# Patient Record
Sex: MALE | Race: BLACK OR AFRICAN AMERICAN | NOT HISPANIC OR LATINO | Employment: FULL TIME | URBAN - METROPOLITAN AREA
[De-identification: names, ages, dates, MRNs, and addresses within clinical notes are randomized per-mention and may not be internally consistent; named-entity substitution may affect disease eponyms.]

---

## 2019-04-01 ENCOUNTER — TRANSFERRED RECORDS (OUTPATIENT)
Dept: HEALTH INFORMATION MANAGEMENT | Facility: CLINIC | Age: 44
End: 2019-04-01

## 2019-04-01 ENCOUNTER — HOSPITAL ENCOUNTER (INPATIENT)
Facility: CLINIC | Age: 44
LOS: 15 days | Discharge: HOME | DRG: 885 | End: 2019-04-17
Attending: PSYCHIATRY & NEUROLOGY | Admitting: PSYCHIATRY & NEUROLOGY
Payer: COMMERCIAL

## 2019-04-01 DIAGNOSIS — Z86.718 HISTORY OF DEEP VENOUS THROMBOSIS: ICD-10-CM

## 2019-04-01 DIAGNOSIS — Z21 ASYMPTOMATIC HUMAN IMMUNODEFICIENCY VIRUS (HIV) INFECTION STATUS (H): Primary | ICD-10-CM

## 2019-04-01 DIAGNOSIS — R45.851 SUICIDAL IDEATION: ICD-10-CM

## 2019-04-01 DIAGNOSIS — F14.24 COCAINE DEPENDENCE WITH COCAINE-INDUCED MOOD DISORDER (H): ICD-10-CM

## 2019-04-01 DIAGNOSIS — F33.1 MODERATE EPISODE OF RECURRENT MAJOR DEPRESSIVE DISORDER (H): ICD-10-CM

## 2019-04-01 LAB
AMPHETAMINES UR QL SCN: NEGATIVE
BARBITURATES UR QL: NEGATIVE
BENZODIAZ UR QL: NEGATIVE
CANNABINOIDS UR QL SCN: NEGATIVE
COCAINE UR QL: POSITIVE
ETHANOL UR QL SCN: NEGATIVE
OPIATES UR QL SCN: NEGATIVE

## 2019-04-01 PROCEDURE — 90791 PSYCH DIAGNOSTIC EVALUATION: CPT

## 2019-04-01 PROCEDURE — 99285 EMERGENCY DEPT VISIT HI MDM: CPT | Mod: 25 | Performed by: PSYCHIATRY & NEUROLOGY

## 2019-04-01 PROCEDURE — 99285 EMERGENCY DEPT VISIT HI MDM: CPT | Mod: Z6 | Performed by: PSYCHIATRY & NEUROLOGY

## 2019-04-01 PROCEDURE — 80307 DRUG TEST PRSMV CHEM ANLYZR: CPT | Performed by: PSYCHIATRY & NEUROLOGY

## 2019-04-01 PROCEDURE — 80320 DRUG SCREEN QUANTALCOHOLS: CPT | Performed by: PSYCHIATRY & NEUROLOGY

## 2019-04-01 RX ORDER — VALACYCLOVIR HYDROCHLORIDE 1 G/1
TABLET, FILM COATED ORAL
Status: ON HOLD | COMMUNITY
Start: 2018-08-13 | End: 2022-02-08

## 2019-04-01 RX ORDER — WARFARIN SODIUM 5 MG/1
TABLET ORAL
Status: ON HOLD | COMMUNITY
Start: 2019-02-07 | End: 2019-04-12

## 2019-04-01 ASSESSMENT — ENCOUNTER SYMPTOMS
NEUROLOGICAL NEGATIVE: 1
MUSCULOSKELETAL NEGATIVE: 1
HALLUCINATIONS: 1
DECREASED CONCENTRATION: 1
GASTROINTESTINAL NEGATIVE: 1
ENDOCRINE NEGATIVE: 1
CARDIOVASCULAR NEGATIVE: 1
HEMATOLOGIC/LYMPHATIC NEGATIVE: 1
ACTIVITY CHANGE: 1
NERVOUS/ANXIOUS: 1
EYES NEGATIVE: 1
RESPIRATORY NEGATIVE: 1
SLEEP DISTURBANCE: 1

## 2019-04-01 NOTE — ED NOTES
I have performed an in person assessment of the patient. Based on this assessment the patient no longer requires a one on one attendant at this point in time.    Frandy Gordon MD  5:23 PM  April 1, 2019         Frandy Gordon MD  04/01/19 2608

## 2019-04-02 PROBLEM — F32.A DEPRESSION: Status: ACTIVE | Noted: 2019-04-02

## 2019-04-02 LAB
ALBUMIN SERPL-MCNC: 2.8 G/DL (ref 3.4–5)
ALP SERPL-CCNC: 40 U/L (ref 40–150)
ALT SERPL W P-5'-P-CCNC: 14 U/L (ref 0–70)
ANION GAP SERPL CALCULATED.3IONS-SCNC: 6 MMOL/L (ref 3–14)
AST SERPL W P-5'-P-CCNC: 13 U/L (ref 0–45)
BASOPHILS # BLD AUTO: 0 10E9/L (ref 0–0.2)
BASOPHILS NFR BLD AUTO: 0.6 %
BILIRUB SERPL-MCNC: 0.2 MG/DL (ref 0.2–1.3)
BUN SERPL-MCNC: 14 MG/DL (ref 7–30)
CALCIUM SERPL-MCNC: 7.8 MG/DL (ref 8.5–10.1)
CHLORIDE SERPL-SCNC: 111 MMOL/L (ref 94–109)
CO2 SERPL-SCNC: 26 MMOL/L (ref 20–32)
CREAT SERPL-MCNC: 0.88 MG/DL (ref 0.66–1.25)
DIFFERENTIAL METHOD BLD: NORMAL
EOSINOPHIL # BLD AUTO: 0.2 10E9/L (ref 0–0.7)
EOSINOPHIL NFR BLD AUTO: 3.1 %
ERYTHROCYTE [DISTWIDTH] IN BLOOD BY AUTOMATED COUNT: 13.5 % (ref 10–15)
GFR SERPL CREATININE-BSD FRML MDRD: >90 ML/MIN/{1.73_M2}
GLUCOSE SERPL-MCNC: 98 MG/DL (ref 70–99)
HCT VFR BLD AUTO: 42.2 % (ref 40–53)
HGB BLD-MCNC: 14 G/DL (ref 13.3–17.7)
IMM GRANULOCYTES # BLD: 0 10E9/L (ref 0–0.4)
IMM GRANULOCYTES NFR BLD: 0.2 %
INR PPP: 1 (ref 0.86–1.14)
LYMPHOCYTES # BLD AUTO: 2.8 10E9/L (ref 0.8–5.3)
LYMPHOCYTES NFR BLD AUTO: 54.8 %
MCH RBC QN AUTO: 30.4 PG (ref 26.5–33)
MCHC RBC AUTO-ENTMCNC: 33.2 G/DL (ref 31.5–36.5)
MCV RBC AUTO: 92 FL (ref 78–100)
MONOCYTES # BLD AUTO: 0.4 10E9/L (ref 0–1.3)
MONOCYTES NFR BLD AUTO: 7.1 %
NEUTROPHILS # BLD AUTO: 1.7 10E9/L (ref 1.6–8.3)
NEUTROPHILS NFR BLD AUTO: 34.2 %
NRBC # BLD AUTO: 0 10*3/UL
NRBC BLD AUTO-RTO: 0 /100
PLATELET # BLD AUTO: 177 10E9/L (ref 150–450)
POTASSIUM SERPL-SCNC: 4 MMOL/L (ref 3.4–5.3)
PROT SERPL-MCNC: 6 G/DL (ref 6.8–8.8)
RBC # BLD AUTO: 4.61 10E12/L (ref 4.4–5.9)
SODIUM SERPL-SCNC: 143 MMOL/L (ref 133–144)
T4 FREE SERPL-MCNC: 0.89 NG/DL (ref 0.76–1.46)
TSH SERPL DL<=0.005 MIU/L-ACNC: 4.88 MU/L (ref 0.4–4)
WBC # BLD AUTO: 5.1 10E9/L (ref 4–11)

## 2019-04-02 PROCEDURE — 36415 COLL VENOUS BLD VENIPUNCTURE: CPT | Performed by: PSYCHIATRY & NEUROLOGY

## 2019-04-02 PROCEDURE — 99223 1ST HOSP IP/OBS HIGH 75: CPT | Mod: AI | Performed by: PSYCHIATRY & NEUROLOGY

## 2019-04-02 PROCEDURE — 80053 COMPREHEN METABOLIC PANEL: CPT | Performed by: PSYCHIATRY & NEUROLOGY

## 2019-04-02 PROCEDURE — 25000132 ZZH RX MED GY IP 250 OP 250 PS 637: Performed by: PSYCHIATRY & NEUROLOGY

## 2019-04-02 PROCEDURE — 85025 COMPLETE CBC W/AUTO DIFF WBC: CPT | Performed by: PSYCHIATRY & NEUROLOGY

## 2019-04-02 PROCEDURE — 85610 PROTHROMBIN TIME: CPT | Performed by: PSYCHIATRY & NEUROLOGY

## 2019-04-02 PROCEDURE — 84443 ASSAY THYROID STIM HORMONE: CPT | Performed by: PSYCHIATRY & NEUROLOGY

## 2019-04-02 PROCEDURE — 84439 ASSAY OF FREE THYROXINE: CPT | Performed by: PSYCHIATRY & NEUROLOGY

## 2019-04-02 PROCEDURE — 12400001 ZZH R&B MH UMMC

## 2019-04-02 RX ORDER — ACETAMINOPHEN 325 MG/1
650 TABLET ORAL EVERY 4 HOURS PRN
Status: DISCONTINUED | OUTPATIENT
Start: 2019-04-02 | End: 2019-04-17 | Stop reason: HOSPADM

## 2019-04-02 RX ORDER — TRAZODONE HYDROCHLORIDE 50 MG/1
50 TABLET, FILM COATED ORAL
Status: DISCONTINUED | OUTPATIENT
Start: 2019-04-02 | End: 2019-04-10

## 2019-04-02 RX ORDER — ALUMINA, MAGNESIA, AND SIMETHICONE 2400; 2400; 240 MG/30ML; MG/30ML; MG/30ML
30 SUSPENSION ORAL EVERY 4 HOURS PRN
Status: DISCONTINUED | OUTPATIENT
Start: 2019-04-02 | End: 2019-04-17 | Stop reason: HOSPADM

## 2019-04-02 RX ORDER — OLANZAPINE 10 MG/1
10 TABLET ORAL
Status: DISCONTINUED | OUTPATIENT
Start: 2019-04-02 | End: 2019-04-17 | Stop reason: HOSPADM

## 2019-04-02 RX ORDER — OLANZAPINE 10 MG/2ML
10 INJECTION, POWDER, FOR SOLUTION INTRAMUSCULAR
Status: DISCONTINUED | OUTPATIENT
Start: 2019-04-02 | End: 2019-04-17 | Stop reason: HOSPADM

## 2019-04-02 RX ORDER — BISACODYL 10 MG
10 SUPPOSITORY, RECTAL RECTAL DAILY PRN
Status: DISCONTINUED | OUTPATIENT
Start: 2019-04-02 | End: 2019-04-17 | Stop reason: HOSPADM

## 2019-04-02 RX ORDER — HYDROXYZINE HYDROCHLORIDE 25 MG/1
25 TABLET, FILM COATED ORAL EVERY 4 HOURS PRN
Status: DISCONTINUED | OUTPATIENT
Start: 2019-04-02 | End: 2019-04-17 | Stop reason: HOSPADM

## 2019-04-02 RX ORDER — SERTRALINE HYDROCHLORIDE 25 MG/1
25 TABLET, FILM COATED ORAL DAILY
Status: DISCONTINUED | OUTPATIENT
Start: 2019-04-02 | End: 2019-04-09

## 2019-04-02 RX ORDER — OLANZAPINE 10 MG/1
10 TABLET ORAL ONCE
Status: COMPLETED | OUTPATIENT
Start: 2019-04-02 | End: 2019-04-02

## 2019-04-02 RX ADMIN — ELVITEGRAVIR, COBICISTAT, EMTRICITABINE, AND TENOFOVIR ALAFENAMIDE 1 TABLET: 150; 150; 200; 10 TABLET ORAL at 12:45

## 2019-04-02 RX ADMIN — OLANZAPINE 10 MG: 10 TABLET, FILM COATED ORAL at 02:05

## 2019-04-02 RX ADMIN — SERTRALINE HYDROCHLORIDE 25 MG: 25 TABLET ORAL at 12:45

## 2019-04-02 RX ADMIN — WARFARIN SODIUM 12.5 MG: 2.5 TABLET ORAL at 19:38

## 2019-04-02 ASSESSMENT — MIFFLIN-ST. JEOR
SCORE: 2022.56
SCORE: 2011.67

## 2019-04-02 ASSESSMENT — ACTIVITIES OF DAILY LIVING (ADL)
RETIRED_EATING: 0-->INDEPENDENT
HYGIENE/GROOMING: INDEPENDENT
FALL_HISTORY_WITHIN_LAST_SIX_MONTHS: NO
LAUNDRY: WITH SUPERVISION
SWALLOWING: 0-->SWALLOWS FOODS/LIQUIDS WITHOUT DIFFICULTY
RETIRED_COMMUNICATION: 0-->UNDERSTANDS/COMMUNICATES WITHOUT DIFFICULTY
BATHING: 0-->INDEPENDENT
HYGIENE/GROOMING: INDEPENDENT;PROMPTS
DRESS: STREET CLOTHES;SCRUBS (BEHAVIORAL HEALTH)
HYGIENE/GROOMING: INDEPENDENT
DRESS: INDEPENDENT;SCRUBS (BEHAVIORAL HEALTH)
DRESS: 0-->INDEPENDENT
ORAL_HYGIENE: INDEPENDENT
DRESS: INDEPENDENT;SCRUBS (BEHAVIORAL HEALTH)
ORAL_HYGIENE: INDEPENDENT
TOILETING: 0-->INDEPENDENT
TRANSFERRING: 0-->INDEPENDENT
COGNITION: 0 - NO COGNITION ISSUES REPORTED
LAUNDRY: WITH SUPERVISION
AMBULATION: 0-->INDEPENDENT
ORAL_HYGIENE: INDEPENDENT

## 2019-04-02 NOTE — ED PROVIDER NOTES
"  History     Chief Complaint   Patient presents with     Suicidal     states \"I got laid off for 45 days and I am really feeling suicidal with a plan.\" \"I was just going to cut my wrist, the voice keeps telling me to just go ahead and do it.\"      Hallucinations     voices telling pt to cut himself.      HPI  Edi Sorenson is a 43 year old male who is here due to feeling suicidal and unsafe. Patient admits to history of cocaine dependence. He gets [place] through [facility] as he has HIV. He was seen at [facility] last July 2018 for suicidal thoughts. He was thinking of jumping in front of the light rail train. Patient stayed overnight in [facility] and was released to [facility] in [place]. He reports completing the treatment program and Aftercare. He worked and kept himself busy and managed to stay sober for 6 months. Patient reports losing his job 6 weeks ago. He felt his depression was not being managed and relapsed on cocaine. He reports spending all of his tax refund ($6000) and $4000 more over a 30 day cocaine binge. He last used last night. He now feels suicidal and reports having command hallucinations to kill himself. He would like admission. Patient denies using other drugs.    Please see DEC Crisis Assessment on 4/1/19 in Epic for further details.    PERSONAL MEDICAL HISTORY  Past Medical History:   Diagnosis Date     Asymptomatic human immunodeficiency virus (HIV) infection status (H)      PAST SURGICAL HISTORY  History reviewed. No pertinent surgical history.  FAMILY HISTORY  History reviewed. No pertinent family history.  SOCIAL HISTORY  Social History     Tobacco Use     Smoking status: Never Smoker     Smokeless tobacco: Never Used   Substance Use Topics     Alcohol use: Yes     MEDICATIONS  No current facility-administered medications for this encounter.      Current Outpatient Medications   Medication     GENVOYA [phone] mg tablet     valACYclovir (VALTREX) 1000 mg tablet     warfarin " (COUMADIN) 5 MG tablet     ALLERGIES  No Known Allergies      I have reviewed the Medications, Allergies, Past Medical and Surgical History, and Social History in the Epic system.    Review of Systems   Constitutional: Positive for activity change.   HENT: Negative.    Eyes: Negative.    Respiratory: Negative.    Cardiovascular: Negative.    Gastrointestinal: Negative.    Endocrine: Negative.    Genitourinary: Negative.    Musculoskeletal: Negative.    Skin: Negative.    Neurological: Negative.    Hematological: Negative.    Psychiatric/Behavioral: Positive for decreased concentration, hallucinations, sleep disturbance and suicidal ideas. The patient is nervous/anxious.    All other systems reviewed and are negative.      Physical Exam   BP: 139/79  Pulse: 76  Temp: 96.2  F (35.7  C)  Resp: 18  Weight: 65.8 kg (145 lb)  SpO2: 98 %      Physical Exam   Constitutional: He appears well-developed and well-nourished.   HENT:   Head: Normocephalic.   Eyes: Pupils are equal, round, and reactive to light.   Neck: Normal range of motion.   Cardiovascular: Normal rate.   Pulmonary/Chest: Effort normal.   Abdominal: Soft.   Musculoskeletal: Normal range of motion.   Neurological: He is alert.   Skin: Skin is warm.   Psychiatric: He has a normal mood and affect. His speech is normal and behavior is normal. Judgment normal. He is not agitated, not aggressive, not hyperactive, not actively hallucinating and not combative. Thought content is not paranoid. Cognition and memory are normal. He expresses suicidal ideation. He expresses no homicidal ideation. He expresses suicidal plans.   Nursing note and vitals reviewed.      ED Course        Procedures               Labs Ordered and Resulted from Time of ED Arrival Up to the Time of Departure from the ED   DRUG ABUSE SCREEN 6 CHEM DEP URINE ([facility])            Assessments & Plan (with Medical Decision Making)   Patient with cocaine dependence who feels acutely suicidal and is unsafe.  Patient is referred for admission. There are no available beds here. Intake is looking for other hospital availability. He will likely be spending overnight in the ED.    I have reviewed the nursing notes.    I have reviewed the findings, diagnosis, plan and need for follow up with the patient.       Medication List      There are no discharge medications for this visit.         Final diagnoses:   Cocaine dependence with cocaine-induced mood disorder (H)   Suicidal ideation       4/1/2019   [facility], [place], EMERGENCY DEPARTMENT     Joni Hernandez MD  04/01/19 [id number]

## 2019-04-02 NOTE — PROGRESS NOTES
Case Management note    Update 12:56pm: Writer met with patient and completed Personal Plan of Care and Initial Psychosocial assessment. Patient was anxious to complete assessment quickly as he reported being tired.  He said that he wanted to get stable while here and looked forward to going back to work after he was started on an anti-depressant.  As he was leaving he asked if I thought he could get in to [facility].  I asked if he was considering CD treatment because he didn't mention it before.  He said he didn't really know - that his head was still unclear and that he'd talk to me about it later.  -------------------------------------  Writer attempted to meet with patient to initiate psychosocial assessment.  Patient was sleeping and asked that writer attempt to meet with him this afternoon as he was quite somnolent.  Writer agreed and left.
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
## 2019-04-02 NOTE — CONSULTS
"Brief Medicine Note    Internal Medicine consulted ASAP today for \"new admission with history of blood clots not compliant with anticoagulants\" with request to \"eval and treat as appropriate.\" Attempted to meet with patient in consultation, however, he became agitated due to frequent interruptions/provider visits and declined interview/exam as he wished to sleep. He was agreeable to being seen by our team tomorrow, so will reattempt at that time.    He did agree to give me a brief history of his blood clots. Per his report, he was diagnosed with a DVT in 5/2018 at which time he was started on Coumadin with Lovenox bridging. He has remained on Coumadin since then, but has been non-compliant in recent weeks with INR 1.00 on admission. He reports his Coumadin dosing is typically increased as an OP when his INR is subtherapeutic and he has never been on Lovenox bridging aside from when the clot was initially diagnosed. He currently refuses Lovenox bridging, but is agreeable to resuming Coumadin. Pharmacy consulted for dosing, INR goal 2-3 per review of OSH records.    Discussed patient's refusal to see our team with RN staff. As noted above, will try to see patient again tomorrow if he is agreeable, but please page the on-call provider with any intercurrent medical issues which arise.    Louisa Lee PA-C  Hospitalist Service  [phone]    "
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
## 2019-04-02 NOTE — ED NOTES
"ED to Behavioral Floor Handoff    SITUATION  Edi Sorenson is a 43 year old male who speaks English and lives in a home alone The patient arrived in the ED by private car from home with a complaint of Suicidal (states \"I got laid off for 45 days and I am really feeling suicidal with a plan.\" \"I was just going to cut my wrist, the voice keeps telling me to just go ahead and do it.\" ) and Hallucinations (voices telling pt to cut himself. )  .The patient's current symptoms started/worsened 1 day(s) ago and during this time the symptoms have increased.   In the ED, pt was diagnosed with   Final diagnoses:   Cocaine dependence with cocaine-induced mood disorder (H)   Suicidal ideation        Initial vitals were: BP: 139/79  Pulse: 76  Temp: 96.2  F (35.7  C)  Resp: 18  Weight: 65.8 kg (145 lb)  SpO2: 98 %   --------  Is the patient diabetic? No   If yes, last blood glucose? --     If yes, was this treated in the ED? --  --------  Is the patient inebriated (ETOH) No or Impaired on other substances? No--utox + for cocaine  MSSA done? N/A  Last MSSA score: --    Were withdrawal symptoms treated? N/A  Does the patient have a seizure history? No. If yes, date of most recent seizure--  --------  Is the patient patient experiencing suicidal ideation? reports the following suicide factors: thoughts of jumping in front of light rail; hears command voices telling him to kill self    Homicidal ideation? denies current or recent homicidal ideation or behaviors.    Self-injurious behavior/urges? denies current or recent self injurious behavior or ideation.  ------  Was pt aggressive in the ED No  Was a code called No  Is the pt now cooperative? Yes  -------  Meds given in ED: Medications - No data to display   Family present during ED course? No  Family currently present? No    BACKGROUND  Does the patient have a cognitive impairment or developmental disability? No  Allergies: No Known Allergies.   Social demographics are "   Social History     Socioeconomic History     Marital status: Single     Spouse name: None     Number of children: None     Years of education: None     Highest education level: None   Occupational History     None   Social Needs     Financial resource strain: None     Food insecurity:     Worry: None     Inability: None     Transportation needs:     Medical: None     Non-medical: None   Tobacco Use     Smoking status: Never Smoker     Smokeless tobacco: Never Used   Substance and Sexual Activity     Alcohol use: Yes     Drug use: Yes     Types: Cocaine     Sexual activity: None   Lifestyle     Physical activity:     Days per week: None     Minutes per session: None     Stress: None   Relationships     Social connections:     Talks on phone: None     Gets together: None     Attends Hoahaoism service: None     Active member of club or organization: None     Attends meetings of clubs or organizations: None     Relationship status: None     Intimate partner violence:     Fear of current or ex partner: None     Emotionally abused: None     Physically abused: None     Forced sexual activity: None   Other Topics Concern     None   Social History Narrative     None        ASSESSMENT  Labs results   Labs Ordered and Resulted from Time of ED Arrival Up to the Time of Departure from the ED   DRUG ABUSE SCREEN 6 CHEM DEP URINE ([facility]) - Abnormal; Notable for the following components:       Result Value    Cocaine Qual Urine Positive (*)     All other components within normal limits      Imaging Studies: No results found for this or any previous visit (from the past 24 hour(s)).   Most recent vital signs /79   Pulse 76   Temp 96.2  F (35.7  C) ([place])   Resp 18   Wt 65.8 kg (145 lb)   SpO2 98%    Abnormal labs/tests/findings requiring intervention:---   Pain control: pt had none  Nausea control: pt had none    RECOMMENDATION  Are any infection precautions needed (MRSA, VRE, etc.)? No If yes, what infection? -- has  HIV  ---  Does the patient have mobility issues? independently. If yes, what device does the pt use? ---  ---  Is patient on 72 hour hold or commitment? No If on 72 hour hold, have hold and rights been given to patient? N/A  Are admitting orders written if after 10 p.m. ?N/A  Tasks needing to be completed:---     Bindu Crawford ; Aniyah Zambrano after 23:30  ascom-- [facility] [phone] [place] [facility]   [phone] [facility]

## 2019-04-02 NOTE — PROGRESS NOTES
04/02/19 0134   Patient Belongings   Did you bring any home meds/supplements to the hospital?  Yes   Disposition of meds  Sent to security/pharmacy per site process   Patient Belongings locker;sent to security per site process   Patient Belongings Put in Hospital Secure Location (Security or Locker, etc.) other (see comments)   Belongings Search Yes   Clothing Search Yes   Second Staff E. A-H.     In locker:  Hat  Phone  Pair of socks  Pair of pants  Shirt  Sweater  Pair of shoes  Belt  Set of keys  Wallet  Lighter  Jacket    To security:  Visa - 1967  [place] EBT - [phone]  [employer] gift card  [employer] gift card    A               Admission:  I am responsible for any personal items that are not sent to the safe or pharmacy.  [facility] is not responsible for loss, theft or damage of any property in my possession.    Signature:  _________________________________ Date: _______  Time: _____                                              Staff Signature:  ____________________________ Date: ________  Time: _____      2nd Staff person, if patient is unable/unwilling to sign:    Signature: ________________________________ Date: ________  Time: _____     Discharge:  [facility] has returned all of my personal belongings:    Signature: _________________________________ Date: ________  Time: _____                                          Staff Signature:  ____________________________ Date: ________  Time: _____

## 2019-04-02 NOTE — PHARMACY-ANTICOAGULATION SERVICE
Clinical Pharmacy - Warfarin Dosing Consult     Pharmacy has been consulted to manage this patient s warfarin therapy.  Indication: DVT/ PE Treatment  Therapy Goal: INR 2-3  Provider/Team: Day Lee  Warfarin Prior to Admission: Yes  Warfarin PTA Regimen: 12.5 mg daily  Significant drug interactions: Acetaminophen,sertaline  Recent documented change in oral intake/nutrition: Unknown    INR   Date Value Ref Range Status   04/02/2019 1.00 0.86 - 1.14 Final       Recommend warfarin 12.5 mg today.  Pharmacy will monitor Edi Sorenson daily and order warfarin doses to achieve specified goal.      Please contact pharmacy as soon as possible if the warfarin needs to be held for a procedure or if the warfarin goals change.

## 2019-04-02 NOTE — PLAN OF CARE
BEHAVIORAL TEAM DISCUSSION    Participants: Nader Ortiz MD, Leann Zavala RN, Aniyah SWAIN  Progress: New Admission  Continued Stay Criteria/Rationale: Evaluate and assess  Medical/Physical: Evaluate and assess  Precautions:   Behavioral Orders   Procedures    Code 1 - Restrict to Unit    Elopement precautions    Routine Programming     As clinically indicated    Sexual precautions    Status 15     Every 15 minutes.    Suicide precautions     Patients on Suicide Precautions should have a Combination Diet ordered that includes a Diet selection(s) AND a Behavioral Tray selection for Safe Tray - with utensils, or Safe Tray - NO utensils      Withdrawal precautions     Plan: Start anti depressant, evaluate and assess  Rationale for change in precautions or plan: New admit

## 2019-04-02 NOTE — PROGRESS NOTES
"CLINICAL NUTRITION SERVICES - ASSESSMENT NOTE     Nutrition Prescription    Malnutrition Status:    Non-severe malnutrition in the context of acute on chronic illness (MH)    Recommendations already ordered by Registered Dietitian (RD):  Continue regular diet    Future/Additional Recommendations:  If intake declines, recommend scheduling snack/supplement to help meet nutritional needs.      REASON FOR ASSESSMENT  Edi Sorenson is a/an 43 year old male assessed by the dietitian for Admission Nutrition Risk Screen for reduced oral intake over the last month    NUTRITION HISTORY  Pt presenting with severe depression and anxiety. Noted pt with heavy substance abuse PTA. Per discussion with pt, he has noticed some wt loss PTA, although he feels happy about the weight loss. Pt reports eating 2-3 small meals PTA, most often consuming instant ramen noodles.     CURRENT NUTRITION ORDERS  Diet: Regular  Intake/Tolerance: Pt reports having a good appetite and consuming 100% of his meals since admitted. Met with pt during lunch today and he ate 100% of a PB and J sandwich and skim milk. Pt states he is going to take the opportunity to optimize his nutritional status while admitted.     LABS  Labs reviewed    MEDICATIONS  Medications reviewed    ANTHROPOMETRICS  Height: 182.9 cm (6' 0\")  Most Recent Weight: 106.9 kg (235 lb 9.6 oz)    IBW: 80.9 kg (132%)  BMI: Obesity Grade I BMI 30-34.9  Weight History: Per documented wts, pt has lost 23 lbs (9%) of his body wt in ~ 2 months. This is clinically significant wt loss.    Weights per care everywhere:  117 kg (258 lb) on 2/7/19  117.9 kg (260 lb) on 9/27/18      Dosing Weight: 87 kg    ASSESSED NUTRITION NEEDS  Estimated Energy Needs: 1654-9215 kcals/day (25 - 30 kcals/kg)  Justification: Maintenance  Estimated Protein Needs:  grams protein/day (1 - 1.2 grams of pro/kg)  Justification: Repletion  Estimated Fluid Needs: 6718-4763 mL/day (1 mL/kcal)   Justification: " "Maintenance    PHYSICAL FINDINGS  See malnutrition section below.    MALNUTRITION  % Intake: < 75% for >/= 1 month (non-severe)  % Weight Loss: > 7.5% in 3 months (severe)  Subcutaneous Fat Loss: None observed  Muscle Loss: None observed  Fluid Accumulation/Edema: None noted  Malnutrition Diagnosis: Non-severe malnutrition in the context of acute on chronic illness (MH)    NUTRITION DIAGNOSIS  Predicted inadequate nutrient intake (protein-energy) related to variable appetite as evidenced by reliance on po intakes to meet estimated needs with potential for decline    INTERVENTIONS  Implementation  Nutrition education for nutrition relationship to health/disease - Discussed importance of adequate nutritional intake for overall health, and loss of LBM during times of quick (severe) wt loss. Encouraged pt to take a multivitamin to meet micronutrient needs and pt reports \"he will get that from food.\"     Goals  Patient to consume % of nutritionally adequate meal trays TID, or the equivalent with supplements/snacks.     Monitoring/Evaluation  Progress toward goals will be monitored and evaluated per protocol.    Irma Espinal RD, LD  Pager: [phone]    "

## 2019-04-03 LAB
ANION GAP SERPL CALCULATED.3IONS-SCNC: 7 MMOL/L (ref 3–14)
BUN SERPL-MCNC: 14 MG/DL (ref 7–30)
CALCIUM SERPL-MCNC: 8.2 MG/DL (ref 8.5–10.1)
CHLORIDE SERPL-SCNC: 109 MMOL/L (ref 94–109)
CO2 SERPL-SCNC: 27 MMOL/L (ref 20–32)
CREAT SERPL-MCNC: 0.92 MG/DL (ref 0.66–1.25)
GFR SERPL CREATININE-BSD FRML MDRD: >90 ML/MIN/{1.73_M2}
GLUCOSE SERPL-MCNC: 97 MG/DL (ref 70–99)
INR PPP: 0.99 (ref 0.86–1.14)
POTASSIUM SERPL-SCNC: 3.9 MMOL/L (ref 3.4–5.3)
SODIUM SERPL-SCNC: 143 MMOL/L (ref 133–144)

## 2019-04-03 PROCEDURE — 86359 T CELLS TOTAL COUNT: CPT | Performed by: PHYSICIAN ASSISTANT

## 2019-04-03 PROCEDURE — 87389 HIV-1 AG W/HIV-1&-2 AB AG IA: CPT | Performed by: PHYSICIAN ASSISTANT

## 2019-04-03 PROCEDURE — 99232 SBSQ HOSP IP/OBS MODERATE 35: CPT | Performed by: PSYCHIATRY & NEUROLOGY

## 2019-04-03 PROCEDURE — 25000132 ZZH RX MED GY IP 250 OP 250 PS 637: Performed by: PSYCHIATRY & NEUROLOGY

## 2019-04-03 PROCEDURE — 12400001 ZZH R&B MH UMMC

## 2019-04-03 PROCEDURE — 99222 1ST HOSP IP/OBS MODERATE 55: CPT | Performed by: PHYSICIAN ASSISTANT

## 2019-04-03 PROCEDURE — 36415 COLL VENOUS BLD VENIPUNCTURE: CPT | Performed by: PHYSICIAN ASSISTANT

## 2019-04-03 PROCEDURE — 25000132 ZZH RX MED GY IP 250 OP 250 PS 637: Performed by: PHYSICIAN ASSISTANT

## 2019-04-03 PROCEDURE — 80048 BASIC METABOLIC PNL TOTAL CA: CPT | Performed by: PSYCHIATRY & NEUROLOGY

## 2019-04-03 PROCEDURE — 86360 T CELL ABSOLUTE COUNT/RATIO: CPT | Performed by: PHYSICIAN ASSISTANT

## 2019-04-03 PROCEDURE — 99207 ZZC CONSULT E&M CHANGED TO INITIAL LEVEL: CPT | Performed by: PHYSICIAN ASSISTANT

## 2019-04-03 PROCEDURE — 85610 PROTHROMBIN TIME: CPT | Performed by: PSYCHIATRY & NEUROLOGY

## 2019-04-03 PROCEDURE — 36415 COLL VENOUS BLD VENIPUNCTURE: CPT | Performed by: PSYCHIATRY & NEUROLOGY

## 2019-04-03 RX ORDER — LACTOBACILLUS RHAMNOSUS GG 10B CELL
1 CAPSULE ORAL 2 TIMES DAILY
Status: DISCONTINUED | OUTPATIENT
Start: 2019-04-03 | End: 2019-04-17 | Stop reason: HOSPADM

## 2019-04-03 RX ADMIN — TRAZODONE HYDROCHLORIDE 50 MG: 50 TABLET ORAL at 20:29

## 2019-04-03 RX ADMIN — ELVITEGRAVIR, COBICISTAT, EMTRICITABINE, AND TENOFOVIR ALAFENAMIDE 1 TABLET: 150; 150; 200; 10 TABLET ORAL at 13:04

## 2019-04-03 RX ADMIN — HYDROXYZINE HYDROCHLORIDE 25 MG: 25 TABLET ORAL at 14:13

## 2019-04-03 RX ADMIN — SERTRALINE HYDROCHLORIDE 25 MG: 25 TABLET ORAL at 09:12

## 2019-04-03 RX ADMIN — Medication 1 CAPSULE: at 20:29

## 2019-04-03 RX ADMIN — WARFARIN SODIUM 12.5 MG: 10 TABLET ORAL at 17:25

## 2019-04-03 SDOH — HEALTH STABILITY: MENTAL HEALTH: HOW OFTEN DO YOU HAVE A DRINK CONTAINING ALCOHOL?: NEVER

## 2019-04-03 ASSESSMENT — ACTIVITIES OF DAILY LIVING (ADL)
HYGIENE/GROOMING: INDEPENDENT
ORAL_HYGIENE: INDEPENDENT
DRESS: INDEPENDENT
DRESS: SCRUBS (BEHAVIORAL HEALTH);INDEPENDENT
HYGIENE/GROOMING: INDEPENDENT
LAUNDRY: WITH SUPERVISION
LAUNDRY: WITH SUPERVISION
ORAL_HYGIENE: INDEPENDENT

## 2019-04-03 NOTE — PROGRESS NOTES
"Patient is currently hearing voices \"telling me I ain't shit,\" but denying SI/HI/SIB and stating \"it's better\" than when he was admitted. Low mood currently. Does not have any questions or needs at this time, anxious to get his medication.       04/03/19 1300   Behavioral Health   Hallucinations auditory  (voices telling patient he is worthless)   Thinking intact   Orientation person: oriented;place: oriented;date: oriented;time: oriented   Memory baseline memory   Insight insight appropriate to situation;insight appropriate to events   Judgement intact   Eye Contact at examiner   Affect tense;blunted, flat   Mood depressed   Physical Appearance/Attire attire appropriate to age and situation   Hygiene well groomed   Suicidality other (see comments)  (not currently--just voices telling him he \"ain't shit\")   1. Wish to be Dead No   2. Non-Specific Active Suicidal Thoughts  No   Self Injury other (see comment)  (no statements or behaviors)   Elopement (no statements or behaviors)   Activity withdrawn   Speech clear;coherent   Medication Sensitivity no stated side effects;no observed side effects   Psychomotor / Gait balanced;steady   Activities of Daily Living   Hygiene/Grooming independent   Oral Hygiene independent   Dress independent   Laundry with supervision   Room Organization independent   Julito Flowers on 4/3/2019 at 1:10 PM    "

## 2019-04-03 NOTE — PROGRESS NOTES
"When this writer tried to ask pt some questions in the am; pt responded, \"I just want to sleep\". Later pt was a bit more receptive; he rated his depression an \"8-9\" and his anxiety a \"9\". Pt denies hallucinations, SI, and SIB thoughts.  Pt slept all day, but did get up for meals.       04/02/19 1449   Behavioral Health   Thinking distractable;poor concentration   Orientation time: oriented;date: oriented;place: oriented;person: oriented   Memory baseline memory   Insight poor   Judgement impaired   Eye Contact out of corner of eyes   Affect blunted, flat   Mood depressed;anxious   Physical Appearance/Attire appears stated age;untidy   Hygiene (fair)   Suicidality (denies)   1. Wish to be Dead No   2. Non-Specific Active Suicidal Thoughts  No   Self Injury (denies)   Elopement (WDL) WDL   Activity isolative;withdrawn   Speech coherent;clear   Medication Sensitivity no stated side effects;no observed side effects   Psychomotor / Gait steady;balanced;slow   Coping/Psychosocial   Verbalized Emotional State anxiety;depression   Safety   Suicidality Status 15;Room close to team care station (desk)   Elopement status 15;room away from unit doors   Activities of Daily Living   Hygiene/Grooming independent;prompts   Oral Hygiene independent   Dress independent;scrubs (behavioral health)   Laundry with supervision   Room Organization independent     "

## 2019-04-03 NOTE — PROGRESS NOTES
[facility], [place]   Psychiatric Progress Note  Hospital Day: 1        Interim History:   The patient's care was discussed with the treatment team during the daily team meeting and/or staff's chart notes were reviewed.  Staff report patient has been isolative, coming out mostly for food.    Upon interview, the patient reports he is feeling down. Ongoing suicidal thoughts. More ambivalent about CD treatment at this time.    Psychiatric Symptoms: depression, suicidal thoughts    Medication side effects reported: mild nausea    Other issues reported by patient: none         Medications:       Gberssi-Lcdbc-Qsefqnwu-TenofAF  1 tablet Oral Daily with breakfast     lactobacillus rhamnosus (GG)  1 capsule Oral BID     sertraline  25 mg Oral Daily     warfarin  12.5 mg Oral ONCE at 18:00          Allergies:   No Known Allergies       Labs:     Recent Results (from the past 48 hour(s))   Drug abuse screen 6 urine (tox)    Collection Time: 04/01/19  9:20 PM   Result Value Ref Range    Amphetamine Qual Urine Negative NEG^Negative    Barbiturates Qual Urine Negative NEG^Negative    Benzodiazepine Qual Urine Negative NEG^Negative    Cannabinoids Qual Urine Negative NEG^Negative    Cocaine Qual Urine Positive (A) NEG^Negative    Ethanol Qual Urine Negative NEG^Negative    Opiates Qualitative Urine Negative NEG^Negative   Comprehensive metabolic panel    Collection Time: 04/02/19  8:04 AM   Result Value Ref Range    Sodium 143 133 - 144 mmol/L    Potassium 4.0 3.4 - 5.3 mmol/L    Chloride 111 (H) 94 - 109 mmol/L    Carbon Dioxide 26 20 - 32 mmol/L    Anion Gap 6 3 - 14 mmol/L    Glucose 98 70 - 99 mg/dL    Urea Nitrogen 14 7 - 30 mg/dL    Creatinine 0.88 0.66 - 1.25 mg/dL    GFR Estimate >90 >60 mL/min/[1.73_m2]    GFR Estimate If Black >90 >60 mL/min/[1.73_m2]    Calcium 7.8 (L) 8.5 - 10.1 mg/dL    Bilirubin Total 0.2 0.2 - 1.3 mg/dL    Albumin 2.8 (L) 3.4 - 5.0 g/dL    Protein Total 6.0 (L) 6.8 -  8.8 g/dL    Alkaline Phosphatase 40 40 - 150 U/L    ALT 14 0 - 70 U/L    AST 13 0 - 45 U/L   INR    Collection Time: 04/02/19  8:04 AM   Result Value Ref Range    INR 1.00 0.86 - 1.14   TSH with free T4 reflex and/or T3 as indicated    Collection Time: 04/02/19  8:04 AM   Result Value Ref Range    TSH 4.88 (H) 0.40 - 4.00 mU/L   CBC with platelets differential    Collection Time: 04/02/19  8:04 AM   Result Value Ref Range    WBC 5.1 4.0 - 11.0 10e9/L    RBC Count 4.61 4.4 - 5.9 10e12/L    Hemoglobin 14.0 13.3 - 17.7 g/dL    Hematocrit 42.2 40.0 - 53.0 %    MCV 92 78 - 100 fl    MCH 30.4 26.5 - 33.0 pg    MCHC 33.2 31.5 - 36.5 g/dL    RDW 13.5 10.0 - 15.0 %    Platelet Count 177 150 - 450 10e9/L    Diff Method Automated Method     % Neutrophils 34.2 %    % Lymphocytes 54.8 %    % Monocytes 7.1 %    % Eosinophils 3.1 %    % Basophils 0.6 %    % Immature Granulocytes 0.2 %    Nucleated RBCs 0 0 /100    Absolute Neutrophil 1.7 1.6 - 8.3 10e9/L    Absolute Lymphocytes 2.8 0.8 - 5.3 10e9/L    Absolute Monocytes 0.4 0.0 - 1.3 10e9/L    Absolute Eosinophils 0.2 0.0 - 0.7 10e9/L    Absolute Basophils 0.0 0.0 - 0.2 10e9/L    Abs Immature Granulocytes 0.0 0 - 0.4 10e9/L    Absolute Nucleated RBC 0.0    T4 free    Collection Time: 04/02/19  8:04 AM   Result Value Ref Range    T4 Free 0.89 0.76 - 1.46 ng/dL   Basic metabolic panel    Collection Time: 04/03/19  7:23 AM   Result Value Ref Range    Sodium 143 133 - 144 mmol/L    Potassium 3.9 3.4 - 5.3 mmol/L    Chloride 109 94 - 109 mmol/L    Carbon Dioxide 27 20 - 32 mmol/L    Anion Gap 7 3 - 14 mmol/L    Glucose 97 70 - 99 mg/dL    Urea Nitrogen 14 7 - 30 mg/dL    Creatinine 0.92 0.66 - 1.25 mg/dL    GFR Estimate >90 >60 mL/min/[1.73_m2]    GFR Estimate If Black >90 >60 mL/min/[1.73_m2]    Calcium 8.2 (L) 8.5 - 10.1 mg/dL   INR    Collection Time: 04/03/19  7:23 AM   Result Value Ref Range    INR 0.99 0.86 - 1.14          Psychiatric Examination:     /86   Pulse 80    Temp 96.8  F (36  C) (Tympanic)   Resp 16   Ht 1.829 m (6')   Wt 106.9 kg (235 lb 9.6 oz)   SpO2 98%   BMI 31.95 kg/m    Weight is 235 lbs 9.6 oz  Body mass index is 31.95 kg/m .    Orthostatic Vitals       Most Recent      Sitting Orthostatic /80 04/03 0801    Sitting Orthostatic Pulse (bpm) 68 04/03 0801    Standing Orthostatic /79 04/03 0801    Standing Orthostatic Pulse (bpm) 91 04/03 0801            Appearance: awake, alert, adequately groomed and dressed in hospital scrubs  Attitude:  cooperative  Eye Contact:  good  Mood:  depressed  Affect:  appropriate and in normal range and mood congruent  Speech:  clear, coherent and normal prosody  Language: fluent and intact in English  Psychomotor, Gait, Musculoskeletal:  no evidence of tardive dyskinesia, dystonia, or tics and intact station, gait and muscle tone  Throught Process:  logical, linear and goal oriented  Associations:  no loose associations  Thought Content:  no evidence of psychotic thought and passive suicidal ideation present  Insight:  good  Judgement:  intact  Oriented to:  time, person, and place  Attention Span and Concentration:  intact  Recent and Remote Memory:  intact  Fund of Knowledge:  appropriate    Clinical Global Impressions  First:  Considering your total clinical experience with this particular patient population, how severe are the patient's symptoms at this time?: 7 (04/02/19 1022)  Compared to the patient's condition at the START of treatment, this patient's condition is:: 4 (04/02/19 1022)  Most recent:  Considering your total clinical experience with this particular patient population, how severe are the patient's symptoms at this time?: 7 (04/02/19 1022)  Compared to the patient's condition at the START of treatment, this patient's condition is:: 4 (04/02/19 1022)           Precautions:     Behavioral Orders   Procedures     Code 1 - Restrict to Unit     Elopement precautions     Routine Programming     As  clinically indicated     Sexual precautions     Status 15     Every 15 minutes.     Suicide precautions     Patients on Suicide Precautions should have a Combination Diet ordered that includes a Diet selection(s) AND a Behavioral Tray selection for Safe Tray - with utensils, or Safe Tray - NO utensils       Withdrawal precautions          Diagnoses:      1.  Major depressive disorder, recurrent, severe without psychotic features.   2.  Stimulant use disorder, severe, cocaine type.     3.  HIV positive.   4.  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              Assessment & Plan:       Target psychiatric symptoms and interventions:  1. Depression  -continue with sertraline 25 mg and titrate as tolerated and needed    2. Substance use disorder  -refer for CD treatment    Medical Problems and Treatments:  History of DVT  -management per internal medicine      Behavioral/Psychological/Social:  Encourage unit programming        Disposition Plan   Reason for ongoing admission: poses an imminent risk to self  Discharge location: Chemical dependency treatment facility  Discharge Medications: not ordered  Follow-up Appointments: not scheduled  Legal Status: voluntary  Entered by: Nader Ortiz on 4/3/2019 at 4:52 PM

## 2019-04-03 NOTE — CONSULTS
[facility], [place]  Consult Note - Hospitalist Service       Date of Admission: 4/1/2019  Consult Requested by: Dr. Ortiz  Reason for Consult: Hx of blood clots, non-compliant with anticoagulants.    Assessment & Plan   Edi Sorenson is a 43 year old male with a history of HIV, DVTs (last 6/2018), genital HSV, cocaine abuse, and depression who is admitted to inpatient behavioral health with auditory hallucinations, SI, and HI.    MDD with SI, HI, and Auditory Hallucinations - Not on any PTA medications.   - Management per Psychiatry.    Hx of DVTs - 2 prior DVTs reported by patient, last in 6/2018 with US showing occlusive thrombus from R posterior tibial vein into proximal femoral vein. On Coumadin PTA managed by [facility] anticoagulation clinic with non-compliance for a few weeks PTA. Most recent INR 0.99 on 4/3.  - Coumadin per pharmacy dosing. INR goal 2-3.  - Declined Lovenox bridging     HIV - On Genvoya PTA with non-compliance for a few weeks PTA. Last HIV Quant <48 in 9/2018 and absolute CD4 count 1,122 in 10/2016. Follows with Dr. Puentes of [facility] ID, last visit 9/2018.   - PTA Genvoya resumed by Psychiatry on admission  - Check HIV Quant and CD4 count given non-compliance with antiretrovirals PTA   - Follow up with ID as scheduled in 5/2019    Genital HSV - On Valtrex 1000 mg BID PRN for flares PTA. Currently asymptomatic.   - If concern for outbreak this admission, start Valtrex per PTA dosing    Cocaine Abuse - Using 7-8 grams daily PTA via snorting or smoking. Denies any hx of IVDU.  - Management per Psychiatry.    Intermittent Loose Stools - Since inpatient behavioral health admission. Suspect d/t cocaine withdrawal vs medications. No nausea, vomiting, or abdominal pain. Having formed stools as well. Low suspicion for infectious etiology given afebrile with normal WBC and lack of associated GI symptoms.  - Start daily probiotic  - Notify Medicine if worsening diarrhea,  fever, or new GI symptoms develop    Subclinical Hypothyroidism - TSH 4.88 (H), FT4 0.89 (WNL) on 4/2/19. No hx of thyroid disease. Likely sick euthyroid syndrome in setting of acute psychiatric illness. Asymptomatic.   - Repeat TFTs per PCP in 6-8 weeks.    Medicine will follow up on HIV Quant and CD4 count.    Louisa Lee PA-C  Hospitalist Service  [phone]  _____________________________________________________________________    Chief Complaint   Auditory Hallucinations, Suicidal Ideation, Homicidal Ideation    History is obtained from the patient and electronic health record    History of Present Illness   Edi Sorenson is a 43 year old male with a history of HIV, DVTs (last 6/2018), genital HSV, cocaine abuse, and depression who is admitted to inpatient behavioral health with auditory hallucinations and suicidal/homicidal ideations. Internal Medicine was consulted due to history of DVTs with anticoagulation non-compliance PTA. Mr. Sorenson reports he has had 2 prior DVTs the most recent of which was in 6/2018 at which time he was started on Coumadin. He has remained on Coumadin since then which is [place] [facility] [place], but reports non-compliance for a few weeks PTA. Denies any history of PEs.     He reports using 7-8 grams of cocaine daily PTA; typical modality of use is via smoking or snorting. Denies any IVDU. Currently, he endorses intermittent loose stools since inpatient behavioral health admission and occasional chronic dry cough which is unchanged from baseline. Denies fevers, rhinorrhea, sore throat, chest pain, SOB, n/v/c, abdominal pain, or dysuria.    Review of Systems   The 10 point Review of Systems is negative other than noted in the HPI or here.     Past Medical History    I have reviewed this patient's medical history and updated it with pertinent information if needed.   Past Medical History:   Diagnosis Date     Asymptomatic human immunodeficiency virus (HIV)  infection status (H)      Cocaine abuse (H)      Depression      DVT (deep venous thrombosis) (H)     Last in 6/2018. No hx of PE. On Coumadin.     Genital HSV        Past Surgical History   I have reviewed this patient's surgical history and updated it with pertinent information if needed.  History reviewed. No pertinent surgical history.    Social History   I have reviewed this patient's social history and updated it with pertinent information if needed.  Social History     Tobacco Use     Smoking status: Former Smoker     Smokeless tobacco: Never Used   Substance Use Topics     Alcohol use: No     Frequency: Never     Drug use: Yes     Types: Cocaine       Family History   I have reviewed this patient's family history and updated it with pertinent information if needed.   History reviewed. No pertinent family history.    Medications   Medications Prior to Admission   Medication Sig Dispense Refill Last Dose     GENVOYA [id number] mg tablet Take 1 tablet by mouth   4/1/2019 at Unknown time     valACYclovir (VALTREX) 1000 mg tablet Take 1 tablet twice daily as needed for flares of herpetic lesions.   Past Month at Unknown time     warfarin (COUMADIN) 5 MG tablet Take 2 and 1/2 tablets (12.5 mg) daily.  Repeat INR in one week.   More than a month at Unknown time       Allergies   No Known Allergies    Physical Exam   Vital Signs: Temp: 96.8  F (36  C) Temp src: Tympanic       Resp: 16        Weight: 235 lbs 9.6 oz    General: Awake. Non-toxic appearing. NAD.  HEENT: NC/AT. Anicteric sclera. Mucous membranes moist.  CV: RRR. S1,S2. No appreciable murmurs.  Respiratory: Normal effort on RA. Lungs CTAB without wheezes, rales, or rhonchi.  GI: Soft, non-tender, and non-distended with bowel sounds present.  Extremities: Warm and well perfused. No peripheral edema.   Neuro: Alert. Answers questions appropriately. Moves all extremities.   Skin: No rashes or jaundice on exposed areas of skin.    Data   Most Recent  CBC:  Recent Labs   Lab Test 04/02/19  0804   WBC 5.1   HGB 14.0   MCV 92        Most Recent BMP's:  Recent Labs   Lab Test 04/03/19  0723 04/02/19  0804    143   POTASSIUM 3.9 4.0   CHLORIDE 109 111*   CO2 27 26   BUN 14 14   CR 0.92 0.88   ANIONGAP 7 6   VERNA 8.2* 7.8*   GLC 97 98     Most Recent LFT's:  Recent Labs   Lab Test 04/02/19  0804   AST 13   ALT 14   ALKPHOS 40   BILITOTAL 0.2     Most Recent INR's:  Recent Labs   Lab Test 04/03/19  0723 04/02/19  0804   INR 0.99 1.00

## 2019-04-03 NOTE — PROGRESS NOTES
Case Management Note    Writer met with patient to complete Rule 25 assessment.  The pt reported he had done a Rule 25 assessment immediately preceding his hospitalization here with Beatriz at the [facility] ([phone]//fax [phone]).  Patient signed SYLVAIN and this was sent along with a request for the original Rule 25 were requested by fax.  Spoke with RN Leelee tai at [facility] with patient present and requested original Rule 25.  She said she will convey to [facility] assessors Beatriz and Renard that a request for the assessment be sent to our facility    Referred to [facility] [phone]-they will review.  [facility] to follow up with them on referral    [facility] [phone]/fax [phone] in [facility] event [facility]  Sent SYLVAIN and Rule 25 update.  [facility] to follow up with them on referral    Sent funding documents to [facility] (for [facility]).  [facility] to follow up with them ([phone]/fax [phone])
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
## 2019-04-03 NOTE — PROGRESS NOTES
04/02/19 2223   Behavioral Health   Hallucinations denies / not responding to hallucinations   Thinking poor concentration   Orientation time: oriented;date: oriented;place: oriented   Memory baseline memory   Insight insight appropriate to events   Judgement impaired   Eye Contact at examiner   Affect blunted, flat   Mood mood is calm   Physical Appearance/Attire attire appropriate to age and situation   Hygiene neglected grooming - unclean body, hair, teeth   Suicidality (denied )   1. Wish to be Dead No   2. Non-Specific Active Suicidal Thoughts  No   Self Injury (denied )   Elopement (denied )   Speech (denied )   Psychomotor / Gait steady;balanced   Psycho Education   Type of Intervention 1:1 intervention   Response participates, initiates socially appropriate   Hours 0.5   Activities of Daily Living   Hygiene/Grooming independent   Oral Hygiene independent   Dress street clothes;scrubs (behavioral health)   Laundry with supervision   Room Organization independent   Activity   Activity Assistance Provided independent     Pt was calm and cooperative with blunted mood affect, isolative, withdrawn, and minimally social. Pt stayed in his room for the majority part of the shift, otherwise he comes out for requests, medication and meals. Pt denied all mental health symptoms including SI/SIB. Pt's appetite was good and sleeping and napping well. No behavioral issues. No major concerns.

## 2019-04-04 LAB
CD3 CELLS # BLD: 1735 CELLS/UL (ref 603–2990)
CD3 CELLS NFR BLD: 90 % (ref 49–84)
CD3+CD4+ CELLS # BLD: 827 CELLS/UL (ref 441–2156)
CD3+CD4+ CELLS NFR BLD: 43 % (ref 28–63)
CD3+CD4+ CELLS/CD3+CD8+ CLL BLD: 0.93 % (ref 1.4–2.6)
CD3+CD8+ CELLS # BLD: 878 CELLS/UL (ref 125–1312)
CD3+CD8+ CELLS NFR BLD: 46 % (ref 10–40)
IFC SPECIMEN: ABNORMAL
INR PPP: 1.13 (ref 0.86–1.14)

## 2019-04-04 PROCEDURE — 25000132 ZZH RX MED GY IP 250 OP 250 PS 637: Performed by: PHYSICIAN ASSISTANT

## 2019-04-04 PROCEDURE — 25000132 ZZH RX MED GY IP 250 OP 250 PS 637: Performed by: PSYCHIATRY & NEUROLOGY

## 2019-04-04 PROCEDURE — 85610 PROTHROMBIN TIME: CPT | Performed by: PHYSICIAN ASSISTANT

## 2019-04-04 PROCEDURE — 12400001 ZZH R&B MH UMMC

## 2019-04-04 PROCEDURE — 99233 SBSQ HOSP IP/OBS HIGH 50: CPT | Performed by: PSYCHIATRY & NEUROLOGY

## 2019-04-04 PROCEDURE — H2032 ACTIVITY THERAPY, PER 15 MIN: HCPCS

## 2019-04-04 PROCEDURE — 36415 COLL VENOUS BLD VENIPUNCTURE: CPT | Performed by: PHYSICIAN ASSISTANT

## 2019-04-04 RX ORDER — VALACYCLOVIR HYDROCHLORIDE 1 G/1
1000 TABLET, FILM COATED ORAL 2 TIMES DAILY
Status: DISCONTINUED | OUTPATIENT
Start: 2019-04-04 | End: 2019-04-08

## 2019-04-04 RX ADMIN — MAGNESIUM HYDROXIDE 30 ML: 400 SUSPENSION ORAL at 21:34

## 2019-04-04 RX ADMIN — OLANZAPINE 10 MG: 10 TABLET, FILM COATED ORAL at 00:36

## 2019-04-04 RX ADMIN — Medication 1 CAPSULE: at 20:05

## 2019-04-04 RX ADMIN — OLANZAPINE 10 MG: 10 TABLET, FILM COATED ORAL at 21:34

## 2019-04-04 RX ADMIN — ELVITEGRAVIR, COBICISTAT, EMTRICITABINE, AND TENOFOVIR ALAFENAMIDE 1 TABLET: 150; 150; 200; 10 TABLET ORAL at 12:58

## 2019-04-04 RX ADMIN — Medication 1 CAPSULE: at 08:26

## 2019-04-04 RX ADMIN — VALACYCLOVIR HYDROCHLORIDE 1000 MG: 1 TABLET, FILM COATED ORAL at 21:33

## 2019-04-04 RX ADMIN — WARFARIN SODIUM 15 MG: 10 TABLET ORAL at 18:14

## 2019-04-04 RX ADMIN — SERTRALINE HYDROCHLORIDE 25 MG: 25 TABLET ORAL at 08:26

## 2019-04-04 ASSESSMENT — ACTIVITIES OF DAILY LIVING (ADL)
LAUNDRY: WITH SUPERVISION
DRESS: INDEPENDENT
ORAL_HYGIENE: INDEPENDENT
HYGIENE/GROOMING: INDEPENDENT

## 2019-04-04 ASSESSMENT — MIFFLIN-ST. JEOR: SCORE: 1999.88

## 2019-04-04 NOTE — PROGRESS NOTES
Re: Discharge planning      Writer received call from Patricia with [facility] Rule 25. Patient has [employer] PMAP and is not eligible for [facility] funding.    Tammi Kelley, LPCC, LADC

## 2019-04-04 NOTE — PROGRESS NOTES
04/03/19 2022   Behavioral Health   Hallucinations auditory   Thinking intact   Orientation person: oriented;place: oriented;date: oriented;time: oriented   Memory baseline memory   Insight insight appropriate to situation;insight appropriate to events   Judgement intact   Eye Contact at examiner   Affect blunted, flat   Mood depressed;mood is calm   Physical Appearance/Attire attire appropriate to age and situation;appears younger than stated age   Hygiene well groomed   Suicidality other (see comments)  (not this shift)   1. Wish to be Dead No   2. Non-Specific Active Suicidal Thoughts  No   3. Active Sucidal Ideation with any Methods (Not Plan) Without Intent to Act  No   4. Active Suicidal Ideation with Some Intent to Act, Without Specific Plan  No   5. Active Suicidal Ideation with Specific Plan and Intent  No   Enviromental Risk Factors None   Self Injury other (see comment)  (denies)   Elopement (no statements or behaviors)   Activity isolative;withdrawn   Speech clear;coherent   Medication Sensitivity no stated side effects;no observed side effects   Psychomotor / Gait steady;balanced   Daily Care   Activity up ad shawn   Patient Performed Hygiene other (see comments)  (none observed this shift)   Activities of Daily Living   Hygiene/Grooming independent   Oral Hygiene independent   Dress scrubs (behavioral health);independent   Laundry with supervision   Room Organization independent   Activity   Activity Assistance Provided independent   Hygiene Care Assistance   Oral Care other (see comments)     Pt has been sleeping most of the day. Pt has left room for two snacks as well as supper. Pt spent some time in lounge watching television but has kept to themselves. Affect is blunted. Pt admits to audio hallucinations, but says they are reduced since taking meds. Pt denies anxiety and states that depression is low and motioned to the floor as if the depression was barely off the ground. Pt shows good insight  into their care and is glad to be in the hospital until they feel safe being alone. Patient denies all SI/SIB.

## 2019-04-04 NOTE — PROGRESS NOTES
Re: [employer]    Writer spoke with Ashanti in intake. Patient confirmed address on file. He was instructed to call [employer] and update them on his address as well. Patient given [employer] phone and his ID number.    VANESSA Verdin, SHARRONC

## 2019-04-04 NOTE — PROGRESS NOTES
[facility], [place]   Psychiatric Progress Note  Hospital Day: 2        Interim History:   The patient's care was discussed with the treatment team during the daily team meeting and/or staff's chart notes were reviewed.  Staff reports that patient was requesting valtrex for an outbreak. Does not qualify for [facility] funding due to having active [facility] insurance.    Upon interview, the patient states he is still down. He is committed to sobriety. He becomes tearful when discussing his depression. No medication side effects.    Psychiatric Symptoms: depression, suicidal thoughts, negative self-thoughts    Medication side effects reported: none    Other issues reported by patient: herpes outbreak         Medications:       Rhglkmf-Saxwa-Zupmtemo-TenofAF  1 tablet Oral Daily with breakfast     lactobacillus rhamnosus (GG)  1 capsule Oral BID     sertraline  25 mg Oral Daily     valACYclovir  1,000 mg Oral BID          Allergies:   No Known Allergies       Labs:     Recent Results (from the past 48 hour(s))   Basic metabolic panel    Collection Time: 04/03/19  7:23 AM   Result Value Ref Range    Sodium 143 133 - 144 mmol/L    Potassium 3.9 3.4 - 5.3 mmol/L    Chloride 109 94 - 109 mmol/L    Carbon Dioxide 27 20 - 32 mmol/L    Anion Gap 7 3 - 14 mmol/L    Glucose 97 70 - 99 mg/dL    Urea Nitrogen 14 7 - 30 mg/dL    Creatinine 0.92 0.66 - 1.25 mg/dL    GFR Estimate >90 >60 mL/min/[1.73_m2]    GFR Estimate If Black >90 >60 mL/min/[1.73_m2]    Calcium 8.2 (L) 8.5 - 10.1 mg/dL   INR    Collection Time: 04/03/19  7:23 AM   Result Value Ref Range    INR 0.99 0.86 - 1.14   T cell subset profile    Collection Time: 04/03/19  3:11 PM   Result Value Ref Range    IFC Specimen Blood     CD3 Mature T 90 (H) 49 - 84 %    CD4 Nelson T 43 28 - 63 %    CD8 Suppressor T 46 (H) 10 - 40 %    CD4:CD8 Ratio 0.93 (L) 1.40 - 2.60    Absolute CD3 1,735 603 - 2,990 cells/uL    Absolute CD4 827 441 - 2,156 cells/uL     Absolute CD8 878 125 - 1,312 cells/uL   INR    Collection Time: 04/04/19  8:04 AM   Result Value Ref Range    INR 1.13 0.86 - 1.14          Psychiatric Examination:     BP (!) 127/93   Pulse 71   Temp 97.8  F (36.6  C) (Tympanic)   Resp 16   Ht 1.829 m (6')   Wt 105.7 kg (233 lb)   SpO2 98%   BMI 31.60 kg/m    Weight is 233 lbs 0 oz  Body mass index is 31.6 kg/m .    Orthostatic Vitals       Most Recent      Sitting Orthostatic /92 04/04 0822    Sitting Orthostatic Pulse (bpm) 69 04/04 0822    Standing Orthostatic /90 04/04 0822    Standing Orthostatic Pulse (bpm) 93 04/04 0822            Appearance: awake, alert, adequately groomed and dressed in hospital scrubs  Attitude:  cooperative  Eye Contact:  good  Mood:  depressed  Affect:  mood congruent  Speech:  clear, coherent and normal prosody  Language: fluent and intact in English  Psychomotor, Gait, Musculoskeletal:  no evidence of tardive dyskinesia, dystonia, or tics and intact station, gait and muscle tone  Throught Process:  logical, linear and goal oriented  Associations:  no loose associations  Thought Content:  no evidence of psychotic thought and passive suicidal ideation present  Insight:  good  Judgement:  intact  Oriented to:  time, person, and place  Attention Span and Concentration:  intact  Recent and Remote Memory:  intact  Fund of Knowledge:  appropriate    Clinical Global Impressions  First:  Considering your total clinical experience with this particular patient population, how severe are the patient's symptoms at this time?: 7 (04/02/19 1022)  Compared to the patient's condition at the START of treatment, this patient's condition is:: 4 (04/02/19 1022)  Most recent:  Considering your total clinical experience with this particular patient population, how severe are the patient's symptoms at this time?: 7 (04/02/19 1022)  Compared to the patient's condition at the START of treatment, this patient's condition is:: 4 (04/02/19  1022)           Precautions:     Behavioral Orders   Procedures     Code 1 - Restrict to Unit     Elopement precautions     Routine Programming     As clinically indicated     Sexual precautions     Status 15     Every 15 minutes.     Suicide precautions     Patients on Suicide Precautions should have a Combination Diet ordered that includes a Diet selection(s) AND a Behavioral Tray selection for Safe Tray - with utensils, or Safe Tray - NO utensils       Withdrawal precautions          Diagnoses:      1.  Major depressive disorder, recurrent, severe without psychotic features.   2.  Stimulant use disorder, severe, cocaine type.     3.  HIV positive.   4.  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   5.  Herpes oubreak           Assessment & Plan:       Target psychiatric symptoms and interventions:  1. Depression  -continue with sertraline 25 mg and titrate as tolerated and needed    2. Substance use disorder  -refer for CD treatment    Medical Problems and Treatments:  History of DVT  -management per internal medicine    Herpes outbreak  -start Valtrex 1000 mg BID    Behavioral/Psychological/Social:  Encourage unit programming        Disposition Plan   Reason for ongoing admission: poses an imminent risk to self  Discharge location: Chemical dependency treatment facility  Discharge Medications: not ordered  Follow-up Appointments: not scheduled  Legal Status: voluntary  Entered by: Nader Ortiz on 4/4/2019 at 1:51 PM

## 2019-04-04 NOTE — PROGRESS NOTES
Patient is calm and cooperative. He has been in his room most of the day and says he is just really sleepy. He did come out for meals but was not very social with others. He says that he is just feeling kind of down and wants to sleep. He has denied SI/SIB and rated his anxiety and depression each at 5. He has good concentration and a flat affect. He denies hearing voices. He has no other concerns.

## 2019-04-05 LAB
HIV 1+2 AB+HIV1 P24 AG SERPL QL IA: REACTIVE
INR PPP: 1.31 (ref 0.86–1.14)

## 2019-04-05 PROCEDURE — 25000132 ZZH RX MED GY IP 250 OP 250 PS 637: Performed by: PSYCHIATRY & NEUROLOGY

## 2019-04-05 PROCEDURE — 85610 PROTHROMBIN TIME: CPT | Performed by: PHYSICIAN ASSISTANT

## 2019-04-05 PROCEDURE — 36415 COLL VENOUS BLD VENIPUNCTURE: CPT | Performed by: PHYSICIAN ASSISTANT

## 2019-04-05 PROCEDURE — 12400001 ZZH R&B MH UMMC

## 2019-04-05 PROCEDURE — 25000132 ZZH RX MED GY IP 250 OP 250 PS 637: Performed by: PHYSICIAN ASSISTANT

## 2019-04-05 RX ADMIN — Medication 1 CAPSULE: at 08:22

## 2019-04-05 RX ADMIN — SERTRALINE HYDROCHLORIDE 25 MG: 25 TABLET ORAL at 08:22

## 2019-04-05 RX ADMIN — TRAZODONE HYDROCHLORIDE 50 MG: 50 TABLET ORAL at 21:36

## 2019-04-05 RX ADMIN — VALACYCLOVIR HYDROCHLORIDE 1000 MG: 1 TABLET, FILM COATED ORAL at 21:36

## 2019-04-05 RX ADMIN — VALACYCLOVIR HYDROCHLORIDE 1000 MG: 1 TABLET, FILM COATED ORAL at 08:22

## 2019-04-05 RX ADMIN — Medication 1 CAPSULE: at 21:36

## 2019-04-05 RX ADMIN — ELVITEGRAVIR, COBICISTAT, EMTRICITABINE, AND TENOFOVIR ALAFENAMIDE 1 TABLET: 150; 150; 200; 10 TABLET ORAL at 13:05

## 2019-04-05 RX ADMIN — HYDROXYZINE HYDROCHLORIDE 25 MG: 25 TABLET ORAL at 21:36

## 2019-04-05 RX ADMIN — WARFARIN SODIUM 15 MG: 10 TABLET ORAL at 17:52

## 2019-04-05 ASSESSMENT — ACTIVITIES OF DAILY LIVING (ADL)
DRESS: INDEPENDENT
LAUNDRY: WITH SUPERVISION
ORAL_HYGIENE: INDEPENDENT
LAUNDRY: WITH SUPERVISION
HYGIENE/GROOMING: INDEPENDENT
ORAL_HYGIENE: INDEPENDENT
DRESS: STREET CLOTHES;SCRUBS (BEHAVIORAL HEALTH)
HYGIENE/GROOMING: INDEPENDENT

## 2019-04-05 NOTE — PROGRESS NOTES
Brief Medicine Note    Edi Sorenson is a 43 year old male with a history of HIV, DVTs (last 6/2018), genital HSV, cocaine abuse, and depression who is admitted to station 10 with auditory hallucinations, SI, and HI. Medicine following for HIV.    HIV - On Genvoya with non-compliance for a few weeks PTA. Last HIV Quant <48 in 9/2018. Absolute CD4 827 on 4/3/19. Follows with Dr. Puentes at [facility], last visit 9/2018.   - PTA Genvoya resumed by Psychiatry on admission  - HIV Quant incorrectly ordered on 4/3. Defer repeat lab draw to obtain given normal CD4 count and would be more beneficial to obtain in OP setting after period of compliance with ant-retroviral therapy.  - Follow up with ID as scheduled in 5/2019    Medicine will sign off. Please page the on-call FRIEDA for any intercurrent medical issues which arise.    Louisa Lee PA-C  Hospitalist Service  [phone]

## 2019-04-05 NOTE — PROGRESS NOTES
Pt was much more visible in the milieu this evening, but was still relatively flat upon approach. Pt was then visited by his girlfriend and seemed extremely exuberant after and elated. Pt was admitted after a cocaine binge and had started hearing voices as a result. Pt has been denying voices for a few days, although after this visit was then endorsing them and asked for a PRN zyprexa as the voices he was hearing internally were bothersome to him. It is recommended that staff keep a close eye on their encounters in the future.     Pt is eating well, denying SI, HI, and is only mildly anxious and depressed.

## 2019-04-05 NOTE — PLAN OF CARE
"Illness Management Recovery model:  Taking Action.    Patient identified the following actions they can take when early warning signs are present in order to prevent relapse:    1. \"I will pray.\"  2. \"I will network.\"  3. \"I will persevere.\"     "

## 2019-04-05 NOTE — PLAN OF CARE
"\"I'm about the same.  My medications might be starting to kick in.\"  Indicates depression is the same, rating this at a 9 on a scale with 10 being the worst.  Anxiety remains the same at a 7 on the same scale.  Denies suicidal ideation \"at the moment\", wish to be dead or self injurious thoughts.  States voices continue, but \"they are more mild\".  States focus and concentration continue to be poor, racing and ruminative thoughts continue \"sometimes\".  Sleep has improved, attributing this to \"sleeping medication\". Feels medications in general are helping, denies medication side effects.  When asked about going to CD treatment indicates \"I'm hopeful, I want to go.\"  Has had previous treatment and been able to maintain some sobriety in the past.  [facility] will work with a psychiatric medication provider for medication management.  \"I just want to stay here until I'm better.\"  Indicated this morning would attend groups today. After breakfast returned to bed to rest and has remained there until awakened by this staff at 1130.  "

## 2019-04-06 LAB — INR PPP: 1.86 (ref 0.86–1.14)

## 2019-04-06 PROCEDURE — 85610 PROTHROMBIN TIME: CPT | Performed by: PHYSICIAN ASSISTANT

## 2019-04-06 PROCEDURE — 36415 COLL VENOUS BLD VENIPUNCTURE: CPT | Performed by: PHYSICIAN ASSISTANT

## 2019-04-06 PROCEDURE — 25000132 ZZH RX MED GY IP 250 OP 250 PS 637: Performed by: PSYCHIATRY & NEUROLOGY

## 2019-04-06 PROCEDURE — G0177 OPPS/PHP; TRAIN & EDUC SERV: HCPCS

## 2019-04-06 PROCEDURE — 25000132 ZZH RX MED GY IP 250 OP 250 PS 637: Performed by: PHYSICIAN ASSISTANT

## 2019-04-06 PROCEDURE — 12400001 ZZH R&B MH UMMC

## 2019-04-06 RX ADMIN — WARFARIN SODIUM 12.5 MG: 10 TABLET ORAL at 17:59

## 2019-04-06 RX ADMIN — OLANZAPINE 10 MG: 10 TABLET, FILM COATED ORAL at 21:46

## 2019-04-06 RX ADMIN — ELVITEGRAVIR, COBICISTAT, EMTRICITABINE, AND TENOFOVIR ALAFENAMIDE 1 TABLET: 150; 150; 200; 10 TABLET ORAL at 12:53

## 2019-04-06 RX ADMIN — SERTRALINE HYDROCHLORIDE 25 MG: 25 TABLET ORAL at 08:28

## 2019-04-06 RX ADMIN — VALACYCLOVIR HYDROCHLORIDE 1000 MG: 1 TABLET, FILM COATED ORAL at 08:28

## 2019-04-06 RX ADMIN — HYDROXYZINE HYDROCHLORIDE 25 MG: 25 TABLET ORAL at 08:29

## 2019-04-06 RX ADMIN — VALACYCLOVIR HYDROCHLORIDE 1000 MG: 1 TABLET, FILM COATED ORAL at 21:45

## 2019-04-06 RX ADMIN — Medication 1 CAPSULE: at 08:29

## 2019-04-06 RX ADMIN — Medication 1 CAPSULE: at 21:45

## 2019-04-06 ASSESSMENT — ACTIVITIES OF DAILY LIVING (ADL)
HYGIENE/GROOMING: INDEPENDENT
DRESS: INDEPENDENT
ORAL_HYGIENE: INDEPENDENT
DRESS: SCRUBS (BEHAVIORAL HEALTH)
HYGIENE/GROOMING: INDEPENDENT
ORAL_HYGIENE: INDEPENDENT
LAUNDRY: WITH SUPERVISION
LAUNDRY: WITH SUPERVISION

## 2019-04-06 NOTE — PROGRESS NOTES
[facility] and was social and interactive with both staff and peers. Pt attended and participated in O.T group. Pt denies anxiety, depression, paranoia, hallucination, and SI/SIB/HI. Pt ate meals and had no behavioral issue. Nothing to add.          04/06/19 1501   Behavioral Health   Hallucinations denies / not responding to hallucinations   Thinking poor concentration   Orientation person: oriented;place: oriented;date: oriented;time: oriented   Memory baseline memory   Insight admits / accepts;insight appropriate to situation;insight appropriate to events   Judgement impaired   Eye Contact at examiner   Affect full range affect   Mood mood is calm   Physical Appearance/Attire attire appropriate to age and situation;appears stated age   Hygiene well groomed   Suicidality other (see comments)  (Denies)   1. Wish to be Dead No   2. Non-Specific Active Suicidal Thoughts  No   Self Injury other (see comment)  (Denies)   Elopement (None)   Activity other (see comment)  (Social and interactive)   Speech clear;coherent   Medication Sensitivity no stated side effects;no observed side effects   Psychomotor / Gait balanced;steady   Activities of Daily Living   Hygiene/Grooming independent   Oral Hygiene independent   Dress scrubs (behavioral health)   Laundry with supervision   Room Organization independent

## 2019-04-06 NOTE — PROGRESS NOTES
"   04/05/19 2128   Behavioral Health   Hallucinations auditory   Thinking intact   Orientation time: oriented;date: oriented;place: oriented   Memory baseline memory   Insight insight appropriate to situation   Judgement impaired   Eye Contact at examiner   Affect blunted, flat   Mood mood is calm;depressed   Physical Appearance/Attire attire appropriate to age and situation   Hygiene neglected grooming - unclean body, hair, teeth   Suicidality (denied )   1. Wish to be Dead No   2. Non-Specific Active Suicidal Thoughts  No   Self Injury (denied )   Elopement (denied )   Activity withdrawn;isolative   Speech coherent;clear   Medication Sensitivity no stated side effects   Psychomotor / Gait balanced;steady   Psycho Education   Type of Intervention 1:1 intervention   Response participates, initiates socially appropriate   Hours 0.5   Activities of Daily Living   Hygiene/Grooming independent   Oral Hygiene independent   Dress street clothes;scrubs (behavioral health)   Laundry with supervision   Room Organization independent   Activity   Activity Assistance Provided independent     Pt was calm, cooperative, polite and appreciative with blunted mood affect. Pt was visibile in milieu for the majority part of the shift watching tv and minimally socializing with selective peers. Pt's wife and kids were visited and he was delightful and appreciative about the visit. Pt endorsed 8/10 depression, 7/10 anxiety, and denied SI/SIB ideation. Pt remains experiencing some auditory hallucinations, but gradually \"decreasing\". Pt stated the medication seems to be working. Pt's appetite was good and sleeping and napping well. No behavioral issues. No major concerns from the pt.      "

## 2019-04-06 NOTE — PLAN OF CARE
OT General Care Plan  OT Goal 1  Description  Will develop insightful ideas for coping strategies and identify symptoms of when using them would be beneficial.    4/6/2019 1045 by Lisa Ortega, OT  Note  Attended OT group. He was active in participating in an activity focused on identifying helpful ideas for calming using the 5 senses, and practicing a grounding technique with this focus. He initiated comments, socialized and seemed more focused as the session progressed. He stated feeling more comfortable and relaxed at the end of the group, felt able to re focus thoughts on choice chosen when reminded. He identified multiple ideas of sensory input that is helpful for him and talked at length of the closeness he feels with his Mom and how she has been an important part in his life. OT goals will be set with additional attendance.

## 2019-04-07 LAB — INR PPP: 2.06 (ref 0.86–1.14)

## 2019-04-07 PROCEDURE — 25000132 ZZH RX MED GY IP 250 OP 250 PS 637: Performed by: PHYSICIAN ASSISTANT

## 2019-04-07 PROCEDURE — 25000132 ZZH RX MED GY IP 250 OP 250 PS 637: Performed by: PSYCHIATRY & NEUROLOGY

## 2019-04-07 PROCEDURE — 36415 COLL VENOUS BLD VENIPUNCTURE: CPT | Performed by: PHYSICIAN ASSISTANT

## 2019-04-07 PROCEDURE — 85610 PROTHROMBIN TIME: CPT | Performed by: PHYSICIAN ASSISTANT

## 2019-04-07 PROCEDURE — H2032 ACTIVITY THERAPY, PER 15 MIN: HCPCS

## 2019-04-07 PROCEDURE — 12400001 ZZH R&B MH UMMC

## 2019-04-07 RX ADMIN — HYDROXYZINE HYDROCHLORIDE 25 MG: 25 TABLET ORAL at 20:26

## 2019-04-07 RX ADMIN — WARFARIN SODIUM 12.5 MG: 10 TABLET ORAL at 18:12

## 2019-04-07 RX ADMIN — Medication 1 CAPSULE: at 20:22

## 2019-04-07 RX ADMIN — TRAZODONE HYDROCHLORIDE 50 MG: 50 TABLET ORAL at 20:26

## 2019-04-07 RX ADMIN — SERTRALINE HYDROCHLORIDE 25 MG: 25 TABLET ORAL at 08:32

## 2019-04-07 RX ADMIN — VALACYCLOVIR HYDROCHLORIDE 1000 MG: 1 TABLET, FILM COATED ORAL at 08:32

## 2019-04-07 RX ADMIN — VALACYCLOVIR HYDROCHLORIDE 1000 MG: 1 TABLET, FILM COATED ORAL at 20:22

## 2019-04-07 RX ADMIN — ELVITEGRAVIR, COBICISTAT, EMTRICITABINE, AND TENOFOVIR ALAFENAMIDE 1 TABLET: 150; 150; 200; 10 TABLET ORAL at 12:46

## 2019-04-07 RX ADMIN — MAGNESIUM HYDROXIDE 30 ML: 400 SUSPENSION ORAL at 14:39

## 2019-04-07 RX ADMIN — Medication 1 CAPSULE: at 08:32

## 2019-04-07 ASSESSMENT — ACTIVITIES OF DAILY LIVING (ADL)
HYGIENE/GROOMING: INDEPENDENT
ORAL_HYGIENE: INDEPENDENT
DRESS: INDEPENDENT
LAUNDRY: UNABLE TO COMPLETE

## 2019-04-07 NOTE — PROGRESS NOTES
04/07/19 1350   Behavioral Health   Hallucinations denies / not responding to hallucinations   Thinking intact   Orientation person: oriented;place: oriented;date: oriented;time: oriented   Memory baseline memory   Insight insight appropriate to situation   Judgement intact   Eye Contact at examiner   Affect full range affect   Mood mood is calm   Physical Appearance/Attire attire appropriate to age and situation   Hygiene well groomed   Suicidality   (pt denied)   Self Injury   (none observed)   Elopement   (none observed)   Activity   (visible in the milieu; social with others)   Speech clear;coherent   Medication Sensitivity no stated side effects;no observed side effects   Psychomotor / Gait balanced;steady   Psycho Education   Type of Intervention structured groups   Response participates, initiates socially appropriate   Hours 1   Activities of Daily Living   Hygiene/Grooming independent   Oral Hygiene independent   Dress independent   Laundry unable to complete   Room Organization independent     Pt had a good shift and was very cooperative. He was visible in the milieu, and social with others. Pt denied thoughts of SI and SIB, and feelings of anxiety or depression.

## 2019-04-07 NOTE — PROGRESS NOTES
"Pt had a calm shift.  He spent time out in the milieu watching TV with staff and peers.  Pt ate meals so his appetite was reported as \"OK.\"  He reported attending groups.  Pt reported having depression as a 9 and his anxiety at a 6.  His paranoid thoughts were \"not right now,\" has agitation, irritable, sad, and his concentration is \"I concentrate.\"  Pt reports not thoughts of SI or SIB, no HI or VI, sleep is horrible with \"balls bouncing at all hours of the night, girl screaming and being loud.\"  ADL's are independent.       04/06/19 2134   Behavioral Health   Hallucinations denies / not responding to hallucinations   Thinking intact;distractable   Orientation person: oriented;place: oriented;date: oriented;time: oriented   Memory baseline memory   Insight insight appropriate to situation;insight appropriate to events   Judgement intact   Affect full range affect;other (see comments)  (calm affect at times as well)   Mood mood is calm;anxious   Physical Appearance/Attire attire appropriate to age and situation   Hygiene well groomed   Suicidality other (see comments)  (pt denies)   1. Wish to be Dead No   Wish to be Dead Description pt denies   2. Non-Specific Active Suicidal Thoughts  No   Non-Specific Active Suicidal Thought Description pt denies   Activities of Daily Living   Hygiene/Grooming independent   Oral Hygiene independent   Dress independent   Laundry with supervision   Room Organization independent     "

## 2019-04-08 LAB — INR PPP: 2.2 (ref 0.86–1.14)

## 2019-04-08 PROCEDURE — G0177 OPPS/PHP; TRAIN & EDUC SERV: HCPCS

## 2019-04-08 PROCEDURE — 25000132 ZZH RX MED GY IP 250 OP 250 PS 637: Performed by: PHYSICIAN ASSISTANT

## 2019-04-08 PROCEDURE — 25000132 ZZH RX MED GY IP 250 OP 250 PS 637: Performed by: PSYCHIATRY & NEUROLOGY

## 2019-04-08 PROCEDURE — 85610 PROTHROMBIN TIME: CPT | Performed by: PHYSICIAN ASSISTANT

## 2019-04-08 PROCEDURE — 99232 SBSQ HOSP IP/OBS MODERATE 35: CPT | Performed by: PSYCHIATRY & NEUROLOGY

## 2019-04-08 PROCEDURE — 25000132 ZZH RX MED GY IP 250 OP 250 PS 637

## 2019-04-08 PROCEDURE — 99207 ZZC CDG-MDM COMPONENT: MEETS MODERATE - UP CODED: CPT | Performed by: PSYCHIATRY & NEUROLOGY

## 2019-04-08 PROCEDURE — 12400001 ZZH R&B MH UMMC

## 2019-04-08 PROCEDURE — 36415 COLL VENOUS BLD VENIPUNCTURE: CPT | Performed by: PHYSICIAN ASSISTANT

## 2019-04-08 RX ADMIN — Medication 1 CAPSULE: at 08:51

## 2019-04-08 RX ADMIN — TRAZODONE HYDROCHLORIDE 50 MG: 50 TABLET ORAL at 20:35

## 2019-04-08 RX ADMIN — VALACYCLOVIR HYDROCHLORIDE 1000 MG: 1 TABLET, FILM COATED ORAL at 08:51

## 2019-04-08 RX ADMIN — ELVITEGRAVIR, COBICISTAT, EMTRICITABINE, AND TENOFOVIR ALAFENAMIDE 1 TABLET: 150; 150; 200; 10 TABLET ORAL at 13:04

## 2019-04-08 RX ADMIN — VALACYCLOVIR HYDROCHLORIDE 1000 MG: 1 TABLET, FILM COATED ORAL at 20:32

## 2019-04-08 RX ADMIN — WARFARIN SODIUM 12.5 MG: 10 TABLET ORAL at 18:15

## 2019-04-08 RX ADMIN — SERTRALINE HYDROCHLORIDE 25 MG: 25 TABLET ORAL at 08:51

## 2019-04-08 RX ADMIN — Medication 1 CAPSULE: at 20:32

## 2019-04-08 ASSESSMENT — ACTIVITIES OF DAILY LIVING (ADL)
ORAL_HYGIENE: INDEPENDENT
DRESS: STREET CLOTHES
LAUNDRY: WITH SUPERVISION
HYGIENE/GROOMING: INDEPENDENT
HYGIENE/GROOMING: INDEPENDENT
DRESS: STREET CLOTHES
ORAL_HYGIENE: INDEPENDENT

## 2019-04-08 NOTE — PROGRESS NOTES
Patient is calm and cooperative. He currently denies SI, SIB. He is nervously optimistic about receiving further treatment for addiction. He reports improved mood which attributes to medications.        04/08/19 1353   Behavioral Health   Hallucinations denies / not responding to hallucinations   Thinking intact   Orientation person: oriented;place: oriented;date: oriented;time: oriented   Memory baseline memory   Insight admits / accepts   Judgement intact   Eye Contact at examiner   Affect full range affect   Mood mood is calm   Physical Appearance/Attire attire appropriate to age and situation   Hygiene   (adequate)   1. Wish to be Dead No   2. Non-Specific Active Suicidal Thoughts  No   Self Injury   (denies)   Elopement   (none indicated)   Activity   (active)   Speech clear;coherent   Medication Sensitivity no stated side effects;no observed side effects   Psychomotor / Gait balanced;steady   Activities of Daily Living   Hygiene/Grooming independent   Oral Hygiene independent   Dress street clothes   Room Organization independent

## 2019-04-08 NOTE — PLAN OF CARE
"48 Hour Nursing Assessment    Met with Edi this evening. Stated his goal today was to go to group. He attended art therapy group this afternoon. Pt denies AH, SI/SIB. Denies feeling depressed or anxious. Affect is full range, mood is calm. States he still has cravings to use, plans to work on his 12 step program, \"I am still thinking about going to treatment\" \"I feel good about being in the hospital right now. In the past I have been impatient, wanting to leave the hospital before I am ready. I'm grateful that my work has given me 30-45 days leave, I feel that is what I need\"    Pt continues on suicide, sexual, and withdrawal precautions. Continues on warfarin therapy. Will continue to monitor pt's progress.  "

## 2019-04-08 NOTE — PROGRESS NOTES
Occupational Therapy Initial Assessment      Description: OT staff met with pt to review the role of occupational therapy and explain the value of having them involved in their treatment plan including options to meet current needs/self-identified goals. The below evaluation is a compilation of chart review, functional performance observation, and information obtained from an OT self-assessment.      Pt reported a daily routine to include employment.        04/06/19 1000   General Information   Date Initially Attended OT 04/06/19   Special Considerations see note   Clinical Impression   Affect Appropriate to situation;Fluctuates;Excessive, manic   Orientation Oriented to person, place and time   Appearance and ADLs General cleanliness observed in most areas   Attention to Internal Stimuli No observed signs   Interaction Skills Interacts appropriately with staff;Initiates appropriately with staff;Interacts appropriately with peers;Initiates appropriately with peers   Ability to Communicate Needs Independent   Verbal Content Clear;Appropriate to topic   Ability to Maintain Boundaries Accepts and maintains boundaries with one cue   Participation Participates with minimal encouragement   Concentration Concentrates 30+ minutes   Ability to Concentrate With structure;With refocus   Follows and Comprehends Directions Independently follows 2 step verbal directions   Memory Delayed and immediate recall intact   Organization Independently organizes medium tasks   Decision Making Independent   Planning and Problem Solving Occasionally needs assist/feedback   Ability to Apply and Learn Concepts Comprehends concepts, but needs assist to apply   Frustrations / Stress Tolerance Independently identifies skills   (Sleep or walks [place] the lake)   Level of Insight Some insight  (Stressors/triggers: finances )   Self Esteem Can identify positives;Takes risks with support and encouragement;Accepts positive feedback  (Strengths: good  with people)   Social Supports Has knowledge of support systems  (Friends, familiy, mentors )        Pt identified interest to explore healthy coping strategies via: guided meditation, lacy chi, cooking, journaling, and woodworking in hopes to improve overall wellbeing.     Assessment: Pt would benefit from engagement in OT groups that support healthy recovery, specifically to share thoughts and/or feelings on mental health/substance abuse, routine development, and role fulfillment for symptom management/relapse prevention.      Plan: Initiate occupational therapy goals per plan of care.      Current Goal: Within 1 week, Pt will demonstrate increased openness as evidenced by sharing >2 thoughts and/or feelings on mental health or substance use.

## 2019-04-08 NOTE — PROGRESS NOTES
"   04/07/19 1900   General Information   Art Directive other (see comments)   AT directive was to create two images using lines, shapes and colors. The first image was to symbolize the mind state that led to hospitalization ( anxious, depressed etc) The second image was to symbolize the \"resting mind\" (calmness, peacefulness associated with being mindful. Goals: emotional expression, mindfulness. Pt was a positive participant, focused on task for the full duration of group. Pt verbally processed with group sharing an a current image pt described as \"darkness, racing thoughts.\" For second image pt created an image of water and a nature landscape and [place] for [place]. Mood was calm.    "

## 2019-04-08 NOTE — PLAN OF CARE
"  Problem: OT General Care Plan  Goal: OT Goal 1  Description  Within 1 week, Pt will demonstrate increased openness as evidenced by sharing >2 thoughts and/or feelings on mental health or substance use.      Groups Attended: OT Clinic x2, Mental Health Management     Affect/Hygiene/Presentation: Pleasant mood, bright affect, engaged with occasional assistance to refocus.      Patient Performance/Response:  -Clinic: Pt actively participated in occupational therapy clinic. Pt was able to ask for assistance as needed, and independently engage in a novel, goal-directed task with assistance from writer for task selection, setup, and organization. Pt demonstrated good focus, planning, and problem solving during task completion. Pt appeared comfortable interacting with peers and writer, and demonstrated his sense of humor during group.   -Mental Health Management: Pt attended a structured OT group with a focus on self-awareness and socialization. Pt appeared comfortable sharing positive personal information, and was respectful in listening and responding to peers. Pt identified \"my smile\" as a positive personal strength, and encouraged peers throughout the activity.     Goal Progress: Goal initiated this date.     Plan: Pt will be encouraged to maintain group attendance for continued ongoing assessment and support in completion of occupational therapy treatment goals.     Outcome: Improving     "

## 2019-04-09 LAB — INR PPP: 2.32 (ref 0.86–1.14)

## 2019-04-09 PROCEDURE — 99232 SBSQ HOSP IP/OBS MODERATE 35: CPT | Performed by: PSYCHIATRY & NEUROLOGY

## 2019-04-09 PROCEDURE — 85610 PROTHROMBIN TIME: CPT | Performed by: PHYSICIAN ASSISTANT

## 2019-04-09 PROCEDURE — 12400001 ZZH R&B MH UMMC

## 2019-04-09 PROCEDURE — G0177 OPPS/PHP; TRAIN & EDUC SERV: HCPCS

## 2019-04-09 PROCEDURE — 25000132 ZZH RX MED GY IP 250 OP 250 PS 637: Performed by: PSYCHIATRY & NEUROLOGY

## 2019-04-09 PROCEDURE — 25000132 ZZH RX MED GY IP 250 OP 250 PS 637

## 2019-04-09 PROCEDURE — 25000132 ZZH RX MED GY IP 250 OP 250 PS 637: Performed by: PHYSICIAN ASSISTANT

## 2019-04-09 PROCEDURE — 36415 COLL VENOUS BLD VENIPUNCTURE: CPT | Performed by: PHYSICIAN ASSISTANT

## 2019-04-09 RX ORDER — WARFARIN SODIUM 10 MG/1
10 TABLET ORAL
Status: COMPLETED | OUTPATIENT
Start: 2019-04-09 | End: 2019-04-09

## 2019-04-09 RX ADMIN — ELVITEGRAVIR, COBICISTAT, EMTRICITABINE, AND TENOFOVIR ALAFENAMIDE 1 TABLET: 150; 150; 200; 10 TABLET ORAL at 13:00

## 2019-04-09 RX ADMIN — WARFARIN SODIUM 10 MG: 10 TABLET ORAL at 17:59

## 2019-04-09 RX ADMIN — HYDROXYZINE HYDROCHLORIDE 25 MG: 25 TABLET ORAL at 14:41

## 2019-04-09 RX ADMIN — ALUMINUM HYDROXIDE, MAGNESIUM HYDROXIDE, AND DIMETHICONE 30 ML: 400; 400; 40 SUSPENSION ORAL at 11:16

## 2019-04-09 RX ADMIN — Medication 1 CAPSULE: at 20:26

## 2019-04-09 RX ADMIN — Medication 1 CAPSULE: at 08:28

## 2019-04-09 RX ADMIN — TRAZODONE HYDROCHLORIDE 50 MG: 50 TABLET ORAL at 20:27

## 2019-04-09 RX ADMIN — ACETAMINOPHEN 650 MG: 325 TABLET, FILM COATED ORAL at 19:33

## 2019-04-09 RX ADMIN — SERTRALINE HYDROCHLORIDE 25 MG: 25 TABLET ORAL at 08:28

## 2019-04-09 ASSESSMENT — ACTIVITIES OF DAILY LIVING (ADL)
HYGIENE/GROOMING: INDEPENDENT
LAUNDRY: WITH SUPERVISION
HYGIENE/GROOMING: INDEPENDENT
LAUNDRY: WITH SUPERVISION
ORAL_HYGIENE: INDEPENDENT
ORAL_HYGIENE: INDEPENDENT
DRESS: INDEPENDENT
ORAL_HYGIENE: INDEPENDENT
HYGIENE/GROOMING: INDEPENDENT
DRESS: STREET CLOTHES;INDEPENDENT
DRESS: SCRUBS (BEHAVIORAL HEALTH)

## 2019-04-09 ASSESSMENT — MIFFLIN-ST. JEOR: SCORE: 2049.78

## 2019-04-09 NOTE — PROGRESS NOTES
[facility], [place]   Psychiatric Progress Note  Hospital Day: 6        Interim History:   The patient's care was discussed with the treatment team during the daily team meeting and/or staff's chart notes were reviewed.  Staff reports that the patient had a good weekend. He has been more visible in the lounge. He watched the Final four games and appeared happy and social.    Upon interview, the patient states he is doing better and would be ready for treatment when a bed is available.    Psychiatric Symptoms: some anxiety    Medication side effects reported: none    Other issues reported by patient: none new         Medications:       [id number]-TenofAF  1 tablet Oral Daily with breakfast     lactobacillus rhamnosus (GG)  1 capsule Oral BID     sertraline  25 mg Oral Daily     valACYclovir  1,000 mg Oral BID          Allergies:   No Known Allergies       Labs:     Recent Results (from the past 48 hour(s))   INR    Collection Time: 04/07/19  2:03 PM   Result Value Ref Range    INR 2.06 (H) 0.86 - 1.14   INR    Collection Time: 04/08/19  8:36 AM   Result Value Ref Range    INR 2.20 (H) 0.86 - 1.14          Psychiatric Examination:     /67   Pulse 105   Temp 97.9  F (36.6  C) (Tympanic)   Resp 16   Ht 1.829 m (6')   Wt 105.7 kg (233 lb)   SpO2 98%   BMI 31.60 kg/m    Weight is 233 lbs 0 oz  Body mass index is 31.6 kg/m .    Orthostatic Vitals       Most Recent      Sitting Orthostatic /85 04/08 0700    Sitting Orthostatic Pulse (bpm) 69 04/08 0700    Standing Orthostatic /85 04/08 0700    Standing Orthostatic Pulse (bpm) 104 04/08 0700            Appearance: awake, alert, adequately groomed and casually dressed  Attitude:  cooperative  Eye Contact:  good  Mood:  good  Affect:  mood congruent  Speech:  clear, coherent and normal prosody  Language: fluent and intact in English  Psychomotor, Gait, Musculoskeletal:  no evidence of tardive dyskinesia,  dystonia, or tics and intact station, gait and muscle tone  Throught Process:  logical, linear and goal oriented  Associations:  no loose associations  Thought Content:  no evidence of suicidal ideation or homicidal ideation and no evidence of psychotic thought  Insight:  good  Judgement:  intact  Oriented to:  time, person, and place  Attention Span and Concentration:  intact  Recent and Remote Memory:  intact  Fund of Knowledge:  appropriate    Clinical Global Impressions  First:  Considering your total clinical experience with this particular patient population, how severe are the patient's symptoms at this time?: 7 (04/02/19 1022)  Compared to the patient's condition at the START of treatment, this patient's condition is:: 4 (04/02/19 1022)  Most recent:  Considering your total clinical experience with this particular patient population, how severe are the patient's symptoms at this time?: 7 (04/02/19 1022)  Compared to the patient's condition at the START of treatment, this patient's condition is:: 4 (04/02/19 1022)           Precautions:     Behavioral Orders   Procedures     Code 1 - Restrict to Unit     Elopement precautions     Routine Programming     As clinically indicated     Sexual precautions     Status 15     Every 15 minutes.     Suicide precautions     Patients on Suicide Precautions should have a Combination Diet ordered that includes a Diet selection(s) AND a Behavioral Tray selection for Safe Tray - with utensils, or Safe Tray - NO utensils       Withdrawal precautions          Diagnoses:      1.  Major depressive disorder, recurrent, severe without psychotic features.   2.  Stimulant use disorder, severe, cocaine type.     3.  HIV positive.   4.  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   5.  Herpes oubreak           Assessment & Plan:       Target psychiatric symptoms and interventions:  1. Depression  -continue with sertraline 25 mg and titrate as tolerated and  needed    2. Substance use disorder  -refer for CD treatment    Medical Problems and Treatments:  History of DVT  -management per internal medicine    Herpes outbreak  -stop Valtrex.    Behavioral/Psychological/Social:  Encourage unit programming        Disposition Plan   Reason for ongoing admission: awaiting CD treatment  Discharge location: Chemical dependency treatment facility  Discharge Medications: not ordered  Follow-up Appointments: not scheduled  Legal Status: voluntary  Entered by: Nader Ortiz on 4/5/2019 at 9:18 PM

## 2019-04-09 NOTE — PLAN OF CARE
48 hour nursing assessment:    Patient denies any SI/SIB. He is feeling hopeful for the future. He had a visit with his girlfriend that went well. He has been out in the milieu with a bright affect.   He is participating in groups and developing healthy coping skills. Will continue to assess.  Patient denies auditory or visual  Hallucinations.

## 2019-04-09 NOTE — PLAN OF CARE
"  Problem: OT General Care Plan  Goal: OT Goal 1  Description  Within 1 week, Pt will demonstrate increased openness as evidenced by sharing >2 thoughts and/or feelings on mental health or substance use.      Groups Attended: OT Clinic, Mental Health Management, Mindfulness     Affect/Hygiene/Presentation: Pleasant mood, engaged with occasional assistance to refocus his attention, congruent affect.     Patient Performance/Response:  -Clinic: Pt actively participated in occupational therapy clinic. Pt was able to ask for assistance as needed, and independently engage in a novel, goal-directed task. Pt demonstrated good focus, planning, and problem solving during task completion. Pt appeared comfortable interacting with peers and writer, and shared his thoughts with writer regarding substance use, working to understand his triggers, and how to manage them going forward so that he is able to stay on track.   -Mental Health Management:  Pt attended a mental health management group focused on reviewing accomplishments over the past week and structured goal-setting. Pt was able to independently reflect on their progress over the past week, developed strategies to move closer to discharge readiness, and identified small steps to take this week to achieve self-identified goals. Pt was open to discussing progress and goals with peers and writer. Pt was insightful into his recovery process, and is currently working towards feeling good physically, spiritually, and mentally.   -Mindfulness: Pt actively participated in a mindfulness group focusing on reduction of anxiety, improvement of mood, and increase in concentration. The mindfulness practice was offered via supportive approaches including chair yoga, journaling, and guided imagery/seated meditation to decrease worry, rumination, and other negative feelings. Pt verbalized feeling \"really calm\" at the end of the group, and was able to remain focused for the full duration. "     Plan: Pt will be encouraged to maintain group attendance for continued ongoing assessment and support in completion of occupational therapy treatment goals.     Outcome: Improving

## 2019-04-09 NOTE — PROGRESS NOTES
"[facility], [place]   Psychiatric Progress Note  Hospital Day: 7        Interim History:   The patient's care was discussed with the treatment team during the daily team meeting and/or staff's chart notes were reviewed.  Staff reports that patient has been very social. No issues.    Upon interview, the patient states that he feels down today. Plans to lay in bed, maybe cry some with an extra blanket and pillow. He indicates that his \"girl\" came in to visit and \"she looked real good.\" he looked at female student that was with me today and said, \"know what I mean.\" He then talked about having some jealousy and general difficulty trusting that something wasn't going on with her outside of the hospital. He made another comment to student about considering breaking up with his girl. He was very social and laughing with staff at that time.    Psychiatric Symptoms: sad    Medication side effects reported: none    Other issues reported by patient: none new         Medications:       Ytxclad-Bjces-Octrmxnd-TenofAF  1 tablet Oral Daily with breakfast     lactobacillus rhamnosus (GG)  1 capsule Oral BID     [START ON 4/10/2019] sertraline  50 mg Oral Daily     warfarin  10 mg Oral ONCE at 18:00          Allergies:   No Known Allergies       Labs:     Recent Results (from the past 48 hour(s))   INR    Collection Time: 04/08/19  8:36 AM   Result Value Ref Range    INR 2.20 (H) 0.86 - 1.14   INR    Collection Time: 04/09/19  9:42 AM   Result Value Ref Range    INR 2.32 (H) 0.86 - 1.14          Psychiatric Examination:     /67   Pulse 105   Temp 97.6  F (36.4  C) (Tympanic)   Resp 16   Ht 1.829 m (6')   Wt 110.7 kg (244 lb)   SpO2 98%   BMI 33.09 kg/m    Weight is 244 lbs 0 oz  Body mass index is 33.09 kg/m .    Orthostatic Vitals       Most Recent      Sitting Orthostatic /87 04/09 0802    Sitting Orthostatic Pulse (bpm) 65 04/09 0802    Standing Orthostatic /84 04/09 0802    " Standing Orthostatic Pulse (bpm) 101 04/09 0802            Appearance: awake, alert, adequately groomed and casually dressed  Attitude:  cooperative  Eye Contact:  good  Mood:  good  Affect:  mood congruent  Speech:  clear, coherent and normal prosody  Language: fluent and intact in English  Psychomotor, Gait, Musculoskeletal:  no evidence of tardive dyskinesia, dystonia, or tics and intact station, gait and muscle tone  Throught Process:  logical, linear and goal oriented  Associations:  no loose associations  Thought Content:  no evidence of suicidal ideation or homicidal ideation and no evidence of psychotic thought  Insight:  good  Judgement:  intact  Oriented to:  time, person, and place  Attention Span and Concentration:  intact  Recent and Remote Memory:  intact  Fund of Knowledge:  appropriate    Clinical Global Impressions  First:  Considering your total clinical experience with this particular patient population, how severe are the patient's symptoms at this time?: 7 (04/02/19 1022)  Compared to the patient's condition at the START of treatment, this patient's condition is:: 4 (04/02/19 1022)  Most recent:  Considering your total clinical experience with this particular patient population, how severe are the patient's symptoms at this time?: 7 (04/02/19 1022)  Compared to the patient's condition at the START of treatment, this patient's condition is:: 4 (04/02/19 1022)           Precautions:     Behavioral Orders   Procedures     Code 1 - Restrict to Unit     Elopement precautions     Routine Programming     As clinically indicated     Sexual precautions     Status 15     Every 15 minutes.     Suicide precautions     Patients on Suicide Precautions should have a Combination Diet ordered that includes a Diet selection(s) AND a Behavioral Tray selection for Safe Tray - with utensils, or Safe Tray - NO utensils       Withdrawal precautions          Diagnoses:      1.  Major depressive disorder, recurrent,  severe without psychotic features.   2.  Stimulant use disorder, severe, cocaine type.     3.  HIV positive.   4.  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  Herpes outbreak - treated           Assessment & Plan:       Target psychiatric symptoms and interventions:  1. Depression  -increase sertraline to 50 mg and titrate as tolerated and needed    2. Substance use disorder  -refer for CD treatment    Medical Problems and Treatments:  History of DVT  -management per internal medicine/pharmacy    Behavioral/Psychological/Social:  Encourage unit programming      Disposition Plan   Reason for ongoing admission: awaiting CD treatment  Discharge location: Chemical dependency treatment facility  Discharge Medications: not ordered  Follow-up Appointments: not scheduled  Legal Status: voluntary  Entered by: Nader Ortiz on 4/9/2019 at 3:41 PM

## 2019-04-09 NOTE — PROGRESS NOTES
"Pt expresses, \"I feel sad but I can't determine the reason. I just feel like shit today. Maybe I'll try to lie down during the next shift.\" Pt endorses depression and anxiety but declined to give writer a rating. He denies SI, SIB, pain, racing thoughts, and HI. Pt also expresses, \"my appetite is good but my sleep isn't. I just have this nightmare that makes me anxious. I'm also anxious about going to treatment soon. What's it going to be like?\" He spent the day attending groups, showered, and was generally sociable with pts present in the milieu. Pt is hopeful of his discharge to treatment and remains calm while on the unit.       04/09/19 1443   Behavioral Health   Hallucinations denies / not responding to hallucinations   Thinking intact   Orientation person: oriented;place: oriented;date: oriented;time: oriented   Memory baseline memory   Insight admits / accepts   Judgement impaired   Eye Contact at examiner   Affect full range affect   Mood mood is calm   Physical Appearance/Attire appears stated age   Hygiene well groomed   Suicidality other (see comments)  (denies)   1. Wish to be Dead No   2. Non-Specific Active Suicidal Thoughts  No   Self Injury other (see comment)  (denies)   Elopement   (none stated/observed)   Activity other (see comment)  (visible in milieu, attending groups)   Speech clear;coherent   Psychomotor / Gait steady;balanced   Activities of Daily Living   Hygiene/Grooming independent   Oral Hygiene independent   Dress scrubs (behavioral health)   Laundry with supervision   Room Organization independent     "

## 2019-04-10 LAB — INR PPP: 2.5 (ref 0.86–1.14)

## 2019-04-10 PROCEDURE — 25000132 ZZH RX MED GY IP 250 OP 250 PS 637: Performed by: PSYCHIATRY & NEUROLOGY

## 2019-04-10 PROCEDURE — 25000132 ZZH RX MED GY IP 250 OP 250 PS 637: Performed by: PHYSICIAN ASSISTANT

## 2019-04-10 PROCEDURE — 85610 PROTHROMBIN TIME: CPT | Performed by: PHYSICIAN ASSISTANT

## 2019-04-10 PROCEDURE — 36415 COLL VENOUS BLD VENIPUNCTURE: CPT | Performed by: PHYSICIAN ASSISTANT

## 2019-04-10 PROCEDURE — G0177 OPPS/PHP; TRAIN & EDUC SERV: HCPCS

## 2019-04-10 PROCEDURE — 12400001 ZZH R&B MH UMMC

## 2019-04-10 PROCEDURE — 99232 SBSQ HOSP IP/OBS MODERATE 35: CPT | Performed by: PSYCHIATRY & NEUROLOGY

## 2019-04-10 RX ORDER — MIRTAZAPINE 7.5 MG/1
7.5 TABLET, FILM COATED ORAL
Status: DISCONTINUED | OUTPATIENT
Start: 2019-04-10 | End: 2019-04-17 | Stop reason: HOSPADM

## 2019-04-10 RX ORDER — WARFARIN SODIUM 10 MG/1
10 TABLET ORAL
Status: COMPLETED | OUTPATIENT
Start: 2019-04-10 | End: 2019-04-10

## 2019-04-10 RX ADMIN — WARFARIN SODIUM 10 MG: 10 TABLET ORAL at 17:47

## 2019-04-10 RX ADMIN — SERTRALINE HYDROCHLORIDE 50 MG: 50 TABLET ORAL at 08:16

## 2019-04-10 RX ADMIN — Medication 1 CAPSULE: at 08:16

## 2019-04-10 RX ADMIN — ELVITEGRAVIR, COBICISTAT, EMTRICITABINE, AND TENOFOVIR ALAFENAMIDE 1 TABLET: 150; 150; 200; 10 TABLET ORAL at 13:52

## 2019-04-10 RX ADMIN — MIRTAZAPINE 7.5 MG: 7.5 TABLET, FILM COATED ORAL at 20:48

## 2019-04-10 ASSESSMENT — ACTIVITIES OF DAILY LIVING (ADL)
LAUNDRY: WITH SUPERVISION
HYGIENE/GROOMING: INDEPENDENT
DRESS: INDEPENDENT
HYGIENE/GROOMING: INDEPENDENT
DRESS: INDEPENDENT
ORAL_HYGIENE: INDEPENDENT
ORAL_HYGIENE: INDEPENDENT
LAUNDRY: WITH SUPERVISION

## 2019-04-10 NOTE — PROGRESS NOTES
Patient visible and active in the milieu most of the shift.  Peer interactions spontaneous and respectful.  Responsive to staff MH status inquiries and interventions.  Presented as quiet, calm, alert,  focused on approach, and cooperative with unit protocols.  Continued to endorse significant anxiety and depression, but denied all other MH concerns, issues, and acuities, including SI, SIB urges, and all psychotic thought distortions.  Stated that he was focused on and hopeful of getting an MICD placement subsequent to his stay here.   Galileo Phelps   04/09/2019 04/09/19 2024   Sleep/Rest/Relaxation   Day/Evening Time Hours napping;resting in bed   Behavioral Health   Hallucinations denies / not responding to hallucinations   Thinking intact   Orientation person: oriented;place: oriented;date: oriented;time: oriented   Memory baseline memory   Insight admits / accepts;insight appropriate to situation;insight appropriate to events   Judgement impaired   Eye Contact at examiner   Affect full range affect   Mood depressed;anxious   Physical Appearance/Attire appears stated age;attire appropriate to age and situation;neat   Hygiene well groomed   Suicidality other (see comments)  (denied SI)   1. Wish to be Dead No   2. Non-Specific Active Suicidal Thoughts  No   Self Injury other (see comment)  (denied SIB urges)   Elopement   (no indicators)   Activity other (see comment)  (visible, social, quietly engaged in milieu)   Speech clear;coherent   Psychomotor / Gait balanced;steady   Coping/Psychosocial   Verbalized Emotional State acceptance;anxiety;depression   Psycho Education   Type of Intervention 1:1 intervention   Response participates with encouragement   Hours 0.5   Treatment Detail current MH status   Safety   Suicidality Status 15   Violence Risk   Feels Like Hurting Others no   Activities of Daily Living   Hygiene/Grooming independent   Oral Hygiene independent   Dress street clothes;independent   Groups    Details N/A

## 2019-04-10 NOTE — PROGRESS NOTES
[facility] called needing pt's original rule 25 assessment.  CT called Beatriz at the [facility] where pt had it done and she said she would fax it to station [facility] and [facility].  Awaiting fax.

## 2019-04-10 NOTE — PROGRESS NOTES
CLINICAL NUTRITION SERVICES - REASSESSMENT NOTE     Nutrition Prescription    RECOMMENDATIONS FOR MDs/PROVIDERS TO ORDER:  None today    Malnutrition Status:    Patient does not meet two of the criteria necessary for diagnosing malnutrition     Recommendations already ordered by Registered Dietitian (RD):  None today    Future/Additional Recommendations:  Monitor po intakes and wt trends. Consider need to add/discontinue supplement or adjust flavors pending trends and pt preferences.   Consider need to stop double portions and do small scheduled snack instead.     EVALUATION OF THE PROGRESS TOWARD GOALS   Diet: Regular, allow double portions  Intake: Noted to be eating meals, appetite good. Edi reports that he typically eats eggs, sausage, and nunez at the breakfast meal at has the main entree at the lunch and dinner meals. He likes getting double portions of the main entree and reports typically not eating the sides on his tray.      NEW FINDINGS   - Wt: Using wt 238 lb on 4/2, wt is up 6 lb over admission. Per review of wt history, wt loss of 14 lb (5.4%) over the past ~2 months. Pt asked his current wt and stated that he would not like to exceed 250 lb.  - Plan for discharging to CD treatment in near future    MALNUTRITION  % Intake: Decreased intake does not meet criteria  % Weight Loss: Up to 7.5% in 3 months (non-severe)  Subcutaneous Fat Loss: None observed  Muscle Loss: None observed  Fluid Accumulation/Edema: None noted  Malnutrition Diagnosis: Patient does not meet two of the above criteria necessary for diagnosing malnutrition    Previous Goals   Patient to consume % of nutritionally adequate meal trays TID, or the equivalent with supplements/snacks.  Evaluation: Met    Previous Nutrition Diagnosis  Predicted inadequate nutrient intake (protein-energy) related to variable appetite as evidenced by reliance on po intakes to meet estimated needs with potential for decline  Evaluation:  Resolved    CURRENT NUTRITION DIAGNOSIS  No nutrition diagnosis at this time      INTERVENTIONS  Implementation  Encouraged meals TID. Discussed wt trends - not necessary for pt to gain back the weight he had lost. Pt would like to continue receiving double portions given he usually doesn't eat the sides on his meal tray. He prefers to do this rather than scheduled snacks.    Goals  Patient to consume % of nutritionally adequate meal trays TID, or the equivalent with supplements/snacks.    Monitoring/Evaluation  Progress toward goals will be monitored and evaluated per protocol.    Adina Zafar RD, LD  Unit pgr: [phone]

## 2019-04-10 NOTE — PROGRESS NOTES
"Spoke to nursing representative at pt's INR clinic [phone].  Explained pt's discharge plans.  Pt will need weekly INRs upon discharge. The plan is for him to have the blood draws at [facility]s OP lab while he is in [facility]. Writer faxed over hx of Warfarin dosages while in the hospital per their request.  Fax: [phone].   "

## 2019-04-10 NOTE — PROGRESS NOTES
"Brandy had a calm cooperative shift.  States that today was not a good day as far as his thoughts and feelings go.  Depressed about about issues with his \"girl\".  Denies SI SIB.  No goal for the day.  Had a good visit from his \"girl\"  More anxious today about discharge but trying to feel hopeful .  Eating fine and not sleeping so well.  No other input at this time.     04/09/19 2100   Behavioral Health   Hallucinations denies / not responding to hallucinations   Thinking intact   Orientation date: oriented   Memory baseline memory   Insight insight appropriate to situation   Judgement impaired   Eye Contact at examiner   Affect full range affect   Mood mood is calm   Physical Appearance/Attire attire appropriate to age and situation   Suicidality other (see comments)  (denies)   1. Wish to be Dead No   Fall Risk Interventions   High fall risk medications prescribed? no   Within arms reach in use No   Fall precaution band in place? No (see comments)   Activities of Daily Living   Hygiene/Grooming independent   Oral Hygiene independent   Dress independent   Laundry with supervision     "

## 2019-04-10 NOTE — PROGRESS NOTES
[facility], [place]   Psychiatric Progress Note  Hospital Day: 8        Interim History:   The patient's care was discussed with the treatment team during the daily team meeting and/or staff's chart notes were reviewed.  Staff reports that patient has been very social. No issues.    Upon interview, the patient again reports that he is feeling down. Just minutes before and after our meeting he was laughing and joking with peers.    Psychiatric Symptoms: mild to moderate depression    Medication side effects reported: none    Other issues reported by patient: none new         Medications:       Lfouzdg-Fvmjx-Lnllndbe-TenofAF  1 tablet Oral Daily with breakfast     lactobacillus rhamnosus (GG)  1 capsule Oral BID     sertraline  50 mg Oral Daily     warfarin  10 mg Oral ONCE at 18:00          Allergies:   No Known Allergies       Labs:     Recent Results (from the past 48 hour(s))   INR    Collection Time: 04/09/19  9:42 AM   Result Value Ref Range    INR 2.32 (H) 0.86 - 1.14   INR    Collection Time: 04/10/19  7:59 AM   Result Value Ref Range    INR 2.50 (H) 0.86 - 1.14          Psychiatric Examination:     /70   Pulse 79   Temp 97.2  F (36.2  C) (Tympanic)   Resp 16   Ht 1.829 m (6')   Wt 110.7 kg (244 lb)   SpO2 98%   BMI 33.09 kg/m    Weight is 244 lbs 0 oz  Body mass index is 33.09 kg/m .    Orthostatic Vitals       Most Recent      Sitting Orthostatic /89 04/10 0826    Sitting Orthostatic Pulse (bpm) 70 04/10 1300    Standing Orthostatic /77 04/10 0826    Standing Orthostatic Pulse (bpm) 80 04/10 1300            Appearance: awake, alert, adequately groomed and casually dressed  Attitude:  cooperative  Eye Contact:  good  Mood:  sad   Affect:  mood congruent  Speech:  clear, coherent and normal prosody  Language: fluent and intact in English  Psychomotor, Gait, Musculoskeletal:  no evidence of tardive dyskinesia, dystonia, or tics and intact station, gait and  muscle tone  Throught Process:  logical, linear and goal oriented  Associations:  no loose associations  Thought Content:  no evidence of suicidal ideation or homicidal ideation and no evidence of psychotic thought  Insight:  good  Judgement:  intact  Oriented to:  time, person, and place  Attention Span and Concentration:  intact  Recent and Remote Memory:  intact  Fund of Knowledge:  appropriate    Clinical Global Impressions  First:  Considering your total clinical experience with this particular patient population, how severe are the patient's symptoms at this time?: 7 (04/02/19 1022)  Compared to the patient's condition at the START of treatment, this patient's condition is:: 4 (04/02/19 1022)  Most recent:  Considering your total clinical experience with this particular patient population, how severe are the patient's symptoms at this time?: 7 (04/02/19 1022)  Compared to the patient's condition at the START of treatment, this patient's condition is:: 4 (04/02/19 1022)           Precautions:     Behavioral Orders   Procedures     Code 1 - Restrict to Unit     Elopement precautions     Routine Programming     As clinically indicated     Sexual precautions     Status 15     Every 15 minutes.     Suicide precautions     Patients on Suicide Precautions should have a Combination Diet ordered that includes a Diet selection(s) AND a Behavioral Tray selection for Safe Tray - with utensils, or Safe Tray - NO utensils       Withdrawal precautions          Diagnoses:      1.  Major depressive disorder, recurrent, severe without psychotic features.   2.  Stimulant use disorder, severe, cocaine type.     3.  HIV positive.   4.  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  Herpes outbreak - treated           Assessment & Plan:       Target psychiatric symptoms and interventions:  1. Depression  -sertraline 50 mg and titrate as tolerated and needed    2. Substance use disorder  -[facility] for [facility]. Waiting for bed.    Medical  Problems and Treatments:  History of DVT  -management per internal medicine/pharmacy    Behavioral/Psychological/Social:  Encourage unit programming      Disposition Plan   Reason for ongoing admission: awaiting CD treatment  Discharge location: Chemical dependency treatment facility  Discharge Medications: not ordered  Follow-up Appointments: not scheduled  Legal Status: voluntary  Entered by: Nader Ortiz on 4/10/2019 at 3:15 PM

## 2019-04-10 NOTE — PLAN OF CARE
"\"I'm feeling kind of down today.  I don't have any desire to come out and do anything.\"  States is improved overall.  \"I'm doing a little bit better, I feel healthier and I'm moving around more.\"  Denies suicidal ideation, wish to be dead or thoughts of self harm.  Indicates continues to experience difficulties with focus and concentration.  States has difficulty sleeping, yet staff noted patient to sleep 6.25 hours last night.  Would like to change medication for sleep from Trazodone to Remeron, states that has been helpful in the past. Denies medication side effects and is compliant. Has been approved to enter [facility] CD program when a bed is available.  When asked about this indicated \"I hope it has a mental health part to it.  I think I need that.\"  Was up for breakfast this morning, after this returned to bed to nap.  When in milieu appears relaxed, animated and is joking with peers.  Has not attended groups as of yet today.  "

## 2019-04-11 LAB — INR PPP: 2.39 (ref 0.86–1.14)

## 2019-04-11 PROCEDURE — 12400001 ZZH R&B MH UMMC

## 2019-04-11 PROCEDURE — 25000132 ZZH RX MED GY IP 250 OP 250 PS 637: Performed by: PHYSICIAN ASSISTANT

## 2019-04-11 PROCEDURE — 25000132 ZZH RX MED GY IP 250 OP 250 PS 637: Performed by: PSYCHIATRY & NEUROLOGY

## 2019-04-11 PROCEDURE — G0177 OPPS/PHP; TRAIN & EDUC SERV: HCPCS

## 2019-04-11 PROCEDURE — 36415 COLL VENOUS BLD VENIPUNCTURE: CPT | Performed by: PHYSICIAN ASSISTANT

## 2019-04-11 PROCEDURE — 85610 PROTHROMBIN TIME: CPT | Performed by: PHYSICIAN ASSISTANT

## 2019-04-11 RX ADMIN — MIRTAZAPINE 7.5 MG: 7.5 TABLET, FILM COATED ORAL at 21:41

## 2019-04-11 RX ADMIN — Medication 1 CAPSULE: at 08:28

## 2019-04-11 RX ADMIN — SERTRALINE HYDROCHLORIDE 50 MG: 50 TABLET ORAL at 08:28

## 2019-04-11 RX ADMIN — ELVITEGRAVIR, COBICISTAT, EMTRICITABINE, AND TENOFOVIR ALAFENAMIDE 1 TABLET: 150; 150; 200; 10 TABLET ORAL at 12:38

## 2019-04-11 RX ADMIN — WARFARIN SODIUM 12.5 MG: 10 TABLET ORAL at 19:36

## 2019-04-11 ASSESSMENT — ACTIVITIES OF DAILY LIVING (ADL)
ORAL_HYGIENE: INDEPENDENT
DRESS: STREET CLOTHES;SCRUBS (BEHAVIORAL HEALTH)
HYGIENE/GROOMING: INDEPENDENT
LAUNDRY: WITH SUPERVISION

## 2019-04-11 ASSESSMENT — MIFFLIN-ST. JEOR: SCORE: 2050.23

## 2019-04-11 NOTE — PLAN OF CARE
Problem: OT General Care Plan  Goal: OT Goal 1  Description  Within 1 week, Pt will demonstrate increased openness as evidenced by sharing >2 thoughts and/or feelings on mental health or substance use.      Groups Attended: OT Mental Health Management     Affect/Hygiene/Presentation: Pleasant mood, engaged with occasional assistance to refocus his attention, bright affect.     Patient Performance/Response: Pt actively participated in a mental health management group involving identification of coping skills beginning with every letter of the alphabet facilitated through group discussion. Pt consistently contributed ideas of positive coping skills, and was receptive and respectful of ideas contributed by peers. Pt identified coping skills that currently work for him including affirmations and journaling, and that he would like to try using exercise and knitting.     Plan: Pt will be encouraged to maintain group attendance for continued ongoing assessment and support in completion of occupational therapy treatment goals.     4/11/2019 1627 by Fina Giles, OT  Outcome: Improving

## 2019-04-11 NOTE — PLAN OF CARE
Problem: OT General Care Plan  Goal: OT Goal 1  Description  Within 1 week, Pt will demonstrate increased openness as evidenced by sharing >2 thoughts and/or feelings on mental health or substance use.      Groups Attended: Mindfulness group     Affect/Hygiene/Presentation: Pleasant mood, engaged with occasional assistance to refocus from writer, bright affect.     Patient Performance/Response: Pt actively participated in a mindfulness group focusing on reduction of anxiety, improvement of mood, and increase in concentration. The mindfulness practice was offered via supportive approaches including lacy chi, guided imagery, and a discussion on mindfulness to decrease worry, rumination, and other negative feelings. Pt verbalized feeling good at the end of the group, and was able to remain focused for the full duration.     Plan: Pt will be encouraged to maintain group attendance for continued ongoing assessment and support in completion of occupational therapy treatment goals.     Outcome: Improving

## 2019-04-11 NOTE — PROGRESS NOTES
has been working with Michelle from  to try and have the original CD evaluator send the eval to [employer] for a prior auth.  emailed Beatriz from [facility] ([phone]), the original , as she does not have her VM set up. Hopefully she will fax to .

## 2019-04-11 NOTE — PROGRESS NOTES
"Pt has been very social throughout the shift by interacting with peers and watching movies with them.  His goal was to make it to group and pt reported he did.  Pt reported that he participated in groups today however he \"I wish there was more mental health more then treatment.\"  Staff validated his concerns with him  wanting to get more of the mental health side of treatment.  Pt had a full range, and at times calm, affect throughout the shift, his appetite is fair \"not trying to over do it anymore,\" no sleep, pain was a no \"stayed in my room all during the day shift,\" and the SE's are, pt feels, from his Trazodone PRN at night time and \"it does the opposite effect on me, I can't sleep.\"  Pt had his girlfriend visit today and \"not really\" having paranoid thoughts, rates depression and anxiety both at a 5, concentration \"fair,\" and his racing thoughts \"calming down and not as much anymore.\"     04/10/19 2100   Behavioral Health   Hallucinations denies / not responding to hallucinations   Thinking intact   Orientation person: oriented;place: oriented;date: oriented;time: oriented   Memory baseline memory   Insight admits / accepts  (insights improved)   Judgement intact   Affect full range affect   Mood mood is calm   Physical Appearance/Attire attire appropriate to age and situation   Hygiene well groomed   Suicidality other (see comments)  (pt denies)   1. Wish to be Dead No   Wish to be Dead Description pt denies   2. Non-Specific Active Suicidal Thoughts  No   Non-Specific Active Suicidal Thought Description pt denies   Self Injury other (see comment)  (denies)   Speech coherent;rambling;tangential   Medication Sensitivity no stated side effects;no observed side effects   Psychomotor / Gait balanced;steady   Activities of Daily Living   Hygiene/Grooming independent   Oral Hygiene independent   Dress independent   Laundry with supervision   Room Organization independent     "

## 2019-04-12 LAB — INR PPP: 2.67 (ref 0.86–1.14)

## 2019-04-12 PROCEDURE — 85610 PROTHROMBIN TIME: CPT | Performed by: PSYCHIATRY & NEUROLOGY

## 2019-04-12 PROCEDURE — 99232 SBSQ HOSP IP/OBS MODERATE 35: CPT | Performed by: PSYCHIATRY & NEUROLOGY

## 2019-04-12 PROCEDURE — G0177 OPPS/PHP; TRAIN & EDUC SERV: HCPCS

## 2019-04-12 PROCEDURE — 25000132 ZZH RX MED GY IP 250 OP 250 PS 637: Performed by: PHYSICIAN ASSISTANT

## 2019-04-12 PROCEDURE — 25000132 ZZH RX MED GY IP 250 OP 250 PS 637: Performed by: PSYCHIATRY & NEUROLOGY

## 2019-04-12 PROCEDURE — 36415 COLL VENOUS BLD VENIPUNCTURE: CPT | Performed by: PSYCHIATRY & NEUROLOGY

## 2019-04-12 PROCEDURE — 12400001 ZZH R&B MH UMMC

## 2019-04-12 RX ORDER — WARFARIN SODIUM 10 MG/1
10 TABLET ORAL DAILY
Status: ON HOLD | DISCHARGE
Start: 2019-04-12 | End: 2022-02-08

## 2019-04-12 RX ORDER — MIRTAZAPINE 7.5 MG/1
7.5 TABLET, FILM COATED ORAL
Qty: 30 TABLET | Refills: 0 | Status: SHIPPED | OUTPATIENT
Start: 2019-04-12 | End: 2021-01-11

## 2019-04-12 RX ORDER — WARFARIN SODIUM 10 MG/1
10 TABLET ORAL
Status: COMPLETED | OUTPATIENT
Start: 2019-04-12 | End: 2019-04-12

## 2019-04-12 RX ADMIN — Medication 1 CAPSULE: at 09:14

## 2019-04-12 RX ADMIN — MIRTAZAPINE 7.5 MG: 7.5 TABLET, FILM COATED ORAL at 20:50

## 2019-04-12 RX ADMIN — WARFARIN SODIUM 10 MG: 10 TABLET ORAL at 18:11

## 2019-04-12 RX ADMIN — ELVITEGRAVIR, COBICISTAT, EMTRICITABINE, AND TENOFOVIR ALAFENAMIDE 1 TABLET: 150; 150; 200; 10 TABLET ORAL at 13:01

## 2019-04-12 RX ADMIN — SERTRALINE HYDROCHLORIDE 50 MG: 50 TABLET ORAL at 09:14

## 2019-04-12 ASSESSMENT — ACTIVITIES OF DAILY LIVING (ADL)
LAUNDRY: WITH SUPERVISION
DRESS: INDEPENDENT
HYGIENE/GROOMING: INDEPENDENT
ORAL_HYGIENE: INDEPENDENT

## 2019-04-12 NOTE — PROGRESS NOTES
Patient is calm and cooperative. His goal for the day was to make it to group which he did. He said his depression was 5-6 and anxiety was around 7. He looks to be happy and in good spirit but he is fairly sad when you talk to him more. He Denies SI/SIB. He is good with his ADLs and has been eating well. He started a new med that is making him sleep a lot and he is trying to have more energy and be social. He is doing well overall.

## 2019-04-12 NOTE — PROGRESS NOTES
04/11/19 2152   Behavioral Health   Hallucinations denies / not responding to hallucinations   Thinking poor concentration   Orientation time: oriented;date: oriented;place: oriented   Memory baseline memory   Insight insight appropriate to events   Judgement impaired   Eye Contact at examiner   Affect blunted, flat   Mood mood is calm   Physical Appearance/Attire attire appropriate to age and situation   Hygiene neglected grooming - unclean body, hair, teeth   Suicidality   (denied )   1. Wish to be Dead No   2. Non-Specific Active Suicidal Thoughts  No   Self Injury   (denied )   Elopement   (denied )   Activity withdrawn;isolative   Speech coherent;clear   Medication Sensitivity no observed side effects   Psychomotor / Gait balanced;steady   Psycho Education   Type of Intervention 1:1 intervention   Response participates, initiates socially appropriate   Hours 0.5   Activities of Daily Living   Hygiene/Grooming independent   Oral Hygiene independent   Dress street clothes;scrubs (behavioral health)   Laundry with supervision   Room Organization independent   Activity   Activity Assistance Provided independent     Pt was calm, pleasant, bright and cooperative with blunted mood affect. He was visibile in milieu for the majority part of the shift watching tv and socializing with peers. Pt denied all mental health symptoms including SI/SIB. Pt's appetite was good and sleeping and napping well. No behavioral issues. No major concerns from the pt. Pt is looking forward to discharge to treatment facility near the future.

## 2019-04-12 NOTE — PLAN OF CARE
Problem: OT General Care Plan  Goal: OT Goal 1  Description  Within 1 week, Pt will demonstrate increased openness as evidenced by sharing >2 thoughts and/or feelings on mental health or substance use.      Groups Attended: OT Clinic    Affect/Hygiene/Presentation: Pleasant mood, engaged, bright affect.     Patient Performance/Response: Pt actively participated in occupational therapy clinic. Pt was able to ask for assistance as needed, and independently engage in a novel, goal-directed task without difficulty. Pt demonstrated good focus, planning, and problem solving during task completion. Pt appeared comfortable interacting with peers and writer during clinic.    Plan: Pt will be encouraged to maintain group attendance for continued ongoing assessment and support in completion of occupational therapy treatment goals.     Outcome: Improving

## 2019-04-13 PROCEDURE — 25000132 ZZH RX MED GY IP 250 OP 250 PS 637: Performed by: PSYCHIATRY & NEUROLOGY

## 2019-04-13 PROCEDURE — 12400001 ZZH R&B MH UMMC

## 2019-04-13 RX ORDER — WARFARIN SODIUM 10 MG/1
10 TABLET ORAL
Status: COMPLETED | OUTPATIENT
Start: 2019-04-13 | End: 2019-04-13

## 2019-04-13 RX ADMIN — WARFARIN SODIUM 10 MG: 10 TABLET ORAL at 18:30

## 2019-04-13 RX ADMIN — MIRTAZAPINE 7.5 MG: 7.5 TABLET, FILM COATED ORAL at 22:11

## 2019-04-13 RX ADMIN — ELVITEGRAVIR, COBICISTAT, EMTRICITABINE, AND TENOFOVIR ALAFENAMIDE 1 TABLET: 150; 150; 200; 10 TABLET ORAL at 12:54

## 2019-04-13 RX ADMIN — ALUMINUM HYDROXIDE, MAGNESIUM HYDROXIDE, AND DIMETHICONE 30 ML: 400; 400; 40 SUSPENSION ORAL at 14:39

## 2019-04-13 RX ADMIN — SERTRALINE HYDROCHLORIDE 50 MG: 50 TABLET ORAL at 08:38

## 2019-04-13 ASSESSMENT — ACTIVITIES OF DAILY LIVING (ADL)
LAUNDRY: WITH SUPERVISION
ORAL_HYGIENE: INDEPENDENT
HYGIENE/GROOMING: INDEPENDENT
DRESS: SCRUBS (BEHAVIORAL HEALTH);INDEPENDENT
HYGIENE/GROOMING: INDEPENDENT
DRESS: SCRUBS (BEHAVIORAL HEALTH)
ORAL_HYGIENE: INDEPENDENT

## 2019-04-13 NOTE — PROGRESS NOTES
"Pt spent the day sleeping until lunch. He was present in the milieu after lunch while watching and socializing with other pts. He remains calm while on station 10. He denies SI, SIB, pain, racing thoughts, and HI. He continues to endorse anxiety and depression but declined to rate both for writer. He shares, \"I'm still feeling a little sad but I don't know why and I'm still anxious to go to treatment because I don't know what it's going to be like.\" He expresses no other concerns at this time.       04/13/19 1400   Behavioral Health   Hallucinations denies / not responding to hallucinations   Thinking intact   Orientation person: oriented;place: oriented;time: oriented;date: oriented   Memory baseline memory   Insight insight appropriate to situation;insight appropriate to events   Judgement impaired   Eye Contact at examiner   Affect full range affect   Mood mood is calm   Physical Appearance/Attire appears stated age   Suicidality other (see comments)  (denies)   1. Wish to be Dead No   2. Non-Specific Active Suicidal Thoughts  No   Self Injury other (see comment)  (denies)   Elopement   (none stated/observed)   Activity other (see comment)  (social with others, present in milieu)   Speech clear;coherent   Psychomotor / Gait balanced;steady   Activities of Daily Living   Hygiene/Grooming independent   Oral Hygiene independent   Dress scrubs (behavioral health)   Laundry with supervision   Room Organization independent        04/13/19 1400   Behavioral Health   Hallucinations denies / not responding to hallucinations   Thinking intact   Orientation person: oriented;place: oriented;time: oriented;date: oriented   Memory baseline memory   Insight insight appropriate to situation;insight appropriate to events   Judgement impaired   Eye Contact at examiner   Affect full range affect   Mood mood is calm   Physical Appearance/Attire appears stated age   Suicidality other (see comments)  (denies)   1. Wish to be Dead No "   2. Non-Specific Active Suicidal Thoughts  No   Self Injury other (see comment)  (denies)   Elopement   (none stated/observed)   Activity other (see comment)  (social with others, present in milieu)   Speech clear;coherent   Psychomotor / Gait balanced;steady   Activities of Daily Living   Hygiene/Grooming independent   Oral Hygiene independent   Dress scrubs (behavioral health)   Laundry with supervision   Room Organization independent

## 2019-04-13 NOTE — PLAN OF CARE
"    The patient is noted to be doing better. Patient has been somewhat restless, very engaged on approach, he would give everybody a compliment \" Lj, you're one of kind.\"  Full range affect and brightened upon approach. No real intrusive behavior. No racing thoughts or irritability. Denied hallucinations and paranoia. Denied SI/SIB and HI. No urges to harm self or others. Tolerating medications well, no side effects. Appetite and sleep intact.   Plan: Status 15s; Build trust with pt. Continue to build on strengths. Encourage healthy coping.       "

## 2019-04-14 LAB — INR PPP: 2.3 (ref 0.86–1.14)

## 2019-04-14 PROCEDURE — 85610 PROTHROMBIN TIME: CPT | Performed by: PSYCHIATRY & NEUROLOGY

## 2019-04-14 PROCEDURE — 12400001 ZZH R&B MH UMMC

## 2019-04-14 PROCEDURE — 25000132 ZZH RX MED GY IP 250 OP 250 PS 637: Performed by: PSYCHIATRY & NEUROLOGY

## 2019-04-14 PROCEDURE — 36415 COLL VENOUS BLD VENIPUNCTURE: CPT | Performed by: PSYCHIATRY & NEUROLOGY

## 2019-04-14 RX ADMIN — MIRTAZAPINE 7.5 MG: 7.5 TABLET, FILM COATED ORAL at 21:40

## 2019-04-14 RX ADMIN — MAGNESIUM HYDROXIDE 30 ML: 400 SUSPENSION ORAL at 16:39

## 2019-04-14 RX ADMIN — WARFARIN SODIUM 12.5 MG: 10 TABLET ORAL at 18:27

## 2019-04-14 RX ADMIN — ELVITEGRAVIR, COBICISTAT, EMTRICITABINE, AND TENOFOVIR ALAFENAMIDE 1 TABLET: 150; 150; 200; 10 TABLET ORAL at 12:45

## 2019-04-14 RX ADMIN — SERTRALINE HYDROCHLORIDE 50 MG: 50 TABLET ORAL at 08:46

## 2019-04-14 ASSESSMENT — ACTIVITIES OF DAILY LIVING (ADL)
LAUNDRY: WITH SUPERVISION
ORAL_HYGIENE: INDEPENDENT
DRESS: INDEPENDENT
HYGIENE/GROOMING: INDEPENDENT

## 2019-04-14 ASSESSMENT — MIFFLIN-ST. JEOR: SCORE: 2055.22

## 2019-04-14 NOTE — PROGRESS NOTES
"  Patient visible and active in the milieu throughout the evening.  Peer interactions spontaneous and congenial.  Enjoyed a visit from his spouse.  Cooperative with all unit and personal care protocols.  Responsive to staff MH inquiries and interventions.  General presentation has been bright, alert, calm, and a bit pressured, but pleasant.  Minimized MH issues and concerns and denied all acuity at this time.  Focused on and hopeful of discharge in the near future, stating \"I've got to get on with my life again.  I know what I need to do.\"   Galileo Phelps   04/13/2019 04/13/19 2058   Sleep/Rest/Relaxation   Day/Evening Time Hours up all shift   Behavioral Health   Hallucinations denies / not responding to hallucinations   Thinking intact   Orientation person: oriented;place: oriented   Memory baseline memory   Insight admits / accepts;insight appropriate to situation;insight appropriate to events   Judgement impaired   Eye Contact at examiner   Affect full range affect   Mood mood is calm   Physical Appearance/Attire appears stated age;attire appropriate to age and situation;neat   Suicidality other (see comments)  (denied SI)   1. Wish to be Dead No   2. Non-Specific Active Suicidal Thoughts  No   Self Injury other (see comment)  (denied SI)   Elopement   (no indicators)   Activity other (see comment)  (visible. social, a bit restless)   Speech clear;coherent   Psychomotor / Gait balanced;steady   Psycho Education   Type of Intervention 1:1 intervention   Response participates with encouragement   Hours 0.5   Treatment Detail current MH status   Safety   Suicidality Status 15   Activities of Daily Living   Hygiene/Grooming independent   Oral Hygiene independent   Dress scrubs (behavioral health);independent   Room Organization independent   Groups   Details N/A     "

## 2019-04-15 LAB — INR PPP: 2.54 (ref 0.86–1.14)

## 2019-04-15 PROCEDURE — 25000132 ZZH RX MED GY IP 250 OP 250 PS 637: Performed by: PSYCHIATRY & NEUROLOGY

## 2019-04-15 PROCEDURE — G0177 OPPS/PHP; TRAIN & EDUC SERV: HCPCS

## 2019-04-15 PROCEDURE — 36415 COLL VENOUS BLD VENIPUNCTURE: CPT | Performed by: PSYCHIATRY & NEUROLOGY

## 2019-04-15 PROCEDURE — 99231 SBSQ HOSP IP/OBS SF/LOW 25: CPT | Performed by: PSYCHIATRY & NEUROLOGY

## 2019-04-15 PROCEDURE — 12400001 ZZH R&B MH UMMC

## 2019-04-15 PROCEDURE — 85610 PROTHROMBIN TIME: CPT | Performed by: PSYCHIATRY & NEUROLOGY

## 2019-04-15 RX ORDER — WARFARIN SODIUM 10 MG/1
10 TABLET ORAL
Status: COMPLETED | OUTPATIENT
Start: 2019-04-15 | End: 2019-04-15

## 2019-04-15 RX ADMIN — ELVITEGRAVIR, COBICISTAT, EMTRICITABINE, AND TENOFOVIR ALAFENAMIDE 1 TABLET: 150; 150; 200; 10 TABLET ORAL at 13:06

## 2019-04-15 RX ADMIN — WARFARIN SODIUM 10 MG: 10 TABLET ORAL at 18:20

## 2019-04-15 RX ADMIN — SERTRALINE HYDROCHLORIDE 50 MG: 50 TABLET ORAL at 08:14

## 2019-04-15 RX ADMIN — MIRTAZAPINE 7.5 MG: 7.5 TABLET, FILM COATED ORAL at 21:53

## 2019-04-15 ASSESSMENT — ACTIVITIES OF DAILY LIVING (ADL)
DRESS: STREET CLOTHES;SCRUBS (BEHAVIORAL HEALTH);INDEPENDENT
ORAL_HYGIENE: INDEPENDENT
HYGIENE/GROOMING: INDEPENDENT
LAUNDRY: WITH SUPERVISION

## 2019-04-15 NOTE — PLAN OF CARE
Problem: OT General Care Plan  Goal: OT Goal 1  Description  Pt will practice using >2 coping strategies to manage stress and reduce symptoms to demonstrate increased readiness for discharge.      Groups Attended: OT Leisure    Affect/Hygiene/Presentation: Pleasant mood, engaged, bright affect. Pt was hyperverbal/tangential when responding to question prompts, requiring redirection to remain on task and allow time for peers to share.     Patient Performance/Response: Pt actively participated in a mental health management group revolving around leisure participation. Pt contributed to a discussion regarding various categories of leisure activities, and appeared comfortable sharing his favorite leisure activities with peers. Pt was able to identify one new leisure activity that they are interested in trying. Education was provided on various leisure categories, the purpose of leisure as an occupation in daily/weekly routine and exploration of healthy leisure interests that support recovery.     Goal Progress: GOAL REVIEW: care plan reviewed, plan/goal adjustment made to allow for further progress and additional opportunities for skill acquisition.     Plan: Pt will be encouraged to maintain group attendance for continued ongoing assessment and support in completion of occupational therapy treatment goals.     Outcome: Improving

## 2019-04-15 NOTE — PROGRESS NOTES
[facility], [place]   Psychiatric Progress Note  Hospital Day: 13        Interim History:   The patient's care was discussed with the treatment team during the daily team meeting and/or staff's chart notes were reviewed.  Staff reports that the patient continues to be social. Reports depression.    CTC indicates that there remains delays in getting patient to [facility] because the person that did the CD eval has not faxed the eval to [facility] despite multiple requests from unit CTC and [facility] plus staff. Thus, the approval from [facility] has not occurred.    Upon interview, the patient was tired and in bed. Denies any issues and indicates he is waiting for [facility] bed.    Psychiatric Symptoms: no acute issues    Medication side effects reported: none    Other issues reported by patient: none new         Medications:       Brurfwa-Qhark-Cxoqcrir-TenofAF  1 tablet Oral Daily with breakfast     lactobacillus rhamnosus (GG)  1 capsule Oral BID     sertraline  50 mg Oral Daily          Allergies:   No Known Allergies       Labs:     Recent Results (from the past 48 hour(s))   INR    Collection Time: 04/14/19  1:07 PM   Result Value Ref Range    INR 2.30 (H) 0.86 - 1.14          Psychiatric Examination:     /68   Pulse 75   Temp 97.3  F (36.3  C) (Oral)   Resp 16   Ht 1.829 m (6')   Wt 111.2 kg (245 lb 3.2 oz)   SpO2 98%   BMI 33.26 kg/m    Weight is 245 lbs 3.2 oz  Body mass index is 33.26 kg/m .    Orthostatic Vitals       Most Recent      Sitting Orthostatic /82 04/15 0900    Sitting Orthostatic Pulse (bpm) 70 04/15 0900    Standing Orthostatic /87 04/15 0900    Standing Orthostatic Pulse (bpm) 82 04/15 0900            Appearance: adequately groomed, sleepy and casually dressed  Attitude:  cooperative  Eye Contact:  good  Mood:  euthymic  Affect:  appropriate and in normal range and mood congruent  Speech:  clear, coherent and normal  prosody  Language: fluent and intact in English  Psychomotor, Gait, Musculoskeletal:  no evidence of tardive dyskinesia, dystonia, or tics and intact station, gait and muscle tone  Throught Process:  logical, linear and goal oriented  Associations:  no loose associations  Thought Content:  no evidence of suicidal ideation or homicidal ideation and no evidence of psychotic thought  Insight:  good  Judgement:  intact  Oriented to:  time, person, and place  Attention Span and Concentration:  intact  Recent and Remote Memory:  intact  Fund of Knowledge:  appropriate    Clinical Global Impressions  First:  Considering your total clinical experience with this particular patient population, how severe are the patient's symptoms at this time?: 7 (04/02/19 1022)  Compared to the patient's condition at the START of treatment, this patient's condition is:: 4 (04/02/19 1022)  Most recent:  Considering your total clinical experience with this particular patient population, how severe are the patient's symptoms at this time?: 4 (04/10/19 1250)  Compared to the patient's condition at the START of treatment, this patient's condition is:: 2 (04/10/19 1250)           Precautions:     Behavioral Orders   Procedures     Code 1 - Restrict to Unit     Elopement precautions     Routine Programming     As clinically indicated     Sexual precautions     Status 15     Every 15 minutes.     Suicide precautions     Patients on Suicide Precautions should have a Combination Diet ordered that includes a Diet selection(s) AND a Behavioral Tray selection for Safe Tray - with utensils, or Safe Tray - NO utensils       Withdrawal precautions          Diagnoses:      1.  Major depressive disorder, recurrent, severe without psychotic features.   2.  Stimulant use disorder, severe, cocaine type.     3.  HIV positive.   4.  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  Herpes outbreak - treated           Assessment & Plan:       Target psychiatric symptoms and  interventions:  1. Depression  -sertraline 50 mg and titrate as tolerated and needed    2. Substance use disorder  -approved for [facility]ging [facility]. [facility] has yet to approve    Medical Problems and Treatments:  History of DVT  -management per internal medicine/pharmacy  -outpatient provider will resume prescription while patient is in [facility].    Behavioral/Psychological/Social:  Encourage unit programming    Disposition Plan   Reason for ongoing admission: awaiting CD treatment  Discharge location: [facility] dependency treatment facility  Discharge Medications: ordered.  Follow-up Appointments: scheduled  Legal Status: voluntary  Entered by: Nader Ortiz on 4/12/2019 at 2:33 PM

## 2019-04-15 NOTE — PROGRESS NOTES
04/15/19 1800   General Information   Art Directive other (see comments)   AT directive was to create an image of something pt is grateful for after a short verbal lecture based on gratitude. Pt was a positive participant, focused on task for the full duration of group. Pt lizabeth an image of family and nature symbols. Pts mood was calm.

## 2019-04-15 NOTE — PLAN OF CARE
"  Problem: Suicidal Behavior  Goal: Suicidal Behavior is Absent or Managed  Outcome: Adequate for Discharge        Pt said \" I didn't have a goal today.\" The patient is noted to be at baseline. Patient has been calm and more engaged on approach. Affect full range affect but brightened upon approach.No behavioral outburst/receptive to staff redirection. No overt psychosis, zachary or confusion. No racing thoughts or irritability. Denied hallucinations and paranoia. Denied SI/SIB and HI. No urges to harm self or others. Tolerating medications well, no side effects. Appetite and sleep intact.   Plan: Status 15s; Build trust with pt. Continue to build on strengths. Encourage healthy coping.          "

## 2019-04-15 NOTE — PLAN OF CARE
"Pt has been social with peers and staff. Pt's goal was to go to group, which he was to do. Pt became upset with the phlebotomist who was unable to get a blood draw this am. Pt was given pt relations phone number. Pt was redrawn by another staff.  Refused lactobacillus,stating he does not want to take it.  Pt waiting for bed at lodging plus. Full range affect. Calm mood. Ate breakfast and lunch. Pt told writer several times \"you are the best\"   "

## 2019-04-15 NOTE — PROGRESS NOTES
Pt is waiting for a bed at .  staff Michelle ([id number]) has been trying to get this case moving forward. At this point they are waiting for [facility] to authorize placement. This might happen today.

## 2019-04-16 PROCEDURE — 12400001 ZZH R&B MH UMMC

## 2019-04-16 PROCEDURE — 25000132 ZZH RX MED GY IP 250 OP 250 PS 637: Performed by: PSYCHIATRY & NEUROLOGY

## 2019-04-16 PROCEDURE — 99231 SBSQ HOSP IP/OBS SF/LOW 25: CPT | Performed by: PSYCHIATRY & NEUROLOGY

## 2019-04-16 PROCEDURE — G0177 OPPS/PHP; TRAIN & EDUC SERV: HCPCS

## 2019-04-16 RX ADMIN — ELVITEGRAVIR, COBICISTAT, EMTRICITABINE, AND TENOFOVIR ALAFENAMIDE 1 TABLET: 150; 150; 200; 10 TABLET ORAL at 13:14

## 2019-04-16 RX ADMIN — MIRTAZAPINE 7.5 MG: 7.5 TABLET, FILM COATED ORAL at 21:19

## 2019-04-16 RX ADMIN — WARFARIN SODIUM 12.5 MG: 10 TABLET ORAL at 17:53

## 2019-04-16 RX ADMIN — SERTRALINE HYDROCHLORIDE 50 MG: 50 TABLET ORAL at 08:20

## 2019-04-16 ASSESSMENT — ACTIVITIES OF DAILY LIVING (ADL)
LAUNDRY: WITH SUPERVISION
HYGIENE/GROOMING: INDEPENDENT
ORAL_HYGIENE: INDEPENDENT
DRESS: SCRUBS (BEHAVIORAL HEALTH);INDEPENDENT

## 2019-04-16 ASSESSMENT — MIFFLIN-ST. JEOR: SCORE: 2056.13

## 2019-04-16 NOTE — PLAN OF CARE
Problem: OT General Care Plan  Goal: OT Goal 1  Description  Pt will practice using >2 coping strategies to manage stress and reduce symptoms to demonstrate increased readiness for discharge.      Groups Attended: OT Clinic, Mental Health Management     Affect/Hygiene/Presentation: Pleasant mood, engaged with occasional assistance to refocus, bright affect. No apparent hygiene concerns.     Patient Performance/Response:  -Clinic: Pt actively participated in occupational therapy clinic. Pt was able to ask for assistance as needed, and independently engage in a novel, goal-directed task with minimal encouragement. Pt demonstrated good focus, planning, and problem solving during task completion. Pt appeared comfortable interacting with peers and writer, and socialized throughout group.  -Mental Health Management: Pt actively participated in a mental health management group focusing on increasing openness, communication, self-awareness, and self-compassion within daily roles, habits, and routines for overall occupational performance/function. Pt engaged in a hands-on task involving identification of characteristics, emotions, and thoughts they feel they want to hide from themselves or others, as well as characteristics or activities they may use to diminish these feelings. Pt was insightful during the discussion portion of the activity, and was able to identify how this self-awareness can inform our daily roles, habits, and routines. Pt was also able to verbalize one way they can use this information to increase self-compassion and communication throughout engagement in daily activities.    Plan: Pt will be encouraged to maintain group attendance for continued ongoing assessment and support in completion of occupational therapy treatment goals.     Outcome: Improving

## 2019-04-16 NOTE — PLAN OF CARE
BEHAVIORAL TEAM DISCUSSION    Participants: Nader Ortiz MD, Leann Collins RN, Aniyah SWAIN  Progress: Ready for discharge  Continued Stay Criteria/Rationale: awaiting placement at [facility]  Medical/Physical: Meds ordered-needs PCP appt for coumadin  Precautions:   Behavioral Orders   Procedures    Code 1 - Restrict to Unit    Elopement precautions    Routine Programming     As clinically indicated    Sexual precautions    Status 15     Every 15 minutes.    Suicide precautions     Patients on Suicide Precautions should have a Combination Diet ordered that includes a Diet selection(s) AND a Behavioral Tray selection for Safe Tray - with utensils, or Safe Tray - NO utensils      Withdrawal precautions     Plan:[facility] with intake at 11:00am on Wed 4/17/19 and follow up appt with PCP  Rationale for change in precautions or plan: N/A

## 2019-04-16 NOTE — PROGRESS NOTES
Case management note    Writer spoke with Michelle in Intake at  ([phone]) who reports auth from  is approved.    Writer Spoke with Pa in intake at  ([id number]) who reports bed availability for 11:00am on Wed 4/17/19.    Writer met with pt to determine whether he still wants to go to [facility].  He stated that he did and will be prepared for discharge in order to attend 11:00am intake tomorrow at [facility].    Writer called Pa at  ([id number]) to confirm that patient will accept admission  Team note completed  PCP appointment made  AVS completed

## 2019-04-16 NOTE — PROGRESS NOTES
04/15/19 2151   Behavioral Health   Hallucinations denies / not responding to hallucinations   Thinking intact   Orientation person: oriented;place: oriented;date: oriented;time: oriented   Memory baseline memory   Insight admits / accepts   Judgement intact   Eye Contact at examiner   Affect full range affect   Mood mood is calm;grandiose   Physical Appearance/Attire appears stated age;attire appropriate to age and situation   Hygiene well groomed   Suicidality thoughts only  (fleeting)   1. Wish to be Dead Yes  (fleeting, more hopeful more often)   2. Non-Specific Active Suicidal Thoughts  No   3. Active Sucidal Ideation with any Methods (Not Plan) Without Intent to Act  No   4. Active Suicidal Ideation with Some Intent to Act, Without Specific Plan  No   5. Active Suicidal Ideation with Specific Plan and Intent  No   Duration (Lifetime) 1   Change in Protective Factors? No   Enviromental Risk Factors None   Elopement   (none)   Activity other (see comment)  (social and active)   Speech clear;coherent   Medication Sensitivity no stated side effects;no observed side effects   Psychomotor / Gait steady;balanced   Daily Care   Activity up ad shawn   Activities of Daily Living   Hygiene/Grooming independent   Oral Hygiene independent   Dress street clothes;scrubs (behavioral health);independent   Laundry with supervision   Room Organization independent   Activity   Activity Assistance Provided independent   Hygiene Care Assistance   Hygiene Assistance independent     Pt reported having a good day. Pt is feeling hopeful about their situation. Pt reports the most fleeting of SI and states that the hope for positive change is much stronger. Pt denies SIB/depression/anxiety/hallucinations. Pt reports having support from partner, recovery sponsor, and friends and is hopeful and excited for next step. Pt is happy to be here instead of CD treatment as they feel their mental health has been a bigger contributor to mental  health problems and has yet to be addressed as fully during prior treatments.

## 2019-04-16 NOTE — PROGRESS NOTES
"Pt had a good shift. Was visible and participating in programing and interacting positively with peers. Did endorse audio and visual hallucinations, saying that \"it was weird\" so he was unable to explain further to me. Pt seemed happy and was very sarcastic with writer throughout the shift. Denies SI/SIB, side effects to meds, depression. Appetite and sleep are good. Anxiety is at a 3/10 and states he believes his meds are starting to work. Wants to talk to doctor and/or CTC about when he is discharging to [facility]. No other significant events to note - will continue to monitor.       04/16/19 1013   Behavioral Health   Hallucinations auditory;visual   Thinking poor concentration;distractable   Orientation place: oriented;person: oriented;date: oriented;time: oriented   Memory baseline memory   Insight admits / accepts   Judgement intact   Eye Contact at examiner   Affect full range affect   Mood mood is calm;grandiose   Physical Appearance/Attire appears stated age;attire appropriate to age and situation;neat   Hygiene well groomed   Suicidality other (see comments)  (Denies)   1. Wish to be Dead No   2. Non-Specific Active Suicidal Thoughts  No   Self Injury other (see comment)  (Denies)   Elopement   (None observed)   Activity other (see comment)  (Active)   Speech clear;coherent   Medication Sensitivity no stated side effects;no observed side effects   Psychomotor / Gait balanced;steady   Activities of Daily Living   Hygiene/Grooming independent   Oral Hygiene independent   Dress scrubs (behavioral health);independent   Laundry with supervision   Room Organization independent     "

## 2019-04-17 VITALS
OXYGEN SATURATION: 99 % | HEART RATE: 67 BPM | TEMPERATURE: 97.5 F | RESPIRATION RATE: 16 BRPM | SYSTOLIC BLOOD PRESSURE: 126 MMHG | HEIGHT: 72 IN | WEIGHT: 245.4 LBS | BODY MASS INDEX: 33.24 KG/M2 | DIASTOLIC BLOOD PRESSURE: 80 MMHG

## 2019-04-17 LAB — INR PPP: 2.46 (ref 0.86–1.14)

## 2019-04-17 PROCEDURE — 85610 PROTHROMBIN TIME: CPT | Performed by: PSYCHIATRY & NEUROLOGY

## 2019-04-17 PROCEDURE — 99239 HOSP IP/OBS DSCHRG MGMT >30: CPT | Performed by: PSYCHIATRY & NEUROLOGY

## 2019-04-17 PROCEDURE — 36415 COLL VENOUS BLD VENIPUNCTURE: CPT | Performed by: PSYCHIATRY & NEUROLOGY

## 2019-04-17 PROCEDURE — 25000132 ZZH RX MED GY IP 250 OP 250 PS 637: Performed by: PSYCHIATRY & NEUROLOGY

## 2019-04-17 RX ADMIN — SERTRALINE HYDROCHLORIDE 50 MG: 50 TABLET ORAL at 07:55

## 2019-04-17 ASSESSMENT — ACTIVITIES OF DAILY LIVING (ADL)
HYGIENE/GROOMING: INDEPENDENT
DRESS: INDEPENDENT
ORAL_HYGIENE: INDEPENDENT

## 2019-04-17 NOTE — PLAN OF CARE
"  Pt discharged at approximately 11:42am.    Pt presents as calm, cooperative and pleasant. Pt visible in milieu throughout shift. Pt states his appetite is \"okay\" with no issues reported. Pt explains his sleep hygiene is \"better\" with no issues reported. Pt denies any adverse/side effects to medications and none seen by staff. Pt denies any acute physical ailments and none seen by staff.     Pt rates his anxiety a 4-5/10 and his depression a 0/10. Pt denies SI/SIB/HI. Pt denies AH/VH.     PT was to discharge to [facility]; however, Pt explained that he needed to return to his home and return to work. Pt goes on to state that he was on a 30-45 day leave from work and if he went to [place], he would surpass that timeframe.     Pt able to identify the following support network: \"sponsor, girlfriend and family.\"     Pt able to identify the following coping skills: \"walks and prayer.\"     Pt denies access to guns.     Pt's belongings returned without incident. Belongings sheet signed. AVS report reviewed with Pt and paperwork signed. Medications given to Pt and paperwork signed. Pt escorted off unit by staff and transported to home via cab service.   "

## 2019-04-17 NOTE — DISCHARGE SUMMARY
Psychiatric Discharge Summary    Edi Sorenson MRN# [id number]   Age: 43 year old YOB: 1975     Date of Admission:  4/1/2019  Date of Discharge:  4/17/2019  Admitting Physician:  Nader Ortiz MD  Discharge Physician:  Nader Ortiz MD          Event Leading to Hospitalization:   A 43-year-old man admitted with increasing auditory hallucinations of commands to kill self, also has homicidal thoughts in the setting of a recent cocaine binge.            See Admission note by Nader Ortiz MD on 4/2/19 for additional details.          Diagnoses:     1.  Major depressive disorder, recurrent, severe without psychotic features.   2.  Stimulant use disorder, severe, cocaine type.     3.  HIV positive.   4.  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  Herpes outbreak - treated         Labs:     Recent Results (from the past 336 hour(s))   HIV Antigen Antibody Combo    Collection Time: 04/03/19  3:11 PM   Result Value Ref Range    HIV Antigen Antibody Combo Reactive (A) NR^Nonreactive       T cell subset profile    Collection Time: 04/03/19  3:11 PM   Result Value Ref Range    IFC Specimen Blood     CD3 Mature T 90 (H) 49 - 84 %    CD4 [place] T 43 28 - 63 %    CD8 Suppressor T 46 (H) 10 - 40 %    CD4:CD8 Ratio 0.93 (L) 1.40 - 2.60    Absolute CD3 1,735 603 - 2,990 cells/uL    Absolute CD4 827 441 - 2,156 cells/uL    Absolute CD8 878 125 - 1,312 cells/uL   INR    Collection Time: 04/04/19  8:04 AM   Result Value Ref Range    INR 1.13 0.86 - 1.14   INR    Collection Time: 04/05/19  7:43 AM   Result Value Ref Range    INR 1.31 (H) 0.86 - 1.14   INR    Collection Time: 04/06/19  7:57 AM   Result Value Ref Range    INR 1.86 (H) 0.86 - 1.14   INR    Collection Time: 04/07/19  2:03 PM   Result Value Ref Range    INR 2.06 (H) 0.86 - 1.14   INR    Collection Time: 04/08/19  8:36 AM   Result Value Ref Range    INR 2.20 (H) 0.86 - 1.14   INR    Collection Time: 04/09/19  9:42 AM   Result Value Ref Range    INR  "2.32 (H) 0.86 - 1.14   INR    Collection Time: 04/10/19  7:59 AM   Result Value Ref Range    INR 2.50 (H) 0.86 - 1.14   INR    Collection Time: 04/11/19  7:43 AM   Result Value Ref Range    INR 2.39 (H) 0.86 - 1.14   INR    Collection Time: 04/12/19 11:42 AM   Result Value Ref Range    INR 2.67 (H) 0.86 - 1.14   INR    Collection Time: 04/14/19  1:07 PM   Result Value Ref Range    INR 2.30 (H) 0.86 - 1.14   INR    Collection Time: 04/15/19  1:42 PM   Result Value Ref Range    INR 2.54 (H) 0.86 - 1.14   INR    Collection Time: 04/17/19  7:51 AM   Result Value Ref Range    INR 2.46 (H) 0.86 - 1.14              Consults:   Patient was seen by internal medicine to make recs about anticoagulation         Hospital Course:   Edi Sorenson was [facility] with attending Nader Ortiz MD as a voluntary patient. The patient was placed under status 15 (15 minute checks) to ensure patient safety.     The patient initially \"crashed\" as he was washing out the cocaine. As time went on, he was more visible in the milieu. Case management and [facility] to work on setting up treatment there. On the day he was to go to , he indicated that he no longer wanted to go. Instead he planned to stay with a friend for one night and then his girlfriend would clean the drugs out of his house. When he first learned a bed was available, he started to become ambivalent about going. I suspect that he never really wanted treatment and was simply requesting it to prolong his hospital stay.    The patient would report depression and SI one minute and then joke and laugh with peers. If it wasn't for the plan to go to CD treatment, he would have likely discharged sooner. Given his last second change, I would advise making future CD referrals happen as outpatient care.    Edi Sorenson was released to home. At the time of discharge Edi Sorenson was determined to not be a danger to himself or others.          Discharge " Medications:     Current Discharge Medication List      START taking these medications    Details   mirtazapine (REMERON) 7.5 MG tablet Take 1 tablet (7.5 mg) by mouth nightly as needed (sleep)  Qty: 30 tablet, Refills: 0    Associated Diagnoses: Moderate episode of recurrent major depressive disorder (H)      sertraline (ZOLOFT) 50 MG tablet Take 1 tablet (50 mg) by mouth daily  Qty: 30 tablet, Refills: 0    Associated Diagnoses: Moderate episode of recurrent major depressive disorder (H)         CONTINUE these medications which have CHANGED    Details   GENVOYA [id number] mg tablet Take 1 tablet by mouth daily (with breakfast)  Qty: 30 tablet, Refills: 0    Associated Diagnoses: Asymptomatic human immunodeficiency virus (HIV) infection status (H)      warfarin (COUMADIN) 10 MG tablet Take 1 tablet (10 mg) by mouth daily    Associated Diagnoses: History of deep venous thrombosis         CONTINUE these medications which have NOT CHANGED    Details   valACYclovir (VALTREX) 1000 mg tablet Take 1 tablet twice daily as needed for flares of herpetic lesions.                  Psychiatric Examination:   Appearance:  awake, alert, adequately groomed and casually dressed  Attitude:  cooperative  Eye Contact:  good  Mood:  good  Affect:  appropriate and in normal range and mood congruent  Speech:  clear, coherent  Psychomotor Behavior:  no evidence of tardive dyskinesia, dystonia, or tics  Thought Process:  logical, linear and goal oriented  Associations:  no loose associations  Thought Content:  no evidence of suicidal ideation or homicidal ideation and no evidence of psychotic thought  Insight:  good  Judgment:  intact  Oriented to:  time, person, and place  Attention Span and Concentration:  intact  Recent and Remote Memory:  intact  Language: Able to name objects and Able to repeat phrases  Fund of Knowledge: appropriate  Muscle Strength and Tone: normal  Gait and Station: Normal         Discharge Plan:   Medication  Appointments: Primary care provider, Dr. Puentes on 5/9/19    Patient was referred to [facility] but on the day of discharge, when a bed was available, he declined to go.    Attestation:  The patient has been seen and evaluated by me,  Nader Ortiz MD  On the day of discharge, I saw the patient and performed the above examination, reviewed discharge medications, reviewed follow-up plan, and assessed safety for discharge. I spent greater than 30 minutes on these tasks.

## 2019-04-17 NOTE — DISCHARGE INSTRUCTIONS
Behavioral Discharge Planning and Instructions      Summary:  You were admitted on 4/1/2019  due to Suicidal Ideations and Chemical Use Issues.  You were treated by Dr. Nader Ortiz MD and [facility] on 4/17/[facility]. Patient stated he now does not want to go to treatment but would rather discharge to home.      Principal Diagnosis: Per Nader Ortiz MD  1.  Major depressive disorder, recurrent, severe without psychotic features.   2.  Stimulant use disorder, severe, cocaine type.     3.  HIV positive.   4.  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       Health Care Follow-up Appointments:   Primary Care Physician:  Date and Time: 9:00am on Thursday May 9th  Provider:  Dr Puentes, [facility]   Address:  [address], [address], [place], [place]  Phone:  [phone]  Please arrive 10-15 minutes early with your insurance card and ID    Substance Use Disorder Treatment   Date and Time:  11:00am on Wednesday 4/17/19  Provider:  [facility]  Address:  [address] (next to [address])  [address] [place] [address], 5th Floor, [place] [address]  Phone:  [phone]      Attend all scheduled appointments with your outpatient providers. Call at least 24 hours in advance if you need to reschedule an appointment to ensure continued access to your outpatient providers.   Major Treatments, Procedures and Findings:  You were provided with: a psychiatric assessment, assessed for medical stability, medication evaluation and/or management, group therapy, CD evaluation/assessment and milieu management    Symptoms to Report: feeling more aggressive, increased confusion, losing more sleep, mood getting worse or thoughts of suicide    Early warning signs can include: increased depression or anxiety sleep disturbances increased thoughts or behaviors of suicide or self-harm  increased unusual thinking, such as paranoia or  "hearing voices    Safety and Wellness:  Take all medicines as directed.  Make no changes unless your doctor suggests them.      Follow treatment recommendations.  Refrain from alcohol and non-prescribed drugs.  Ask your support system to help you reduce your access to items that could harm yourself or others. Items could include:  Firearms  Medicines (both prescribed and over-the-counter)  Knives and other sharp objects  Ropes and like materials  Car keys  If there is a concern for safety, call 911. If there is a concern for safety, call 911.    Resources:   Crisis Intervention: 883.543.4635 or 822-884-2167 (TTY: 998.489.7601).  Call anytime for help.  National [place] on Mental Illness (www.[place].eliot.org): 782.135.4839 or 136-310-9087.  [place] Association for Children's Mental Health (www.macmh.org): 535.753.2162.  Alcoholics Anonymous (www.alcoholics-anonymous.org): Check your phone book for your local chapter.  Suicide Awareness Voices of Education (SAVE) (www.save.org): 404-823-DKQE (6082)  National Suicide Prevention Line (www.mentalhealthmn.org): 145-483-FDMI (6446)  Mental Health Consumer/Survivor Network of [place] (www.mhcsn.net): 979.717.7621 or 046-539-0041  Mental Health Association of [place] (www.mentalhealth.org): 328.632.1225 or 713-941-8177  Self- Management and Recovery Training., SMART-- Toll free: 362.801.2199  www.File[employer] Document Management software.org  [employer] Crisis (COPE) Response - Adult 624 686-8496  Text 4 Life: txt \"LIFE\" to 89495 for immediate support and crisis intervention  Crisis text line: Text \"[place]\" to 222491. Free, confidential, 24/7.  Crisis Intervention: 643.164.8612 or 994-166-4518. Call anytime for help.       The treatment team has appreciated the opportunity to work with you.     Edi please take care and make your recovery a daily recovery.   If you have any questions or concerns our unit number is [phone]      "

## 2019-04-17 NOTE — PROGRESS NOTES
[facility], [place]   Psychiatric Progress Note  Hospital Day: 14        Interim History:   The patient's care was discussed with the treatment team during the daily team meeting and/or staff's chart notes were reviewed.  Staff reports that the patient has indicated that he may just want to wait until Friday and go home. Plans to have his friends make his house free from drugs/alcohol.    Upon interview, the patient reports that his mood is good. He states that he has been using up a lot of his 45 day leave waiting for treatment. I indicated that since there is a bed tomorrow, he will be discharged. He agrees to go to [facility]0cket. I advised him to stay for the entire program as it will be helpful in maintaining sobriety.    Psychiatric Symptoms: no acute issues    Medication side effects reported: none    Other issues reported by patient: none new         Medications:       Qjqdlkb-Jgxuc-Ynvpobot-TenofAF  1 tablet Oral Daily with breakfast     lactobacillus rhamnosus (GG)  1 capsule Oral BID     sertraline  50 mg Oral Daily          Allergies:   No Known Allergies       Labs:     Recent Results (from the past 48 hour(s))   INR    Collection Time: 04/15/19  1:42 PM   Result Value Ref Range    INR 2.54 (H) 0.86 - 1.14          Psychiatric Examination:     /80 (BP Location: Left arm)   Pulse 67   Temp 97.6  F (36.4  C) (Tympanic)   Resp 16   Ht 1.829 m (6')   Wt 111.3 kg (245 lb 6.4 oz)   SpO2 99%   BMI 33.28 kg/m    Weight is 245 lbs 6.4 oz  Body mass index is 33.28 kg/m .    Orthostatic Vitals       Most Recent      Sitting Orthostatic /76 04/15 1630    Sitting Orthostatic Pulse (bpm) 69 04/15 1630    Standing Orthostatic /83 04/16 0824    Standing Orthostatic Pulse (bpm) 89 04/16 0824            Appearance: adequately groomed, sleepy and casually dressed  Attitude:  cooperative  Eye Contact:  good  Mood:  euthymic  Affect:  appropriate and in normal range and  mood congruent  Speech:  clear, coherent and normal prosody  Language: fluent and intact in English  Psychomotor, Gait, Musculoskeletal:  no evidence of tardive dyskinesia, dystonia, or tics and intact station, gait and muscle tone  Throught Process:  logical, linear and goal oriented  Associations:  no loose associations  Thought Content:  no evidence of suicidal ideation or homicidal ideation and no evidence of psychotic thought  Insight:  good  Judgement:  intact  Oriented to:  time, person, and place  Attention Span and Concentration:  intact  Recent and Remote Memory:  intact  Fund of Knowledge:  appropriate    Clinical Global Impressions  First:  Considering your total clinical experience with this particular patient population, how severe are the patient's symptoms at this time?: 7 (04/02/19 1022)  Compared to the patient's condition at the START of treatment, this patient's condition is:: 4 (04/02/19 1022)  Most recent:  Considering your total clinical experience with this particular patient population, how severe are the patient's symptoms at this time?: 4 (04/10/19 1250)  Compared to the patient's condition at the START of treatment, this patient's condition is:: 2 (04/10/19 1250)           Precautions:     Behavioral Orders   Procedures     Code 1 - Restrict to Unit     Elopement precautions     Routine Programming     As clinically indicated     Sexual precautions     Status 15     Every 15 minutes.     Suicide precautions     Patients on Suicide Precautions should have a Combination Diet ordered that includes a Diet selection(s) AND a Behavioral Tray selection for Safe Tray - with utensils, or Safe Tray - NO utensils       Withdrawal precautions          Diagnoses:      1.  Major depressive disorder, recurrent, severe without psychotic features.   2.  Stimulant use disorder, severe, cocaine type.     3.  HIV positive.   4.  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  Herpes outbreak - treated            Assessment & Plan:       Target psychiatric symptoms and interventions:  1. Depression  -sertraline 50 mg daily    2. Substance use disorder  -[facility] has been 4/17 at 11 AM.    Medical Problems and Treatments:  History of DVT  -management per internal medicine/pharmacy  -outpatient provider will resume prescription while patient is in [place].    Behavioral/Psychological/Social:  Encourage unit programming    Disposition Plan   Reason for ongoing admission: awaiting CD treatment  Discharge location: Chemical dependency treatment facility  Discharge Medications: ordered.  Follow-up Appointments: scheduled  Legal Status: voluntary  Entered by: Nader Ortiz on 4/16/2019 at 9:50 PM

## 2021-01-10 ENCOUNTER — HOSPITAL ENCOUNTER (OUTPATIENT)
Facility: CLINIC | Age: 46
Setting detail: OBSERVATION
Discharge: HOME | End: 2021-01-11
Attending: FAMILY MEDICINE | Admitting: FAMILY MEDICINE
Payer: COMMERCIAL

## 2021-01-10 DIAGNOSIS — F33.1 MAJOR DEPRESSIVE DISORDER, RECURRENT EPISODE, MODERATE (H): ICD-10-CM

## 2021-01-10 DIAGNOSIS — F14.10 COCAINE ABUSE (H): ICD-10-CM

## 2021-01-10 DIAGNOSIS — Z20.822 CONTACT WITH AND (SUSPECTED) EXPOSURE TO COVID-19: ICD-10-CM

## 2021-01-10 DIAGNOSIS — R45.1 AGITATION: ICD-10-CM

## 2021-01-10 DIAGNOSIS — F14.151: ICD-10-CM

## 2021-01-10 DIAGNOSIS — R05.9 COUGH: ICD-10-CM

## 2021-01-10 PROBLEM — R41.82 ALTERED MENTAL STATUS: Status: ACTIVE | Noted: 2021-01-10

## 2021-01-10 LAB
ALBUMIN SERPL-MCNC: 3.8 G/DL (ref 3.4–5)
ALP SERPL-CCNC: 71 U/L (ref 40–150)
ALT SERPL W P-5'-P-CCNC: 40 U/L (ref 0–70)
ANION GAP SERPL CALCULATED.3IONS-SCNC: 8 MMOL/L (ref 3–14)
APTT PPP: 27 SEC (ref 22–37)
AST SERPL W P-5'-P-CCNC: 75 U/L (ref 0–45)
BASOPHILS # BLD AUTO: 0.1 10E9/L (ref 0–0.2)
BASOPHILS NFR BLD AUTO: 0.4 %
BILIRUB SERPL-MCNC: 0.6 MG/DL (ref 0.2–1.3)
BUN SERPL-MCNC: 18 MG/DL (ref 7–30)
CALCIUM SERPL-MCNC: 8.9 MG/DL (ref 8.5–10.1)
CHLORIDE SERPL-SCNC: 101 MMOL/L (ref 94–109)
CK SERPL-CCNC: 2532 U/L (ref 30–300)
CO2 SERPL-SCNC: 29 MMOL/L (ref 20–32)
CREAT SERPL-MCNC: 1.46 MG/DL (ref 0.66–1.25)
DIFFERENTIAL METHOD BLD: ABNORMAL
EOSINOPHIL # BLD AUTO: 0.2 10E9/L (ref 0–0.7)
EOSINOPHIL NFR BLD AUTO: 1.8 %
ERYTHROCYTE [DISTWIDTH] IN BLOOD BY AUTOMATED COUNT: 12.6 % (ref 10–15)
ETHANOL SERPL-MCNC: <0.01 G/DL
GFR SERPL CREATININE-BSD FRML MDRD: 58 ML/MIN/{1.73_M2}
GLUCOSE SERPL-MCNC: 120 MG/DL (ref 70–99)
HCT VFR BLD AUTO: 38.5 % (ref 40–53)
HGB BLD-MCNC: 13.6 G/DL (ref 13.3–17.7)
IMM GRANULOCYTES # BLD: 0 10E9/L (ref 0–0.4)
IMM GRANULOCYTES NFR BLD: 0.3 %
INR PPP: 1.06 (ref 0.86–1.14)
LYMPHOCYTES # BLD AUTO: 2.9 10E9/L (ref 0.8–5.3)
LYMPHOCYTES NFR BLD AUTO: 25.8 %
MCH RBC QN AUTO: 31.5 PG (ref 26.5–33)
MCHC RBC AUTO-ENTMCNC: 35.3 G/DL (ref 31.5–36.5)
MCV RBC AUTO: 89 FL (ref 78–100)
MONOCYTES # BLD AUTO: 0.9 10E9/L (ref 0–1.3)
MONOCYTES NFR BLD AUTO: 8.1 %
NEUTROPHILS # BLD AUTO: 7.1 10E9/L (ref 1.6–8.3)
NEUTROPHILS NFR BLD AUTO: 63.6 %
NRBC # BLD AUTO: 0 10*3/UL
NRBC BLD AUTO-RTO: 0 /100
PLATELET # BLD AUTO: 220 10E9/L (ref 150–450)
POTASSIUM SERPL-SCNC: 3.2 MMOL/L (ref 3.4–5.3)
PROT SERPL-MCNC: 7.6 G/DL (ref 6.8–8.8)
RBC # BLD AUTO: 4.32 10E12/L (ref 4.4–5.9)
SODIUM SERPL-SCNC: 138 MMOL/L (ref 133–144)
TROPONIN I SERPL-MCNC: <0.015 UG/L (ref 0–0.04)
WBC # BLD AUTO: 11.2 10E9/L (ref 4–11)

## 2021-01-10 PROCEDURE — 96376 TX/PRO/DX INJ SAME DRUG ADON: CPT | Performed by: FAMILY MEDICINE

## 2021-01-10 PROCEDURE — C9803 HOPD COVID-19 SPEC COLLECT: HCPCS | Performed by: FAMILY MEDICINE

## 2021-01-10 PROCEDURE — 84484 ASSAY OF TROPONIN QUANT: CPT | Performed by: FAMILY MEDICINE

## 2021-01-10 PROCEDURE — 96372 THER/PROPH/DIAG INJ SC/IM: CPT | Performed by: FAMILY MEDICINE

## 2021-01-10 PROCEDURE — 96374 THER/PROPH/DIAG INJ IV PUSH: CPT | Performed by: FAMILY MEDICINE

## 2021-01-10 PROCEDURE — 82550 ASSAY OF CK (CPK): CPT | Performed by: FAMILY MEDICINE

## 2021-01-10 PROCEDURE — 99220 PR INITIAL OBSERVATION CARE,LEVEL III: CPT | Mod: 25 | Performed by: FAMILY MEDICINE

## 2021-01-10 PROCEDURE — 85025 COMPLETE CBC W/AUTO DIFF WBC: CPT | Performed by: FAMILY MEDICINE

## 2021-01-10 PROCEDURE — 83605 ASSAY OF LACTIC ACID: CPT | Performed by: FAMILY MEDICINE

## 2021-01-10 PROCEDURE — 258N000003 HC RX IP 258 OP 636: Performed by: FAMILY MEDICINE

## 2021-01-10 PROCEDURE — G0378 HOSPITAL OBSERVATION PER HR: HCPCS

## 2021-01-10 PROCEDURE — 250N000013 HC RX MED GY IP 250 OP 250 PS 637: Performed by: FAMILY MEDICINE

## 2021-01-10 PROCEDURE — 85730 THROMBOPLASTIN TIME PARTIAL: CPT | Performed by: FAMILY MEDICINE

## 2021-01-10 PROCEDURE — 85610 PROTHROMBIN TIME: CPT | Performed by: FAMILY MEDICINE

## 2021-01-10 PROCEDURE — 82077 ASSAY SPEC XCP UR&BREATH IA: CPT | Performed by: FAMILY MEDICINE

## 2021-01-10 PROCEDURE — 96361 HYDRATE IV INFUSION ADD-ON: CPT | Performed by: FAMILY MEDICINE

## 2021-01-10 PROCEDURE — 99285 EMERGENCY DEPT VISIT HI MDM: CPT | Mod: 25 | Performed by: FAMILY MEDICINE

## 2021-01-10 PROCEDURE — 93005 ELECTROCARDIOGRAM TRACING: CPT | Performed by: FAMILY MEDICINE

## 2021-01-10 PROCEDURE — 93010 ELECTROCARDIOGRAM REPORT: CPT | Performed by: FAMILY MEDICINE

## 2021-01-10 PROCEDURE — 80053 COMPREHEN METABOLIC PANEL: CPT | Performed by: FAMILY MEDICINE

## 2021-01-10 PROCEDURE — 250N000011 HC RX IP 250 OP 636: Performed by: FAMILY MEDICINE

## 2021-01-10 RX ORDER — OLANZAPINE 10 MG/1
10 TABLET, ORALLY DISINTEGRATING ORAL ONCE
Status: COMPLETED | OUTPATIENT
Start: 2021-01-10 | End: 2021-01-10

## 2021-01-10 RX ORDER — LORAZEPAM 2 MG/ML
1 INJECTION INTRAMUSCULAR ONCE
Status: COMPLETED | OUTPATIENT
Start: 2021-01-10 | End: 2021-01-10

## 2021-01-10 RX ORDER — LORAZEPAM 2 MG/ML
2 INJECTION INTRAMUSCULAR ONCE
Status: DISCONTINUED | OUTPATIENT
Start: 2021-01-10 | End: 2021-01-10

## 2021-01-10 RX ORDER — SODIUM CHLORIDE 9 MG/ML
INJECTION, SOLUTION INTRAVENOUS CONTINUOUS
Status: DISCONTINUED | OUTPATIENT
Start: 2021-01-10 | End: 2021-01-12 | Stop reason: HOSPADM

## 2021-01-10 RX ORDER — LORAZEPAM 2 MG/ML
2 INJECTION INTRAMUSCULAR ONCE
Status: COMPLETED | OUTPATIENT
Start: 2021-01-10 | End: 2021-01-10

## 2021-01-10 RX ADMIN — LORAZEPAM 1 MG: 2 INJECTION INTRAMUSCULAR; INTRAVENOUS at 22:23

## 2021-01-10 RX ADMIN — SODIUM CHLORIDE 1000 ML: 9 INJECTION, SOLUTION INTRAVENOUS at 22:23

## 2021-01-10 RX ADMIN — SODIUM CHLORIDE: 9 INJECTION, SOLUTION INTRAVENOUS at 23:47

## 2021-01-10 RX ADMIN — OLANZAPINE 10 MG: 10 TABLET, ORALLY DISINTEGRATING ORAL at 22:09

## 2021-01-10 RX ADMIN — LORAZEPAM 2 MG: 2 INJECTION INTRAMUSCULAR; INTRAVENOUS at 21:44

## 2021-01-10 NOTE — ED AVS SNAPSHOT
[facility] Emergency Department  [address]  [place] [address]  Phone: [phone]  Fax: [phone]                                    Edi Sorenson   MRN: [id number]    Department: [facility] Emergency Department   Date of Visit: 1/10/2021           After Visit Summary Signature Page    I have received my discharge instructions, and my questions have been answered. I have discussed any challenges I see with this plan with the nurse or doctor.    ..........................................................................................................................................  Patient/Patient Representative Signature      ..........................................................................................................................................  Patient Representative Print Name and Relationship to Patient    ..................................................               ................................................  Date                                   Time    ..........................................................................................................................................  Reviewed by Signature/Title    ...................................................              ..............................................  Date                                               Time          22EPIC Rev 08/18

## 2021-01-11 ENCOUNTER — APPOINTMENT (OUTPATIENT)
Dept: GENERAL RADIOLOGY | Facility: CLINIC | Age: 46
End: 2021-01-11
Attending: EMERGENCY MEDICINE
Payer: COMMERCIAL

## 2021-01-11 VITALS
OXYGEN SATURATION: 100 % | RESPIRATION RATE: 16 BRPM | SYSTOLIC BLOOD PRESSURE: 132 MMHG | HEART RATE: 79 BPM | DIASTOLIC BLOOD PRESSURE: 70 MMHG

## 2021-01-11 LAB
ALBUMIN UR-MCNC: 10 MG/DL
AMPHETAMINES UR QL SCN: NEGATIVE
ANION GAP SERPL CALCULATED.3IONS-SCNC: 7 MMOL/L (ref 3–14)
APPEARANCE UR: CLEAR
BARBITURATES UR QL: NEGATIVE
BENZODIAZ UR QL: NEGATIVE
BILIRUB UR QL STRIP: NEGATIVE
BUN SERPL-MCNC: 12 MG/DL (ref 7–30)
CALCIUM SERPL-MCNC: 7.8 MG/DL (ref 8.5–10.1)
CANNABINOIDS UR QL SCN: POSITIVE
CHLORIDE SERPL-SCNC: 107 MMOL/L (ref 94–109)
CK SERPL-CCNC: 2055 U/L (ref 30–300)
CK SERPL-CCNC: NORMAL U/L (ref 30–300)
CK SERPL-CCNC: NORMAL U/L (ref 30–300)
CO2 SERPL-SCNC: 26 MMOL/L (ref 20–32)
COCAINE UR QL: POSITIVE
COLOR UR AUTO: ABNORMAL
CREAT SERPL-MCNC: 1.02 MG/DL (ref 0.66–1.25)
ETHANOL UR QL SCN: NEGATIVE
FLUAV RNA RESP QL NAA+PROBE: NEGATIVE
FLUBV RNA RESP QL NAA+PROBE: NEGATIVE
GFR SERPL CREATININE-BSD FRML MDRD: 89 ML/MIN/{1.73_M2}
GLUCOSE SERPL-MCNC: 91 MG/DL (ref 70–99)
GLUCOSE UR STRIP-MCNC: NEGATIVE MG/DL
HGB UR QL STRIP: NEGATIVE
HYALINE CASTS #/AREA URNS LPF: 4 /LPF (ref 0–2)
INTERPRETATION ECG - MUSE: NORMAL
KETONES UR STRIP-MCNC: 10 MG/DL
LABORATORY COMMENT REPORT: NORMAL
LACTATE BLD-SCNC: 1 MMOL/L (ref 0.7–2)
LEUKOCYTE ESTERASE UR QL STRIP: NEGATIVE
MUCOUS THREADS #/AREA URNS LPF: PRESENT /LPF
NITRATE UR QL: NEGATIVE
OPIATES UR QL SCN: NEGATIVE
PH UR STRIP: 5.5 PH (ref 5–7)
POTASSIUM SERPL-SCNC: 5.1 MMOL/L (ref 3.4–5.3)
RBC #/AREA URNS AUTO: 0 /HPF (ref 0–2)
RSV RNA SPEC QL NAA+PROBE: NORMAL
SARS-COV-2 RNA RESP QL NAA+PROBE: NEGATIVE
SODIUM SERPL-SCNC: 140 MMOL/L (ref 133–144)
SOURCE: ABNORMAL
SP GR UR STRIP: 1.01 (ref 1–1.03)
SPECIMEN SOURCE: NORMAL
SQUAMOUS #/AREA URNS AUTO: <1 /HPF (ref 0–1)
TROPONIN I SERPL-MCNC: 0.02 UG/L (ref 0–0.04)
UROBILINOGEN UR STRIP-MCNC: NORMAL MG/DL (ref 0–2)
WBC #/AREA URNS AUTO: 2 /HPF (ref 0–5)

## 2021-01-11 PROCEDURE — 96361 HYDRATE IV INFUSION ADD-ON: CPT | Performed by: FAMILY MEDICINE

## 2021-01-11 PROCEDURE — 80320 DRUG SCREEN QUANTALCOHOLS: CPT | Performed by: FAMILY MEDICINE

## 2021-01-11 PROCEDURE — 84484 ASSAY OF TROPONIN QUANT: CPT | Performed by: EMERGENCY MEDICINE

## 2021-01-11 PROCEDURE — G0378 HOSPITAL OBSERVATION PER HR: HCPCS

## 2021-01-11 PROCEDURE — 81001 URINALYSIS AUTO W/SCOPE: CPT | Mod: XU | Performed by: FAMILY MEDICINE

## 2021-01-11 PROCEDURE — 250N000013 HC RX MED GY IP 250 OP 250 PS 637: Performed by: INTERNAL MEDICINE

## 2021-01-11 PROCEDURE — 80048 BASIC METABOLIC PNL TOTAL CA: CPT | Performed by: EMERGENCY MEDICINE

## 2021-01-11 PROCEDURE — 80307 DRUG TEST PRSMV CHEM ANLYZR: CPT | Performed by: FAMILY MEDICINE

## 2021-01-11 PROCEDURE — 87636 SARSCOV2 & INF A&B AMP PRB: CPT | Performed by: EMERGENCY MEDICINE

## 2021-01-11 PROCEDURE — 250N000013 HC RX MED GY IP 250 OP 250 PS 637: Performed by: EMERGENCY MEDICINE

## 2021-01-11 PROCEDURE — 71045 X-RAY EXAM CHEST 1 VIEW: CPT

## 2021-01-11 PROCEDURE — 90791 PSYCH DIAGNOSTIC EVALUATION: CPT

## 2021-01-11 PROCEDURE — 99217 PR OBSERVATION CARE DISCHARGE: CPT | Performed by: INTERNAL MEDICINE

## 2021-01-11 PROCEDURE — 258N000003 HC RX IP 258 OP 636: Performed by: FAMILY MEDICINE

## 2021-01-11 PROCEDURE — 82550 ASSAY OF CK (CPK): CPT | Mod: 91 | Performed by: EMERGENCY MEDICINE

## 2021-01-11 RX ORDER — MIRTAZAPINE 7.5 MG/1
7.5 TABLET, FILM COATED ORAL
Qty: 10 TABLET | Refills: 0 | Status: ON HOLD | OUTPATIENT
Start: 2021-01-11 | End: 2022-02-14

## 2021-01-11 RX ORDER — MIRTAZAPINE 7.5 MG/1
7.5 TABLET, FILM COATED ORAL AT BEDTIME
Status: DISCONTINUED | OUTPATIENT
Start: 2021-01-11 | End: 2021-01-12 | Stop reason: HOSPADM

## 2021-01-11 RX ADMIN — MIRTAZAPINE 7.5 MG: 7.5 TABLET, FILM COATED ORAL at 22:50

## 2021-01-11 RX ADMIN — SODIUM CHLORIDE: 9 INJECTION, SOLUTION INTRAVENOUS at 03:17

## 2021-01-11 RX ADMIN — SODIUM CHLORIDE 1000 ML: 9 INJECTION, SOLUTION INTRAVENOUS at 01:09

## 2021-01-11 RX ADMIN — ELVITEGRAVIR, COBICISTAT, EMTRICITABINE, AND TENOFOVIR ALAFENAMIDE 1 TABLET: 150; 150; 200; 10 TABLET ORAL at 13:17

## 2021-01-11 NOTE — ED NOTES
I took signout of the patient from Dr. Zimmerman.  Is a 45-year-old male who presents with agitation.  She was also noted to have a CK of 2532 and a creatinine 1.46 of likely due to recent cocaine use.  Patient was given IV hydration and creatinine decreased to 1.02 and CK decreased to 2055.  As these numbers appear to be clearing, it is unlikely the patient is at risk of rhabdomyolysis from his cocaine use.  Patient was initially agitated and was given olanzapine and lorazepam initially.  Patient was more calm and able to sleep after this.  Patient was reevaluated when he was more calm.  He was still somewhat somnolent but able to answer some questions.  He states he has been using cocaine for the past 7 days.  He states he feels thirsty and I encouraged him to continue drinking water and juice that was provided for him.  Deck assessment was attempted however patient was still too drowsy to cooperate at this time.  Patient will need to be evaluated when he is more awake and alert.     Kavin Rojas,   01/11/21 2587

## 2021-01-11 NOTE — ED NOTES
Bed: HW01  Expected date:   Expected time:   Means of arrival:   Comments:  Hen 442: 45 M Narc use

## 2021-01-11 NOTE — ED PROVIDER NOTES
Emergency Department Patient Sign-out       Brief HPI:  This is a 45 year old male signed out to me by Dr. Gordon.  See initial ED Provider note for details of the presentation.       Exam:   Patient Vitals for the past 24 hrs:   BP Pulse Resp SpO2   01/11/21 0345 (!) 142/125 88 27 94 %   01/11/21 0315 (!) 146/103 95 (!) 33 98 %   01/11/21 0235 -- 75 27 90 %   01/11/21 0200 (!) 141/104 91 27 95 %   01/11/21 0120 116/76 92 24 90 %   01/11/21 0050 120/70 93 28 92 %   01/11/21 0032 -- 82 27 93 %   01/11/21 0020 (!) 140/77 93 (!) 31 94 %   01/11/21 0018 -- 99 27 94 %   01/11/21 0005 -- 95 (!) 31 93 %   01/10/21 2345 (!) 144/82 104 30 --   01/10/21 2250 (!) 143/83 104 24 --   01/10/21 2247 (!) 161/92 103 -- --   01/10/21 2214 (!) 143/88 106 -- 97 %           ED RESULTS:   Results for orders placed or performed during the hospital encounter of 01/10/21 (from the past 24 hour(s))   CBC with platelets differential     Status: Abnormal    Collection Time: 01/10/21 10:49 PM   Result Value Ref Range    WBC 11.2 (H) 4.0 - 11.0 10e9/L    RBC Count 4.32 (L) 4.4 - 5.9 10e12/L    Hemoglobin 13.6 13.3 - 17.7 g/dL    Hematocrit 38.5 (L) 40.0 - 53.0 %    MCV 89 78 - 100 fl    MCH 31.5 26.5 - 33.0 pg    MCHC 35.3 31.5 - 36.5 g/dL    RDW 12.6 10.0 - 15.0 %    Platelet Count 220 150 - 450 10e9/L    Diff Method Automated Method     % Neutrophils 63.6 %    % Lymphocytes 25.8 %    % Monocytes 8.1 %    % Eosinophils 1.8 %    % Basophils 0.4 %    % Immature Granulocytes 0.3 %    Nucleated RBCs 0 0 /100    Absolute Neutrophil 7.1 1.6 - 8.3 10e9/L    Absolute Lymphocytes 2.9 0.8 - 5.3 10e9/L    Absolute Monocytes 0.9 0.0 - 1.3 10e9/L    Absolute Eosinophils 0.2 0.0 - 0.7 10e9/L    Absolute Basophils 0.1 0.0 - 0.2 10e9/L    Abs Immature Granulocytes 0.0 0 - 0.4 10e9/L    Absolute Nucleated RBC 0.0    Comprehensive metabolic panel     Status: Abnormal    Collection Time: 01/10/21 10:49 PM   Result Value Ref Range    Sodium 138 133 - 144  mmol/L    Potassium 3.2 (L) 3.4 - 5.3 mmol/L    Chloride 101 94 - 109 mmol/L    Carbon Dioxide 29 20 - 32 mmol/L    Anion Gap 8 3 - 14 mmol/L    Glucose 120 (H) 70 - 99 mg/dL    Urea Nitrogen 18 7 - 30 mg/dL    Creatinine 1.46 (H) 0.66 - 1.25 mg/dL    GFR Estimate 58 (L) >60 mL/min/[1.73_m2]    GFR Estimate If Black 67 >60 mL/min/[1.73_m2]    Calcium 8.9 8.5 - 10.1 mg/dL    Bilirubin Total 0.6 0.2 - 1.3 mg/dL    Albumin 3.8 3.4 - 5.0 g/dL    Protein Total 7.6 6.8 - 8.8 g/dL    Alkaline Phosphatase 71 40 - 150 U/L    ALT 40 0 - 70 U/L    AST 75 (H) 0 - 45 U/L   Troponin I     Status: None    Collection Time: 01/10/21 10:49 PM   Result Value Ref Range    Troponin I ES <0.015 0.000 - 0.045 ug/L   Alcohol ethyl     Status: None    Collection Time: 01/10/21 10:49 PM   Result Value Ref Range    Ethanol g/dL <0.01 <0.01 g/dL   INR     Status: None    Collection Time: 01/10/21 10:49 PM   Result Value Ref Range    INR 1.06 0.86 - 1.14   Partial thromboplastin time     Status: None    Collection Time: 01/10/21 10:49 PM   Result Value Ref Range    PTT 27 22 - 37 sec   CK total     Status: Abnormal    Collection Time: 01/10/21 10:49 PM   Result Value Ref Range    CK Total 2,532 (HH) 30 - 300 U/L   Lactic acid     Status: None    Collection Time: 01/10/21 11:56 PM   Result Value Ref Range    Lactic Acid 1.0 0.7 - 2.0 mmol/L   Drug abuse screen 6 urine (chem dep)     Status: Abnormal    Collection Time: 01/11/21  3:17 AM   Result Value Ref Range    Amphetamine Qual Urine Negative NEG^Negative    Barbiturates Qual Urine Negative NEG^Negative    Benzodiazepine Qual Urine Negative NEG^Negative    Cannabinoids Qual Urine Positive (A) NEG^Negative    Cocaine Qual Urine Positive (A) NEG^Negative    Ethanol Qual Urine Negative NEG^Negative    Opiates Qualitative Urine Negative NEG^Negative   UA reflex to Microscopic and Culture     Status: Abnormal    Collection Time: 01/11/21  3:17 AM    Specimen: Urine clean catch; Unspecified Urine    Result Value Ref Range    Color Urine Light Yellow     Appearance Urine Clear     Glucose Urine Negative NEG^Negative mg/dL    Bilirubin Urine Negative NEG^Negative    Ketones Urine 10 (A) NEG^Negative mg/dL    Specific Gravity Urine 1.008 1.003 - 1.035    Blood Urine Negative NEG^Negative    pH Urine 5.5 5.0 - 7.0 pH    Protein Albumin Urine 10 (A) NEG^Negative mg/dL    Urobilinogen mg/dL Normal 0.0 - 2.0 mg/dL    Nitrite Urine Negative NEG^Negative    Leukocyte Esterase Urine Negative NEG^Negative    Source Unspecified Urine     RBC Urine 0 0 - 2 /HPF    WBC Urine 2 0 - 5 /HPF    Squamous Epithelial /HPF Urine <1 0 - 1 /HPF    Mucous Urine Present (A) NEG^Negative /LPF    Hyaline Casts 4 (H) 0 - 2 /LPF   Symptomatic Influenza A/B & SARS-CoV2 (COVID-19) Virus PCR Multiplex     Status: None    Collection Time: 01/11/21  3:25 AM    Specimen: Nasopharyngeal   Result Value Ref Range    Flu A/B & SARS-COV-2 PCR Source Nasopharyngeal     SARS-CoV-2 PCR Result NEGATIVE     Influenza A PCR Negative NEG^Negative    Influenza B PCR Negative NEG^Negative    Respiratory Syncytial Virus PCR (Note)     Flu A/B & SARS-CoV-2 PCR Comment (Note)    Basic metabolic panel     Status: Abnormal    Collection Time: 01/11/21  4:46 AM   Result Value Ref Range    Sodium 140 133 - 144 mmol/L    Potassium 5.1 3.4 - 5.3 mmol/L    Chloride 107 94 - 109 mmol/L    Carbon Dioxide 26 20 - 32 mmol/L    Anion Gap 7 3 - 14 mmol/L    Glucose 91 70 - 99 mg/dL    Urea Nitrogen 12 7 - 30 mg/dL    Creatinine 1.02 0.66 - 1.25 mg/dL    GFR Estimate 89 >60 mL/min/[1.73_m2]    GFR Estimate If Black >90 >60 mL/min/[1.73_m2]    Calcium 7.8 (L) 8.5 - 10.1 mg/dL   CK total     Status: None    Collection Time: 01/11/21  4:46 AM   Result Value Ref Range    CK Total Unsatisfactory specimen - hemolyzed 30 - 300 U/L   XR Chest Port 1 View     Status: None    Collection Time: 01/11/21  4:49 AM    Narrative    EXAM: CHEST SINGLE VIEW PORTABLE  LOCATION: [facility]  SERVICES  DATE/TIME: 1/11/2021 4:22 AM    INDICATION: Cough.  COMPARISON: None.    FINDINGS: Hypoinflated lungs. No convincing pulmonary opacities. Normal-sized chronic silhouette.      Impression    IMPRESSION: No convincing evidence of active cardiopulmonary disease.        ED MEDICATIONS:   Medications   0.9% sodium chloride BOLUS (0 mLs Intravenous Stopped 1/11/21 0008)     Followed by   sodium chloride 0.9% infusion ( Intravenous New Bag 1/11/21 0317)   LORazepam (ATIVAN) injection 2 mg (2 mg Intramuscular Given 1/10/21 2144)   OLANZapine zydis (zyPREXA) ODT tab 10 mg (10 mg Oral Given 1/10/21 2209)   LORazepam (ATIVAN) injection 1 mg (1 mg Intravenous Given 1/10/21 2223)   0.9% sodium chloride BOLUS (0 mLs Intravenous Stopped 1/11/21 0316)         Impression:  No diagnosis found.    Plan:    Pending studies include continuous close monitoring while patient metabolizes his cocaine abuse, repeat laboratory testing, IV hydration, and behavioral health assessment.    I reviewed repeat laboratory testing which demonstrates improvement of his creatinine from 1.46-1.02.  Chest x-ray was performed due to patient's cough which was unremarkable with no acute cardiopulmonary process, pneumonia, effusion.  I reviewed repeat troponin 0.21. Covid negative. Repeat CK total pending (hemolyzed x 2).  Patient signed out to morning provider pending behavioral health assessment and follow-up on laboratory testing (CK total)      MD Erasmo Gr, Suellen Thacker MD  01/11/21 1411

## 2021-01-11 NOTE — ED PROVIDER NOTES
"ED Observation History and Physical  [facility]  Observation Initiation Date: Josh 10, 2021    Edi Sorenson MRN: [id number]   Age: 45 year old YOB: 1975     History     Chief Complaint   Patient presents with     Hallucinations     visual hallucinations, agitated, seeing people that aren't there     Panic Attack     pt has been awake for ~5 days using crack, says he is scared     Drug Problem     HPI   Edi Sorenson is a 45 year old male with history of asymptomatic HIV, major depressive disorder, cocaine abuse who presents via EMS due to altered mental status and bizarre behavior.  Patient's girlfriend had called 911 after patient started getting anxious, hallucinating things that aren't there and screaming at things. Here patient states he spent $15k on crack cocaine in the past week, hasn't slept in 6 days as he has been on a 6 day cocaine binge. He states he feels as if he has a \"split personality.\" He physically feels ok but his mind is racing. He knows his hallucinations aren't real, just wanted to make sure he doesn't hurt anyone.    History limited due to altered mental status. There is reason to suspect alcohol and/or drug intoxication as the etiology of altered mental status.      Past Medical and Surgical History, Medications, Allergies, and Social History were reviewed in the electronic medical record. Review with the patient was attempted but limited due to altered mental status.      Review of Systems  A complete review of systems was attempted but limited due to altered mental status.    Physical Exam     Physical Exam   There were no vitals taken for this visit.    Vitals signs and nursing note reviewed.     EXAM:  HEENT: Normal.  Oropharynx clear and moist.  Neck: Supple, trachea midline, normal voice, patient does have a large palpable rubbery soft tissue swelling on his posterior neck which is nontender and is most consistent with a " lipoma  Chest:  No respiratory distress, speaks in complete sentences, chest wall nontender, lungs clear in all fields  CV: Tachycardic, regular, no murmur, normal pulse, no jugular venous distention  Abdomen: Nondistended, soft nontender.  No hepatosplenomegaly.  Extremities: No edema or tenderness  Neurologic: He moves all his extremities and has no focal deficits  Psychiatric:   Attention and Perception: He is inattentive. He perceives auditory hallucinations.   Mood and Affect: Mood is elated. Affect is labile.   Speech: Speech is rapid and pressured and tangential.   Behavior: Behavior is hyperactive.   Thought Content: Thought content is delusional. Thought content does not include homicidal or suicidal ideation.   Judgment: Judgment is inappropriate.       ED Course      Procedures             EKG Interpretation:      Interpreted by Frandy Gordon MD  Time reviewed: 2301  Symptoms at time of EKG: altered mental status, suspected cocaine ingestion  Rhythm: sinus tachycardia  Rate: 100-110  Axis: Normal  Ectopy: none  Conduction: normal  ST Segments/ T Waves: Non-specific ST-T wave changes  Q Waves: nonspecific  Comparison to prior: Unchanged from 1/01/2019    Clinical Impression: no acute changes and non-specific EKG                   Critical Care Addendum    My initial assessment, based on my review of prehospital provider report, review of nursing observations, review of vital signs, focused history, physical exam, review of cardiac rhythm monitor and 12 lead ECG analysis, established that Edi Sorenson has severe agitation and altered mental status, which requires immediate intervention, and therefore he is critically ill.     After the initial assessment, the care team initiated multiple lab tests, initiated IV fluid administration and initiated medication therapy with Intravenous Ativan, oral Zyprexa to provide stabilization care. Due to the critical nature of this patient, I reassessed nursing  observations, vital signs, physical exam, review of cardiac rhythm monitor, mental status and respiratory status multiple times prior to his disposition.     Time also spent performing documentation and coordination of care.     Critical care time (excluding teaching time and procedures): 60 minutes.             Labs Ordered and Resulted from Time of ED Arrival Up to the Time of Departure from the ED - No data to display         Assessments & Plan (with Medical Decision Making)   Patient arriving with altered mental status possible substance-induced psychosis, with reason to suspect alcohol or other drug intoxication as etiology. Nursing notes reviewed. Exam without findings suggestive of trauma, non-focal. Blood EtOH 0.0. Glucose Normal.     AMS likely due to intoxication delirium but cannot immediately rule out other dangerous etiologies of AMS. Plan close clinical monitoring of the patient and his mental status for clearing of intoxicating substance. With approriate clearing, the patient would likely be able to be discharged. If not appropriately clearing, plan broadening of work-up, potentially including CT head and serum labs.  Did obtain initial labs with slight leukocytosis, negative troponin, some mild acute kidney injury which is likely due to agitation and dehydration given his recent cocaine binge.  His CPK is 2500 again indicative of his agitation and mild dehydration.    During my care, the patient did require medications for agitation, and did not require restraints/seclusion for patient and/or provider safety.     With period of monitoring, the patient continues to clear appropriately but is not yet clinically sober at this time.  He is sleeping soundly.  Patient signed out to oncoming provider with plan for further monitoring, recheck of basic metabolic profile and CPK after gentle hydration, mental health assessment once he is more clear, possible discharge if appropriately clear with sobriety,  further work-up if indicated.  I see prior visits under similar circumstances both here and at [facility].  He has been admitted before for substance-induced psychosis, however on other occasions was able to clear and be discharged.  His INR is not indicating that he is taking Coumadin.  Is unclear if he is still supposed to take Coumadin as he was previously on this medication for DVT.  Once his mental status is more clear we will need to clarify as to whether we should restart Coumadin, bridge with Lovenox or if he is no longer taking the medication.  Patient is being signed out to the night provider for observation, serial exam, and pending mental health assessment once he is more sober.    Preliminary diagnosis:  Altered mental status, unspecified     --  Frandy Gordon MD   [facility] EMERGENCY DEPARTMENT  1/10/2021      Frandy Gordon MD  01/10/21 8267       Frandy Gordon MD  01/10/21 0122

## 2021-01-11 NOTE — ED NOTES
DEC accessor on Ipad talking to patient and unable to fully access patient due to patient falling asleep. MD Rojas made aware and also spoke to accessor. DEC accessor will try again when patient is more awake.

## 2021-01-12 NOTE — DISCHARGE INSTRUCTIONS
Drug Abuse   Use and abuse of drugs or medicines may lead to addiction or dependence. Illegal drugs include marijuana, amphetamines (speed, crank), cocaine, heroin, MDMA, ecstasy, bath salts, PCP, mescaline, and LSD. Medicines include prescription medicines, sedatives, and sleeping pills. Once addiction or dependence happens, you are at greater risk for any of the following.  Social and personal problems    Craving for the drug and not able to stop using even though you think you want to stop (psychological addiction)    Drug withdrawal symptoms if you stop taking the drug (physical dependence)    Loss of job or your family    Arrest, conviction, and [employer] sentence for possession of an illegal substance or for driving under the influence    Health problems    Strokes, heart attacks, and kidney failure    Accidental injuries to yourself or others while you are under the influence of the drug (in a car or at home)    HIV infection. This is a much greater risk if you use IV drugs.    Skin infections    Other sexually transmitted infections (STIs) such as herpes, chlamydia, and gonorrhea    Severe and fatal infection of the heart valves if you use IV drugs    Hepatitis B or C    Death from overdose    Home care  The following suggestions can help you care for yourself at home:    Admit you have a drug problem. Ask for help from your family and close friends.    Seek professional help. This could be one-on-one therapy or counseling. There are also outpatient, inpatient, and residential drug treatment programs.    Join a self-help group for drug abuse.    Stay away from friends who abuse drugs or tempt you to continue abusing drugs.    Eat a balanced diet and start a regular exercise program.  Follow-up care  Follow up with your healthcare provider, or as advised. Contact one of the resources below for help:    National Transfer on Alcoholism and Drug Dependence, www.ncadd.org, 533.806.5590    Narcotics Anonymous,  www.na.org, 789.153.8612    National Alcohol and Substance Abuse Information Center, www.addictioncarePixable.Orate, 116.429.4958. This center can refer you to a treatment program.  Call 911  Call 911 if any of the following occur:    Seizure    Hard time breathing or slow, irregular breathing    Chest pain    Sudden weakness on one side of your body or sudden trouble speaking    Very drowsy or trouble awakening    Fainting or loss of consciousness    Rapid heart rate    Very slow heart rate  When to seek medical advice  Call your healthcare provider right away if any of these occur:    Agitation, anxiety, or unable to sleep    Unintended weight loss. This means more than 10 to 15 pounds over 3 months.    Fever of 100.4 F (38 C) or higher, or as directed by your healthcare provider    Shortness of breath    Cough with colored sputum    Redness, swelling, or tenderness at an injection site  [employer] last reviewed this educational content on 11/1/2019 2000-2020 [employer]. [address] [place] [address], [place] [address]. All rights reserved. This information is not intended as a substitute for professional medical care. Always follow your healthcare professional's instructions.      Please follow up with [facility] as arranged as soon as possible even if entirely better.

## 2021-01-12 NOTE — ED NOTES
Emergency Department Patient Sign-out       Brief HPI:  This is a 45 year old male signed out to me by Dr. Rojas .  See initial ED Provider note for details of the presentation.          Patient is medically cleared for admission to a Behavioral Health unit.      The patient is not on a hold.      The patient has required medication for agitation.    Awaiting Transfer to Mental Health Facility and Awaiting Behavioral Health Assessment (DEC)        Significant Events prior to my assuming care: agitation required zyprexa.      Exam:   Patient Vitals for the past 24 hrs:   BP Pulse Resp SpO2   01/11/21 2050 132/70 79 -- --   01/11/21 2047 132/70 77 -- --   01/11/21 2023 (!) 143/92 98 16 100 %   01/11/21 1951 127/89 109 -- --   01/11/21 1921 (!) 135/90 87 -- --   01/11/21 1851 126/67 83 18 97 %   01/11/21 1821 (!) 129/120 105 -- 96 %   01/11/21 1751 (!) 126/109 87 -- 95 %   01/11/21 1720 (!) 122/118 79 -- --   01/11/21 1551 (!) 158/101 73 23 99 %   01/11/21 1521 (!) 150/79 78 -- 98 %   01/11/21 1415 (!) 154/82 85 (!) 45 97 %   01/11/21 1215 (!) 110/97 87 (!) 33 92 %   01/11/21 1145 130/74 85 (!) 31 91 %   01/11/21 1115 137/75 92 28 90 %   01/11/21 1045 135/78 104 19 99 %   01/11/21 1015 125/77 102 29 99 %   01/11/21 1000 (!) 155/87 96 (!) 33 100 %   01/11/21 0915 -- 79 26 100 %   01/11/21 0900 -- 90 (!) 31 95 %   01/11/21 0800 -- 112 (!) 31 94 %   01/11/21 0700 -- 85 27 96 %   01/11/21 0644 (!) 154/68 84 25 100 %   01/11/21 0550 (!) 163/97 91 (!) 31 99 %   01/11/21 0345 (!) 142/125 88 27 94 %   01/11/21 0315 (!) 146/103 95 (!) 33 98 %   01/11/21 0235 -- 75 27 90 %   01/11/21 0200 (!) 141/104 91 27 95 %   01/11/21 0120 116/76 92 24 90 %   01/11/21 0050 120/70 93 28 92 %   01/11/21 0032 -- 82 27 93 %   01/11/21 0020 (!) 140/77 93 (!) 31 94 %   01/11/21 0018 -- 99 27 94 %   01/11/21 0005 -- 95 (!) 31 93 %   01/10/21 2345 (!) 144/82 104 30 --           ED RESULTS:   Results for orders placed or performed during the  hospital encounter of 01/10/21 (from the past 24 hour(s))   EKG 12-lead, tracing only     Status: None    Collection Time: 01/10/21 11:01 PM   Result Value Ref Range    Interpretation ECG Click View Image link to view waveform and result    Lactic acid     Status: None    Collection Time: 01/10/21 11:56 PM   Result Value Ref Range    Lactic Acid 1.0 0.7 - 2.0 mmol/L   CK total     Status: None    Collection Time: 01/11/21  2:59 AM   Result Value Ref Range    CK Total Unsatisfactory specimen - hemolyzed 30 - 300 U/L   Drug abuse screen 6 urine (chem dep)     Status: Abnormal    Collection Time: 01/11/21  3:17 AM   Result Value Ref Range    Amphetamine Qual Urine Negative NEG^Negative    Barbiturates Qual Urine Negative NEG^Negative    Benzodiazepine Qual Urine Negative NEG^Negative    Cannabinoids Qual Urine Positive (A) NEG^Negative    Cocaine Qual Urine Positive (A) NEG^Negative    Ethanol Qual Urine Negative NEG^Negative    Opiates Qualitative Urine Negative NEG^Negative   UA reflex to Microscopic and Culture     Status: Abnormal    Collection Time: 01/11/21  3:17 AM    Specimen: Urine clean catch; Unspecified Urine   Result Value Ref Range    Color Urine Light Yellow     Appearance Urine Clear     Glucose Urine Negative NEG^Negative mg/dL    Bilirubin Urine Negative NEG^Negative    Ketones Urine 10 (A) NEG^Negative mg/dL    Specific Gravity Urine 1.008 1.003 - 1.035    Blood Urine Negative NEG^Negative    pH Urine 5.5 5.0 - 7.0 pH    Protein Albumin Urine 10 (A) NEG^Negative mg/dL    Urobilinogen mg/dL Normal 0.0 - 2.0 mg/dL    Nitrite Urine Negative NEG^Negative    Leukocyte Esterase Urine Negative NEG^Negative    Source Unspecified Urine     RBC Urine 0 0 - 2 /HPF    WBC Urine 2 0 - 5 /HPF    Squamous Epithelial /HPF Urine <1 0 - 1 /HPF    Mucous Urine Present (A) NEG^Negative /LPF    Hyaline Casts 4 (H) 0 - 2 /LPF   Symptomatic Influenza A/B & SARS-CoV2 (COVID-19) Virus PCR Multiplex     Status: None     Collection Time: 01/11/21  3:25 AM    Specimen: Nasopharyngeal   Result Value Ref Range    Flu A/B & SARS-COV-2 PCR Source Nasopharyngeal     SARS-CoV-2 PCR Result NEGATIVE     Influenza A PCR Negative NEG^Negative    Influenza B PCR Negative NEG^Negative    Respiratory Syncytial Virus PCR (Note)     Flu A/B & SARS-CoV-2 PCR Comment (Note)    Basic metabolic panel     Status: Abnormal    Collection Time: 01/11/21  4:46 AM   Result Value Ref Range    Sodium 140 133 - 144 mmol/L    Potassium 5.1 3.4 - 5.3 mmol/L    Chloride 107 94 - 109 mmol/L    Carbon Dioxide 26 20 - 32 mmol/L    Anion Gap 7 3 - 14 mmol/L    Glucose 91 70 - 99 mg/dL    Urea Nitrogen 12 7 - 30 mg/dL    Creatinine 1.02 0.66 - 1.25 mg/dL    GFR Estimate 89 >60 mL/min/[1.73_m2]    GFR Estimate If Black >90 >60 mL/min/[1.73_m2]    Calcium 7.8 (L) 8.5 - 10.1 mg/dL   CK total     Status: None    Collection Time: 01/11/21  4:46 AM   Result Value Ref Range    CK Total Unsatisfactory specimen - hemolyzed 30 - 300 U/L   XR Chest Port 1 View     Status: None    Collection Time: 01/11/21  4:49 AM    Narrative    EXAM: CHEST SINGLE VIEW PORTABLE  LOCATION: [facility]  DATE/TIME: 1/11/2021 4:22 AM    INDICATION: Cough.  COMPARISON: None.    FINDINGS: Hypoinflated lungs. No convincing pulmonary opacities. Normal-sized chronic silhouette.      Impression    IMPRESSION: No convincing evidence of active cardiopulmonary disease.    Troponin I     Status: None    Collection Time: 01/11/21  5:29 AM   Result Value Ref Range    Troponin I ES 0.021 0.000 - 0.045 ug/L   CK total     Status: Abnormal    Collection Time: 01/11/21  6:36 AM   Result Value Ref Range    CK Total 2,055 (HH) 30 - 300 U/L       ED MEDICATIONS:   Medications   0.9% sodium chloride BOLUS (0 mLs Intravenous Stopped 1/11/21 0008)     Followed by   sodium chloride 0.9% infusion ( Intravenous Stopped 1/11/21 1208)   GENVOYA [id number] mg tablet (1 tablet Oral Given 1/11/21 1317)    mirtazapine (REMERON) tablet TABS 7.5 mg (7.5 mg Oral Given 1/11/21 2250)   LORazepam (ATIVAN) injection 2 mg (2 mg Intramuscular Given 1/10/21 2144)   OLANZapine zydis (zyPREXA) ODT tab 10 mg (10 mg Oral Given 1/10/21 2209)   LORazepam (ATIVAN) injection 1 mg (1 mg Intravenous Given 1/10/21 2223)   0.9% sodium chloride BOLUS (0 mLs Intravenous Stopped 1/11/21 0316)         Impression:    ICD-10-CM    1. Agitation  R45.1    2. Cocaine abuse (H)  F14.10        Plan:    Pending studies include BEC eval-done, discharge to Crisis bed with 10 days supple of his meds, follow up with Chemical Dependency Program with Rule 25 arrranged.        Gerardo Monique MD, Gerardo Gomez MD  01/11/21 2980

## 2022-02-07 ENCOUNTER — HOSPITAL ENCOUNTER (INPATIENT)
Facility: CLINIC | Age: 47
LOS: 6 days | Discharge: PSYCHIATRIC OR SUBSTANCE USE FACILITY | DRG: 897 | End: 2022-02-14
Attending: EMERGENCY MEDICINE | Admitting: PSYCHIATRY & NEUROLOGY
Payer: COMMERCIAL

## 2022-02-07 DIAGNOSIS — F33.1 MODERATE EPISODE OF RECURRENT MAJOR DEPRESSIVE DISORDER (H): ICD-10-CM

## 2022-02-07 DIAGNOSIS — Z11.52 ENCOUNTER FOR SCREENING LABORATORY TESTING FOR SEVERE ACUTE RESPIRATORY SYNDROME CORONAVIRUS 2 (SARS-COV-2): ICD-10-CM

## 2022-02-07 DIAGNOSIS — K59.00 CONSTIPATION, UNSPECIFIED CONSTIPATION TYPE: ICD-10-CM

## 2022-02-07 DIAGNOSIS — R45.851 SUICIDAL IDEATION: ICD-10-CM

## 2022-02-07 DIAGNOSIS — R10.13 DYSPEPSIA: ICD-10-CM

## 2022-02-07 DIAGNOSIS — B20 HUMAN IMMUNODEFICIENCY VIRUS (HIV) DISEASE (H): ICD-10-CM

## 2022-02-07 DIAGNOSIS — M54.50 ACUTE MIDLINE LOW BACK PAIN WITHOUT SCIATICA: ICD-10-CM

## 2022-02-07 DIAGNOSIS — F32.9 MAJOR DEPRESSIVE DISORDER, SINGLE EPISODE, UNSPECIFIED: ICD-10-CM

## 2022-02-07 DIAGNOSIS — F14.151: ICD-10-CM

## 2022-02-07 DIAGNOSIS — F19.10 POLYSUBSTANCE ABUSE (H): ICD-10-CM

## 2022-02-07 DIAGNOSIS — Z86.718 PERSONAL HISTORY OF VENOUS THROMBOSIS AND EMBOLISM: Primary | ICD-10-CM

## 2022-02-07 PROCEDURE — 99285 EMERGENCY DEPT VISIT HI MDM: CPT | Performed by: EMERGENCY MEDICINE

## 2022-02-07 PROCEDURE — 99285 EMERGENCY DEPT VISIT HI MDM: CPT | Mod: 25

## 2022-02-07 PROCEDURE — 90791 PSYCH DIAGNOSTIC EVALUATION: CPT

## 2022-02-07 PROCEDURE — 250N000013 HC RX MED GY IP 250 OP 250 PS 637: Performed by: EMERGENCY MEDICINE

## 2022-02-07 PROCEDURE — C9803 HOPD COVID-19 SPEC COLLECT: HCPCS

## 2022-02-07 RX ORDER — LORAZEPAM 1 MG/1
2 TABLET ORAL ONCE
Status: COMPLETED | OUTPATIENT
Start: 2022-02-07 | End: 2022-02-07

## 2022-02-07 RX ADMIN — LORAZEPAM 2 MG: 1 TABLET ORAL at 07:59

## 2022-02-07 ASSESSMENT — ENCOUNTER SYMPTOMS
FATIGUE: 0
FEVER: 0
DYSPHORIC MOOD: 1
CHILLS: 0

## 2022-02-07 NOTE — ED TRIAGE NOTES
Patient states he used cocaine, called EMS and had them bring him here for suicidal thoughts with a plan, wanted to jump off a bridge.

## 2022-02-07 NOTE — ED NOTES
2/7/2022  Edi Silvermanwhite 1975     [facility] Crisis Assessment    Patient was assessed: in person  Patient location: [facility] ED    Referral Data and Chief Complaint  Edi is a 46 year old who uses male pronouns. Patient presented to the ED via EMS and was referred to the ED by self. Patient is presenting to the ED for the following concerns: SI with plan to jump off of a bridge, substance induced psychosis with auditory hallucinations.      Informed Consent and Assessment Methods    Patient is his own guardian. Writer met with patient and explained the crisis assessment process, including applicable information disclosures and limits to confidentiality, assessed understanding of the process, and obtained consent to proceed with the assessment. Patient was observed to be able to participate in the assessment as evidenced by engaging with assessment. Assessment methods included conducting a formal interview with patient, review of medical records, collaboration with medical staff, and obtaining relevant collateral information from family and community providers when available.    Narrative Summary of Presenting Problem and Current Functioning  What led to the patient presenting for crisis services, factors that make the crisis life threatening or complex, stressors, how is this disrupting the patient's life, and how current functioning is in comparison to baseline. How is patient presenting during the assessment.     Pt reports that he called 911 to come to the ED because he has been experiencing SI with plan to jump off bridge and auditory hallucinations for the past two weeks. He reports that he had relapsed on crack cocaine and has been using constantly for the past 4 months, reportedly spending $60k on crack cocaine. He reports that he had an onset of his hallucinations two weeks ago after smoking and they have been persistent while he is at his home. He described them as whispers coming from the television  stating 'help me' and he expresses feeling overwhelmed by the persistent voices. He continued to express immense fear with returning to his home due to the voices and due to concerns that he would continue to use crack and kill himself. Pt reports that he had been wanting to come to the hospital to get help with his substance abuse, SI, and to get himself stable on medications . He does have hx of MDD and cocaine abuse with previous CD tx programs including outpatient and inpatient while he lived in [place]. He reports he has not been compliant with any of his psychotropic or medical medications since relapsing on crack cocaine. He has no reported outpatient providers or supports at this time. At time of assessment he reports to have using crack prior to calling 911 and was able to sleep in the ED before being seen. He did present with disorganized thoughts and flight of ideas where he would change subjects rapidly and at times his speech was incoherent, and he was redirectable. He was observed with labile mood and when presented options for treatment he became tearful as he expressed his fear about leaving the hospital and not being able to maintain his safety without stabilization.      History of the Crisis  Duration of the current crisis, coping skills attempted to reduce the crisis, community resources used, and past presentations.    Pt has hx of MDD and crack cocaine abuse which has led to previous ED visits. Pt was last seen at [facility] on 1/11/2021 due to crack abuse, SI, and hallucinations. He was referred for [facility] residence and rule 25. Pt does report that he was able to maintain sobriety, according to care everywhere he relapsed approximately in August 2021. He reports his longest sobriety was 18 months. He reports he has hx of SI with attempts reportedly by cutting his wrists and throat. Pt has hx of inpatient at [facility] in 2014.     Collateral Information  Care everywhere reviewed.    Risk  Assessment    Risk of Harm to Self     ESS-6  1.a. Over the past 2 weeks, have you had thoughts of killing yourself? Yes  1.b. Have you ever attempted to kill yourself and, if yes, when did this last happen? No   2. Recent or current suicide plan? Yes Jump off bridge    3. Recent or current intent to act on ideation? Yes  4. Lifetime psychiatric hospitalization? Yes  5. Pattern of excessive substance use? Yes  6. Current irritability, agitation, or aggression? Yes  Scoring note: BOTH 1a and 1b must be yes for it to score 1 point, if both are not yes it is zero. All others are 1 point per number. If all questions 1a/1b - 6 are no, risk is negligible. If one of 1a/1b is yes, then risk is mild. If either question 2 or 3, but not both, is yes, then risk is automatically moderate regardless of total score. If both 2 and 3 are yes, risk is automatically high regardless of total score.     Score: 4, high risk    The patient has the following risk factors for suicide: substance abuse, depressive symptoms, lack of support, poor decision making, poor impulse control, preoccupied with death/dying, psychosis and restless/agitated    Is the patient experiencing current suicidal ideation: Yes. Plan: to jump off bridge Intent reports strong intent leading him to call 911    Is the patient engaging in preparatory suicide behaviors (formulating how to act on plan, giving away possessions, saying goodbye, displaying dramatic behavior changes, etc)? No    Does the patient have access to firearms or other lethal means? yes, lethal means counseling was provided and the following plan for risk mitigation was discussed: Pt reports hx of gun access, pt expresses concerns with home having paraphernalia and pt stated he would have people go to his home to clean out his drugs and paraphernalia.      The patient has the following protective factors: social support, voluntarily seeking mental health support, future focused thinking, displays  insight, expresses desire to engage in treatment and safe/stable housing    Support system information: Pt reports being involved in the community, currently has no outpatient supports    Does the patient engage in non-suicidal self-injurious behavior (NSSI/SIB)? no    Is the patient vulnerable to sexual exploitation?  No    Is the patient experiencing abuse or neglect? no    Is the patient a vulnerable adult? No      Risk of Harm to Others  The patient has the following risk factors of harm to others: agitation and impaired self-control    Does the patient have thoughts of harming others? No    Is the patient engaging in sexually inappropriate behavior?  no       Current Substance Abuse    Is there recent substance abuse? Pt reports to excessive crack cocaine use for past 4 months, reportedly spending $60k on crack cocaine. Last used this morning.     Was a urine drug screen or blood alcohol level obtained: Yes Ordered    CAGE AID  Have you felt you ought to cut down on your drinking or drug use?  Yes  Have people annoyed you by criticizing your drinking or drug use? Yes  Have you felt bad or guilty about your drinking or drug use? Yes  Have you ever had a drink or used drugs first thing in the morning to steady your nerves or to get rid of a hangover? Yes  Score: 4/4       Current Symptoms/Concerns    Symptoms  Attention, hyperactivity, and impulsivity symptoms present: No    Anxiety symptoms present: Yes: Generalized Symptoms: Agitation, Avoidance and Cognitive anxiety - feelings of doom, racing thoughts, difficulty concentrating       Appetite symptoms present: Yes: Loss of Appetite     Behavioral difficulties present: Yes: Agitation and Impulsivity/Disinhibition     Cognitive impairment symptoms present: No    Depressive symptoms present: Yes Crying or feels like crying, Depressed mood, Feelings of helplessness , Feelings of hopelessness , Feelings of worthlessness , Impaired concentration, Impaired decision  making , Increased irritability/agitation, Low self esteem , Sleep disturbance  and Thoughts of suicide/death      Eating disorder symptoms present: No    Learning disabilities, cognitive challenges, and/or developmental disorder symptoms present: No     Manic/hypomanic symptoms present: No    Personality and interpersonal functioning difficulties present : Yes: Emotional Deregulation and Impaired Impulse Control    Psychosis symptoms present: Yes: Hallucinations: Auditory      Sleep difficulties present: Yes: Other: Not sleeping due to drug use     Substance abuse disorder symptoms present: Yes Substance(s) taken in larger amounts or over a longer period than intended, Persistent desire or unsuccessful efforts to cut down or control use, A great deal of time is spent in activities necessary to obtain substance(s), use substance(s), or recover from their effects, Cravings or strong desire to use, Recurrent substance use resulting in failure to fulfill major role obligations at school, work, or home, Continued substance use despite having persistent or recurrent social or interpersonal problems caused by or exacerbated by the use of substance(s), Substance abuse is continued despite knowledge of having a persistent or recurrent physical or psychological problem that has been caused of exacerbated by substance use, Tolerance (increased amounts to obtain desired effect or diminished effect with the same amount of use) and Withdrawal     Trauma and stressor related symptoms present: No           Mental Status Exam   Affect: Labile   Appearance: Disheveled    Attention Span/Concentration: Attentive?    Eye Contact: Intense   Fund of Knowledge: Appropriate    Language /Speech Content: Non-fluent   Language /Speech Volume: Loud    Language /Speech Rate/Productions: Hyperverbal    Recent Memory: Intact   Remote Memory: Intact   Mood: Angry, Anxious, Depressed, Irritable and Sad    Orientation to Person: Yes    Orientation  to Place: Yes   Orientation to Time of Day: No    Orientation to Date: No    Situation (Do they understand why they are here?): Yes    Psychomotor Behavior: Agitated    Thought Content: Hallucinations and Suicidal   Thought Form: Goal Directed       Mental Health and Substance Abuse History    History  Current and historical diagnoses or mental health concerns: Hx of MDD and substance abuse induced psychosis    Prior MH services (inpatient, programmatic care, outpatient, etc) : Yes hx of inpatient    History of substance abuse: Yes crack cocaine    Prior URSZULA services (inpatient, programmatic care, detox, outpatient, etc) : Yes hx of CD tx    History of commitment: No    Family history of MH/URSZULA: No    Trauma history: No    Medication  Psychotropic medications: No    Current Care Team  Primary Care Provider: [facility]    Psychiatrist: No    Therapist: No    : No    CTSS or ARMHS: No    ACT Team: No    Other: No    Release of Information  Was a release of information signed: No. Reason: pt did not want to sign      Biopsychosocial Information    Socioeconomic Information  Current living situation: Pt lives in own apartment, continued to express that he was not homeless.     Employment/income source: Unable to identify source of income.     Relevant legal issues: None reported    Cultural, Hoahaoism, or spiritual influences on mental health care: Pt is -American male    Is the patient active in the  or a : No      Relevant Medical Concerns   Patient identifies concerns with completing ADLs? No     Patient can ambulate independently? Yes     Other medical concerns? Hx of STI's     History of concussion or TBI? Yes pt reports concerns of head damage citing his drug abuse.         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 Patient response to intervention: pt was engaged and upset at times believing that  did not believe him and was going to 'toss' him back to his living situation where he expressed fear of his hallucinations and inability to control himself.      Disposition  Recommended disposition: Inpatient Mental Health      Reviewed case and recommendations with attending provider. Attending Name: Dr. Zheng      Attending concurs with disposition: Yes      Patient concurs with disposition: Yes      Guardian concurs with disposition: NA     Final disposition: Inpatient mental health .     Inpatient Details (if applicable):  Is patient admitted voluntarily:Yes    Patient aware of potential for transfer if there is not appropriate placement? Yes     Patient is willing to travel outside of the [facility] for placement? Yes      Behavioral Intake Notified? Yes: Date: 2/7/2022 Time: 1545.       Clinical Substantiation of Recommendations   Rationale with supporting factors for disposition and diagnosis.     Pt presents to the ED after calling 911 himself due to ongoing hallucinations with increased SI over the past two weeks due to excessive crack cocaine abuse. Pt reportedly relapsed 4 months ago and has not been able to maintain his functioning as he normally could within the community. He has not felt in control of himself and over the past two weeks having auditory hallucinations of hearing whispers in his apartment. These have been distressing and have increased his SI with plans to jump off a bridge. He was observed to be fearful of his hallucinations and his current SI. Pt expresses strong desire for stabilization and to return to treatment  so he can return to being a member in the community which he has hx of doing so. Pt would benefit from acute level inpatient stabilization and safekeeping and is recommended.       Assessment Details  Patient interview started at: 1505 and completed at: 1535.    Total duration spent on the patient case in minutes: .50 hrs     CPT code(s) utilized: 24409 - Psychotherapy for Crisis - 60 (30-74*) min       Aftercare and Safety Planning  Follow up plans with MH/URSZULA services: No      Aftercare plan placed in the AVS and provided to patient: No. Rationale: inpatient    Austin Khan, MSW, LGSW

## 2022-02-07 NOTE — SAFE
Edi Yas  February 7, 2022  SAFE Note    Critical Safety Issues: Pt presents to the ED after calling 911 himself due to ongoing hallucinations with increased SI over the past two weeks due to excessive crack cocaine abuse. Over the past two weeks pt has been having auditory hallucinations of hearing whispers in his apartment. These have been distressing and have increased his SI with plans to jump off a bridge. Pt has been labile during assessment and would become loud at times, and NOT threatening in any manner.       Current Suicidal Ideation/Self-Injurious Concerns/Methods: Jumping (from building, into traffic, etc.)      Current or Historical Inappropriate Sexual Behavior: No      Current or Historical Aggression/Homicidal Ideation: Agitation/Hyperactivity and Impaired Self-Control      Triggers: Hallucinations    Updated care team: Yes    For additional details see full [facility] assessment.       Austin Khan, MSW, LGSW

## 2022-02-07 NOTE — ED NOTES
Bed: HW01  Expected date:   Expected time:   Means of arrival:   Comments:  H431  46 M   SI/Crack use

## 2022-02-07 NOTE — ED PROVIDER NOTES
[facility] EMERGENCY DEPARTMENT ([facility])    2/07/22          History     Chief Complaint   Patient presents with     Suicidal     SI thoughts     Addiction Problem     HPI  Edi Sorenson is a 46 year old male with a past medical history significant for cocaine abuse, depression, HIV, DVT, and PE who presents to the ED for evaluation of cocaine use and suicidal ideation.  Patient says he last used crack cocaine and then called an ambulance to come to the ER.  Patient says that he has been having suicidal thoughts for the past 2 weeks.  Patient wants to jump off a bridge.  He says feel feeling anxious.  He says that he was previously clean and relapsed about 4 months prior.  Patient says he feels that he needs to be admitted to inpatient mental health for further care.    Per chart review patient [place] [facility] [place] for chest pain and cocaine abuse.  CT PE was negative for any acute PE but showed sequela of previous PE and mild enlargement of the pulmonary artery possibly indicative of pulmonary hypertension.  EKG was nonconcerning for ischemia and delta troponins were insignificant.  Patient was discharged with Eliquis 5 mg and follow-up with primary care provider.  Past Medical History  Past Medical History:   Diagnosis Date     Asymptomatic human immunodeficiency virus (HIV) infection status (H)      Cocaine abuse (H)      Depression      DVT (deep venous thrombosis) (H)     Last in 6/2018. No hx of PE. On Coumadin.     Genital HSV      History reviewed. No pertinent surgical history.  GENVOYA [id number] mg tablet  mirtazapine (REMERON) 7.5 MG tablet  sertraline (ZOLOFT) 50 MG tablet  valACYclovir (VALTREX) 1000 mg tablet  warfarin (COUMADIN) 10 MG tablet      No Known Allergies  Family History  History reviewed. No pertinent family history.  Social History   Social History     Tobacco Use     Smoking status: Former Smoker     Smokeless tobacco: Never Used   Substance Use Topics      Alcohol use: Yes     Drug use: Yes     Types: Cocaine     Comment: Used in the right before calling EMS      Past medical history, past surgical history, medications, allergies, family history, and social history were reviewed with the patient. No additional pertinent items.       Review of Systems   Constitutional: Negative for chills, fatigue and fever.   Psychiatric/Behavioral: Positive for dysphoric mood and suicidal ideas. Negative for behavioral problems and self-injury.     A complete review of systems was performed with pertinent positives and negatives noted in the HPI, and all other systems negative.    Physical Exam   BP: (!) 160/98  Pulse: 97  Temp: 98.3  F (36.8  C)  Resp: 18  SpO2: 97 %  Physical Exam  Constitutional:       Appearance: He is well-developed. He is not ill-appearing or toxic-appearing.      Comments: Jittery; anxious;    HENT:      Head: Normocephalic and atraumatic.   Cardiovascular:      Rate and Rhythm: Normal rate and regular rhythm.      Heart sounds: Normal heart sounds.   Pulmonary:      Effort: Pulmonary effort is normal. No respiratory distress.      Breath sounds: No wheezing.   Abdominal:      General: There is no distension.      Palpations: Abdomen is soft.      Tenderness: There is no abdominal tenderness. There is no rebound.   Musculoskeletal:      Cervical back: Normal range of motion and neck supple.   Skin:     General: Skin is warm.   Neurological:      Mental Status: He is alert and oriented to person, place, and time.   Psychiatric:         Behavior: Behavior normal.         Thought Content: Thought content normal.      Comments: Thoughts of self-harm; no clear plan; no active delusions          ED Course      Procedures       The medical record was reviewed and interpreted.  Previous labs reviewed and interpreted.  Mental Health Risk Assessment      PSS-3    Date and Time Over the past 2 weeks have you felt down, depressed, or hopeless? Over the past 2 weeks have  you had thoughts of killing yourself? Have you ever attempted to kill yourself? When did this last happen? User   02/07/22 0610 yes yes yes patient unable to complete SMR      C-SSRS ([place])    Date and Time Q1 Wished to be Dead (Past Month) Q2 Suicidal Thoughts (Past Month) Q3 Suicidal Thought Method Q4 Suicidal Intent without Specific Plan Q5 Suicide Intent with Specific Plan Q6 Suicide Behavior (Lifetime) Within the Past 3 Months? RETIRED: Level of Risk per Screen Screening Not Complete User   02/07/22 0627 yes yes yes no yes yes -- -- -- SMR              Suicide assessment completed by mental health (D.E.C., LCSW, etc.)       No results found for any visits on 02/07/22.  Medications   LORazepam (ATIVAN) tablet 2 mg (2 mg Oral Given 2/7/22 7741)        Assessments & Plan (with Medical Decision Making)   Hx of substance abuse;  Recent crack-cocaine use.  Pt was given ativan and reevaluated 8 hrs later.   Pt is still saying he is suicidal and wants to jump off a bridge.  Pt was seen by DARY Avila ; still says he feels unsafe.   Will admit to  admit on voluntary basis.   If pt decides he is feeling safe will discharge home.     I have reviewed the nursing notes. I have reviewed the findings, diagnosis, plan and need for follow up with the patient.    New Prescriptions    No medications on file       Final diagnoses:   Polysubstance abuse (H)   Moderate episode of recurrent major depressive disorder (H)       --  Cesilia Zheng  [facility] EMERGENCY DEPARTMENT  2/7/2022     Cesilia Zheng MD  02/07/22 0697

## 2022-02-08 ENCOUNTER — TELEPHONE (OUTPATIENT)
Dept: BEHAVIORAL HEALTH | Facility: CLINIC | Age: 47
End: 2022-02-08

## 2022-02-08 LAB
AMPHETAMINES UR QL SCN: ABNORMAL
BARBITURATES UR QL: ABNORMAL
BENZODIAZ UR QL: ABNORMAL
CANNABINOIDS UR QL SCN: ABNORMAL
COCAINE UR QL: ABNORMAL
OPIATES UR QL SCN: ABNORMAL
SARS-COV-2 RNA RESP QL NAA+PROBE: NEGATIVE

## 2022-02-08 PROCEDURE — 80307 DRUG TEST PRSMV CHEM ANLYZR: CPT | Performed by: EMERGENCY MEDICINE

## 2022-02-08 PROCEDURE — 250N000013 HC RX MED GY IP 250 OP 250 PS 637: Performed by: STUDENT IN AN ORGANIZED HEALTH CARE EDUCATION/TRAINING PROGRAM

## 2022-02-08 PROCEDURE — 124N000002 HC R&B MH UMMC

## 2022-02-08 PROCEDURE — 87635 SARS-COV-2 COVID-19 AMP PRB: CPT | Performed by: EMERGENCY MEDICINE

## 2022-02-08 PROCEDURE — 99222 1ST HOSP IP/OBS MODERATE 55: CPT | Mod: AI | Performed by: PSYCHIATRY & NEUROLOGY

## 2022-02-08 PROCEDURE — 250N000013 HC RX MED GY IP 250 OP 250 PS 637

## 2022-02-08 RX ORDER — POLYETHYLENE GLYCOL 3350 17 G
2 POWDER IN PACKET (EA) ORAL
Status: DISCONTINUED | OUTPATIENT
Start: 2022-02-08 | End: 2022-02-14 | Stop reason: HOSPADM

## 2022-02-08 RX ORDER — POLYETHYLENE GLYCOL 3350 17 G/17G
17 POWDER, FOR SOLUTION ORAL DAILY
Status: DISCONTINUED | OUTPATIENT
Start: 2022-02-09 | End: 2022-02-14 | Stop reason: HOSPADM

## 2022-02-08 RX ORDER — LANOLIN ALCOHOL/MO/W.PET/CERES
3 CREAM (GRAM) TOPICAL
Status: DISCONTINUED | OUTPATIENT
Start: 2022-02-08 | End: 2022-02-14 | Stop reason: HOSPADM

## 2022-02-08 RX ORDER — OLANZAPINE 10 MG/2ML
10 INJECTION, POWDER, FOR SOLUTION INTRAMUSCULAR 3 TIMES DAILY PRN
Status: DISCONTINUED | OUTPATIENT
Start: 2022-02-08 | End: 2022-02-14 | Stop reason: HOSPADM

## 2022-02-08 RX ORDER — MAGNESIUM HYDROXIDE/ALUMINUM HYDROXICE/SIMETHICONE 120; 1200; 1200 MG/30ML; MG/30ML; MG/30ML
30 SUSPENSION ORAL EVERY 4 HOURS PRN
Status: DISCONTINUED | OUTPATIENT
Start: 2022-02-08 | End: 2022-02-10 | Stop reason: CLARIF

## 2022-02-08 RX ORDER — HYDROXYZINE HYDROCHLORIDE 25 MG/1
25 TABLET, FILM COATED ORAL EVERY 4 HOURS PRN
Status: DISCONTINUED | OUTPATIENT
Start: 2022-02-08 | End: 2022-02-14 | Stop reason: HOSPADM

## 2022-02-08 RX ORDER — MIRTAZAPINE 7.5 MG/1
7.5 TABLET, FILM COATED ORAL
Status: DISCONTINUED | OUTPATIENT
Start: 2022-02-08 | End: 2022-02-14 | Stop reason: HOSPADM

## 2022-02-08 RX ORDER — POLYETHYLENE GLYCOL 3350 17 G/17G
17 POWDER, FOR SOLUTION ORAL DAILY PRN
Status: DISCONTINUED | OUTPATIENT
Start: 2022-02-08 | End: 2022-02-08

## 2022-02-08 RX ORDER — DOCUSATE SODIUM 100 MG/1
100 CAPSULE, LIQUID FILLED ORAL 2 TIMES DAILY
Status: DISCONTINUED | OUTPATIENT
Start: 2022-02-08 | End: 2022-02-09

## 2022-02-08 RX ORDER — ACETAMINOPHEN 325 MG/1
650 TABLET ORAL EVERY 4 HOURS PRN
Status: DISCONTINUED | OUTPATIENT
Start: 2022-02-08 | End: 2022-02-14 | Stop reason: HOSPADM

## 2022-02-08 RX ORDER — RISPERIDONE 1 MG/1
1 TABLET ORAL 3 TIMES DAILY PRN
Status: DISCONTINUED | OUTPATIENT
Start: 2022-02-08 | End: 2022-02-14 | Stop reason: HOSPADM

## 2022-02-08 RX ORDER — OLANZAPINE 10 MG/1
10 TABLET ORAL 3 TIMES DAILY PRN
Status: DISCONTINUED | OUTPATIENT
Start: 2022-02-08 | End: 2022-02-14 | Stop reason: HOSPADM

## 2022-02-08 RX ADMIN — POLYETHYLENE GLYCOL 3350 17 G: 17 POWDER, FOR SOLUTION ORAL at 09:16

## 2022-02-08 RX ADMIN — APIXABAN 5 MG: 2.5 TABLET, FILM COATED ORAL at 20:10

## 2022-02-08 RX ADMIN — MIRTAZAPINE 7.5 MG: 7.5 TABLET, FILM COATED ORAL at 21:47

## 2022-02-08 RX ADMIN — DOCUSATE SODIUM 100 MG: 100 CAPSULE, LIQUID FILLED ORAL at 12:53

## 2022-02-08 RX ADMIN — MELATONIN TAB 3 MG 3 MG: 3 TAB at 20:20

## 2022-02-08 RX ADMIN — DOCUSATE SODIUM 100 MG: 100 CAPSULE, LIQUID FILLED ORAL at 20:10

## 2022-02-08 RX ADMIN — BICTEGRAVIR SODIUM, EMTRICITABINE, AND TENOFOVIR ALAFENAMIDE FUMARATE 1 TABLET: 50; 200; 25 TABLET ORAL at 10:30

## 2022-02-08 RX ADMIN — SERTRALINE HYDROCHLORIDE 50 MG: 50 TABLET ORAL at 08:42

## 2022-02-08 ASSESSMENT — ACTIVITIES OF DAILY LIVING (ADL)
DIFFICULTY_EATING/SWALLOWING: NO
FALL_HISTORY_WITHIN_LAST_SIX_MONTHS: NO
HYGIENE/GROOMING: INDEPENDENT
DOING_ERRANDS_INDEPENDENTLY_DIFFICULTY: NO
DRESS: SCRUBS (BEHAVIORAL HEALTH)
CONCENTRATING,_REMEMBERING_OR_MAKING_DECISIONS_DIFFICULTY: NO
WALKING_OR_CLIMBING_STAIRS_DIFFICULTY: NO
DRESSING/BATHING_DIFFICULTY: NO
HEARING_DIFFICULTY_OR_DEAF: NO
ORAL_HYGIENE: PROMPTS
DIFFICULTY_COMMUNICATING: NO
TOILETING_ISSUES: NO
WEAR_GLASSES_OR_BLIND: NO
LAUNDRY: WITH SUPERVISION

## 2022-02-08 ASSESSMENT — MIFFLIN-ST. JEOR
SCORE: 1861.96
SCORE: 1864.22

## 2022-02-08 NOTE — PLAN OF CARE
BEHAVIORAL TEAM DISCUSSION    Participants: Dr. George, Psychiatry Residents, Med Students, Marla FRAZIER, Patricia Rothman [facility]  Progress: Initial assessment   Anticipated length of stay: No anticipated discharge date at this time   Continued Stay Criteria/Rationale: Hallucinations and SI. Needs clinical stabilization and medication management  Medical/Physical: See MD's daily note  Precautions:   Behavioral Orders   Procedures    Code 1 - Restrict to Unit    Discontinue 1:1 attendant for suicide risk     Order Specific Question:   I have performed an in person assessment of the patient     Answer:   Based on this assessment the patient no longer requires a one on one attendant at this point in time.    Routine Programming     As clinically indicated    Self Injury Precaution    Status 15     Every 15 minutes.    Suicide precautions     Patients on Suicide Precautions should have a Combination Diet ordered that includes a Diet selection(s) AND a Behavioral Tray selection for Safe Tray - with utensils, or Safe Tray - NO utensils      Withdrawal precautions     Plan: Patient will have ongoing psychiatric assessment. Medications will be reviewed and adjusted per MD as indicated. Outpatient providers will be contacted for care coordination. CTC will continue to assess needs and  ensure appropriate follow up care is in place.   Rationale for change in precautions or plan: None

## 2022-02-08 NOTE — PLAN OF CARE
Problem: Adult Inpatient Plan of Care  Goal: Optimal Comfort and Wellbeing  2/8/2022 0528 by Lebron Moon RN  Outcome: Improving     Problem: Behavioral Health Plan of Care  Goal: Plan of Care Review  2/8/2022 0528 by Lebron Moon RN  Outcome: Improving  Flowsheets (Taken 2/8/2022 0354)  Plan of Care Reviewed With: patient  Patient Agreement with Plan of Care: agrees  2/8/2022 0319 by Lebron Moon RN  Outcome: Improving     Problem: Adult Inpatient Plan of Care  Goal: Plan of Care Review  Recent Flowsheet Documentation  Taken 2/8/2022 0354 by Lebron Moon RN  Plan of Care Reviewed With: patient    Patient had full range affect. Appeared disheveled. Patient came to the unit from ED and is voluntary. Patient stated that he came to hospital because of suicidal ideation to harm self by jumping off the bridge and have not taken his medication in weeks now. Patient could not state how long and reason for not taking his medication. He stated the thought of harming self have been lingering in his mind for about 2 weeks. He verbalized taking 1/2 ounce of cocaine and smoking three cigarette daily. Last intake of both cocaine and cigarette was yesterday before coming to hospital. He verbalized he came to hospital for treatment and denied pain/anxiety/depression/SI/HI/SIB/AH/VH  and covid 19. Patient complained of being hungry was given lunch box which he ate with good appetite. Patient was oriented to unit. Will continue to monitor.
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
## 2022-02-08 NOTE — PROGRESS NOTES
Patient appeared to have slept for 2 hours. Patient is a new admit. No complaint by patient and no psychiatric issues noted. Safety checks done. Will continue to monitor.

## 2022-02-08 NOTE — PLAN OF CARE
Problem: Adult Inpatient Plan of Care  Goal: Optimal Comfort and Wellbeing  Outcome: Improving  Goal: Readiness for Transition of Care  Intervention: Mutually Develop Transition Plan  Recent Flowsheet Documentation  Taken 2/8/2022 0300 by Lebron Moon RN  Equipment Currently Used at Home: none     Problem: Adult Inpatient Plan of Care  Goal: Readiness for Transition of Care  Intervention: Mutually Develop Transition Plan  Recent Flowsheet Documentation  Taken 2/8/2022 0300 by Lebron Moon RN  Equipment Currently Used at Home: none     Problem: Behavioral Health Plan of Care  Goal: Plan of Care Review  Outcome: Improving  Goal: Adheres to Safety Considerations for Self and Others  Outcome: Improving
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
## 2022-02-08 NOTE — PROVIDER NOTIFICATION
02/08/22 1109   Patient Belongings   Did you bring any home meds/supplements to the hospital?  Yes   Disposition of meds  Sent to security/pharmacy per site process   Patient Belongings locker;sent to security per site process   Patient Belongings Put in Hospital Secure Location (Security or Locker, etc.) clothing;medication(s);plastic bag;shoes   Belongings Search Yes   Clothing Search Yes   Second Staff MARIBEL Reece     Rubber shoes with laces  Blue hat  White shirt  Blue sweat shirt  Black jacket  White  T shirt  White long sleeved shirt  Socks  Belt  Jeans  Lighter  Carmex    Security envelope #[id number]  $40.00                                #[id number]  Medications:    Eliquis, Viktarvy, Mirtazapine,Potassium  Chloride, Sertraline    A               Admission:  I am responsible for any personal items that are not sent to the safe or pharmacy.  [facility] is not responsible for loss, theft or damage of any property in my possession.    Signature:  _________________________________ Date: _______  Time: _____                                              Staff Signature:  ____________________________ Date: ________  Time: _____      2nd Staff person, if patient is unable/unwilling to sign:    Signature: ________________________________ Date: ________  Time: _____     Discharge:  [facility] has returned all of my personal belongings:    Signature: _________________________________ Date: ________  Time: _____                                          Staff Signature:  ____________________________ Date: ________  Time: _____

## 2022-02-08 NOTE — PLAN OF CARE
Problem: Behavioral Health Plan of Care  Goal: Patient-Specific Goal (Individualization)  Flowsheets (Taken 2/8/2022 1129)  Patient Vulnerabilities:   substance abuse/addiction   limited support system   history of unsuccessful treatment   poor impulse control   lacks insight into illness   adverse childhood experience(s)   traumatic event  Patient Personal Strengths:   resilient   stable living environment  Note:   Personal Plan of Care    Patient goals:  Feel stable  Get referrals for outpatient care    Plan for admission:  1. stabilization of mood disorder symptoms.   2. Absence of SI/Safe with self  3. Medication management per Mds  4. Coordination of Care with outpatient providers/family  5. Psychiatric follow up care in place    For CD:    Monitor for symptoms of withdrawal  Complete CD assessment/plan for sobriety

## 2022-02-08 NOTE — TELEPHONE ENCOUNTER
R: 20 / Doris  02:08 - On call resident accepts for 20NB. Placed pt in queue. 02:12 - Notified unit. RN completing another admission and will call ED after Covid test is collected/resulted. 02:13 - Notified ED and requested Covid be collected prior to RN-RN report is given.

## 2022-02-08 NOTE — PLAN OF CARE
"  Problem: Behavioral Health Plan of Care  Goal: Patient-Specific Goal (Individualization)  Outcome: No Change     Problem: Behavioral Health Plan of Care  Goal: Adheres to Safety Considerations for Self and Others  Outcome: No Change     Problem: Behavioral Health Plan of Care  Goal: Absence of New-Onset Illness or Injury  Outcome: No Change     Pt presented with a full range affect. Pleasant and cooperative with staff, medication compliant. Appetite is good. Pt slept for 2 hours last night. Upon check in, Pt mood is \"solemn\".  Pt said his Anxiety is minimal. Depression is at 4/10. When asked about SI, pt stated \"not here\" , rosy for safety while in the unit. Pt stated that he hears the voices at his house, and they say \"help me\",\" psst \". Goal for today is to \"talk to God a little bit more\" and his overall goal in his stay here is \"stability\" - to get stable on medications  in 2 weeks. \"    "

## 2022-02-09 LAB
ALBUMIN SERPL-MCNC: 3 G/DL (ref 3.4–5)
ALP SERPL-CCNC: 44 U/L (ref 40–150)
ALT SERPL W P-5'-P-CCNC: 15 U/L (ref 0–70)
ANION GAP SERPL CALCULATED.3IONS-SCNC: 5 MMOL/L (ref 3–14)
AST SERPL W P-5'-P-CCNC: 7 U/L (ref 0–45)
BASOPHILS # BLD AUTO: 0 10E3/UL (ref 0–0.2)
BASOPHILS NFR BLD AUTO: 1 %
BILIRUB SERPL-MCNC: 0.2 MG/DL (ref 0.2–1.3)
BUN SERPL-MCNC: 11 MG/DL (ref 7–30)
CALCIUM SERPL-MCNC: 8.4 MG/DL (ref 8.5–10.1)
CHLORIDE BLD-SCNC: 113 MMOL/L (ref 94–109)
CHOLEST SERPL-MCNC: 171 MG/DL
CO2 SERPL-SCNC: 27 MMOL/L (ref 20–32)
CREAT SERPL-MCNC: 0.89 MG/DL (ref 0.66–1.25)
EOSINOPHIL # BLD AUTO: 0.2 10E3/UL (ref 0–0.7)
EOSINOPHIL NFR BLD AUTO: 3 %
ERYTHROCYTE [DISTWIDTH] IN BLOOD BY AUTOMATED COUNT: 13.3 % (ref 10–15)
FOLATE SERPL-MCNC: 11.8 NG/ML
GFR SERPL CREATININE-BSD FRML MDRD: >90 ML/MIN/1.73M2
GLUCOSE BLD-MCNC: 97 MG/DL (ref 70–99)
HCT VFR BLD AUTO: 45.6 % (ref 40–53)
HDLC SERPL-MCNC: 50 MG/DL
HGB BLD-MCNC: 15.2 G/DL (ref 13.3–17.7)
IMM GRANULOCYTES # BLD: 0 10E3/UL
IMM GRANULOCYTES NFR BLD: 0 %
INR PPP: 0.97 (ref 0.86–1.14)
LDLC SERPL CALC-MCNC: 102 MG/DL
LYMPHOCYTES # BLD AUTO: 3.4 10E3/UL (ref 0.8–5.3)
LYMPHOCYTES NFR BLD AUTO: 68 %
MCH RBC QN AUTO: 29 PG (ref 26.5–33)
MCHC RBC AUTO-ENTMCNC: 33.3 G/DL (ref 31.5–36.5)
MCV RBC AUTO: 87 FL (ref 78–100)
MONOCYTES # BLD AUTO: 0.4 10E3/UL (ref 0–1.3)
MONOCYTES NFR BLD AUTO: 7 %
NEUTROPHILS # BLD AUTO: 1.1 10E3/UL (ref 1.6–8.3)
NEUTROPHILS NFR BLD AUTO: 21 %
NONHDLC SERPL-MCNC: 121 MG/DL
NRBC # BLD AUTO: 0 10E3/UL
NRBC BLD AUTO-RTO: 0 /100
PLATELET # BLD AUTO: 240 10E3/UL (ref 150–450)
POTASSIUM BLD-SCNC: 3.9 MMOL/L (ref 3.4–5.3)
PROT SERPL-MCNC: 6.8 G/DL (ref 6.8–8.8)
RBC # BLD AUTO: 5.24 10E6/UL (ref 4.4–5.9)
SODIUM SERPL-SCNC: 145 MMOL/L (ref 133–144)
TRIGL SERPL-MCNC: 95 MG/DL
TSH SERPL DL<=0.005 MIU/L-ACNC: 1.9 MU/L (ref 0.4–4)
VIT B12 SERPL-MCNC: 320 PG/ML (ref 193–986)
WBC # BLD AUTO: 5 10E3/UL (ref 4–11)

## 2022-02-09 PROCEDURE — 80053 COMPREHEN METABOLIC PANEL: CPT | Performed by: STUDENT IN AN ORGANIZED HEALTH CARE EDUCATION/TRAINING PROGRAM

## 2022-02-09 PROCEDURE — 82746 ASSAY OF FOLIC ACID SERUM: CPT | Performed by: STUDENT IN AN ORGANIZED HEALTH CARE EDUCATION/TRAINING PROGRAM

## 2022-02-09 PROCEDURE — 80061 LIPID PANEL: CPT | Performed by: STUDENT IN AN ORGANIZED HEALTH CARE EDUCATION/TRAINING PROGRAM

## 2022-02-09 PROCEDURE — 124N000002 HC R&B MH UMMC

## 2022-02-09 PROCEDURE — 82607 VITAMIN B-12: CPT | Performed by: STUDENT IN AN ORGANIZED HEALTH CARE EDUCATION/TRAINING PROGRAM

## 2022-02-09 PROCEDURE — 250N000013 HC RX MED GY IP 250 OP 250 PS 637: Performed by: STUDENT IN AN ORGANIZED HEALTH CARE EDUCATION/TRAINING PROGRAM

## 2022-02-09 PROCEDURE — H0001 ALCOHOL AND/OR DRUG ASSESS: HCPCS

## 2022-02-09 PROCEDURE — 36415 COLL VENOUS BLD VENIPUNCTURE: CPT | Performed by: STUDENT IN AN ORGANIZED HEALTH CARE EDUCATION/TRAINING PROGRAM

## 2022-02-09 PROCEDURE — 85025 COMPLETE CBC W/AUTO DIFF WBC: CPT | Performed by: STUDENT IN AN ORGANIZED HEALTH CARE EDUCATION/TRAINING PROGRAM

## 2022-02-09 PROCEDURE — 85610 PROTHROMBIN TIME: CPT | Performed by: STUDENT IN AN ORGANIZED HEALTH CARE EDUCATION/TRAINING PROGRAM

## 2022-02-09 PROCEDURE — 84443 ASSAY THYROID STIM HORMONE: CPT | Performed by: STUDENT IN AN ORGANIZED HEALTH CARE EDUCATION/TRAINING PROGRAM

## 2022-02-09 PROCEDURE — 250N000013 HC RX MED GY IP 250 OP 250 PS 637

## 2022-02-09 PROCEDURE — 99232 SBSQ HOSP IP/OBS MODERATE 35: CPT | Mod: GC | Performed by: PSYCHIATRY & NEUROLOGY

## 2022-02-09 RX ORDER — DOCUSATE SODIUM 100 MG/1
200 CAPSULE, LIQUID FILLED ORAL 2 TIMES DAILY
Status: DISCONTINUED | OUTPATIENT
Start: 2022-02-09 | End: 2022-02-14 | Stop reason: HOSPADM

## 2022-02-09 RX ADMIN — MELATONIN TAB 3 MG 3 MG: 3 TAB at 23:42

## 2022-02-09 RX ADMIN — SERTRALINE HYDROCHLORIDE 50 MG: 50 TABLET ORAL at 08:40

## 2022-02-09 RX ADMIN — HYDROXYZINE HYDROCHLORIDE 25 MG: 25 TABLET, FILM COATED ORAL at 00:41

## 2022-02-09 RX ADMIN — POLYETHYLENE GLYCOL 3350 17 G: 17 POWDER, FOR SOLUTION ORAL at 08:40

## 2022-02-09 RX ADMIN — MIRTAZAPINE 7.5 MG: 7.5 TABLET, FILM COATED ORAL at 19:46

## 2022-02-09 RX ADMIN — APIXABAN 5 MG: 2.5 TABLET, FILM COATED ORAL at 08:40

## 2022-02-09 RX ADMIN — DOCUSATE SODIUM 100 MG: 100 CAPSULE, LIQUID FILLED ORAL at 08:40

## 2022-02-09 RX ADMIN — BICTEGRAVIR SODIUM, EMTRICITABINE, AND TENOFOVIR ALAFENAMIDE FUMARATE 1 TABLET: 50; 200; 25 TABLET ORAL at 08:49

## 2022-02-09 RX ADMIN — DOCUSATE SODIUM 200 MG: 100 CAPSULE, LIQUID FILLED ORAL at 19:46

## 2022-02-09 RX ADMIN — APIXABAN 5 MG: 2.5 TABLET, FILM COATED ORAL at 19:46

## 2022-02-09 NOTE — PLAN OF CARE
Assessment/Intervention/Current Symptoms and Care Coordination: Met with team. Reviewed patient's chart and progress notes. Patient declined to complete CD assessment yesterday. Staff attempted to connect patient with the CD  but this became irritable and stated that he needed to sleep. CD  will attempt to meet with patient again when time allows on 2/10 or 2/11.           Discharge Plan or Goal: [facility]. Will be referred to outpatient mental health providers.          Barriers to Discharge: Needs clinical stabilization and medication management.          Referral Status:         Legal Status: Voluntary

## 2022-02-09 NOTE — PROGRESS NOTES
"CLINICAL NUTRITION SERVICES - ASSESSMENT NOTE     Nutrition Prescription    RECOMMENDATIONS FOR MDs/PROVIDERS TO ORDER:  None at this time     Malnutrition Status:    Moderate malnutrition in the context of social/environmental factor    Recommendations already ordered by Registered Dietitian (RD):  None at this time    Future/Additional Recommendations:  Monitor PO intake  Monitor pt. weight       REASON FOR ASSESSMENT  Edi Sorenson is a/an 46 year old male assessed by the dietitian for Admission Nutrition Risk Screen for report of wt loss of 2-13 lbs at admission.    PMH:  Cocaine abuse, depression, HIV, DVT, and PE who presents to the ED for evaluation of cocaine use and suicidal ideation.     NUTRITION HISTORY  Per [facility] Crisis Assessment on 2/7/2022, patient has loss of appetite, however on 2/8/2022 patient complained of being hungry and was given lunch box which he ate with good appetite - per nurse note   Per patient: he has not been eating well over last 5-6 months due to substance abuse ( he said that \" I only ate cocaine\" ). He reported he lost 67 lb over this time frame. He feels his appetite is back, he ordered food, but declined food supplement.    CURRENT NUTRITION ORDERS  Diet: Regular  Intake/Tolerance:  No data on flowsheet- Pt. Is new admit.    LABS  Labs reviewed  Na+: 145 (H), other lytes WNL  LDL: 102 slightly high.    MEDICATIONS  Medications reviewed  Biktarvy  Miralax    ANTHROPOMETRICS  Height: 182.9 cm (6' 0\")  Most Recent Weight: 93.4 kg (205 lb 14.4 oz)    IBW: 89.9 kg  BMI: 27.9 kg/m2 Overweight BMI 25-29.9  Weight History: ~ 24% wt. loss in 1 year  Wt Readings from Last 10 Encounters:   02/08/22 93.4 kg (205 lb 14.4 oz)   04/16/19 111.3 kg (245 lb 6.4 oz)     115.7 kg (255 lb) 01/28/2021 - Care Everywhere       Dosing Weight: 93.4 kg (most recent wt.)    ASSESSED NUTRITION NEEDS  Estimated Energy Needs: 2335 - 2802 kcals/day (25 - 30 kcals/kg)  Justification: Maintenance  Estimated " Protein Needs: 93.4 - 112 grams protein/day (1 - 1.2 grams of pro/kg)  Justification: Maintenance  Estimated Fluid Needs: (1 mL/kcal)   Justification: Maintenance    PHYSICAL FINDINGS  See malnutrition section below.      MALNUTRITION  % Intake: < 75% for >/= 3 months (moderate)  % Weight Loss: > 20% in 1 year (severe)  Subcutaneous Fat Loss: Unable to assess  Muscle Loss: Unable to assess  Fluid Accumulation/Edema: None noted  Malnutrition Diagnosis: Moderate malnutrition in the context of social/environmental factor    NUTRITION DIAGNOSIS  Inadequate oral intake related to poor appetite/AMS secondary to substance abuse as evidenced by 24% weight loss since 1/28/2021 and minimal food intake over past 5 months per pt report.    INTERVENTIONS  Implementation  Nutrition Education: Energy & Protein intake. Explained option to order double portions. Pt prefers this instead of using supplements.   Collaboration with other providers     Goals  Patient to consume % of nutritionally adequate meal trays TID, or the equivalent with supplements/snacks.     Monitoring/Evaluation  Progress toward goals will be monitored and evaluated per protocol.    Dc Albarado  Dietetic Intern    Renetta Fontana, MPH, RDN, LD, CNSC  Pager: [phone]
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
## 2022-02-09 NOTE — PROGRESS NOTES
2/9/2022    Called unit and attempted to meet with pt for CD consult. Staff reports pt is currently sleeping. [facility] team will attempt to contact pt later today if schedule permits otherwise it will be Thursday or Friday.    [facility] Nelson [facility]  Phone: [phone]  Email: [email or web address]

## 2022-02-09 NOTE — PLAN OF CARE
Problem: Sleep Disturbance  Goal: Adequate Sleep/Rest  Outcome: Improving     Patient appears to have slept 6.25 hours. Patient was up once for snacks. Patient requested for Melatonin, but there was no  repeat  dose, requested for hydroxyzine and that was given,  Safety checks and precautions in place every 15 minutes. No other known concerns at this time. Will monitor as needed

## 2022-02-09 NOTE — PLAN OF CARE
Problem: Behavioral Health Plan of Care  Goal: Adheres to Safety Considerations for Self and Others  Outcome: Improving     Problem: Behavioral Health Plan of Care  Goal: Optimized Coping Skills in Response to Life Stressors  Outcome: Improving     Patient sleep beginning of the shift. Patient check every 15 minutes for safety. Patient on the hallway walking in and out of his room. Patient compliant with his medication no observed symptoms. Patient denies MH symptoms. Melatonin given for sleep, Remeron will give when available. Patient denies pain. Denies chest pain, SOB, dizziness, lightheadedness or N/V. VSS Blood pressure (!) 133/97, pulse 65, temperature 97.4  F (36.3  C), temperature source Temporal, resp. rate 18, height 1.829 m (6'), weight 93.4 kg (205 lb 14.4 oz), SpO2 96 %.  Will continue with current plan of care.
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
## 2022-02-09 NOTE — CONSULTS
02/09/22 Chem dep consult completed.      Met with patient via telephone visit, and patient was agreeable to interview.  Patient is interested in IOP treatment with lodging.  He requested referrals to both [facility] and [facility].  This assess can submit referral to [facility] and I will contact unit  to assist in referral to [facility] as we will need a release of information to release assessment for review.  Chem dep will sign off as  can assist with care coordination for placement.    Zoila Del Toro [facility]  [phone]

## 2022-02-09 NOTE — PLAN OF CARE
"  Problem: Behavioral Health Plan of Care  Goal: Patient-Specific Goal (Individualization)  Outcome: No Change     Problem: Behavioral Health Plan of Care  Goal: Adheres to Safety Considerations for Self and Others  Outcome: No Change     Problem: Behavioral Health Plan of Care  Goal: Absence of New-Onset Illness or Injury  Outcome: No Change       Pt spent most of the shift sleeping in his room. Comes out for meals, appetite is good.  A little irritable at the beginning of the shift, but later warmed up then was pleasant and cooperative with staff. Pt denies SI/SIB or hallucinations. Endorsed Anxiety at 4 and depression at 6. Pt mood is a little \"depressed\" today and was not happy that he missed the CD evaluation.  Pt has no goals for today.   "

## 2022-02-10 ENCOUNTER — APPOINTMENT (OUTPATIENT)
Dept: GENERAL RADIOLOGY | Facility: CLINIC | Age: 47
DRG: 897 | End: 2022-02-10
Attending: PHYSICIAN ASSISTANT
Payer: COMMERCIAL

## 2022-02-10 PROCEDURE — 99222 1ST HOSP IP/OBS MODERATE 55: CPT | Performed by: PHYSICIAN ASSISTANT

## 2022-02-10 PROCEDURE — 99232 SBSQ HOSP IP/OBS MODERATE 35: CPT | Mod: GC | Performed by: PSYCHIATRY & NEUROLOGY

## 2022-02-10 PROCEDURE — 124N000002 HC R&B MH UMMC

## 2022-02-10 PROCEDURE — 72080 X-RAY EXAM THORACOLMB 2/> VW: CPT | Mod: 26 | Performed by: STUDENT IN AN ORGANIZED HEALTH CARE EDUCATION/TRAINING PROGRAM

## 2022-02-10 PROCEDURE — 250N000013 HC RX MED GY IP 250 OP 250 PS 637: Performed by: STUDENT IN AN ORGANIZED HEALTH CARE EDUCATION/TRAINING PROGRAM

## 2022-02-10 PROCEDURE — 99207 PR CONSULT E&M CHANGED TO INITIAL LEVEL: CPT | Performed by: PHYSICIAN ASSISTANT

## 2022-02-10 PROCEDURE — 250N000013 HC RX MED GY IP 250 OP 250 PS 637

## 2022-02-10 PROCEDURE — 72080 X-RAY EXAM THORACOLMB 2/> VW: CPT

## 2022-02-10 RX ORDER — FAMOTIDINE 20 MG/1
20 TABLET, FILM COATED ORAL 2 TIMES DAILY PRN
Status: DISCONTINUED | OUTPATIENT
Start: 2022-02-10 | End: 2022-02-14 | Stop reason: HOSPADM

## 2022-02-10 RX ORDER — IBUPROFEN 600 MG/1
600 TABLET, FILM COATED ORAL EVERY 6 HOURS PRN
Status: DISCONTINUED | OUTPATIENT
Start: 2022-02-10 | End: 2022-02-14 | Stop reason: HOSPADM

## 2022-02-10 RX ADMIN — APIXABAN 5 MG: 2.5 TABLET, FILM COATED ORAL at 19:18

## 2022-02-10 RX ADMIN — APIXABAN 5 MG: 2.5 TABLET, FILM COATED ORAL at 08:43

## 2022-02-10 RX ADMIN — OLANZAPINE 10 MG: 10 TABLET, FILM COATED ORAL at 19:23

## 2022-02-10 RX ADMIN — SERTRALINE HYDROCHLORIDE 50 MG: 50 TABLET ORAL at 08:43

## 2022-02-10 RX ADMIN — HYDROXYZINE HYDROCHLORIDE 25 MG: 25 TABLET, FILM COATED ORAL at 13:00

## 2022-02-10 RX ADMIN — BICTEGRAVIR SODIUM, EMTRICITABINE, AND TENOFOVIR ALAFENAMIDE FUMARATE 1 TABLET: 50; 200; 25 TABLET ORAL at 08:43

## 2022-02-10 RX ADMIN — NICOTINE POLACRILEX 2 MG: 2 LOZENGE ORAL at 13:00

## 2022-02-10 RX ADMIN — DOCUSATE SODIUM 200 MG: 100 CAPSULE, LIQUID FILLED ORAL at 08:43

## 2022-02-10 RX ADMIN — DOCUSATE SODIUM 200 MG: 100 CAPSULE, LIQUID FILLED ORAL at 19:18

## 2022-02-10 RX ADMIN — MIRTAZAPINE 7.5 MG: 7.5 TABLET, FILM COATED ORAL at 19:18

## 2022-02-10 ASSESSMENT — MIFFLIN-ST. JEOR: SCORE: 1866.04
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
## 2022-02-10 NOTE — PLAN OF CARE
Problem: Behavioral Health Plan of Care  Goal: Plan of Care Review  Outcome: Improving  Flowsheets  Taken 2/9/2022 2001  Plan of Care Reviewed With: patient  Patient Agreement with Plan of Care: agrees  Taken 2/9/2022 0539  Plan of Care Reviewed With: patient  Patient Agreement with Plan of Care: agrees   Patient alert and oriented. Patient was in his room sleeping beginning of the shift. Patient came out of his room for dinner-appetite good. Patient complained of feeling oozy when standing. Encouraged fluids. Patient VS Blood pressure 133/89, pulse 85, temperature 98.3  F (36.8  C), resp. rate 18, height 1.829 m (6'), weight 93.4 kg (205 lb 14.4 oz), SpO2 98 %. Patient denies chest pain, SOB, dizziness, or N/V. Safety rounds q 15 min done. Patient able to verbalized needs. Patient requested Remeron-given. Patient on Apixaban- no signs and symptoms of active bleeding noted. Will continue with current plan of care.

## 2022-02-10 NOTE — TELEPHONE ENCOUNTER
LP SCREEN TELEPHONE NOTE   Edi Sorenson paperwork was reviewed by JANA Malik and the patient was deemed ELIGIBLE for the LP program.     Inpatient or Community Referral: Inpatient referral     Medical Screening (As Needed): The patient's medical and COVID-19 screen with the LP RN is pending at this time and NEEDS to be completed prior to placing this patient on the LP waiting list.     Regular use of benzodiazapine's (other than detox): The patient does NOT use benzodiazepines.     Insurance: The [facility] Department is responsible for obtaining ALL authorizations for treatment services at the EOP, DOP and LP levels of care.      This will be a: 3A DIRECT TRANSFER (The LP Admissions Counselor will complete the LP UPDATE NOTE, VAA, ISP, SYLVAIN's, DAANES & Update SBAR)     IV use or Pregnant: This patient is an IV drug user and will have PRIORITY status.     Business office: [phone]     List: This patient CAN NOT be placed on the PRIORITY LP Waiting List until after being medically approved for the LP program by the LP RN.       Group: [facility]     Additional Info as needed: NA     The best current contact telephone number for the patient is: [address] @ [phone], or @ [phone] if discharged home       Thanks,  JANA Malik   2/10/2022

## 2022-02-10 NOTE — PLAN OF CARE
Pt was up and out of his room a few different times tonight asking for snacks. He slept for a total of 6.25 hours. He received PRN Melatonin (3mg) at 2342. No other concerns noted.     Problem: Sleep Disturbance  Goal: Adequate Sleep/Rest  Outcome: Improving

## 2022-02-10 NOTE — PLAN OF CARE
Assessment/Intervention/Current Symptoms and Care Coordination: Met with team. Reviewed patient's chart and progress notes. Patient completed CD assessment on 2/9/2022. Writer called [facility] . Writer left  requesting a call back to follow up on referral.      Discharge Plan or Goal: Placement at [facility]. Will be referred to outpatient mental health providers.            Barriers to Discharge: Needs clinical stabilization and medication management.            Referral Status:           Legal Status: Voluntary

## 2022-02-10 NOTE — PLAN OF CARE
Problem: Adult Inpatient Plan of Care  Goal: Plan of Care Review  Recent Flowsheet Documentation  Taken 2/10/2022 1600 by Aniyah Gonzales, RN  Plan of Care Reviewed With: patient     Problem: Adult Inpatient Plan of Care  Goal: Optimal Comfort and Wellbeing  Outcome: Improving  Intervention: Provide Person-Centered Care  Recent Flowsheet Documentation  Taken 2/10/2022 1600 by Aniyah Gonzales, RN  Trust Relationship/Rapport:    care explained    choices provided    emotional support provided    empathic listening provided    questions answered    questions encouraged    reassurance provided    thoughts/feelings acknowledged   Patient mostly Isolative and withdrawn to room this shift, flat and blunted affect on approach, Patient endorsed minimal anxiety and depression, but denies all other  psych symptoms, patient is rosy for safety. Visible in the milieu intermittently, selectively engaged with peers, patient is rosy for safety, safety checks in place every 15 minutes, . PRN Zyprexa given for anxiety, had Remeron with HS medications. No other known concerns at this time. Will offer support as needed for the rest of the shift.

## 2022-02-10 NOTE — TELEPHONE ENCOUNTER
Date: 2/10/2022    To: CHIQUITA RN    Please call this patient at [address]: [phone] or hhvg: [phone] to complete a medical screening to clarify his medications and medical condition and to complete a COVID-19 screening.      The patient has a history of:  Back pain, HBP, HIV, DVT/PE  IV user    INSIDE: This patient had a substance abuse assessment with JANA Moura, so no paperwork will be sent up to the 6th floor because all of the clinical documentation is in the patient's electronic medical record in [facility].      Thanks,  JANA Neves
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
## 2022-02-11 PROCEDURE — 250N000013 HC RX MED GY IP 250 OP 250 PS 637: Performed by: STUDENT IN AN ORGANIZED HEALTH CARE EDUCATION/TRAINING PROGRAM

## 2022-02-11 PROCEDURE — 124N000002 HC R&B MH UMMC

## 2022-02-11 PROCEDURE — 99231 SBSQ HOSP IP/OBS SF/LOW 25: CPT | Mod: GC | Performed by: PSYCHIATRY & NEUROLOGY

## 2022-02-11 PROCEDURE — 250N000013 HC RX MED GY IP 250 OP 250 PS 637

## 2022-02-11 PROCEDURE — 250N000011 HC RX IP 250 OP 636: Performed by: STUDENT IN AN ORGANIZED HEALTH CARE EDUCATION/TRAINING PROGRAM

## 2022-02-11 RX ORDER — PANTOPRAZOLE SODIUM 20 MG/1
40 TABLET, DELAYED RELEASE ORAL 2 TIMES DAILY
Status: DISCONTINUED | OUTPATIENT
Start: 2022-02-11 | End: 2022-02-14 | Stop reason: HOSPADM

## 2022-02-11 RX ORDER — ONDANSETRON 2 MG/ML
4 INJECTION INTRAMUSCULAR; INTRAVENOUS ONCE
Status: COMPLETED | OUTPATIENT
Start: 2022-02-11 | End: 2022-02-11

## 2022-02-11 RX ORDER — FAMOTIDINE 10 MG
10 TABLET ORAL 2 TIMES DAILY
Status: DISCONTINUED | OUTPATIENT
Start: 2022-02-11 | End: 2022-02-11 | Stop reason: CLARIF

## 2022-02-11 RX ADMIN — APIXABAN 5 MG: 2.5 TABLET, FILM COATED ORAL at 09:29

## 2022-02-11 RX ADMIN — APIXABAN 5 MG: 2.5 TABLET, FILM COATED ORAL at 19:00

## 2022-02-11 RX ADMIN — DOCUSATE SODIUM 200 MG: 100 CAPSULE, LIQUID FILLED ORAL at 19:01

## 2022-02-11 RX ADMIN — DOCUSATE SODIUM 200 MG: 100 CAPSULE, LIQUID FILLED ORAL at 09:28

## 2022-02-11 RX ADMIN — SERTRALINE HYDROCHLORIDE 50 MG: 50 TABLET ORAL at 09:29

## 2022-02-11 RX ADMIN — ONDANSETRON 4 MG: 2 INJECTION INTRAMUSCULAR; INTRAVENOUS at 11:08

## 2022-02-11 RX ADMIN — POLYETHYLENE GLYCOL 3350 17 G: 17 POWDER, FOR SOLUTION ORAL at 09:29

## 2022-02-11 RX ADMIN — MIRTAZAPINE 7.5 MG: 7.5 TABLET, FILM COATED ORAL at 19:00

## 2022-02-11 RX ADMIN — PANTOPRAZOLE SODIUM 40 MG: 20 TABLET, DELAYED RELEASE ORAL at 19:01

## 2022-02-11 RX ADMIN — FAMOTIDINE 20 MG: 20 TABLET, FILM COATED ORAL at 10:38

## 2022-02-11 RX ADMIN — HYDROXYZINE HYDROCHLORIDE 25 MG: 25 TABLET, FILM COATED ORAL at 20:03

## 2022-02-11 RX ADMIN — BICTEGRAVIR SODIUM, EMTRICITABINE, AND TENOFOVIR ALAFENAMIDE FUMARATE 1 TABLET: 50; 200; 25 TABLET ORAL at 09:28

## 2022-02-11 RX ADMIN — MELATONIN TAB 3 MG 3 MG: 3 TAB at 19:03

## 2022-02-11 ASSESSMENT — ACTIVITIES OF DAILY LIVING (ADL)
ORAL_HYGIENE: INDEPENDENT
DRESS: SCRUBS (BEHAVIORAL HEALTH);INDEPENDENT
HYGIENE/GROOMING: INDEPENDENT
HYGIENE/GROOMING: HANDWASHING;INDEPENDENT
LAUNDRY: WITH SUPERVISION
LAUNDRY: WITH SUPERVISION
ORAL_HYGIENE: INDEPENDENT
DRESS: INDEPENDENT

## 2022-02-11 NOTE — PROGRESS NOTES
Left ms for Chanda, nurse at [facility], to call back. She had called this morning for a report re pt, and requested a call back. She was busy at the time, and will call back when she is available.
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
## 2022-02-11 NOTE — PLAN OF CARE
Problem: Sleep Disturbance  Goal: Adequate Sleep/Rest  Outcome: Improving     Patient appeared to have slept 6.5 hours, no PRN given or requested this shift. Safety checks in place every 15 minutes. No other known concerns at this time. Will monitor and offer support as needed

## 2022-02-11 NOTE — PLAN OF CARE
"Pt has been irritable and dismissive, this am. He said he woud \"just take my meds, so you can get out of here.\" He declines 1:1, and is staying in his rm, in bed. Pt came out and requested something for heartburn. Pepsid was given at about 1041. Shortly after, pt was heard yelling in his rm. He had a lg emesis, appeared to be undigested food. His BP was 162/104 HR 91 R 24. Asked providers to check pt; Dr George was here and spoke w pt. Also, notified  that pt's BP was somewhat elevated earlier this morning. Zofran IM was given, and will give po med for GI distress, when IM takes effect. Pt is resting quietly, and continues to be dismissive. Will recheck BP in a bit; cont to monitor. CM had said that she had just told pt he may be going to [facility].    1150) Pt declined Protonix ordered. He is asking what time lunch will be here. He continues to be irritable, but did thank this nurse, after BP/P rechecked. BP now 143/81 HR 91 R 16. No acute distress noted or stated.  "

## 2022-02-11 NOTE — PLAN OF CARE
Assessment/Intervention/Current Symptoms and Care Coordination: Met with team. Reviewed patient's chart and progress notes. Patient is currently under review for [facility]. Patient had an upset stomach this morning. Medications were given to address nausea and heartburn. Writer scheduled psychiatry appointment with [facility]        Discharge Plan or Goal: Placement at [facility]. Will be referred to outpatient mental health providers.            Barriers to Discharge: Needs clinical stabilization and medication management.            Referral Status: Was [facility] for [facility]           Legal Status: Voluntary

## 2022-02-12 PROCEDURE — 250N000013 HC RX MED GY IP 250 OP 250 PS 637

## 2022-02-12 PROCEDURE — 250N000013 HC RX MED GY IP 250 OP 250 PS 637: Performed by: STUDENT IN AN ORGANIZED HEALTH CARE EDUCATION/TRAINING PROGRAM

## 2022-02-12 PROCEDURE — 124N000002 HC R&B MH UMMC

## 2022-02-12 RX ADMIN — DOCUSATE SODIUM 200 MG: 100 CAPSULE, LIQUID FILLED ORAL at 08:39

## 2022-02-12 RX ADMIN — APIXABAN 5 MG: 2.5 TABLET, FILM COATED ORAL at 19:22

## 2022-02-12 RX ADMIN — MIRTAZAPINE 7.5 MG: 7.5 TABLET, FILM COATED ORAL at 19:22

## 2022-02-12 RX ADMIN — PANTOPRAZOLE SODIUM 40 MG: 20 TABLET, DELAYED RELEASE ORAL at 19:22

## 2022-02-12 RX ADMIN — SERTRALINE HYDROCHLORIDE 50 MG: 50 TABLET ORAL at 08:39

## 2022-02-12 RX ADMIN — RISPERIDONE 1 MG: 1 TABLET ORAL at 13:21

## 2022-02-12 RX ADMIN — OLANZAPINE 10 MG: 10 TABLET, FILM COATED ORAL at 14:32

## 2022-02-12 RX ADMIN — APIXABAN 5 MG: 2.5 TABLET, FILM COATED ORAL at 08:39

## 2022-02-12 RX ADMIN — PANTOPRAZOLE SODIUM 40 MG: 20 TABLET, DELAYED RELEASE ORAL at 08:39

## 2022-02-12 RX ADMIN — DOCUSATE SODIUM 200 MG: 100 CAPSULE, LIQUID FILLED ORAL at 19:22

## 2022-02-12 RX ADMIN — POLYETHYLENE GLYCOL 3350 17 G: 17 POWDER, FOR SOLUTION ORAL at 08:39

## 2022-02-12 RX ADMIN — BICTEGRAVIR SODIUM, EMTRICITABINE, AND TENOFOVIR ALAFENAMIDE FUMARATE 1 TABLET: 50; 200; 25 TABLET ORAL at 08:40

## 2022-02-12 ASSESSMENT — ACTIVITIES OF DAILY LIVING (ADL)
LAUNDRY: WITH SUPERVISION
HYGIENE/GROOMING: INDEPENDENT
LAUNDRY: WITH SUPERVISION
DRESS: SCRUBS (BEHAVIORAL HEALTH);INDEPENDENT
HYGIENE/GROOMING: INDEPENDENT
ORAL_HYGIENE: INDEPENDENT
DRESS: SCRUBS (BEHAVIORAL HEALTH);INDEPENDENT
ORAL_HYGIENE: INDEPENDENT

## 2022-02-12 NOTE — PLAN OF CARE
"Earlier in the shift pt took a nap. Pt had a labile mood. When pt woke up but still laying down this writer went in to check his vital signs. Pt was very irritable and he just put his arm out and said \"just take it and leave me alone\". Pt came out in the lounge later and was talking to staff and was in good mood and was very hyper verbal and restless. Pt got upset again when he asked for his phone to get numbers and was told there was no phone listed on his belonging list. The phone was later found in his locker and the issues was resolved and pt apologized for being rude. Pt said that he felt like staff were thinking he did not know what he was talking about when he said he had a phone. Pt said his mood today was not good because it was his daughter's birthday and he could not e there for because he is in the hospital. Pt denies SIB/AH/VH. He endorsed passive SI thoughts and said when he leaves the hospital \"I don't know what I might do\". Pt contracts for safety while in the hospital. Pt took all his HS medications including prn Melatonin and Remeron. Will monitor.    Problem: Behavioral Health Plan of Care  Goal: Optimized Coping Skills in Response to Life Stressors  Outcome: No Change     Problem: Adult Inpatient Plan of Care  Goal: Readiness for Transition of Care  Outcome: Improving     Problem: Behavioral Health Plan of Care  Goal: Adheres to Safety Considerations for Self and Others  Outcome: Improving     Problem: Suicidal Behavior  Goal: Suicidal Behavior is Absent or Managed  Outcome: Improving     "

## 2022-02-12 NOTE — PLAN OF CARE
Problem: Sleep Disturbance  Goal: Adequate Sleep/Rest  Outcome: Improving       Pt appeared to have slept for 7 hours. No PRN medication was given. Staff to continue to offer support to patients.

## 2022-02-12 NOTE — PROGRESS NOTES
Brief Medicine Note    Chart reviewed. XR negative for acute fractures.     Continue conservative management of back pain.     Recommend outpatient follow up with primary care to evaluate if symptoms persist >6 weeks.    Austin Manley PA-C  Internal Medicine [facility] Hospitalist  [facility]  Pager [phone]

## 2022-02-12 NOTE — PLAN OF CARE
Pt denies SI.  States waiting for CD tx.  Pt reports hearing voices.  Pt state he hears hissing form the TV.  Pt states he not going to watch TV for the rest of his life.  Pt req.  PRN Risperdal.  Later pt stated the Risperdal not working requested Zyprexa.  During this time pt sitting in [facility] asked staff to turn on a movie channel.  Pt does not appear to be responding to internal stimuli.   Problem: Adult Inpatient Plan of Care  Goal: Plan of Care Review  Outcome: No Change  Goal: Patient-Specific Goal (Individualized)  Outcome: No Change  Goal: Absence of Hospital-Acquired Illness or Injury  Outcome: No Change  Goal: Optimal Comfort and Wellbeing  Outcome: No Change  Goal: Readiness for Transition of Care  Outcome: No Change     Problem: Behavioral Health Plan of Care  Goal: Plan of Care Review  Outcome: No Change  Goal: Patient-Specific Goal (Individualization)  Outcome: No Change  Note:     Goal: Adheres to Safety Considerations for Self and Others  Outcome: No Change  Goal: Absence of New-Onset Illness or Injury  Outcome: No Change  Goal: Optimized Coping Skills in Response to Life Stressors  Outcome: No Change  Goal: Develops/Participates in Therapeutic [place] to Support Successful Transition  Outcome: No Change

## 2022-02-13 PROCEDURE — 124N000002 HC R&B MH UMMC

## 2022-02-13 PROCEDURE — 250N000013 HC RX MED GY IP 250 OP 250 PS 637: Performed by: STUDENT IN AN ORGANIZED HEALTH CARE EDUCATION/TRAINING PROGRAM

## 2022-02-13 PROCEDURE — 250N000013 HC RX MED GY IP 250 OP 250 PS 637

## 2022-02-13 RX ADMIN — ACETAMINOPHEN 650 MG: 325 TABLET, FILM COATED ORAL at 20:53

## 2022-02-13 RX ADMIN — APIXABAN 5 MG: 2.5 TABLET, FILM COATED ORAL at 09:27

## 2022-02-13 RX ADMIN — POLYETHYLENE GLYCOL 3350 17 G: 17 POWDER, FOR SOLUTION ORAL at 09:27

## 2022-02-13 RX ADMIN — APIXABAN 5 MG: 2.5 TABLET, FILM COATED ORAL at 20:53

## 2022-02-13 RX ADMIN — MIRTAZAPINE 7.5 MG: 7.5 TABLET, FILM COATED ORAL at 22:13

## 2022-02-13 RX ADMIN — MELATONIN TAB 3 MG 3 MG: 3 TAB at 21:01

## 2022-02-13 RX ADMIN — SERTRALINE HYDROCHLORIDE 50 MG: 50 TABLET ORAL at 09:27

## 2022-02-13 RX ADMIN — PANTOPRAZOLE SODIUM 40 MG: 20 TABLET, DELAYED RELEASE ORAL at 09:27

## 2022-02-13 RX ADMIN — BICTEGRAVIR SODIUM, EMTRICITABINE, AND TENOFOVIR ALAFENAMIDE FUMARATE 1 TABLET: 50; 200; 25 TABLET ORAL at 09:28

## 2022-02-13 RX ADMIN — MELATONIN TAB 3 MG 3 MG: 3 TAB at 01:06

## 2022-02-13 RX ADMIN — DOCUSATE SODIUM 200 MG: 100 CAPSULE, LIQUID FILLED ORAL at 20:53

## 2022-02-13 RX ADMIN — PANTOPRAZOLE SODIUM 40 MG: 20 TABLET, DELAYED RELEASE ORAL at 20:53

## 2022-02-13 RX ADMIN — DOCUSATE SODIUM 200 MG: 100 CAPSULE, LIQUID FILLED ORAL at 09:27

## 2022-02-13 ASSESSMENT — ACTIVITIES OF DAILY LIVING (ADL)
DRESS: SCRUBS (BEHAVIORAL HEALTH);INDEPENDENT
DRESS: INDEPENDENT
ORAL_HYGIENE: INDEPENDENT
HYGIENE/GROOMING: INDEPENDENT
HYGIENE/GROOMING: INDEPENDENT
ORAL_HYGIENE: INDEPENDENT
LAUNDRY: WITH SUPERVISION
LAUNDRY: WITH SUPERVISION

## 2022-02-13 ASSESSMENT — MIFFLIN-ST. JEOR: SCORE: 1901.42

## 2022-02-13 NOTE — PLAN OF CARE
"Pt was in and out his room and kept to himself. Pt declined to have 1:1 interview with writer, saying he \"is going to sleep\". He was irritable during the encounter. Pt appeared responding to internal stimuli.       "

## 2022-02-13 NOTE — PLAN OF CARE
Pt states has SI thoughts reports no plan. Pt states hear voices.  Asked pt what they say.  Pt states he does not know what there saying.  Pt does not appear responding to internal stimuli.  Pt affect been very bright.  Pt laughing and joking with staff.  Pt ate all meals.  Pt currently singing in his room.  Problem: Adult Inpatient Plan of Care  Goal: Plan of Care Review  Outcome: No Change  Goal: Patient-Specific Goal (Individualized)  Outcome: No Change  Goal: Absence of Hospital-Acquired Illness or Injury  Outcome: No Change  Goal: Optimal Comfort and Wellbeing  Outcome: No Change  Goal: Readiness for Transition of Care  Outcome: No Change     Problem: Behavioral Health Plan of Care  Goal: Plan of Care Review  Outcome: No Change  Goal: Patient-Specific Goal (Individualization)  Outcome: No Change  Goal: Adheres to Safety Considerations for Self and Others  Outcome: No Change  Goal: Absence of New-Onset Illness or Injury  Outcome: No Change  Goal: Optimized Coping Skills in Response to Life Stressors  Outcome: No Change  Goal: Develops/Participates in Therapeutic [place] to Support Successful Transition  Outcome: No Change

## 2022-02-14 ENCOUNTER — HOSPITAL ENCOUNTER (OUTPATIENT)
Dept: BEHAVIORAL HEALTH | Facility: CLINIC | Age: 47
End: 2022-02-14
Attending: FAMILY MEDICINE
Payer: COMMERCIAL

## 2022-02-14 VITALS
SYSTOLIC BLOOD PRESSURE: 137 MMHG | BODY MASS INDEX: 29.07 KG/M2 | HEART RATE: 77 BPM | DIASTOLIC BLOOD PRESSURE: 90 MMHG | HEIGHT: 72 IN | RESPIRATION RATE: 16 BRPM | TEMPERATURE: 96.9 F | WEIGHT: 214.6 LBS | OXYGEN SATURATION: 99 %

## 2022-02-14 VITALS — TEMPERATURE: 98.3 F | WEIGHT: 216 LBS | HEIGHT: 72 IN | BODY MASS INDEX: 29.26 KG/M2 | OXYGEN SATURATION: 99 %

## 2022-02-14 PROBLEM — K59.00 CONSTIPATION: Status: ACTIVE | Noted: 2022-02-14

## 2022-02-14 PROBLEM — M54.50 ACUTE LOW BACK PAIN: Status: ACTIVE | Noted: 2022-02-14

## 2022-02-14 PROCEDURE — 250N000013 HC RX MED GY IP 250 OP 250 PS 637

## 2022-02-14 PROCEDURE — 1002N00001 HC LODGING PLUS FACILITY CHARGE ADULT

## 2022-02-14 PROCEDURE — 99238 HOSP IP/OBS DSCHRG MGMT 30/<: CPT | Mod: GC | Performed by: PSYCHIATRY & NEUROLOGY

## 2022-02-14 PROCEDURE — 250N000013 HC RX MED GY IP 250 OP 250 PS 637: Performed by: STUDENT IN AN ORGANIZED HEALTH CARE EDUCATION/TRAINING PROGRAM

## 2022-02-14 RX ORDER — PANTOPRAZOLE SODIUM 40 MG/1
40 TABLET, DELAYED RELEASE ORAL 2 TIMES DAILY
Qty: 30 TABLET | Refills: 0 | Status: ON HOLD | OUTPATIENT
Start: 2022-02-14 | End: 2022-02-25

## 2022-02-14 RX ORDER — AMOXICILLIN 250 MG
2 CAPSULE ORAL DAILY PRN
Status: ON HOLD | COMMUNITY
End: 2022-02-25

## 2022-02-14 RX ORDER — LANOLIN ALCOHOL/MO/W.PET/CERES
3 CREAM (GRAM) TOPICAL
Qty: 30 TABLET | Refills: 0 | Status: SHIPPED | OUTPATIENT
Start: 2022-02-14 | End: 2023-08-29

## 2022-02-14 RX ORDER — MAGNESIUM HYDROXIDE/ALUMINUM HYDROXICE/SIMETHICONE 120; 1200; 1200 MG/30ML; MG/30ML; MG/30ML
30 SUSPENSION ORAL EVERY 6 HOURS PRN
COMMUNITY
End: 2023-08-29

## 2022-02-14 RX ORDER — RISPERIDONE 1 MG/1
1 TABLET ORAL 3 TIMES DAILY PRN
Qty: 60 TABLET | Refills: 0 | Status: ON HOLD | OUTPATIENT
Start: 2022-02-14 | End: 2022-02-25

## 2022-02-14 RX ORDER — POLYETHYLENE GLYCOL 3350 17 G/17G
17 POWDER, FOR SOLUTION ORAL DAILY
Qty: 510 G | Refills: 0 | Status: SHIPPED | OUTPATIENT
Start: 2022-02-14 | End: 2023-08-29

## 2022-02-14 RX ORDER — MIRTAZAPINE 7.5 MG/1
7.5 TABLET, FILM COATED ORAL
Qty: 30 TABLET | Refills: 0 | Status: ON HOLD | OUTPATIENT
Start: 2022-02-14 | End: 2022-02-25

## 2022-02-14 RX ORDER — LORATADINE 10 MG/1
10 TABLET ORAL DAILY PRN
COMMUNITY
End: 2023-08-29

## 2022-02-14 RX ORDER — ACETAMINOPHEN 325 MG/1
650 TABLET ORAL EVERY 4 HOURS PRN
Qty: 14 TABLET | Refills: 0 | Status: ON HOLD | OUTPATIENT
Start: 2022-02-14 | End: 2022-02-25

## 2022-02-14 RX ORDER — IBUPROFEN 600 MG/1
600 TABLET, FILM COATED ORAL EVERY 6 HOURS PRN
Qty: 14 TABLET | Refills: 0 | Status: SHIPPED | OUTPATIENT
Start: 2022-02-14 | End: 2023-08-29

## 2022-02-14 RX ADMIN — PANTOPRAZOLE SODIUM 40 MG: 20 TABLET, DELAYED RELEASE ORAL at 09:10

## 2022-02-14 RX ADMIN — DOCUSATE SODIUM 200 MG: 100 CAPSULE, LIQUID FILLED ORAL at 09:10

## 2022-02-14 RX ADMIN — BICTEGRAVIR SODIUM, EMTRICITABINE, AND TENOFOVIR ALAFENAMIDE FUMARATE 1 TABLET: 50; 200; 25 TABLET ORAL at 09:11

## 2022-02-14 RX ADMIN — APIXABAN 5 MG: 2.5 TABLET, FILM COATED ORAL at 09:10

## 2022-02-14 RX ADMIN — SERTRALINE HYDROCHLORIDE 50 MG: 50 TABLET ORAL at 09:10

## 2022-02-14 RX ADMIN — ACETAMINOPHEN 650 MG: 325 TABLET, FILM COATED ORAL at 09:17

## 2022-02-14 ASSESSMENT — ANXIETY QUESTIONNAIRES
3. WORRYING TOO MUCH ABOUT DIFFERENT THINGS: SEVERAL DAYS
4. TROUBLE RELAXING: NEARLY EVERY DAY
7. FEELING AFRAID AS IF SOMETHING AWFUL MIGHT HAPPEN: NOT AT ALL
5. BEING SO RESTLESS THAT IT IS HARD TO SIT STILL: NOT AT ALL
6. BECOMING EASILY ANNOYED OR IRRITABLE: MORE THAN HALF THE DAYS
IF YOU CHECKED OFF ANY PROBLEMS ON THIS QUESTIONNAIRE, HOW DIFFICULT HAVE THESE PROBLEMS MADE IT FOR YOU TO DO YOUR WORK, TAKE CARE OF THINGS AT HOME, OR GET ALONG WITH OTHER PEOPLE: NOT DIFFICULT AT ALL
GAD7 TOTAL SCORE: 10
2. NOT BEING ABLE TO STOP OR CONTROL WORRYING: SEVERAL DAYS
1. FEELING NERVOUS, ANXIOUS, OR ON EDGE: NEARLY EVERY DAY

## 2022-02-14 ASSESSMENT — MIFFLIN-ST. JEOR: SCORE: 1907.77

## 2022-02-14 ASSESSMENT — PATIENT HEALTH QUESTIONNAIRE - PHQ9: SUM OF ALL RESPONSES TO PHQ QUESTIONS 1-9: 11
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
## 2022-02-14 NOTE — PLAN OF CARE
Problem: Sleep Disturbance  Goal: Adequate Sleep/Rest  Outcome: Improving          Pt appeared to have slept for 6.75 hours. No PRN medication was given. Staff to continue to offer support to patients.

## 2022-02-14 NOTE — PLAN OF CARE
Assessment/Intervention/Current Symptoms and Care Coordination: Met with team. Reviewed patient's chart and progress notes. Writer called  at [facility]. Writer provided update on patient's progress over the weekend. Writer will be informed today if patient will be approved for admissions.        Discharge Plan or Goal: Placement at [facility]. Will be referred to outpatient mental health providers.            Barriers to Discharge: Needs clinical stabilization and medication management.            Referral Status: [facility]           Legal Status: Voluntary

## 2022-02-14 NOTE — PROGRESS NOTES
Name: Edi Sorenson  Date: 2/14/2022  Medical Record: [id number]    Envelope Number: [id number]    List of Contents (List each item separately in new row):   Cell Phone    Admission:  I am responsible for any personal items that are not sent to the safe or pharmacy.  [facility] is not responsible for loss, theft or damage of any property in my possession.      Patient Signature:  ___________________________________________       Date/Time:__________________________    Staff Signature: __________________________________       Date/Time:_________________________      Discharge:  [facility] has returned all of my personal belongings:    Patient Signature: ________________________________________     Date/Time: ____________________________________    Staff Signature: ______________________________________     Date/Time:_____________________________________

## 2022-02-14 NOTE — DISCHARGE INSTRUCTIONS
Behavioral Discharge Planning and Instructions    Summary: You were admitted on 2/7/2022  due to SI.  You were treated by Dr. George and discharged on 2/14/2022 from [facility]    Main Diagnosis: MDD, cocaine use disorder.        Health Care Follow-up:   Ninfa Horan  Appointment Date/Time: 3/8/2022 at 3:00 PM. This will be an in person appointment    Follow up: 4/8/2022 at 12:30 PM.     Psychiatrist/Primary Care Giver: Yuri Pham      Address: [address] [place] [address], [address], [place] [address]     Phone Number: [phone]       Other Providers:  [facility]   Infectious Disease [facility]    [address]    [address]    [place] [address]    [phone]          Attend all scheduled appointments with your outpatient providers. Call at least 24 hours in advance if you need to reschedule an appointment to ensure continued access to your outpatient providers.     Major Treatments, Procedures and Findings:  You were provided with: a psychiatric assessment, assessed for medical stability, medication evaluation and/or management, group therapy, CD evaluation/assessment and milieu management    Symptoms to Report: feeling more aggressive, increased confusion, losing more sleep, mood getting worse or thoughts of suicide    Early warning signs can include: increased depression or anxiety sleep disturbances increased thoughts or behaviors of suicide or self-harm  increased unusual thinking, such as paranoia or hearing voices    Safety and Wellness:  Take all medicines as directed.  Make no changes unless your doctor suggests them. Follow treatment recommendations.  Refrain from alcohol and non-prescribed drugs.  Ask your support system to help you reduce your access to items that could harm yourself or others. If there is a concern for safety, call 911.    Resources:   Crisis Intervention: 410.383.7529 or 092-306-1520 (TTY: 627.413.2983).  Call anytime for  "help.  National [place] [employer] Mental Illness (www.[place].eliot.org): 524.646.8941 or 930-918-4399.  Alcoholics Anonymous (www.alcoholics-anonymous.org): Check your phone book for your local chapter.  Suicide Awareness Voices of Education (SAVE) (www.save.org): 074-593-IKME (2237)  National Suicide Prevention Line (www.mentalhealthmn.org): 594-530-IUXR (4583)  Mental Health Consumer/Survivor Network of [place] (www.mhcsn.net): 176.791.7509 or 238-861-1309  Mental Health Association of [place] (www.mentalhealth.org): 890.909.2190 or 345-578-4927  Self- Management and Recovery Training., SMART-- Toll free: 784.865.3616  www.[employer]  [facility] Crisis (COPE) Response - Adult 369 491-7315  Text 4 Life: txt \"LIFE\" to 41558 for immediate support and crisis intervention  Crisis text line: Text \"[place]\" to 014586. Free, confidential, 24/7.    General Medication Instructions:   See your medication sheet(s) for instructions.   Take all medicines as directed.  Make no changes unless your doctor suggests them.   Go to all your doctor visits.  Be sure to have all your required lab tests. This way, your medicines can be refilled on time.  Do not use any drugs not prescribed by your doctor.  Avoid alcohol.    Advance Directives:   Scanned document on file with [employer]? No scanned doc  Is document scanned? Pt states no documents  Honoring Choices Your Rights Handout: Informed and given  Was more information offered? Pt declined    The Treatment team has appreciated the opportunity to work with you. If you have any questions or concerns about your recent admission, you can contact the unit which can receive your call 24 hours a day, 7 days a week. They will be able to get in touch with a Provider if needed. The unit number is [phone].      "
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
## 2022-02-14 NOTE — PLAN OF CARE
"Pt was visible in the milieu and social with peers and staff. Pt was in good mood this shift and happy watching the super ball with others. Presented with full range affect. Pt reports that suicidal thoughts are intermittent and has not plans. Pt endorsed auditory hallucinations that he describes as a \"hiss sound from the tv\". Pt says he will stop watching tv for the rest of his life it that won't stop. Denies depression or anxiety. Ate 100% and took his medications including prn Melatonin and Remeron for sleep.     Problem: Adult Inpatient Plan of Care  Goal: Readiness for Transition of Care  Outcome: Improving     Problem: Behavioral Health Plan of Care  Goal: Adheres to Safety Considerations for Self and Others  Outcome: Improving     Problem: Suicidal Behavior  Goal: Suicidal Behavior is Absent or Managed  Outcome: Improving     "
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
## 2022-02-14 NOTE — PROGRESS NOTES
Initial Services Plan    Before your first treatment group, please do the following    Immediate health & safety concerns: Look for sober housing and a supportive social network.  Look for a support network (such as AA, NA, DBT group, a Sabianism group, etc.)    Suggestions for client during the time between intake & completion of treatment plan:  Tour your treatment center (unit or outpatient clinic).  Introduce yourself to the treatment group.  Spend time getting to know your peers.  Complete the problem list for your treatment plan.  Review your patient or client handbook.    Client issues to be addressed in the first treatment sessions:  Identify motivations(s) for coming to treatment, i.e. legal, family, job, self  Identify concerns about coming into treatment, i.e. fear of failing again, sharing a room in treatment  Identify concerns about going to group, i.e. fear of talking in group  Identify outside concerns that may interfere with treatment, i.e. bills not getting paid, homesick for children    Jay Granda, [facility]  2/14/2022

## 2022-02-14 NOTE — PLAN OF CARE
Pt has been visible in the milieu, he is social with staff and peers and presents with a full range affect. Pt denies mental health symptoms upon assessment. He is med compliant with no stated or observed effects, he declined his AM Miralax. Pt will discharge to [facility], he is looking forward to this.    Problem: Adult Inpatient Plan of Care  Goal: Plan of Care Review  Outcome: Adequate for Discharge
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
## 2022-02-14 NOTE — PROGRESS NOTES
Progress Note    This patient had a Comprehensive Substance Abuse assessment on 2/9/22 completed by JANA Moura.  This patient was seen for a face to face update of the Comprehensive Substance Abuse assessment on 2/14/2022 by JANA Neves.  INSIDE: The patient's Comprehensive Substance Abuse assessment completed on 2/9/22 is in the patient's electronic medical record in Epic in the Chart Review section under the Notes/Trans Tab.    Alcohol/Drug use since the last CD evaluation (include date of last use):     No additional substances use since the last CD evaluation     Please note any other clinical changes since the last CD evaluation (such as medication changes, additional legal charges, detoxification admissions, overdoses, etc.)     No significant changes since the last CD evaluation       ASAM Dimensions Original scores Current Scores   I.) Intoxication and Withdrawal: 0 0   II.) Biomedical:  1 1   III.) Emotional and Behavioral:  2 2   IV.) Readiness to Change:  0 0   V.) Relapse Potential: 4 4   VI.) Recovery Environmental: 4 4     Please list clinical justifications for the above ASAM score changes since the original comprehensive assessment:     None of the ASAM scores on the six dimensions had changed since the Comprehensive Substance Abuse assessment was completed on 2/9/22.       Current ARTHUR: Current UA:     No ARTHUR as the patient was a direct transfer from [facility] at [facility] in [place], [place].     No UA screen as the patient was a direct transfer from [facility] at [facility] in [place], [place].        PHQ-9, SOFIA-7   PHQ-9 on 2/14/2022 SOFIA-7 on 2/14/2022   The patient's PHQ-9 score was 11 out of 27, indicating moderate depression.   The patient's SOFIA-7 score was 10 out of 21, indicating moderate anxiety.       [place]-Suicide Severity Rating Scale Reassessment   Have you ever wished you were dead or that you could go to sleep and not wake up?  Past Month:   Yes     Have you actually had any thoughts of killing yourself?  Past Month:  Yes     Have you been thinking about how you might do this?     Past Month:  No   Lifetime:  Yes, Describe: jump off bridge   Have you had these thoughts and had some intention of acting on them?     Past Month:  No   Lifetime:  Yes, Describe: see above   Have you started to work out the details of how to kill yourself?   Past Month:  No   Lifetime:  Yes, Describe: see above   Do you intend to carry out this plan?   No     When you have the thoughts how long do they last?  More than 8 hours/persistent or continuous- days     Are there things - anyone or anything (i.e. family, Yazidi, pain of death) that stopped you from wanting to die or acting on thoughts of suicide?  Protective factors definitely stopped you from attempting suicide       2008  [employer]  Used with permission by Chanel Davidson, PhD.       Guide to C-SSRS Risk Ratings   NO IDEATION:  with no active thoughts IDEATION: with a wish to die. IDEATION: with active thoughts. Risk Ratings   If Yes No No 0 - Very Low Risk   If NA Yes No 1 - Low Risk   If NA Yes Yes 2 - Low/moderate risk   IDEATION: associated thoughts of methods without intent or plan INTENT: Intent to follow through on suicide PLAN: Plan to follow through on suicide Risk Ratings cont...   If Yes No No 3 - Moderate Risk   If Yes Yes No 4 - High Risk   If Yes Yes Yes 5 - High Risk   The patient's ADDITIONAL RISK FACTORS and lack of PROTECTIVE FACTORS may increase their overall suicide risk ratings.     Additional Risk Factors:    Someone close to the patient (family member/friend) completed a suicide     Significant history of physical illness or chronic medical problems     Significant history of untreated or poorly treated chronic pain issues     A recent death of someone close to the patient and/or unresolved grief and loss issues     A recent loss that was significant to  "the patient, i.e. loss of job, loss of home, divorce, break-up, etc.     Significant history of trauma and/or abuse issues     History of impulsive or aggressive behaviors   Protective Factors:    Having people in his/her life that would prevent the patient from considering a suicide attempt (i.e. young children, spouse, parents, etc.)     A positive relationship with his/her clinical medical and/or mental health providers     Having easy access to supportive family members     Having a good community support network     Having cultural, Adventism or spiritual beliefs that discourage suicide     Having restricted access to highly lethal means of suicide     Risk Status   1. - Low Risk: Evaluation Counselors:  Document in Epic / SBAR to counselor \"Low Risk\".      Treatment Counselors:  Reassess upon admission as applicable, assess weekly in progress notes under Dimension 3 and summarize in Discharge / Treatment summary under Dimension 3.     Additional information to support suicide risk rating: There was no additional information to provide at this time.       "

## 2022-02-14 NOTE — TELEPHONE ENCOUNTER
----- Message from Mansi Duggan sent at 2/14/2022 11:18 AM CST -----  Regarding: [facility] NEEDED  Pt will transfer [facility] to [facility] today 2/14/22. Thanks

## 2022-02-14 NOTE — PROGRESS NOTES
Name: Edi Sorenson  Date: 2/14/2022  Medical Record: [id number]    Envelope Number: [id number]  List of Contents (List each item separately in new row):   Biktarvy -25 mg/ Mirtazapine 7.5 mg/Eliquis 5 mg/ Potassium chloride 20 mg  Setraline 50 mg  Admission:  I am responsible for any personal items that are not sent to the safe or pharmacy.  [facility] is not responsible for loss, theft or damage of any property in my possession.    Patient Signature:  ___________________________________________       Date/Time:__________________________    Staff Signature: __________________________________       Date/Time:__________________________    2nd Staff person, if patient is unable/unwilling to sign:      __________________________________________________________       Date/Time: __________________________    Discharge:  [facility] has returned all of my personal belongings:    Patient Signature: ________________________________________     Date/Time: ____________________________________    Staff Signature: ______________________________________     Date/Time:_____________________________________

## 2022-02-14 NOTE — DISCHARGE SUMMARY
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   Recent Labs   Lab 02/09/22  0816   *   POTASSIUM 3.9   CHLORIDE 113*   CO2 27   ANIONGAP 5   BUN 11   CR 0.89   VERNA 8.4*   PROTTOTAL 6.8   ALBUMIN 3.0*   BILITOTAL 0.2   ALKPHOS 44   AST 7   ALT 15     Recent Labs   Lab 02/09/22  0816   WBC 5.0   RBC 5.24   HGB 15.2   HCT 45.6   MCV 87   MCH 29.0   MCHC 33.3   RDW 13.3        Recent Labs   Lab 02/09/22  0816   INR 0.97     No lab results found in last 7 days.   Recent Labs   Lab 02/09/22  0816   TSH 1.90     No lab results found in last 7 days.  Recent Labs   Lab 02/09/22  0816   GLC 97           Urine Drugs of Abuse Screen     Status: Abnormal    Narrative    The following orders were created for panel order Urine Drugs of Abuse Screen.  Procedure                               Abnormality         Status                     ---------                               -----------         ------                     Drug abuse screen 1 urin...[[id number]]  Abnormal            Final result                 Please view results for these  tests on the individual orders.   CBC with platelets differential     Status: Abnormal    Narrative    The following orders were created for panel order CBC with platelets differential.  Procedure                               Abnormality         Status                     ---------                               -----------         ------                     CBC with platelets and d...[[id number]]  Abnormal            Final result                 Please view results for these tests on the individual orders.

## 2022-02-15 ENCOUNTER — HOSPITAL ENCOUNTER (OUTPATIENT)
Dept: BEHAVIORAL HEALTH | Facility: CLINIC | Age: 47
End: 2022-02-15
Attending: FAMILY MEDICINE
Payer: COMMERCIAL

## 2022-02-15 VITALS — TEMPERATURE: 97.3 F | OXYGEN SATURATION: 98 %

## 2022-02-15 PROBLEM — F19.20 CHEMICAL DEPENDENCY (H): Status: ACTIVE | Noted: 2022-02-15

## 2022-02-15 PROCEDURE — H2035 A/D TX PROGRAM, PER HOUR: HCPCS | Mod: HQ

## 2022-02-15 PROCEDURE — 1002N00001 HC LODGING PLUS FACILITY CHARGE ADULT

## 2022-02-15 ASSESSMENT — ANXIETY QUESTIONNAIRES: GAD7 TOTAL SCORE: 10

## 2022-02-15 NOTE — GROUP NOTE
Group Therapy Documentation    PATIENT'S NAME: Edi Sorenson  MRN:   [id number]  :   1975  ACCT. NUMBER: [id number]  DATE OF SERVICE: 2/15/22  START TIME:  3:00 PM  END TIME:  4:00 PM  FACILITATOR(S): Lonnie Sifuentes LADC; Ant Turk; Solange Roberts  TOPIC: BEH Group Therapy  Number of patients attending the group:  23  Group Length:  1 Hours    Group Therapy Type: Recovery strategies    Summary of Group / Topics Discussed:    Recovery Principles      Group Attendance:  Attended group session    Patient's response to the group topic/interactions:  cooperative with task    Patient appeared to be Attentive.        Client specific details:  Edi attended PM Skills group. Patient took part in a group exercise/discussion on DBT applications in recovery.

## 2022-02-15 NOTE — GROUP NOTE
Group Therapy Documentation    PATIENT'S NAME: Edi Sorenson  MRN:   [id number]  :   1975  ACCT. NUMBER: [id number]  DATE OF SERVICE: 2/15/22  START TIME: 12:30 PM  END TIME:  2:30 PM  FACILITATOR(S): Alannah Zambrano LADC  TOPIC: BEH Group Therapy  Number of patients attending the group: 7  Group Length:  2 Hours    Group Therapy Type: Emotion processing    Summary of Group / Topics Discussed:    Sober coping skills, Disease of addiction, and Emotions/expression      Group Attendance:  Attended group session    Patient's response to the group topic/interactions:  cooperative with task    Patient appeared to be Actively participating, Attentive and Engaged.        Client specific details: Edi was an active participant in afternoon group therapy session.

## 2022-02-15 NOTE — GROUP NOTE
Group Therapy Documentation    PATIENT'S NAME: Edi Sorenson  MRN:   [id number]  :   1975  ACCT. NUMBER: [id number]  DATE OF SERVICE: 2/15/22  START TIME:  9:00 AM  END TIME: 11:00 AM  FACILITATOR(S): Kiana Theodore; Lucina Thornton LADC  TOPIC: BEH Group Therapy  Number of patients attending the group:  7  Group Length:  2 Hours    Group Therapy Type: Recovery strategies and Emotion processing    Summary of Group / Topics Discussed:    Recovery Principles, Sober coping skills, Relationship/socialization, Disease of addiction, and Emotions/expression      Group Attendance:  Attended group session    Patient's response to the group topic/interactions:  cooperative with task    Patient appeared to be Actively participating, Attentive and Engaged.        Client specific details:   Patient participated in morning check in and a mindfulness exercise. The patient then participated in group process time. The group concluded with a self exploration exercise to identify positive traits and how they will help in recovery.

## 2022-02-16 ENCOUNTER — HOSPITAL ENCOUNTER (OUTPATIENT)
Dept: BEHAVIORAL HEALTH | Facility: CLINIC | Age: 47
End: 2022-02-16
Attending: FAMILY MEDICINE
Payer: COMMERCIAL

## 2022-02-16 VITALS
DIASTOLIC BLOOD PRESSURE: 90 MMHG | SYSTOLIC BLOOD PRESSURE: 131 MMHG | OXYGEN SATURATION: 99 % | HEART RATE: 80 BPM | TEMPERATURE: 98 F

## 2022-02-16 PROCEDURE — H2035 A/D TX PROGRAM, PER HOUR: HCPCS | Mod: HQ

## 2022-02-16 PROCEDURE — 1002N00001 HC LODGING PLUS FACILITY CHARGE ADULT

## 2022-02-16 NOTE — GROUP NOTE
Group Therapy Documentation    PATIENT'S NAME: Edi Sorenson  MRN:   [id number]  :   1975  ACCT. NUMBER: [id number]  DATE OF SERVICE: 22  START TIME: 12:30 PM  END TIME:  2:30 PM  FACILITATOR(S): Kiana Theodore; Daily Preston  TOPIC: BEH Group Therapy  Number of patients attending the group: 7  Group Length:  2 Hours    Group Therapy Type: Recovery strategies    Summary of Group / Topics Discussed:    Spiritual Care      Group Attendance:  Attended group session    Patient's response to the group topic/interactions:  cooperative with task    Patient appeared to be Actively participating, Attentive and Engaged.        Client specific details:  Patient participated in Spirituality group with Marie Preston. The primary topic was Love.

## 2022-02-16 NOTE — GROUP NOTE
Group Therapy Documentation    PATIENT'S NAME: Edi Sorenson  MRN:   [id number]  :   1975  ACCT. NUMBER: [id number]  DATE OF SERVICE: 22  START TIME:  9:45 AM  END TIME: 11:30 AM  FACILITATOR(S): Alannah Zambrano LADC; Kiana Theodore  TOPIC: BEH Group Therapy  Number of patients attending the group: 7  Group Length:  2 Hours    Group Therapy Type: Emotion processing    Summary of Group / Topics Discussed:    Sober coping skills and Disease of addiction      Group Attendance:  Attended group session    Patient's response to the group topic/interactions:  cooperative with task    Patient appeared to be Actively participating, Attentive and Engaged.        Client specific details: Edi was an active participant in morning group therapy session.

## 2022-02-16 NOTE — GROUP NOTE
Psychoeducation Group Documentation    PATIENT'S NAME: Edi Sorenson  MRN:   [id number]  :   1975  ACCT. NUMBER: [id number]  DATE OF SERVICE: 22  START TIME:  8:30 AM  END TIME:  9:30 AM  FACILITATOR(S): Azam Branch LADC; Cathleen Mansfield  TOPIC: BEH Pyschoeducation  Number of patients attending the group:  7  Group Length:  1 Hours    Skills Group Therapy Type: Recovery skills and Emotion regulation skills    Summary of Group / Topics Discussed:    Relationship/social skills and Balanced lifestyle skills          Group Attendance:  Attended group session    Patient's response to the group topic/interactions:  cooperative with task and expressed understanding of topic    Patient appeared to be Actively participating and Attentive.         Client specific details:  Edi gave appropriate feedback..
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
## 2022-02-17 ENCOUNTER — HOSPITAL ENCOUNTER (OUTPATIENT)
Dept: BEHAVIORAL HEALTH | Facility: CLINIC | Age: 47
End: 2022-02-17
Attending: FAMILY MEDICINE
Payer: COMMERCIAL

## 2022-02-17 VITALS — OXYGEN SATURATION: 99 % | TEMPERATURE: 97.6 F

## 2022-02-17 PROCEDURE — 1002N00001 HC LODGING PLUS FACILITY CHARGE ADULT

## 2022-02-17 PROCEDURE — H2035 A/D TX PROGRAM, PER HOUR: HCPCS | Mod: HQ

## 2022-02-17 NOTE — GROUP NOTE
Group Therapy Documentation    PATIENT'S NAME: Edi Sorenson  MRN:   [id number]  :   1975  ACCT. NUMBER: [id number]  DATE OF SERVICE: 22  START TIME: 12:30 PM  END TIME:  2:30 PM  FACILITATOR(S): Lucina Thornton LADC; Ant Turk LADC  TOPIC: BEH Group Therapy  Number of patients attending the group:  7  Group Length:  2 Hours    Group Therapy Type: Recovery strategies    Summary of Group / Topics Discussed:    Mindfulness/Relaxation      Group Attendance:  Attended group session    Patient's response to the group topic/interactions:  cooperative with task    Patient appeared to be Engaged.        Client specific details:  Edi participated in an art therapy project on self-exploration.

## 2022-02-17 NOTE — GROUP NOTE
Group Therapy Documentation    PATIENT'S NAME: Edi Sorenson  MRN:   [id number]  :   1975  ACCT. NUMBER: [id number]  DATE OF SERVICE: 22  START TIME:  3:00 PM  END TIME:  4:00 PM  FACILITATOR(S): Solange Roberts  TOPIC: BEH Group Therapy  Number of patients attending the group:  26    Group Length:  1 Hours    Group Therapy Type: Emotion processing    Summary of Group / Topics Discussed:    Cognitive behavioral therapy skills      Group Attendance:  Attended group session    Patient's response to the group topic/interactions:  cooperative with task    Patient appeared to be Actively participating, Attentive and Engaged.        Client specific details:  Patient was attentive and participative during lecture.

## 2022-02-17 NOTE — GROUP NOTE
Group Therapy Documentation    PATIENT'S NAME: Edi Sorenson  MRN:   [id number]  :   1975  ACCT. NUMBER: [id number]  DATE OF SERVICE: 22  START TIME:  9:00 AM  END TIME: 11:00 AM  FACILITATOR(S): Alannah Zambrano LADC; Kiana Theodore  TOPIC: BEH Group Therapy  Number of patients attending the group: 6  Group Length:  2 Hours    Group Therapy Type: Emotion processing    Summary of Group / Topics Discussed:    Disease of addiction, Emotions/expression, and Self-care activities      Group Attendance:  Attended group session    Patient's response to the group topic/interactions:  cooperative with task    Patient appeared to be Actively participating, Attentive and Engaged.        Client specific details: Edi was an active participant in morning group therapy session. He provided supportive feedback to his peer who presented a treatment plan assignment. He also participated in a discussion on morning routines and maintaining a positive attitude.

## 2022-02-18 ENCOUNTER — HOSPITAL ENCOUNTER (OUTPATIENT)
Dept: BEHAVIORAL HEALTH | Facility: CLINIC | Age: 47
End: 2022-02-18
Attending: FAMILY MEDICINE
Payer: COMMERCIAL

## 2022-02-18 VITALS — TEMPERATURE: 97.9 F | OXYGEN SATURATION: 97 %

## 2022-02-18 PROCEDURE — H2035 A/D TX PROGRAM, PER HOUR: HCPCS | Mod: HQ

## 2022-02-18 PROCEDURE — 1002N00001 HC LODGING PLUS FACILITY CHARGE ADULT

## 2022-02-18 NOTE — GROUP NOTE
Group Therapy Documentation    PATIENT'S NAME: Edi Sorenson  MRN:   [id number]  :   1975  ACCT. NUMBER: [id number]  DATE OF SERVICE: 22  START TIME: 12:30 PM  END TIME:  2:30 PM  FACILITATOR(S): Alannah Zambrano LADC; Solange Roberts  TOPIC: BEH Group Therapy  Number of patients attending the group: 5  Group Length:  2 Hours    Group Therapy Type: Emotion processing    Summary of Group / Topics Discussed:    Sober coping skills and Leisure explorations/use of leisure time      Group Attendance:  Attended group session    Patient's response to the group topic/interactions:  cooperative with task    Patient appeared to be Attentive and Engaged.        Client specific details: Edi participated in a sober leisure activity with his peers.

## 2022-02-18 NOTE — GROUP NOTE
"Group Therapy Documentation    PATIENT'S NAME: Edi Sorenson  MRN:   [id number]  :   1975  ACCT. NUMBER: [id number]  DATE OF SERVICE: 22  START TIME:  9:00 AM  END TIME: 11:00 AM  FACILITATOR(S): Luicna Thornton LADC  TOPIC: BEH Group Therapy  Number of patients attending the group:  6  Group Length:  2 Hours    Group Therapy Type: Recovery strategies    Summary of Group / Topics Discussed:    Self-care activities and process time      Group Attendance:  Attended group session    Patient's response to the group topic/interactions:  cooperative with task    Patient appeared to be Actively participating.        Client specific details:  Edi shared insight gained from making his \"inside/outside\" bag and letter to his childhood self.      "

## 2022-02-19 ENCOUNTER — HOSPITAL ENCOUNTER (OUTPATIENT)
Dept: BEHAVIORAL HEALTH | Facility: CLINIC | Age: 47
End: 2022-02-19
Attending: FAMILY MEDICINE
Payer: COMMERCIAL

## 2022-02-19 VITALS — TEMPERATURE: 98 F | OXYGEN SATURATION: 100 %

## 2022-02-19 PROCEDURE — H2035 A/D TX PROGRAM, PER HOUR: HCPCS | Mod: HQ

## 2022-02-19 NOTE — GROUP NOTE
Group Therapy Documentation    PATIENT'S NAME: Edi Sorenson  MRN:   [id number]  :   1975  ACCT. NUMBER: [id number]  DATE OF SERVICE: 22  START TIME:  9:00 AM  END TIME: 11:00 AM  FACILITATOR(S): Ant Turk LADC; Ryan Childs LADC  TOPIC: BEH Group Therapy  Number of patients attending the group:  6  Group Length:  2 Hours    Group Therapy Type: Recovery strategies    Summary of Group / Topics Discussed:    Recovery Principles, Relationship/socialization, Balanced lifestyle, and Disease of addiction      Group Attendance:  Attended group session    Patient's response to the group topic/interactions:  cooperative with task    Patient appeared to be Attentive.        Client specific details:  Edi listened to the speaker and then participated in the morning lecture.

## 2022-02-19 NOTE — GROUP NOTE
Group Therapy Documentation    PATIENT'S NAME: Edi Sorenson  MRN:   [id number]  :   1975  ACCT. NUMBER: [id number]  DATE OF SERVICE: 22  START TIME: 12:30 PM  END TIME:  2:30 PM  FACILITATOR(S): Ant Turk LADC; Lázaro Argueta LADC  TOPIC: BEH Group Therapy  Number of patients attending the group:  6  Group Length:  2 Hours    Group Therapy Type: Recovery strategies    Summary of Group / Topics Discussed:    Recovery Principles and Relapse prevention      Group Attendance:  Attended group session    Patient's response to the group topic/interactions:  cooperative with task    Patient appeared to be Attentive.        Client specific details:  Edi participated in the afternoon lecture on relapse prevention.
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
## 2022-02-21 ENCOUNTER — TELEPHONE (OUTPATIENT)
Dept: BEHAVIORAL HEALTH | Facility: CLINIC | Age: 47
End: 2022-02-21

## 2022-02-21 ENCOUNTER — HOSPITAL ENCOUNTER (INPATIENT)
Facility: CLINIC | Age: 47
LOS: 4 days | Discharge: NURSING, REHAB OR LONG-TERM CARE | DRG: 897 | End: 2022-02-25
Attending: EMERGENCY MEDICINE | Admitting: PSYCHIATRY & NEUROLOGY
Payer: COMMERCIAL

## 2022-02-21 DIAGNOSIS — F14.151: ICD-10-CM

## 2022-02-21 DIAGNOSIS — R10.13 DYSPEPSIA: ICD-10-CM

## 2022-02-21 DIAGNOSIS — Z11.52 ENCOUNTER FOR SCREENING LABORATORY TESTING FOR SEVERE ACUTE RESPIRATORY SYNDROME CORONAVIRUS 2 (SARS-COV-2): ICD-10-CM

## 2022-02-21 DIAGNOSIS — M54.50 ACUTE MIDLINE LOW BACK PAIN WITHOUT SCIATICA: ICD-10-CM

## 2022-02-21 DIAGNOSIS — F30.9 MANIA (H): ICD-10-CM

## 2022-02-21 DIAGNOSIS — B20 HUMAN IMMUNODEFICIENCY VIRUS (HIV) DISEASE (H): ICD-10-CM

## 2022-02-21 DIAGNOSIS — F41.9 ANXIETY: Chronic | ICD-10-CM

## 2022-02-21 DIAGNOSIS — Z86.718 PERSONAL HISTORY OF VENOUS THROMBOSIS AND EMBOLISM: ICD-10-CM

## 2022-02-21 DIAGNOSIS — F33.1 MODERATE EPISODE OF RECURRENT MAJOR DEPRESSIVE DISORDER (H): ICD-10-CM

## 2022-02-21 DIAGNOSIS — R45.851 SUICIDAL IDEATION: ICD-10-CM

## 2022-02-21 DIAGNOSIS — K59.00 CONSTIPATION, UNSPECIFIED CONSTIPATION TYPE: Primary | ICD-10-CM

## 2022-02-21 PROCEDURE — 250N000013 HC RX MED GY IP 250 OP 250 PS 637: Performed by: EMERGENCY MEDICINE

## 2022-02-21 PROCEDURE — 80307 DRUG TEST PRSMV CHEM ANLYZR: CPT | Performed by: EMERGENCY MEDICINE

## 2022-02-21 PROCEDURE — 124N000002 HC R&B MH UMMC

## 2022-02-21 PROCEDURE — C9803 HOPD COVID-19 SPEC COLLECT: HCPCS

## 2022-02-21 PROCEDURE — 250N000013 HC RX MED GY IP 250 OP 250 PS 637: Performed by: STUDENT IN AN ORGANIZED HEALTH CARE EDUCATION/TRAINING PROGRAM

## 2022-02-21 PROCEDURE — 99285 EMERGENCY DEPT VISIT HI MDM: CPT | Performed by: EMERGENCY MEDICINE

## 2022-02-21 PROCEDURE — 99285 EMERGENCY DEPT VISIT HI MDM: CPT | Mod: 25

## 2022-02-21 PROCEDURE — U0005 INFEC AGEN DETEC AMPLI PROBE: HCPCS | Performed by: EMERGENCY MEDICINE

## 2022-02-21 PROCEDURE — 90791 PSYCH DIAGNOSTIC EVALUATION: CPT

## 2022-02-21 RX ORDER — ACETAMINOPHEN 325 MG/1
650 TABLET ORAL EVERY 4 HOURS PRN
Status: DISCONTINUED | OUTPATIENT
Start: 2022-02-21 | End: 2022-02-25 | Stop reason: HOSPADM

## 2022-02-21 RX ORDER — OLANZAPINE 10 MG/2ML
10 INJECTION, POWDER, FOR SOLUTION INTRAMUSCULAR 3 TIMES DAILY PRN
Status: DISCONTINUED | OUTPATIENT
Start: 2022-02-21 | End: 2022-02-24

## 2022-02-21 RX ORDER — POLYETHYLENE GLYCOL 3350 17 G/17G
17 POWDER, FOR SOLUTION ORAL DAILY PRN
Status: DISCONTINUED | OUTPATIENT
Start: 2022-02-21 | End: 2022-02-25 | Stop reason: HOSPADM

## 2022-02-21 RX ORDER — LANOLIN ALCOHOL/MO/W.PET/CERES
3 CREAM (GRAM) TOPICAL
Status: DISCONTINUED | OUTPATIENT
Start: 2022-02-21 | End: 2022-02-25 | Stop reason: HOSPADM

## 2022-02-21 RX ORDER — RISPERIDONE 1 MG/1
1 TABLET ORAL 3 TIMES DAILY PRN
Status: DISCONTINUED | OUTPATIENT
Start: 2022-02-21 | End: 2022-02-25 | Stop reason: HOSPADM

## 2022-02-21 RX ORDER — AMOXICILLIN 250 MG
2 CAPSULE ORAL DAILY PRN
Status: DISCONTINUED | OUTPATIENT
Start: 2022-02-21 | End: 2022-02-25 | Stop reason: HOSPADM

## 2022-02-21 RX ORDER — PANTOPRAZOLE SODIUM 40 MG/1
40 TABLET, DELAYED RELEASE ORAL 2 TIMES DAILY
Status: DISCONTINUED | OUTPATIENT
Start: 2022-02-21 | End: 2022-02-25 | Stop reason: HOSPADM

## 2022-02-21 RX ORDER — OLANZAPINE 10 MG/1
10 TABLET ORAL 3 TIMES DAILY PRN
Status: DISCONTINUED | OUTPATIENT
Start: 2022-02-21 | End: 2022-02-24

## 2022-02-21 RX ORDER — GABAPENTIN 100 MG/1
100 CAPSULE ORAL EVERY 6 HOURS PRN
Status: DISCONTINUED | OUTPATIENT
Start: 2022-02-21 | End: 2022-02-25 | Stop reason: HOSPADM

## 2022-02-21 RX ORDER — POLYETHYLENE GLYCOL 3350 17 G
2 POWDER IN PACKET (EA) ORAL
Status: DISCONTINUED | OUTPATIENT
Start: 2022-02-21 | End: 2022-02-25 | Stop reason: HOSPADM

## 2022-02-21 RX ORDER — MIRTAZAPINE 7.5 MG/1
7.5 TABLET, FILM COATED ORAL
Status: DISCONTINUED | OUTPATIENT
Start: 2022-02-21 | End: 2022-02-25 | Stop reason: HOSPADM

## 2022-02-21 RX ADMIN — DOCUSATE SODIUM AND SENNOSIDES 2 TABLET: 8.6; 5 TABLET ORAL at 07:51

## 2022-02-21 RX ADMIN — PANTOPRAZOLE SODIUM 40 MG: 40 TABLET, DELAYED RELEASE ORAL at 07:47

## 2022-02-21 RX ADMIN — MELATONIN TAB 3 MG 3 MG: 3 TAB at 20:58

## 2022-02-21 RX ADMIN — BICTEGRAVIR SODIUM, EMTRICITABINE, AND TENOFOVIR ALAFENAMIDE FUMARATE 1 TABLET: 50; 200; 25 TABLET ORAL at 07:48

## 2022-02-21 RX ADMIN — SERTRALINE HYDROCHLORIDE 50 MG: 50 TABLET ORAL at 07:47

## 2022-02-21 RX ADMIN — APIXABAN 5 MG: 2.5 TABLET, FILM COATED ORAL at 20:55

## 2022-02-21 RX ADMIN — GABAPENTIN 100 MG: 100 CAPSULE ORAL at 20:57

## 2022-02-21 RX ADMIN — PANTOPRAZOLE SODIUM 40 MG: 40 TABLET, DELAYED RELEASE ORAL at 20:55

## 2022-02-21 RX ADMIN — APIXABAN 5 MG: 2.5 TABLET, FILM COATED ORAL at 07:47

## 2022-02-21 ASSESSMENT — ENCOUNTER SYMPTOMS
HEADACHES: 0
SHORTNESS OF BREATH: 0
FEVER: 0
VOMITING: 0
NAUSEA: 0
DIFFICULTY URINATING: 0
NERVOUS/ANXIOUS: 1
SLEEP DISTURBANCE: 0
BACK PAIN: 0
CHILLS: 0
ABDOMINAL PAIN: 0
DYSPHORIC MOOD: 1
NECK PAIN: 0
SORE THROAT: 0

## 2022-02-21 ASSESSMENT — ACTIVITIES OF DAILY LIVING (ADL)
DRESS: INDEPENDENT
LAUNDRY: WITH SUPERVISION
HYGIENE/GROOMING: INDEPENDENT
ORAL_HYGIENE: INDEPENDENT

## 2022-02-21 NOTE — CONSULTS
2/21/2022  Edi Sorenson 1975     [facility] Crisis Assessment    Patient was assessed: remote  Patient location: [facility] Emergency Room    Referral Data and Chief Complaint  Mr. Sorenson is a 46 year old who uses he/him pronouns. Patient presented to the ED via EMS and was referred to the ED by self. Patient is presenting to the ED for the following concerns: suicidal ideation.      Informed Consent and Assessment Methods    Patient is his own guardian. Writer met with patient and explained the crisis assessment process, including applicable information disclosures and limits to confidentiality, assessed understanding of the process, and obtained consent to proceed with the assessment. Patient was observed to be able to participate in the assessment as evidenced by verbal understanding. Assessment methods included conducting a formal interview with patient, review of medical records, collaboration with medical staff, and obtaining relevant collateral information from family and community providers when available.    Narrative Summary of Presenting Problem and Current Functioning  What led to the patient presenting for crisis services, factors that make the crisis life threatening or complex, stressors, how is this disrupting the patient's life, and how current functioning is in comparison to baseline. How is patient presenting during the assessment.     Mr. Sorenson is a 46 year old male that presents to the Emergency Room via EMS after he called. Unknown if he is an accurate historian as he has some variance of his recollections before coming to the Emergency Room.  He stated to Writer that the last time he used cocaine was yesterday and reported to RN that he used prior to calling EMS.  He reports that he was recently discharged from [facility]. He feels that upon reflection he was not ready for discharge and that he is currently awaiting care for [facility]. He reports that after  "he \"gave in\" to use of cocaine that he felt as though he was hopeless, helpless, defeated and angry with himself that he would be better off dead. He reports that he has had thoughts of jumping off the bridge and reports \"This is the end of the road for me.\"  Reports several times during the assessment that \"If I leave here I will jump off that bridge. I will go right from her and jump.\" Indicates he has tried so hard to make his life better and better the community and wants to work with kids to give them positive role models and \"I can't do that high\". He reports he left [place] as \"I was just seen as a dealer. I ruined families and now God is showing me what I did to other families.\"      History of the Crisis  Duration of the current crisis, coping skills attempted to reduce the crisis, community resources used, and past presentations.    Mr. Sorenson reports that he recently discharged [facility] within the past 10 days. He reports that he does not have a safe living environment as his dealers have access which is how he obtained cocaine recently. States this \"all started with a cup of coffee and before you know it I was jonesing for crack.\"  Indicates he would rather die than keep using. Reports several times that \"If I leave here I will jump off a bridge\". 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 Notes that he was selling drugs and doing other things and left there to start a new life and meet \"the real Edi\". Notes when he got in [facility] things were going well and \"I was even on the news for cleaning up the graffiti in [facility] trying to make things better\". 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 He repeats his ideas several times and in a rapid, hyperverbal, disorganized and tangential way.        Per previous notes from [facility] on 2/4/22:  Pt has hx of MDD and crack cocaine abuse which has " "led to previous ED visits. Pt was last seen at [facility] on 1/11/2021 due to crack abuse, SI, and hallucinations. He was referred for [facility] residence and rule 25. Pt does report that he was able to maintain sobriety, according to care everywhere he relapsed approximately in August 2021. He reports his longest sobriety was 18 months. He reports he has hx of SI with attempts reportedly by cutting his wrists and throat. Pt has [place] [facility] [place]    Collateral Information  No collateral obtained. Mr. Sorenson came in under voluntary status and does not wish to give emergency contact information to obtain collateral.      Risk Assessment    Risk of Harm to Self     ESS-6  1.a. Over the past 2 weeks, have you had thoughts of killing yourself? Yes  1.b. Have you ever attempted to kill yourself and, if yes, when did this last happen? Yes jumping from a bridge   2. Recent or current suicide plan? Yes jumping from a bridge   3. Recent or current intent to act on ideation? Yes  4. Lifetime psychiatric hospitalization? Yes  5. Pattern of excessive substance use? Yes  6. Current irritability, agitation, or aggression? No  Scoring note: BOTH 1a and 1b must be yes for it to score 1 point, if both are not yes it is zero. All others are 1 point per number. If all questions 1a/1b - 6 are no, risk is negligible. If one of 1a/1b is yes, then risk is mild. If either question 2 or 3, but not both, is yes, then risk is automatically moderate regardless of total score. If both 2 and 3 are yes, risk is automatically high regardless of total score.     Score: 4, high risk    The patient has the following risk factors for suicide: substance abuse, lack of support, poor decision making, poor impulse control, prior suicide attempt and psychosis    Is the patient experiencing current suicidal ideation: Yes. Plan: jumping from a bridge Intent yes \"If i leave here I will go there\"    Is the patient engaging in preparatory suicide " "behaviors (formulating how to act on plan, giving away possessions, saying goodbye, displaying dramatic behavior changes, etc)? No    Does the patient have access to firearms or other lethal means? yes, lethal means counseling was provided and the following plan for risk mitigation was discussed: reports he does not believe he has any but has easy access to them .     The patient has the following protective factors: social support, voluntarily seeking mental health support, displays resiliency , janice system, future focused thinking and expresses desire to engage in treatment    Support system information: Reports limited support system.  Indicates that he does not have outpatient providers and that his social supports are also using drugs. He feels he needs \"one year to get my head on straight\".     Patient strengths: Janice, Charismatic, Friendly, Outgoing, Motivated, Caring, Kind     Does the patient engage in non-suicidal self-injurious behavior (NSSI/SIB)? no    Is the patient vulnerable to sexual exploitation?  No    Is the patient experiencing abuse or neglect? no    Is the patient a vulnerable adult? No      Risk of Harm to Others  The patient has the following risk factors of harm to others: no risk factors identified    Does the patient have thoughts of harming others? No    Is the patient engaging in sexually inappropriate behavior?  no       Current Substance Abuse    Is there recent substance abuse? Substance type(s): cocaine Frequency: havent since 2/7/22 until yesterday/today  Quantity: \"not sure\" Method: smoke Duration: unknown Last use: yesterday/today    Was a urine drug screen or blood alcohol level obtained: No-ordered by Dr. Bob after speaking about disposition recommendation.     CAGE AID  Have you felt you ought to cut down on your drinking or drug use?  Yes  Have people annoyed you by criticizing your drinking or drug use? Yes  Have you felt bad or guilty about your drinking or drug use? " Yes  Have you ever had a drink or used drugs first thing in the morning to steady your nerves or to get rid of a hangover? Yes  Score: 4/4       Current Symptoms/Concerns    Symptoms  Attention, hyperactivity, and impulsivity symptoms present: Yes: Disorganized/Forgetful, Hyperactive and Restless    Anxiety symptoms present: Yes: Generalized Symptoms: Agitation, Cognitive anxiety - feelings of doom, racing thoughts, difficulty concentrating , Excessive worry and Physiological anxiety - sweating, flushing, shaking, shortness of breath, or racing heart      Appetite symptoms present: No     Behavioral difficulties present: No     Cognitive impairment symptoms present: No    Depressive symptoms present: No    Eating disorder symptoms present: No    Learning disabilities, cognitive challenges, and/or developmental disorder symptoms present: No     Manic/hypomanic symptoms present: Yes Hyper verbal, Flight of ideas and Distractibility     Personality and interpersonal functioning difficulties present : No    Psychosis symptoms present: No      Sleep difficulties present: No    Substance abuse disorder symptoms present: Yes Persistent desire or unsuccessful efforts to cut down or control use, A great deal of time is spent in activities necessary to obtain substance(s), use substance(s), or recover from their effects, Cravings or strong desire to use, Recurrent substance use resulting in failure to fulfill major role obligations at school, work, or home, Continued substance use despite having persistent or recurrent social or interpersonal problems caused by or exacerbated by the use of substance(s), Important social, occupational, or recreational activities are given up or reduced because of substance use and Substance abuse is continued despite knowledge of having a persistent or recurrent physical or psychological problem that has been caused of exacerbated by substance use     Trauma and stressor related symptoms  present: No           Mental Status Exam   Affect: Dramatic   Appearance: Disheveled    Attention Span/Concentration: Attentive?    Eye Contact: Intense   Fund of Knowledge: Other: Difficult to assess due to anxiety/manic symptoms possibly substance induced    Language /Speech Content: Fluent   Language /Speech Volume: Normal    Language /Speech Rate/Productions: Hyperverbal    Recent Memory: Variable   Remote Memory: Intact   Mood: Anxious    Orientation to Person: Yes    Orientation to Place: Yes   Orientation to Time of Day: Yes    Orientation to Date: Yes    Situation (Do they understand why they are here?): Yes    Psychomotor Behavior: Hyperactive    Thought Content: Suicidal   Thought Form: Flight of Ideas, Obsessive/Perseverative and Tangential       Mental Health and Substance Abuse History    History  Current and historical diagnoses or mental health concerns: Hx of MDD and substance abuse induced psychosis     Prior MH services (inpatient, programmatic care, outpatient, etc) : Yes hx of inpatient     History of substance abuse: Yes crack cocaine     Prior URSZULA services (inpatient, programmatic care, detox, outpatient, etc) : Yes hx of CD tx     History of commitment: No     Family history of MH/URSZULA: No     Trauma history: No     Medication  Psychotropic medications: No     Current Care Team  Primary Care Provider: [facility]     Psychiatrist: No     Therapist: No     : No     CTSS or ARMHS: No     ACT Team: No     Other: No     Release of Information  Was a release of information signed: No. Reason: pt did not want to sign        Biopsychosocial Information     Socioeconomic Information  Current living situation: Pt lives in own apartment, continued to express that he was not homeless.      Employment/income source: Unable to identify source of income.      Relevant legal issues: None reported     Cultural, Jainism, or spiritual influences on mental health care: Pt is  -American male     Is the patient active in the  or a : No        Relevant Medical Concerns   Patient identifies concerns with completing ADLs? No      Patient can ambulate independently? Yes      Other medical concerns? Hx of STI's      History of concussion or TBI? Yes pt reports concerns of head damage citing his drug abuse.     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 Patient response to intervention: participative and engaged.    Disposition  Recommended disposition: Inpatient Mental Health      Reviewed case and recommendations with attending provider. Attending Name: Dr. Bob      Attending concurs with disposition: Yes      Patient concurs with disposition: Yes      Guardian concurs with disposition: NA     Final disposition: Inpatient mental health .     Inpatient Details (if applicable):  Is patient admitted voluntarily:Yes    Patient aware of potential for transfer if there is not appropriate placement? Yes     Patient is willing to travel outside of the [facility] for placement? Yes      Behavioral Intake Notified? Yes: Date: 2/21/22 Time: 5:36am.       Clinical Substantiation of Recommendations   Rationale with supporting factors for disposition and diagnosis.     Mr. Sorenson reports that he is going to jump off a bridge if he were to leave the hospital. He is hyperverbal and anxious and notes that he used cocaine yesterday/today.  He has flight of ideas, tangential or disorganized during his presentation. He is moving constantly and having a difficult time sitting still.  Has a sammy bear with him that her  "refers to him as a \"security bear\". He will hold him and look at him saying \"he is smiling because he knows we are going to get help.\"       Assessment Details  Patient interview started at: 0458 and completed at: 0526.    Total duration spent on the patient case in minutes: .50 hrs     CPT code(s) utilized: 05032 - Psychotherapy for Crisis - 60 (30-74*) min     SHANKAR LAMAS    "

## 2022-02-21 NOTE — ED NOTES
Pt appears anxious; keeps taking off his mask, pacing from time in the ko and having freq sm request.

## 2022-02-21 NOTE — CONSULTS
2/21/2022    CD consult acknowledged and closing.    Pt is currently in the ED, waiting on further placement. A consult can be reordered when pt is admitted to a medical (or mental health unit), is A&Ox4, has a medical or mental health plan in place and wants treatment or resources for URSZULA services. We are unable to preform CD consults in the ED units.  If pt is discharging instead of admitting, they can call the Mental Health and Addiction Services Line: [phone] and make an outpatient appt.     Upon chart review, pt is currently waiting for mental health bed. Pt was in mental health unit 02/07-02/14/22 and had a URSZULA eval on 02/09/2022 with JANA Morales. Pt was discharged to [facility] from 02/14/22 to 02/19/22 where he argued with another client and then decided to leave.   Pt would just need an update when he is stabilized on the mental health unit if he wants further CD treatment.     [email or web address]  Direct phone: [phone]

## 2022-02-21 NOTE — ADDENDUM NOTE
Encounter addended by: Alannah Zambrano LADC on: 2/21/2022 9:32 AM   Actions taken: Clinical Note Signed

## 2022-02-21 NOTE — ED PROVIDER NOTES
ED Provider Note  [facility]      History     Chief Complaint   Patient presents with     Suicidal     HPI  Edi Sorenson is a 46 year old male with history of HIV, cocaine abuse, history of DVT/PE on apixaban, presents to the ED with suicidal thoughts.  States he has a plan to jump off a bridge.  Was recently admitted to hospital for substance-induced psychosis, suicidality, amphetamine abuse.  Was just discharged yesterday.  Patient states that he cannot help himself and used crack cocaine yesterday.  This causes him severe depression and anxiety.  States he has a plan to jump off a bridge.  Says he is desperate for rehabilitation and continued mental health assistance.  He feels that if he leaves the ED he will certainly commit suicide.  Denies any additional medical complaints.  States he used crack cocaine few hours prior to arrival.    Past Medical History  Past Medical History:   Diagnosis Date     Asymptomatic human immunodeficiency virus (HIV) infection status (H)      Cocaine abuse (H)      Depression      DVT (deep venous thrombosis) (H)     Last in 6/2018. No hx of PE. On Coumadin.     Genital HSV      History reviewed. No pertinent surgical history.  acetaminophen (TYLENOL) 325 MG tablet  alum & mag hydroxide-simethicone (MAALOX) 200-200-20 MG/5ML SUSP suspension  apixaban ANTICOAGULANT (ELIQUIS) 5 MG tablet  benzocaine-menthol (CEPACOL) 15-3.6 MG lozenge  bictegravir-emtricitabine-tenofovir (BIKTARVY) -25 MG per tablet  guaiFENesin (ROBITUSSIN) 20 mg/mL SOLN solution  ibuprofen (ADVIL/MOTRIN) 600 MG tablet  loratadine (CLARITIN) 10 MG tablet  melatonin 3 MG tablet  mirtazapine (REMERON) 7.5 MG tablet  pantoprazole (PROTONIX) 40 MG EC tablet  polyethylene glycol (MIRALAX) 17 GM/Dose powder  risperiDONE (RISPERDAL) 1 MG tablet  senna-docusate (SENOKOT-S/PERICOLACE) 8.6-50 MG tablet  sertraline (ZOLOFT) 50 MG tablet      No Known Allergies  Family History  History  reviewed. No pertinent family history.  Social History   Social History     Tobacco Use     Smoking status: Former Smoker     Smokeless tobacco: Never Used   Substance Use Topics     Alcohol use: Yes     Drug use: Yes     Types: Cocaine     Comment: Used in the right before calling EMS      Past medical history, past surgical history, medications, allergies, family history, and social history were reviewed with the patient. No additional pertinent items.       Review of Systems   Constitutional: Negative for chills and fever.   HENT: Negative for sore throat.    Eyes: Negative for visual disturbance.   Respiratory: Negative for shortness of breath.    Cardiovascular: Negative for chest pain.   Gastrointestinal: Negative for abdominal pain, nausea and vomiting.   Genitourinary: Negative for difficulty urinating.   Musculoskeletal: Negative for back pain and neck pain.   Neurological: Negative for headaches.   Psychiatric/Behavioral: Positive for dysphoric mood and suicidal ideas. Negative for self-injury and sleep disturbance. The patient is nervous/anxious.      A complete review of systems was performed with pertinent positives and negatives noted in the HPI, and all other systems negative.    Physical Exam   BP: (!) 160/107  Pulse: 79  Temp: 98.3  F (36.8  C)  Resp: 16  Height: 182.9 cm (6')  Weight: 98 kg (216 lb)  SpO2: 99 %  Physical Exam  Vitals and nursing note reviewed.   Constitutional:       General: He is not in acute distress.     Appearance: Normal appearance. He is not ill-appearing or toxic-appearing.   HENT:      Head: Normocephalic and atraumatic.      Nose: Nose normal.      Mouth/Throat:      Mouth: Mucous membranes are moist.   Eyes:      Pupils: Pupils are equal, round, and reactive to light.   Cardiovascular:      Rate and Rhythm: Normal rate.      Pulses: Normal pulses.      Heart sounds: Normal heart sounds.   Pulmonary:      Effort: Pulmonary effort is normal. No respiratory distress.       Breath sounds: Normal breath sounds.   Abdominal:      General: Abdomen is flat. There is no distension.   Musculoskeletal:         General: No swelling or deformity. Normal range of motion.      Cervical back: Normal range of motion. No rigidity.   Skin:     General: Skin is warm.      Capillary Refill: Capillary refill takes less than 2 seconds.   Neurological:      Mental Status: He is alert and oriented to person, place, and time.   Psychiatric:         Attention and Perception: Attention normal.         Mood and Affect: Mood is anxious.         Speech: Speech is rapid and pressured.         Behavior: Behavior is cooperative.         Thought Content: Thought content is paranoid. Thought content includes suicidal ideation. Thought content includes suicidal plan.         Cognition and Memory: Cognition normal.         Judgment: Judgment is impulsive.         ED Course      Procedures       The medical record was reviewed and interpreted.  Mental Health Risk Assessment      PSS-3    Date and Time Over the past 2 weeks have you felt down, depressed, or hopeless? Over the past 2 weeks have you had thoughts of killing yourself? Have you ever attempted to kill yourself? When did this last happen? User   02/21/22 0454 yes yes yes -- [facility]      C-SSRS (Gable)    Date and Time Q1 Wished to be Dead (Past Month) Q2 Suicidal Thoughts (Past Month) Q3 Suicidal Thought Method Q4 Suicidal Intent without Specific Plan Q5 Suicide Intent with Specific Plan Q6 Suicide Behavior (Lifetime) Within the Past 3 Months? RETIRED: Level of Risk per Screen Screening Not Complete User   02/21/22 1630 yes yes yes yes yes yes -- -- -- [facility]              Suicide assessment completed by mental health (D.E.C., LCSW, etc.)       No results found for any visits on 02/21/22.  Medications   apixaban ANTICOAGULANT (ELIQUIS) tablet 5 mg (has no administration in time range)   bictegravir-emtricitabine-tenofovir (BIKTARVY) -25 MG per tablet 1 tablet  (has no administration in time range)   mirtazapine (REMERON) tablet TABS 7.5 mg (has no administration in time range)   pantoprazole (PROTONIX) EC tablet 40 mg (has no administration in time range)   risperiDONE (risperDAL) tablet 1 mg (has no administration in time range)   senna-docusate (SENOKOT-S/PERICOLACE) 8.6-50 MG per tablet 2 tablet (has no administration in time range)   sertraline (ZOLOFT) tablet 50 mg (has no administration in time range)        Assessments & Plan (with Medical Decision Making)   Patient presents to the ED with suicidal thoughts with plan to jump off a bridge.  Recent admission for similar symptoms.  Continues to use crack cocaine.  On arrival, patient has normal vital signs.  His physical exam is without signs of trauma or clinical toxidrome.  He has rapid/pressured speech and is anxious.  He is persistently suicidal plan to jump off a bridge.  He was evaluated by the mental health team.  Despite his recent admission/discharge, they are recommending readmission to the hospital.  He is unable to contract for safety.  Will restart home medications and monitor in the ED while awaiting inpatient psych admission.    I have reviewed the nursing notes. I have reviewed the findings, diagnosis, plan and need for follow up with the patient.    New Prescriptions    No medications on file       Final diagnoses:   Suicidal ideation   Kimberli (H)       --  Luke Bob DO  [facility] EMERGENCY DEPARTMENT  2/21/2022     Luke Bob DO  02/21/22 0645
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
## 2022-02-21 NOTE — ED NOTES
Bed: HW02  Expected date: 2/21/22  Expected time: 4:19 AM  Means of arrival: Ambulance  Comments:  [address]  46 M  SI

## 2022-02-21 NOTE — TELEPHONE ENCOUNTER
S: 47 y/o male presented to the [facility] ED with SI.    B: Hx depression, DVT, cocaine abuse, substance-induced psychosis.  SI with a plan to jump off of a bridge.  Pt reported to the ED that he became suicidal after he was removed from [facility].  Pt reported he used cocaine just PTA then later told the  he last used cocaine yesterday.  No HI or hallucinations reported.   reported pt appears to be manic, tangential and disorganized.  No reported hx of aggression.  Pt was admitted to [facility] from 2/7-2/14 due to a similar presentation.      A: Voluntary  No acute medical concerns reported.  COVID has not been ordered.  Drug screen has not been ordered.    R: Placed on wait list pending bed availability.
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
## 2022-02-21 NOTE — CONSULTS
Edi Sorenson  February 21, 2022  SAFE Note    Critical Safety Issues: None noted at this time      Current Suicidal Ideation/Self-Injurious Concerns/Methods: None - N/A      Current or Historical Inappropriate Sexual Behavior: No      Current or Historical Aggression/Homicidal Ideation: None - N/A      Triggers: Use of substances, coffee reminds him of use of chemicals and he tries to avoid drinking coffee.     Updated care team: Yes: spoke to provider     For additional details see full LMHP assessment.       MARY FRAZIER LGSW       This patient was last seen 2 1/2 years ago. He is scheduled for a follow up with me next week.     I can provide him with one month of refills; however, he will need to attend the appointment for any further medication refills.

## 2022-02-21 NOTE — ED TRIAGE NOTES
Pt states SI after being removed from [facility] Program.  Pt states using cocaine before calling EMS.

## 2022-02-21 NOTE — ED NOTES
Pt given breakfast and has been on the phone talking to friends about how he was kicked out of his housing and needs to get a place to stay.

## 2022-02-22 LAB
CREAT SERPL-MCNC: 0.9 MG/DL (ref 0.66–1.25)
GFR SERPL CREATININE-BSD FRML MDRD: >90 ML/MIN/1.73M2

## 2022-02-22 PROCEDURE — 82565 ASSAY OF CREATININE: CPT | Performed by: STUDENT IN AN ORGANIZED HEALTH CARE EDUCATION/TRAINING PROGRAM

## 2022-02-22 PROCEDURE — 124N000002 HC R&B MH UMMC

## 2022-02-22 PROCEDURE — 99223 1ST HOSP IP/OBS HIGH 75: CPT | Mod: AI | Performed by: PSYCHIATRY & NEUROLOGY

## 2022-02-22 PROCEDURE — H2032 ACTIVITY THERAPY, PER 15 MIN: HCPCS

## 2022-02-22 PROCEDURE — 250N000013 HC RX MED GY IP 250 OP 250 PS 637: Performed by: STUDENT IN AN ORGANIZED HEALTH CARE EDUCATION/TRAINING PROGRAM

## 2022-02-22 PROCEDURE — 36415 COLL VENOUS BLD VENIPUNCTURE: CPT | Performed by: STUDENT IN AN ORGANIZED HEALTH CARE EDUCATION/TRAINING PROGRAM

## 2022-02-22 RX ADMIN — PANTOPRAZOLE SODIUM 40 MG: 40 TABLET, DELAYED RELEASE ORAL at 21:02

## 2022-02-22 RX ADMIN — APIXABAN 5 MG: 2.5 TABLET, FILM COATED ORAL at 09:33

## 2022-02-22 RX ADMIN — APIXABAN 5 MG: 2.5 TABLET, FILM COATED ORAL at 21:02

## 2022-02-22 RX ADMIN — BICTEGRAVIR SODIUM, EMTRICITABINE, AND TENOFOVIR ALAFENAMIDE FUMARATE 1 TABLET: 50; 200; 25 TABLET ORAL at 10:03

## 2022-02-22 RX ADMIN — SERTRALINE HYDROCHLORIDE 50 MG: 50 TABLET ORAL at 09:34

## 2022-02-22 RX ADMIN — MELATONIN TAB 3 MG 3 MG: 3 TAB at 22:46

## 2022-02-22 RX ADMIN — GABAPENTIN 100 MG: 100 CAPSULE ORAL at 13:03

## 2022-02-22 RX ADMIN — PANTOPRAZOLE SODIUM 40 MG: 40 TABLET, DELAYED RELEASE ORAL at 09:34

## 2022-02-22 RX ADMIN — GABAPENTIN 100 MG: 100 CAPSULE ORAL at 22:46

## 2022-02-22 ASSESSMENT — ACTIVITIES OF DAILY LIVING (ADL)
ORAL_HYGIENE: INDEPENDENT
HYGIENE/GROOMING: INDEPENDENT
HYGIENE/GROOMING: INDEPENDENT
LAUNDRY: WITH SUPERVISION
ORAL_HYGIENE: INDEPENDENT
DRESS: SCRUBS (BEHAVIORAL HEALTH);INDEPENDENT
DRESS: INDEPENDENT
LAUNDRY: WITH SUPERVISION

## 2022-02-22 NOTE — PLAN OF CARE
02/22/22 1321   Individualization/Patient Specific Goals   Patient Personal Strengths realistic evaluation of current/future capabilities;positive attitude;expressive of emotions;expressive of needs;motivated for recovery;motivated for treatment   Patient Vulnerabilities lacks insight into illness;poor impulse control;history of unsuccessful treatment;limited support system;adverse childhood experience(s);substance abuse/addiction   Anxieties, Fears or Concerns Current SI, Recent relapse   Team Discussion   Progress Initial assessment   Anticipated length of stay 5-7 days   Continued Stay Criteria/Rationale SI, substace use. Needs clinical stabilization and medication management   Medical/Physical See H&P note   Plan Will be referred to substance use Tx. Will follow up with outpaitent providers.   Rationale for change in precautions or plan onging assessment

## 2022-02-22 NOTE — PLAN OF CARE
Problem: Sleep Disturbance  Goal: Adequate Sleep/Rest  Outcome: Ongoing, Progressing      Patient appeared to be sleeping, awake around 0230, requested snacks and went back to sleep, repositioned frequently and used the bathroom, no other concerns noted no prn given during shift.

## 2022-02-22 NOTE — TELEPHONE ENCOUNTER
R: Patient in [facility] waiting bed placement.    6:23pm: Paged Resident to present for 20/Doris  6:35pm: Resident accepts for 20/Doris  6:41pm: Intake called unit to go over patient placement; charge nurse unavailable, left message to call back. Sent text page with patient placement to ER charge nurse.

## 2022-02-22 NOTE — PLAN OF CARE
"Pt denied SI. Pt was intrusive, demonstrated poor boundaries, made sexual references when speaking with peers. Pt. Made a joke about \"sucking on hard candy\" when speaking with female peers. Pt. Seemed to be interacting more with vulnerable female patients. Pt. was hyper verbal and active in the milieu, but did not participate in any groups today.     Problem: Plan of Care - These are the overarching goals to be used throughout the patient stay.    Goal: Plan of Care Review/Shift Note  Description: The Plan of Care Review/Shift note should be completed every shift.  The Outcome Evaluation is a brief statement about your assessment that the patient is improving, declining, or no change.  This information will be displayed automatically on your shift note.  Outcome: Ongoing, Not Progressing  Goal: Patient-Specific Goal (Individualized)  Description: You can add care plan individualizations to a care plan. Examples of Individualization might be:  \"Parent requests to be called daily at 9am for status\", \"I have a hard time hearing out of my right ear\", or \"Do not touch me to wake me up as it startles me\".  Outcome: Ongoing, Not Progressing  Goal: Absence of Hospital-Acquired Illness or Injury  Outcome: Ongoing, Not Progressing  Goal: Optimal Comfort and Wellbeing  Outcome: Ongoing, Not Progressing  Goal: Readiness for Transition of Care  Outcome: Ongoing, Not Progressing     Problem: Behavioral Health Plan of Care  Goal: Optimal Comfort and Wellbeing  Outcome: Ongoing, Not Progressing  Goal: Plan of Care Review  Outcome: Ongoing, Not Progressing  Goal: Patient-Specific Goal (Individualization)  Outcome: Ongoing, Not Progressing  Goal: Adheres to Safety Considerations for Self and Others  Outcome: Ongoing, Not Progressing  Goal: Absence of New-Onset Illness or Injury  Outcome: Ongoing, Not Progressing  Goal: Optimized Coping Skills in Response to Life Stressors  Outcome: Ongoing, Not Progressing  Goal: " Develops/Participates in Therapeutic [place] to Support Successful Transition  Outcome: Ongoing, Not Progressing  Goal: Team Discussion  Outcome: Ongoing, Not Progressing   Goal Outcome Evaluation:    Plan of Care Reviewed With: patient

## 2022-02-22 NOTE — CARE PLAN
02/22/22 1321   Team Discussion   Progress Initial assessment   Anticipated length of stay 5-7 days   Continued Stay Criteria/Rationale SI, substace use. Needs clinical stabilization and medication management   Medical/Physical See H&P note   Plan Will be referred to substance use Tx. Will follow up with outpaitent providers.   Rationale for change in precautions or plan onging assessment
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
## 2022-02-22 NOTE — PLAN OF CARE
A               Admission:  I am responsible for any personal items that are not sent to the safe or pharmacy.  [facility] is not responsible for loss, theft or damage of any property in my possession.    Signature:  _________________________________ Date: _______  Time: _____                                              Staff Signature:  ____________________________ Date: ________  Time: _____      2nd Staff person, if patient is unable/unwilling to sign:    Signature: ________________________________ Date: ________  Time: _____     Discharge:  [facility] has returned all of my personal belongings:    Signature: _________________________________ Date: ________  Time: _____                                          Staff Signature:  ____________________________ Date: ________  Time: _____       In locker:  Sweater x2  Pants x2  Belt  Shirt x2  Shampoo  Conditioner  Deodorant  Chapstick  Body wash  Shoes  Socks  Hat  Alvarado bear  Phone  Miralax    Security:  Drivers license  $1.70  Medications
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
## 2022-02-22 NOTE — PLAN OF CARE
"Pt is a 46 year old male admitted to [facility] from [facility]. Pt was recently admitted to this unit. He was discharged to [facility], got into an argument with another patient and was asked to leave, and then relapsed on cocaine the same day. Pt then stated \"he would rather be dead than continue to be a drug addict\" and that he had a plan to jump off a bridge.    Pt contracts for safety on the unit, endorses passive SI but does not currently have intent. He presents with a full range affect and is social with staff/peers. Pt is hyperverbal and tangential, tells writer about various topics from his past and spoke about hx of drug use. He attended the group that was offered this evening. No other concerns at this time.    Problem: Behavior Regulation Impairment (Excessive Substance Use)  Goal: Improved Behavioral Control (Excessive Substance Use)  Outcome: Ongoing, Not Progressing  Problem: Behavioral Health Plan of Care  Goal: Optimal Comfort and Wellbeing  Outcome: Ongoing, Not Progressing                           "

## 2022-02-23 PROCEDURE — 250N000013 HC RX MED GY IP 250 OP 250 PS 637: Performed by: STUDENT IN AN ORGANIZED HEALTH CARE EDUCATION/TRAINING PROGRAM

## 2022-02-23 PROCEDURE — 124N000002 HC R&B MH UMMC

## 2022-02-23 PROCEDURE — 99231 SBSQ HOSP IP/OBS SF/LOW 25: CPT | Mod: GC | Performed by: PSYCHIATRY & NEUROLOGY

## 2022-02-23 RX ADMIN — APIXABAN 5 MG: 2.5 TABLET, FILM COATED ORAL at 21:24

## 2022-02-23 RX ADMIN — MIRTAZAPINE 7.5 MG: 7.5 TABLET, FILM COATED ORAL at 01:24

## 2022-02-23 RX ADMIN — GABAPENTIN 100 MG: 100 CAPSULE ORAL at 13:20

## 2022-02-23 RX ADMIN — NICOTINE POLACRILEX 2 MG: 2 LOZENGE ORAL at 13:26

## 2022-02-23 RX ADMIN — APIXABAN 5 MG: 2.5 TABLET, FILM COATED ORAL at 08:39

## 2022-02-23 RX ADMIN — MELATONIN TAB 3 MG 3 MG: 3 TAB at 21:24

## 2022-02-23 RX ADMIN — SERTRALINE HYDROCHLORIDE 50 MG: 50 TABLET ORAL at 08:39

## 2022-02-23 RX ADMIN — ACETAMINOPHEN 650 MG: 325 TABLET, FILM COATED ORAL at 12:10

## 2022-02-23 RX ADMIN — PANTOPRAZOLE SODIUM 40 MG: 40 TABLET, DELAYED RELEASE ORAL at 21:25

## 2022-02-23 RX ADMIN — MIRTAZAPINE 7.5 MG: 7.5 TABLET, FILM COATED ORAL at 21:24

## 2022-02-23 RX ADMIN — DOCUSATE SODIUM AND SENNOSIDES 2 TABLET: 8.6; 5 TABLET ORAL at 01:26

## 2022-02-23 RX ADMIN — BICTEGRAVIR SODIUM, EMTRICITABINE, AND TENOFOVIR ALAFENAMIDE FUMARATE 1 TABLET: 50; 200; 25 TABLET ORAL at 08:39

## 2022-02-23 RX ADMIN — OLANZAPINE 10 MG: 10 TABLET, FILM COATED ORAL at 13:24

## 2022-02-23 RX ADMIN — PANTOPRAZOLE SODIUM 40 MG: 40 TABLET, DELAYED RELEASE ORAL at 08:39

## 2022-02-23 ASSESSMENT — ACTIVITIES OF DAILY LIVING (ADL)
DRESS: SCRUBS (BEHAVIORAL HEALTH)
LAUNDRY: WITH SUPERVISION
HYGIENE/GROOMING: INDEPENDENT
DRESS: SCRUBS (BEHAVIORAL HEALTH);INDEPENDENT
ORAL_HYGIENE: INDEPENDENT
HYGIENE/GROOMING: INDEPENDENT
ORAL_HYGIENE: INDEPENDENT

## 2022-02-23 NOTE — PLAN OF CARE
02/23/22 1600   Group Therapy Session   Group Attendance attended group session   Time Session Began 1115   Time Session Ended 1205   Total Time patient participated (minutes) 50   Group Type psychotherapeutic   Group Topic Covered balanced lifestyle     Edi  attended a Life Skills group this a.m. that involved sharing reflections according to question prompts. Pleasant and thoughtful. Expressed a strong desire to live a sober life. Expressed support for peers in the group.

## 2022-02-23 NOTE — PLAN OF CARE
"Pt social with others, ate all meals, Pt requested PRN Tylenol for back pain pt also received egg crate mattress for his bed.  Pt requested and rec'd PRN Gabapentin and Seroquel for anxiety.  Pt currently in bed sleeping.  Pt was less verbally loud this shift.    Problem: Plan of Care - These are the overarching goals to be used throughout the patient stay.    Goal: Plan of Care Review/Shift Note  Description: The Plan of Care Review/Shift note should be completed every shift.  The Outcome Evaluation is a brief statement about your assessment that the patient is improving, declining, or no change.  This information will be displayed automatically on your shift note.  Outcome: Ongoing, Not Progressing  Goal: Patient-Specific Goal (Individualized)  Description: You can add care plan individualizations to a care plan. Examples of Individualization might be:  \"Parent requests to be called daily at 9am for status\", \"I have a hard time hearing out of my right ear\", or \"Do not touch me to wake me up as it startles me\".  Outcome: Ongoing, Not Progressing  Goal: Absence of Hospital-Acquired Illness or Injury  Outcome: Ongoing, Not Progressing  Goal: Optimal Comfort and Wellbeing  Outcome: Ongoing, Not Progressing  Goal: Readiness for Transition of Care  Outcome: Ongoing, Not Progressing     Problem: Behavioral Health Plan of Care  Goal: Optimal Comfort and Wellbeing  Outcome: Ongoing, Not Progressing  Goal: Plan of Care Review  Outcome: Ongoing, Not Progressing  Goal: Patient-Specific Goal (Individualization)  Outcome: Ongoing, Not Progressing  Goal: Adheres to Safety Considerations for Self and Others  Outcome: Ongoing, Not Progressing  Goal: Absence of New-Onset Illness or Injury  Outcome: Ongoing, Not Progressing  Goal: Optimized Coping Skills in Response to Life Stressors  Outcome: Ongoing, Not Progressing  Goal: Develops/Participates in Therapeutic [place] to Support Successful Transition  Outcome: Ongoing, Not " Progressing  Goal: Team Discussion  Outcome: Ongoing, Not Progressing   Goal Outcome Evaluation:    Plan of Care Reviewed With: patient

## 2022-02-23 NOTE — PROGRESS NOTES
"    ----------------------------------------------------------------------------------------------------------  [facility], [place]   Psychiatric Progress Note  Hospital Day #2    Identifier: Edi Sorenson is a 46 year old male with previous psychiatric diagnoses of cocaine use disorder and MDD who presents with recent cocaine use and hypomania in the context of cocaine left in his apartment due to poor stewardship. Assessment is that the current presentation is consistent with cocaine use disorder. Of note, was recently hospitalized 2/8-2/14 for cocaine use and SI w/ plan.     Interim History:   The patient's care was discussed with the treatment team and chart notes were reviewed.    Vitals: 140/91, other VS WNL  Sleep: 4.5 hours (02/23/22 0600)  Scheduled medications: Took all scheduled medications as prescribed  PRN medications: gabapentin, melatonin, senna, mirtazapine     Staff Report:   Per chart review, pt was animated and talkative with staff/other pts. Expresses motivation for getting better. Endorses SI if discharged outside of hospital on his own. In the [place], made inappropriate comment on \"sucking on hard candy\" when speaking with female peers. Admits that \"there is a little kid in me\" with poor boundaries and impulse control with women and drugs. Attended groups.     Subjective:     Patient Interview:  Edi Sorenson was interviewed in the conference room.    Edi says he is doing well this morning. He says the tylenol is working well for his back pain. He was interested in learning about [facility] for CD treatment. He expressed much gratitude in the treatment team.     He did ask for a mattress topper.    The risks, benefits, alternatives and side effects of any medication changes have been discussed and are understood by the patient and other caregivers.    Review of systems:   Patient has no bothersome physical symptoms. Back pain significantly improved " "from previous visit.  Patient denies acute concerns    Objective:   BP (!) 140/91 (BP Location: Left arm, Patient Position: Sitting, Cuff Size: Adult Regular)   Pulse 76   Temp 97  F (36.1  C)   Resp 16   Ht 1.829 m (6')   Wt 99.4 kg (219 lb 1.6 oz)   SpO2 99%   BMI 29.72 kg/m    Weight is 219 lbs 1.6 oz  Body mass index is 29.72 kg/m .    Mental Status Exam:  Oriented to:  Grossly Oriented  General:  Awake and Alert  Appearance:  appears stated age  Behavior/Attitude:  cooperative and engaged  Eye Contact:  appropriate  Psychomotor: normal and no evidence of tics, dystonia, or tardive dyskinesia no catatonia present  Speech:  loud volume/tone, talkative and spontaneous  Language: Fluent in English with appropriate syntax and vocabulary.  Mood:  \"you guys are amazing\"  Affect:  appropriate and broad/full  Thought Process:  linear, coherent and goal directed  Thought Content:  suicidal ideation (passive), denies HI; No apparent delusions  Associations:  intact  Insight:  good   Judgment:  fair  Impulse control: poor  Attention Span:  grossly intact  Concentration:  grossly intact  Recent and Remote Memory:  grossly intact  Fund of Knowledge:  average  Muscle Strength and Tone: normal  Gait and Station: Normal        Allergies:   No Known Allergies     Labs:     No results found for this or any previous visit (from the past 24 hour(s)).       Assessment      Primary psychiatric diagnosis:   # Cocaine use disorder, severe    Secondary psychiatric diagnoses of concern this admission:   # Major depressive disorder, per hx     Diagnostic Impression:   Edi Sorenson is a 46 year old single male previously diagnosed with substance use disorder who presented voluntarily with SI in the context of recent discharge from CD treatment, and cocaine use. Substances are a contributing factor to their presentation.  Biological contributions to mental health presentation include childhood trauma exposure, recent and " historical cocaine use, adherence to scheduled medications. Psychosocial factors contributing to current presentation include interpersonal stresses from time in CD treatment, and poor stewardship of his apartment by thoise he trusted to manage it. The patient's presentation is consistent with severe cocaine use disorder.    Psychiatric Hospital course:   Edi Sorenson was admitted to [facility] as a voluntary patient. PTA Eliquis, Biktarvy, Sertraline, & Pantoprazole were continued. No new medications started at the time of admission. Pt just waiting for new CD Tx during this hospital stay.    Medical course:  Medically cleared prior to admission.     Medical diagnoses:  # HIV  - bictegravir-emtricitabine-tenofovir (BIKTARVY) -25 MG, PO daily      # h/o DVT and PE  - apixaban ANTICOAGULANT (ELIQUIS) tablet 5 mg PO, BID    # GERD  - pantoprazole (PROTONIX) EC tablet 40 mg, PO, BID    Admission Labs: Cr wnl    Data:   - UDS (positive for cocaine)  - Cr 0.90    Plan     Today's changes:  - Work with  to find new CD housing.    Psychotropic Medications:  Scheduled:  - Sertraline 50 mg daily for depression    PRN:  -Mirtazapine 7.5 mg daily PRN  -Risperidone 1 mg TID PRN for AH (PTA, from previous admission)  -Acetaminophen 650 mg Q6H PRN for pain and fever  -Melatonin 3mg at bedtime  -Miralax prn for constipation  -NRT  -Olanzapine 10 mg PO/IM prn Q2H severe agitation/psychosis  -Gabapentin 100 mg Q6H PRN for anxiety  -Senna for constipation    Additional Plans:  - Patient will be treated in therapeutic milieu with appropriate individual and group therapies as described  - Referral to CD treatment    Medical diagnoses to be addressed this admission:    None    Consults: None    Legal Status: Voluntary     Safety Assessment:   Behavioral Orders   Procedures     Code 1 - Restrict to Unit     Discontinue 1:1 attendant for suicide risk     Order Specific Question:   I have performed an in person  assessment of the patient     Answer:   Based on this assessment the patient no longer requires a one on one attendant at this point in time.     Order Specific Question:   Rationale     Answer:   Patient States able to remain safe in hospital     Routine Programming     As clinically indicated     Status 15     Every 15 minutes.     Suicide precautions     Patients on Suicide Precautions should have a Combination Diet ordered that includes a Diet selection(s) AND a Behavioral Tray selection for Safe Tray - with utensils, or Safe Tray - NO utensils       Withdrawal precautions       SIO: No    Disposition: Pending stabilization, medication optimization, & development of a safe discharge plan.    Attestation:   I was present with the medical student Adria Ferguson MS3 who participated in the service and in the documentation of the note.  I have verified the history and personally performed the physical exam and medical decision making. I agree with the assessment and plan of care as documented in the note.    This patient was seen and discussed with my attending physician.    Aniya Kee MD, PhD  PGY-2 Psychiatry Resident Physician    Attestation:  This patient has been seen and evaluated by me, Stephan George MD.  I have discussed this patient with the house staff team including the resident and medical student and I agree with the findings and plan in this note.    I have reviewed today's vital signs, medications, labs and imaging. Stephan George MD , PhD.

## 2022-02-23 NOTE — PLAN OF CARE
Music Therapy Group note    Clinical Hours in session: 0.75    Number of patients in group: 7    Scope of service: behavioral     Intervention: Music Bingo     Goal of group: containment, improvement of mood and focus, mental organization    Patient response/reaction to treatment intervention(s): Engaged in 80s Rock and Current Hits Music Bingo.  Showed focus, concentration, cooperation and improvement of mood.  Social with peers.  Pleasant.  Upbeat.  Energetic. Expressed appreciation for group verbally to MT after session.     Karlie Teresa, MT-BC  Board-Certified Music Therapist
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
## 2022-02-23 NOTE — PLAN OF CARE
Problem: Sleep Disturbance  Goal: Adequate Sleep/Rest  Outcome: Ongoing, Progressing      Patient appears sleeping intermittently awake for bathroom use and repositioning, came out requesting snacks and prn Mirtazapine 7.5 mg, patient went back to sleep without concerns no other activities noted the rest of the shift will continue to monitor condition.

## 2022-02-23 NOTE — PROGRESS NOTES
Pt was a  in dance/movement therapy (D/MT) using imagery to support movement what connected him as an integral part of the world.  He initiated and generated these themes and was able to embodied them completely.  He physically saluted each peer in the session to show respect as well as opened his arms wide to demonstrate his connection to the whole wide world.  Peers responded well to his positive attitude and he joined with others demonstrating respectful boundaries and words as well.       02/23/22 1015   Group Therapy Session   Group Attendance attended group session   Group Type psychotherapeutic   Group Topic Covered emotions/expression;relationship;structured socialization

## 2022-02-23 NOTE — PLAN OF CARE
Assessment/Intervention/Current Symptoms and Care Coordination: Met with team. Reviewed patient's chart and progress notes. Patient wants to attend a different residential treatment  for substance use disorders. CD consult was placed. Patient needs to complete CD assessment update and discuss placement with CD .         Discharge Plan or Goal: Will attend residential tx for Substance use. Will follow up with psychiatry services at [facility] and [facility]          Barriers to Discharge: SI and ongoing substance use. Needs clinical stabilization and medication management.               Referral Status: None today         Legal Status:  Voluntary

## 2022-02-24 PROCEDURE — 99232 SBSQ HOSP IP/OBS MODERATE 35: CPT | Mod: GC | Performed by: PSYCHIATRY & NEUROLOGY

## 2022-02-24 PROCEDURE — 250N000013 HC RX MED GY IP 250 OP 250 PS 637: Performed by: STUDENT IN AN ORGANIZED HEALTH CARE EDUCATION/TRAINING PROGRAM

## 2022-02-24 PROCEDURE — 124N000002 HC R&B MH UMMC

## 2022-02-24 PROCEDURE — H0001 ALCOHOL AND/OR DRUG ASSESS: HCPCS

## 2022-02-24 RX ORDER — OLANZAPINE 5 MG/1
5 TABLET ORAL 3 TIMES DAILY PRN
Status: DISCONTINUED | OUTPATIENT
Start: 2022-02-24 | End: 2022-02-25 | Stop reason: HOSPADM

## 2022-02-24 RX ORDER — OLANZAPINE 10 MG/1
10 TABLET ORAL 3 TIMES DAILY PRN
Status: DISCONTINUED | OUTPATIENT
Start: 2022-02-24 | End: 2022-02-24

## 2022-02-24 RX ORDER — OLANZAPINE 10 MG/2ML
10 INJECTION, POWDER, FOR SOLUTION INTRAMUSCULAR 3 TIMES DAILY PRN
Status: DISCONTINUED | OUTPATIENT
Start: 2022-02-24 | End: 2022-02-25 | Stop reason: HOSPADM

## 2022-02-24 RX ORDER — OLANZAPINE 10 MG/2ML
10 INJECTION, POWDER, FOR SOLUTION INTRAMUSCULAR 3 TIMES DAILY PRN
Status: DISCONTINUED | OUTPATIENT
Start: 2022-02-24 | End: 2022-02-24

## 2022-02-24 RX ADMIN — GABAPENTIN 100 MG: 100 CAPSULE ORAL at 13:07

## 2022-02-24 RX ADMIN — DOCUSATE SODIUM AND SENNOSIDES 2 TABLET: 8.6; 5 TABLET ORAL at 19:43

## 2022-02-24 RX ADMIN — PANTOPRAZOLE SODIUM 40 MG: 40 TABLET, DELAYED RELEASE ORAL at 08:25

## 2022-02-24 RX ADMIN — MIRTAZAPINE 7.5 MG: 7.5 TABLET, FILM COATED ORAL at 19:43

## 2022-02-24 RX ADMIN — MELATONIN TAB 3 MG 3 MG: 3 TAB at 23:07

## 2022-02-24 RX ADMIN — BICTEGRAVIR SODIUM, EMTRICITABINE, AND TENOFOVIR ALAFENAMIDE FUMARATE 1 TABLET: 50; 200; 25 TABLET ORAL at 08:25

## 2022-02-24 RX ADMIN — PANTOPRAZOLE SODIUM 40 MG: 40 TABLET, DELAYED RELEASE ORAL at 19:43

## 2022-02-24 RX ADMIN — SERTRALINE HYDROCHLORIDE 50 MG: 50 TABLET ORAL at 08:25

## 2022-02-24 RX ADMIN — GABAPENTIN 100 MG: 100 CAPSULE ORAL at 19:43

## 2022-02-24 RX ADMIN — APIXABAN 5 MG: 2.5 TABLET, FILM COATED ORAL at 08:25

## 2022-02-24 RX ADMIN — APIXABAN 5 MG: 2.5 TABLET, FILM COATED ORAL at 19:43

## 2022-02-24 RX ADMIN — RISPERIDONE 1 MG: 1 TABLET ORAL at 23:07

## 2022-02-24 ASSESSMENT — ACTIVITIES OF DAILY LIVING (ADL)
HYGIENE/GROOMING: INDEPENDENT
HYGIENE/GROOMING: INDEPENDENT
LAUNDRY: WITH SUPERVISION
ORAL_HYGIENE: INDEPENDENT
ORAL_HYGIENE: INDEPENDENT
LAUNDRY: WITH SUPERVISION
DRESS: SCRUBS (BEHAVIORAL HEALTH);INDEPENDENT
DRESS: SCRUBS (BEHAVIORAL HEALTH);INDEPENDENT
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
## 2022-02-24 NOTE — PROVIDER NOTIFICATION
02/24/22 0600   Sleep/Rest   Night Time # Hours 6.25 hours     Pt was awake for a snack and back to sleep. Slept well for 6.25 hours.

## 2022-02-24 NOTE — PROGRESS NOTES
"    ----------------------------------------------------------------------------------------------------------  [facility], [place]   Psychiatric Progress Note  Hospital Day #3    Identifier: Edi Sorenson is a 46 year old male with previous psychiatric diagnoses of cocaine use disorder and MDD who presents with recent cocaine use and hypomania in the context of cocaine left in his apartment due to poor stewardship. Assessment is that the current presentation is consistent with cocaine use disorder. Of note, was recently hospitalized 2/8-2/14 for cocaine use and SI w/ plan.     Interim History:   The patient's care was discussed with the treatment team and chart notes were reviewed.    Vitals: Hypertensive (134/92), other VS WNL  Sleep: 6.25 hours (02/24/22 0600)  Scheduled medications: Took all scheduled medications as prescribed  PRN medications: gabapentin, melatonin, senna, mirtazapine, tylenol, nicotine, Zyprexa    Staff Report:   Per chart review, pt feeling more isolative and napping most of the day. Less loud during shift than before. Denies SI/SIB. Attended groups and expressed a strong desire to live a sober life.      Subjective:     Patient Interview:  Edi Sorenson was interviewed in the conference room.    Edi spoke with the CD assessment team this morning. He said that they spoke about a couple of different options including [facility]. He wants to do more research before committing to any of them; this is his goal for today.    He said that yesterday he was \"feeling a little down\" after groups because it brought up feelings that he \"let other people and himself down.\" He said that he is dedicated to working on himself so this won't happen again.    He was very thankful again to the team and the members of the unit. He is excited to \"live for a purpose\" again.    The risks, benefits, alternatives and side effects of any medication changes have been discussed " "and are understood by the patient and other caregivers.    Review of systems:   Patient has no bothersome physical symptoms. Back pain significantly improved from previous visit.  Patient denies acute concerns    Objective:   /71 (Patient Position: Sitting, Cuff Size: Adult Regular)   Pulse 68   Temp 97.9  F (36.6  C) (Temporal)   Resp 16   Ht 1.829 m (6')   Wt 99.4 kg (219 lb 1.6 oz)   SpO2 99%   BMI 29.72 kg/m    Weight is 219 lbs 1.6 oz  Body mass index is 29.72 kg/m .    Mental Status Exam:  Oriented to:  Grossly Oriented  General:  Awake and Alert  Appearance:  appears stated age  Behavior/Attitude:  cooperative and engaged  Eye Contact:  appropriate  Psychomotor: normal and no evidence of tics, dystonia, or tardive dyskinesia no catatonia present  Speech:  loud volume/tone, talkative and spontaneous  Language: Fluent in English with appropriate syntax and vocabulary.  Mood:  \"Doin good today\"  Affect:  appropriate and broad/full  Thought Process:  linear, coherent and goal directed  Thought Content:  suicidal ideation (passive), denies HI; No apparent delusions  Associations:  intact  Insight:  good   Judgment:  fair  Impulse control: poor  Attention Span:  grossly intact  Concentration:  grossly intact  Recent and Remote Memory:  grossly intact  Fund of Knowledge:  average  Muscle Strength and Tone: normal  Gait and Station: Normal        Allergies:   No Known Allergies     Labs:     No results found for this or any previous visit (from the past 24 hour(s)).       Assessment      Primary psychiatric diagnosis:   # Cocaine use disorder, severe    Secondary psychiatric diagnoses of concern this admission:   # Major depressive disorder, per hx     Diagnostic Impression:   Edi Sorenson is a 46 year old single male previously diagnosed with substance use disorder who presented voluntarily with SI in the context of recent discharge from CD treatment, and cocaine use. Substances are a contributing " factor to their presentation.  Biological contributions to mental health presentation include childhood trauma exposure, recent and historical cocaine use, adherence to scheduled medications. Psychosocial factors contributing to current presentation include interpersonal stresses from time in CD treatment, and poor stewardship of his apartment by thoise he trusted to manage it. The patient's presentation is consistent with severe cocaine use disorder.    Psychiatric Hospital course:   Edi Sorenson was admitted to [facility] as a voluntary patient. PTA Eliquis, Biktarvy, Sertraline, & Pantoprazole were continued. No new medications started at the time of admission. Pt just waiting for new CD Tx during this hospital stay.    Medical course:  Medically cleared prior to admission.     Medical diagnoses:  # HIV  - bictegravir-emtricitabine-tenofovir (BIKTARVY) -25 MG, PO daily      # h/o DVT and PE  - apixaban ANTICOAGULANT (ELIQUIS) tablet 5 mg PO, BID    # GERD  - pantoprazole (PROTONIX) EC tablet 40 mg, PO, BID    Admission Labs: Cr wnl    Data:   - UDS (positive for cocaine)  - Cr 0.90    Plan     Today's changes:  - Work with  to find new CD housing.    Psychotropic Medications:  Scheduled:  - Sertraline 50 mg daily for depression    PRN:  -Mirtazapine 7.5 mg daily PRN  -Risperidone 1 mg TID PRN for AH (PTA, from previous admission)  -Acetaminophen 650 mg Q6H PRN for pain and fever  -Melatonin 3mg at bedtime  -Miralax prn for constipation  -NRT  -Olanzapine 10 mg PO/IM prn Q2H severe agitation/psychosis  -Gabapentin 100 mg Q6H PRN for anxiety  -Senna for constipation    Additional Plans:  - Patient will be treated in therapeutic milieu with appropriate individual and group therapies as described  - Referral to CD treatment    Medical diagnoses to be addressed this admission:    None    Consults: CD consult pending    Legal Status: Voluntary     Safety Assessment:   Behavioral Orders    Procedures     Code 1 - Restrict to Unit     Discontinue 1:1 attendant for suicide risk     Order Specific Question:   I have performed an in person assessment of the patient     Answer:   Based on this assessment the patient no longer requires a one on one attendant at this point in time.     Order Specific Question:   Rationale     Answer:   Patient States able to remain safe in hospital     Routine Programming     As clinically indicated     Sexual precautions     Status 15     Every 15 minutes.     Suicide precautions     Patients on Suicide Precautions should have a Combination Diet ordered that includes a Diet selection(s) AND a Behavioral Tray selection for Safe Tray - with utensils, or Safe Tray - NO utensils       Withdrawal precautions       SIO: No    Disposition: Pending stabilization, medication optimization, & development of a safe discharge plan.    Attending Attestation:    I was present with the medical student who participated in the service and in the documentation of the note. I have verified the history and personally performed the exam and medical decision making. I agree with the assessment and plan of care as documented in the note. Stephan George M.D., Ph.D.

## 2022-02-24 NOTE — PLAN OF CARE
Assessment/Intervention/Current Symptoms and Care Coordination: Met with team. Reviewed patient's chart and progress notes. Patient completed CD assessment update. Originally patient was interested in residential tx but when completing CD update he reported  interest in IOP at this time. However, he did not opposed to send referrals to [facility].     -Patient signed ROIs for: [facility], [facility], [facility], and [facility].     - Patient was given information on [facility] program. Writer notified attending provider of patient's interest on treatment options.   - Writer recommended to attending provider discharging patient to a crisis bed while he awaits to start treatment at an IOP treatment facility.     - Writer received a request for H&P, MAR, and progress notes from [facility]. Writer faxed requested documents to Fina Fischer at [facility] Phone  [phone]   Fax: [phone]           Discharge Plan or Goal: Unclear if patient will attend treatment at a residential level or [facility] for Substance use since he is still exploring his options. Will follow up with outpatient mental health provider at [facility]. Upcoming appointment is 3/8/2022.           Barriers to Discharge: SI and ongoing substance use. Needs clinical stabilization and medication management.                  Referral Status: None today           Legal Status:  Voluntary

## 2022-02-24 NOTE — PLAN OF CARE
"Pt denies SI.  Pt has appeared very clam all shift.  Pt social with staff and patients.  Pt siting in lounge with feet on table and arm on back of chair.  When group time was announced pt came to med window requested PRN Gabapentin and Zyprexa.  Per MD Zyprexa for severe agitation.  Staff told pt just 30 seconds ago you were sitting in chair appearing very relaxed.  Pt got irritated and said that he is very agitated.    Problem: Plan of Care - These are the overarching goals to be used throughout the patient stay.    Goal: Plan of Care Review/Shift Note  Description: The Plan of Care Review/Shift note should be completed every shift.  The Outcome Evaluation is a brief statement about your assessment that the patient is improving, declining, or no change.  This information will be displayed automatically on your shift note.  Outcome: Ongoing, Not Progressing  Goal: Patient-Specific Goal (Individualized)  Description: You can add care plan individualizations to a care plan. Examples of Individualization might be:  \"Parent requests to be called daily at 9am for status\", \"I have a hard time hearing out of my right ear\", or \"Do not touch me to wake me up as it startles me\".  Outcome: Ongoing, Not Progressing  Goal: Absence of Hospital-Acquired Illness or Injury  Outcome: Ongoing, Not Progressing  Goal: Optimal Comfort and Wellbeing  Outcome: Ongoing, Not Progressing  Goal: Readiness for Transition of Care  Outcome: Ongoing, Not Progressing     Problem: Behavioral Health Plan of Care  Goal: Optimal Comfort and Wellbeing  Outcome: Ongoing, Not Progressing  Goal: Plan of Care Review  Outcome: Ongoing, Not Progressing  Goal: Patient-Specific Goal (Individualization)  Outcome: Ongoing, Not Progressing  Goal: Adheres to Safety Considerations for Self and Others  Outcome: Ongoing, Not Progressing  Goal: Absence of New-Onset Illness or Injury  Outcome: Ongoing, Not Progressing  Goal: Optimized Coping Skills in Response to Life " Stressors  Outcome: Ongoing, Not Progressing  Goal: Develops/Participates in Therapeutic [place] to Support Successful Transition  Outcome: Ongoing, Not Progressing  Goal: Team Discussion  Outcome: Ongoing, Not Progressing   Goal Outcome Evaluation:    Plan of Care Reviewed With: patient     Overall Patient Progress: no change

## 2022-02-24 NOTE — PLAN OF CARE
"Goal Outcome Evaluation:    Plan of Care Reviewed With: patient     Overall Patient Progress: no change      Pt was isolative the entire shift except for dinner and snack. Ate 100% dinner. Napped all shift feeling tired. Denied feeling suicidal. \" I'm not suicidal right now.... just let me sleep\"    Prn Remeron and melatonin given at bedtime for sleep.             "

## 2022-02-24 NOTE — CONSULTS
2/24/2022    CD consult completed.   ROIs and phone numbers for pt to call will be emailed to [facility].   Referrals will be sent out today.    Recommendations:  1. Abstain from all non-prescribed mood-altering substances  2. Take all medications as prescribed  3. Enter and complete a co-occurring residential or IOP with lodging program  4. Follow all recommendations upon discharge from treatment. Recommendations may include, but are not limited to: extended treatment, outpatient treatment and/or sober housing.  5. Follow all recommendations of your medical and mental health providers      Referrals/ Alternatives:  [facility] Team for Referrals  Phone: [phone]  Fax: [phone]  [address] [address]  Tel: [phone]  Fax: [phone]    [facility] Outpatient Admissions:   [address] [facility]   Phone: [phone]   Fax: [phone]    [address] [facility] [address]    [address] [place] [address]   Phone: [phone]   Fax: [phone]    [address] [place] [address]    [facility]   Phone: [phone]   Fax: [phone]  [address] [facility] [address]    [facility] IOP with lodging  Ph: [phone]  Fax: [phone]    [facility]   Phone: [phone]  Fax: [phone]   [address] [place] [address] [address]          [place] [address]             URSZULA consult completed by: JANA Henry.  Phone Number: [phone]  E-mail Address: [email or web address] Evaluation Department  [address] [place], [place] [id number]     *Due to regulation of Title 42 of the Code of Federal Regulations (CFR) Part 2: Confidentiality laws apply to this note and the information wherein.  Thus, this note cannot be copy and pasted into any other health care staff's note nor can it be included in general medical records sent  to ANY outside agency without the patient's written consent.

## 2022-02-25 VITALS
DIASTOLIC BLOOD PRESSURE: 85 MMHG | HEART RATE: 77 BPM | WEIGHT: 220.5 LBS | BODY MASS INDEX: 29.87 KG/M2 | RESPIRATION RATE: 16 BRPM | SYSTOLIC BLOOD PRESSURE: 126 MMHG | HEIGHT: 72 IN | TEMPERATURE: 97.6 F | OXYGEN SATURATION: 98 %

## 2022-02-25 PROBLEM — F41.9 ANXIETY: Chronic | Status: ACTIVE | Noted: 2022-02-25

## 2022-02-25 LAB — SARS-COV-2 RNA RESP QL NAA+PROBE: NEGATIVE

## 2022-02-25 PROCEDURE — 99238 HOSP IP/OBS DSCHRG MGMT 30/<: CPT | Mod: GC | Performed by: PSYCHIATRY & NEUROLOGY

## 2022-02-25 PROCEDURE — 87635 SARS-COV-2 COVID-19 AMP PRB: CPT | Performed by: STUDENT IN AN ORGANIZED HEALTH CARE EDUCATION/TRAINING PROGRAM

## 2022-02-25 PROCEDURE — 250N000013 HC RX MED GY IP 250 OP 250 PS 637: Performed by: STUDENT IN AN ORGANIZED HEALTH CARE EDUCATION/TRAINING PROGRAM

## 2022-02-25 RX ORDER — GABAPENTIN 100 MG/1
100 CAPSULE ORAL 3 TIMES DAILY PRN
Qty: 90 CAPSULE | Refills: 0 | Status: SHIPPED | OUTPATIENT
Start: 2022-02-25 | End: 2023-08-30

## 2022-02-25 RX ORDER — AMOXICILLIN 250 MG
2 CAPSULE ORAL DAILY PRN
Qty: 15 TABLET | Refills: 0 | Status: SHIPPED | OUTPATIENT
Start: 2022-02-25 | End: 2023-08-30

## 2022-02-25 RX ORDER — MIRTAZAPINE 7.5 MG/1
7.5 TABLET, FILM COATED ORAL
Qty: 30 TABLET | Refills: 0 | Status: ON HOLD | OUTPATIENT
Start: 2022-02-25 | End: 2023-09-01

## 2022-02-25 RX ORDER — RISPERIDONE 1 MG/1
1 TABLET ORAL 3 TIMES DAILY PRN
Qty: 30 TABLET | Refills: 0 | Status: SHIPPED | OUTPATIENT
Start: 2022-02-25 | End: 2023-08-30

## 2022-02-25 RX ORDER — ACETAMINOPHEN 325 MG/1
650 TABLET ORAL EVERY 4 HOURS PRN
Qty: 30 TABLET | Refills: 0 | Status: SHIPPED | OUTPATIENT
Start: 2022-02-25 | End: 2023-08-29

## 2022-02-25 RX ORDER — PANTOPRAZOLE SODIUM 40 MG/1
40 TABLET, DELAYED RELEASE ORAL 2 TIMES DAILY
Qty: 60 TABLET | Refills: 0 | Status: SHIPPED | OUTPATIENT
Start: 2022-02-25 | End: 2023-08-30

## 2022-02-25 RX ADMIN — GABAPENTIN 100 MG: 100 CAPSULE ORAL at 06:16

## 2022-02-25 RX ADMIN — APIXABAN 5 MG: 2.5 TABLET, FILM COATED ORAL at 08:42

## 2022-02-25 RX ADMIN — PANTOPRAZOLE SODIUM 40 MG: 40 TABLET, DELAYED RELEASE ORAL at 08:42

## 2022-02-25 RX ADMIN — BICTEGRAVIR SODIUM, EMTRICITABINE, AND TENOFOVIR ALAFENAMIDE FUMARATE 1 TABLET: 50; 200; 25 TABLET ORAL at 08:42

## 2022-02-25 RX ADMIN — SERTRALINE HYDROCHLORIDE 50 MG: 50 TABLET ORAL at 08:42

## 2022-02-25 ASSESSMENT — ACTIVITIES OF DAILY LIVING (ADL)
DRESS: STREET CLOTHES;INDEPENDENT
ORAL_HYGIENE: INDEPENDENT
LAUNDRY: WITH SUPERVISION
HYGIENE/GROOMING: SHOWER;INDEPENDENT

## 2022-02-25 NOTE — DISCHARGE SUMMARY
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  States he has a plan to jump off a bridge.  Was recently admitted to hospital for substance-induced psychosis, suicidality, amphetamine abuse.  Was just discharged yesterday from CD treatment.  Patient states that he cannot help himself and used crack cocaine yesterday.  This causes him severe depression and anxiety.  States he has a plan to jump off a bridge.  Says he is desperate for rehabilitation and continued mental health assistance.  He feels that if he leaves the ED he will certainly commit suicide.  Denies any additional medical complaints.  States he used crack cocaine few hours prior to arrival.     Per patient interview:  Edi reports that he left CD treatment due to getting in an argument with a peer.  He reports \"I got bullied.\"  Staff asked him to meet with them in this patient to discuss their conflict, but he was not willing to do this.\"  I was the one with a place to go, so they sent me out.\"  After leaving treatment, he went back to his apartment.  He had left his keys with a couple of friends who \"trashed the place.\"  He found drugs and drug paraphernalia lying around, and \"used a little.\"  After using, he became paranoid that he was doing damage to his brain, and sought help in the ED.  \"I cannot live like that.\"  He was experiencing suicidal ideation to jump off a bridge, but came to the hospital instead.  Denies hallucinations or other psychotic symptoms.  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          Diagnoses:   #Primary Psychiatric Diagnosis  # Cocaine use disorder, severe     #Secondary Psychiatric Diagnosis  # Major depressive disorder, per hx     Diagnostic Impression:   Edi Sorenson is a 46 year old single male previously diagnosed with substance use disorder who presented voluntarily with SI in the context of recent discharge from CD treatment, and cocaine use. Substances are a contributing factor to their presentation.  Biological contributions to mental health presentation include childhood trauma exposure, recent and historical cocaine use, adherence to scheduled medications. Psychosocial factors contributing to current presentation include interpersonal stresses from time in CD treatment, and poor stewardship of his apartment by thoise he trusted to manage it. The patient's presentation is consistent with severe cocaine use disorder.         Consults:   CD Consult recommendation: Patient is recommended to attend IOP, referrals pending.         Hospital Course:   Psychiatric Course:  Edi Sorenson was admitted to [facility] as a voluntary patient. PTA Eliquis, Biktarvy, Sertraline, Pantoprazole were continued. No new medications started at the time of admission. Pt met with CD team and decided IOP would be best option for him. Discharged to crisis bed in the interim while pt connects with sober housing and [facility] CD services.     Medical Course: Edi Sorenson was medically cleared by the ED prior to admission to the unit. PTA medications were continued. Admission labs were notable for the following:  - EKG with normal sinus rhythm  - UDS Positive for cocaine, otherwise not detected  - COVID negative  - Cr wnl (0.90)    Risk Assessment:      Today Edi Sorenson denies SI/SIB/HI. He has notable risk " factors for self-harm, including single status, substance abuse and previous suicide attempts. However, risk is mitigated by desire to live, ability to volunteer a safety plan and history of seeking help when needed. Therefore, based on all available evidence including the factors cited above, he does not appear to be at imminent risk for self-harm, does not meet criteria for a 72-hr hold, and therefore remains appropriate for ongoing outpatient level of care. Additional steps taken to minimize risk include: medication optimization, close psychiatric follow up and provision of crisis resources. Voluntary referral for crisis bed was offered, he accepted this offer.    Edi was released to crisis bed. During this admission, he did participate in groups and was visible in the milieu, and his symptoms of SI improved. At the time of discharge he was determined to not be a danger to himself or others.     This document serves as a transfer of care to Edi Sorenson's outpatient providers.         Discharge Medications:     Current Discharge Medication List      START taking these medications    Details   gabapentin (NEURONTIN) 100 MG capsule Take 1 capsule (100 mg) by mouth 3 times daily as needed for other (anxiety)  Qty: 90 capsule, Refills: 0    Associated Diagnoses: Anxiety         CONTINUE these medications which have CHANGED    Details   acetaminophen (TYLENOL) 325 MG tablet Take 2 tablets (650 mg) by mouth every 4 hours as needed for mild pain (to moderate pain)  Qty: 30 tablet, Refills: 0    Associated Diagnoses: Acute midline low back pain without sciatica      apixaban ANTICOAGULANT (ELIQUIS) 5 MG tablet Take 1 tablet (5 mg) by mouth 2 times daily  Qty: 60 tablet, Refills: 0    Associated Diagnoses: Personal history of venous thrombosis and embolism      bictegravir-emtricitabine-tenofovir (BIKTARVY) -25 MG per tablet Take 1 tablet by mouth daily  Qty: 30 tablet, Refills: 0    Associated Diagnoses:  Human immunodeficiency virus (HIV) disease (H)      mirtazapine (REMERON) 7.5 MG tablet Take 1 tablet (7.5 mg) by mouth nightly as needed (sleep)  Qty: 30 tablet, Refills: 0    Associated Diagnoses: Moderate episode of recurrent major depressive disorder (H)      pantoprazole (PROTONIX) 40 MG EC tablet Take 1 tablet (40 mg) by mouth 2 times daily  Qty: 60 tablet, Refills: 0    Associated Diagnoses: Dyspepsia      risperiDONE (RISPERDAL) 1 MG tablet Take 1 tablet (1 mg) by mouth 3 times daily as needed (auditory hallucinations)  Qty: 30 tablet, Refills: 0    Associated Diagnoses: Moderate episode of recurrent major depressive disorder (H); 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)      senna-docusate (SENOKOT-S/PERICOLACE) 8.6-50 MG tablet Take 2 tablets by mouth daily as needed for constipation  Qty: 15 tablet, Refills: 0    Associated Diagnoses: Constipation, unspecified constipation type      sertraline (ZOLOFT) 50 MG tablet Take 1 tablet (50 mg) by mouth daily  Qty: 30 tablet, Refills: 0    Associated Diagnoses: Moderate episode of recurrent major depressive disorder (H)         CONTINUE these medications which have NOT CHANGED    Details   alum & mag hydroxide-simethicone (MAALOX) 200-200-20 MG/5ML SUSP suspension Take 30 mLs by mouth every 6 hours as needed for indigestion      benzocaine-menthol (CEPACOL) 15-3.6 MG lozenge Place 1 lozenge inside cheek every 2 hours as needed for moderate pain      guaiFENesin (ROBITUSSIN) 20 mg/mL SOLN solution Take 10 mLs by mouth every 4 hours as needed for cough      ibuprofen (ADVIL/MOTRIN) 600 MG tablet Take 1 tablet (600 mg) by mouth every 6 hours as needed for moderate pain  Qty: 14 tablet, Refills: 0    Associated Diagnoses: Acute midline low back pain without sciatica      loratadine (CLARITIN) 10 MG tablet Take 10 mg by mouth daily as needed for allergies      melatonin 3 MG tablet Take 1 tablet (3 mg) by mouth nightly as needed  "for sleep  Qty: 30 tablet, Refills: 0    Associated Diagnoses: Moderate episode of recurrent major depressive disorder (H)      polyethylene glycol (MIRALAX) 17 GM/Dose powder Take 17 g by mouth daily  Qty: 510 g, Refills: 0    Associated Diagnoses: Constipation, unspecified constipation type                  Psychiatric Examination:   Appearance:  no apparent distress and appears stated age  Attitude:  cooperative, engaged, friendly and pleasant  Psychomotor:  normal and no evidence of tics, dystonia, or tardive dyskinesia  Eye Contact: appropriate  Speech:  fluent English  Mood: \"motivated to do bigger and better things\"  Affect:  appropriate, bright, optimistic  Thought Content: denies suicidal ideation, denies homicidal ideation and denies delusions  Thought Process: linear, coherent and goal directed  Sensorium: awake and alert  Cognition: memory grossly intact  Impulse control: poor  Insight: good  Judgment: fair         Discharge Plan:   Psychiatric Appointments:    [facility]  Appointment Date/Time: 3/8/2022 at 3:00 PM. This will be an in person appointment    Follow up: 2022 at 12:30 PM.     Psychiatrist/Primary Care Giver: Yuri Pham      Address: [address] [place] [address], [place] [address]     Phone Number: [phone]     Other Referrals:   [facility]  Admission Date: 2022  Address: [address] [place] [address]  Phone: [phone]    Pt seen and discussed with my attending physician, Stephan George MD.     Alicia Raza MD  PGY-1 Psychiatry Resident Physician    Attestation:   The patient has been seen and evaluated by me,  Stephan George MD. I have examined the patient today and reviewed the discharge plan with the resident and medical student. I agree with the final assessment and plan, as noted in the discharge summary. I have reviewed today's vital signs, medications, labs and imaging.  Total time discharge plannin " minutes  Stephan George MD ,Ph.D.          Appendix A: All Labs This Admission:     Results for orders placed or performed during the hospital encounter of 02/21/22   Asymptomatic COVID-19 Virus (Coronavirus) by PCR Nasopharyngeal     Status: Normal    Specimen: Nasopharyngeal; Swab   Result Value Ref Range    SARS CoV2 PCR Negative Negative    Narrative    Testing was performed using the jackeline  SARS-CoV-2 & Influenza A/B Assay on the jackeline  Minna  System.  This test should be ordered for the detection of SARS-COV-2 in individuals who meet SARS-CoV-2 clinical and/or epidemiological criteria. Test performance is unknown in asymptomatic patients.  This test is for in vitro diagnostic use under the FDA EUA for laboratories certified under CLIA to perform moderate and/or high complexity testing. This test has not been FDA cleared or approved.  A negative test does not rule out the presence of PCR inhibitors in the specimen or target RNA in concentration below the limit of detection for the assay. The possibility of a false negative should be considered if the patient's recent exposure or clinical presentation suggests COVID-19.  [facility] are certified under the Clinical Laboratory Improvement Amendments of 1988 (CLIA-88) as qualified to perform moderate and/or high complexity laboratory testing.   Drug abuse screen 1 urine (ED)     Status: Abnormal   Result Value Ref Range    Amphetamines Urine Screen Negative Screen Negative    Barbiturates Urine Screen Negative Screen Negative    Benzodiazepines Urine Screen Negative Screen Negative    Cannabinoids Urine Screen Negative Screen Negative    Cocaine Urine Screen Positive (A) Screen Negative    Opiates Urine Screen Negative Screen Negative   Creatinine     Status: Normal   Result Value Ref Range    Creatinine 0.90 0.66 - 1.25 mg/dL    GFR Estimate >90 >60 mL/min/1.73m2   Asymptomatic COVID-19 Virus (Coronavirus) by PCR Nose     Status: Normal     Specimen: Nose; Swab   Result Value Ref Range    SARS CoV2 PCR Negative Negative    Narrative    Testing was performed using the jackeline  SARS-CoV-2 & Influenza A/B Assay on the jackeline  Minna  System.  This test should be ordered for the detection of SARS-COV-2 in individuals who meet SARS-CoV-2 clinical and/or epidemiological criteria. Test performance is unknown in asymptomatic patients.  This test is for in vitro diagnostic use under the FDA EUA for laboratories certified under CLIA to perform moderate and/or high complexity testing. This test has not been FDA cleared or approved.  A negative test does not rule out the presence of PCR inhibitors in the specimen or target RNA in concentration below the limit of detection for the assay. The possibility of a false negative should be considered if the patient's recent exposure or clinical presentation suggests COVID-19.  [facility] are certified under the Clinical Laboratory Improvement Amendments of 1988 (CLIA-88) as qualified to perform moderate and/or high complexity laboratory testing.   Urine Drugs of Abuse Screen     Status: Abnormal    Narrative    The following orders were created for panel order Urine Drugs of Abuse Screen.  Procedure                               Abnormality         Status                     ---------                               -----------         ------                     Drug abuse screen 1 urin...[[id number]]  Abnormal            Final result                 Please view results for these tests on the individual orders.

## 2022-02-25 NOTE — PLAN OF CARE
"Pt discharged as per ordew.  Pt denies SI.  Reviewed discharge paperwork with pt.  Pt pleasant and cooperative.  Pt discharged with all belongings, discharge paperwork, and ordered discharge medications.  Pt discharged via cab to home to  belongings.  Then pt set up medical ride to take him to [facility].    Problem: Plan of Care - These are the overarching goals to be used throughout the patient stay.    Goal: Plan of Care Review/Shift Note  Description: The Plan of Care Review/Shift note should be completed every shift.  The Outcome Evaluation is a brief statement about your assessment that the patient is improving, declining, or no change.  This information will be displayed automatically on your shift note.  Outcome: Adequate for Care Transition  Goal: Patient-Specific Goal (Individualized)  Description: You can add care plan individualizations to a care plan. Examples of Individualization might be:  \"Parent requests to be called daily at 9am for status\", \"I have a hard time hearing out of my right ear\", or \"Do not touch me to wake me up as it startles me\".  Outcome: Adequate for Care Transition  Goal: Absence of Hospital-Acquired Illness or Injury  Outcome: Adequate for Care Transition  Goal: Optimal Comfort and Wellbeing  Outcome: Adequate for Care Transition  Goal: Readiness for Transition of Care  Outcome: Adequate for Care Transition     Problem: Behavioral Health Plan of Care  Goal: Optimal Comfort and Wellbeing  Outcome: Adequate for Care Transition  Goal: Plan of Care Review  Outcome: Adequate for Care Transition  Goal: Patient-Specific Goal (Individualization)  Outcome: Adequate for Care Transition  Goal: Adheres to Safety Considerations for Self and Others  Outcome: Adequate for Care Transition  Goal: Absence of New-Onset Illness or Injury  Outcome: Adequate for Care Transition  Goal: Optimized Coping Skills in Response to Life Stressors  Outcome: Adequate for Care Transition  Goal: " Develops/Participates in Therapeutic [place] to Support Successful Transition  Outcome: Adequate for Care Transition  Goal: Team Discussion  Outcome: Adequate for Care Transition     Problem: Activity and Energy Impairment (Excessive Substance Use)  Goal: Optimized Energy Level (Excessive Substance Use)  Outcome: Adequate for Care Transition  Intervention: Optimize Energy Level  Recent Flowsheet Documentation  Taken 2/25/2022 1432 by Chanel Hairston RN  Activity (Behavioral Health): up ad shawn     Problem: Behavior Regulation Impairment (Excessive Substance Use)  Goal: Improved Behavioral Control (Excessive Substance Use)  Outcome: Adequate for Care Transition     Problem: Physiologic Impairment (Excessive Substance Use)  Goal: Improved Physiologic Symptoms (Excessive Substance Use)  Outcome: Adequate for Care Transition     Problem: Decreased Participation and Engagement (Excessive Substance Use)  Goal: Increased Participation and Engagement (Excessive Substance Use)  Outcome: Adequate for Care Transition     Problem: Social, Occupational or Functional Impairment (Excessive Substance Use)  Goal: Enhanced Social, Occupational or Functional Skills (Excessive Substance Use)  Outcome: Adequate for Care Transition     Problem: Suicidal Behavior  Goal: Suicidal Behavior is Absent or Managed  Outcome: Adequate for Care Transition     Problem: Sleep Disturbance  Goal: Adequate Sleep/Rest  Outcome: Adequate for Care Transition     Problem: General Rehab Plan of Care  Goal: Therapeutic Recreation/Music Therapy Goal  Description: The patient and/or their representative will achieve their patient-specific goals related to the plan of care.  The patient-specific goals include:  Outcome: Adequate for Care Transition   Goal Outcome Evaluation:    Plan of Care Reviewed With: patient     Overall Patient Progress: no change

## 2022-02-25 NOTE — PLAN OF CARE
Assessment/Intervention/Current Symptoms and Care Coordination: Met with team. Reviewed patient's chart and progress notes. Writer called [facility] crisis team. Patient was cleared for admission today any time in the afternoon. Writer met with patient to discuss aftercare and discharge plan. Writer provided patient with list of sober homes for [facility]. Writer faxed covid test results to [facility]. Writer completed AVS.         Discharge Plan or Goal: [facility]. Will follow up with outpatient mental health provider at [facility], [place]. Upcoming appointment is 3/8/2022.           Barriers to Discharge: Denies SI/SIB/HI                 Referral Status: Referred to [facility]           Legal Status:  Voluntary

## 2022-02-25 NOTE — PLAN OF CARE
Towards the end of the shift, patient endorsed anxiety 8/10. PRN Gabapentin was given as ordered; was helpful. Pt appeared to have slept for 5.50 hours. Will continue to monitor and offer support.     Problem: Sleep Disturbance  Goal: Adequate Sleep/Rest  Outcome: Ongoing, Progressing   Goal Outcome Evaluation:    Plan of Care Reviewed With: patient     Overall Patient Progress: no change

## 2022-02-25 NOTE — DISCHARGE INSTRUCTIONS
Behavioral Discharge Planning and Instructions    Summary: You were admitted on 2/21/2022  due to SI.  You were treated by Dr. George and discharged on 2/25/2022 [facility]    Main Diagnosis: MDD, cocaine use disorder.        Health Care Follow-up:   [facility]  Admission Date: 2/25/2022  Address: [address] [place] [address]  Phone: [phone]    [facility]  Appointment Date/Time: 3/8/2022 at 3:00 PM. This will be an in person appointment    Follow up: 4/8/2022 at 12:30 PM.     Psychiatrist/Primary Care Giver: Yuri Pham      Address: [address] [facility], [address], [place] [address]     Phone Number: [phone]       Other Providers:  [facility]   [facility]    [address] [place] [address]    [address]    [place] [address]    [phone]      [facility]: [phone]  Your ID: [id number]        Attend all scheduled appointments with your outpatient providers. Call at least 24 hours in advance if you need to reschedule an appointment to ensure continued access to your outpatient providers.     Major Treatments, Procedures and Findings:  You were provided with: a psychiatric assessment, assessed for medical stability, medication evaluation and/or management, group therapy, CD evaluation/assessment and milieu management    Symptoms to Report: feeling more aggressive, increased confusion, losing more sleep, mood getting worse or thoughts of suicide    Early warning signs can include: increased depression or anxiety sleep disturbances increased thoughts or behaviors of suicide or self-harm  increased unusual thinking, such as paranoia or hearing voices    Safety and Wellness:  Take all medicines as directed.  Make no changes unless your doctor suggests them. Follow treatment recommendations.  Refrain from alcohol and non-prescribed drugs.  Ask your support system to help you reduce your access to items that could harm yourself or  "others. If there is a concern for safety, call 911.    Resources:     [facility] Team for Referrals  Phone: [phone]  Fax: [phone]    [address] [place] [address]  Tel: [phone]  Fax: [phone]    [facility] Outpatient Admissions:  [address] [facility]  Phone: [phone]  Fax: [phone]  [address] [facility] [address]    [address] [facility]  Phone: [phone]  Fax: [phone]  [address] [facility] [address]    [facility]  Phone: [phone]  Fax: [phone]  [address] [facility] [address]    [facility] with lodging  Ph: [phone]  Fax: [phone]    [facility]  Phone: [phone]  Fax: [phone]  [facility] [address]  [place] [address]          Crisis Intervention: 240.527.3558 or 457-896-1153 (TTY: 729.299.1158).  Call anytime for help.  National [place] on Mental Illness (www.[place].eliot.org): 838.138.1892 or 633-936-7838.  Alcoholics Anonymous (www.alcoholics-anonymous.org): Check your phone book for your local chapter.  Suicide Awareness Voices of Education (SAVE) (www.save.org): 150-731-YXDN (7941)  National Suicide Prevention Line (www.mentalhealthmn.org): 747-702-NWWL (4636)  Mental Health Consumer/Survivor Network of [place] (www.mhcsn.net): 701.752.9511 or 751-776-8150  Mental Health Association of [place] (www.mentalhealth.org): 695.368.8521 or 035-897-6370  Self- Management and Recovery Training., SMART-- Toll free: 582.817.5375  www.[employer]  [facility] Crisis (COPE) Response - Adult 365 778-1440  Text 4 Life: txt \"LIFE\" to 70554 for immediate support and crisis intervention  Crisis text line: Text \"[place]\" to 573278. Free, confidential, 24/7.    General Medication Instructions:   See your medication sheet(s) for instructions.   Take all medicines as directed.  Make no changes unless your doctor suggests them.   Go to all your doctor " visits.  Be sure to have all your required lab tests. This way, your medicines can be refilled on time.  Do not use any drugs not prescribed by your doctor.  Avoid alcohol.    Advance Directives:   Scanned document on file with [employer]? No scanned doc  Is document scanned? Pt states no documents  Honoring Choices Your Rights Handout: Informed and given  Was more information offered? Pt declined    The Treatment team has appreciated the opportunity to work with you. If you have any questions or concerns about your recent admission, you can contact the unit which can receive your call 24 hours a day, 7 days a week. They will be able to get in touch with a Provider if needed. The unit number is [phone].

## 2022-02-25 NOTE — PLAN OF CARE
Problem: Activity and Energy Impairment (Excessive Substance Use)  Goal: Optimized Energy Level (Excessive Substance Use)  Outcome: Ongoing, Progressing  Flowsheets (Taken 2/24/2022 1826)  Mutually Determined Action Steps (Optimized Energy Level): grooms self without prompting     Problem: Behavior Regulation Impairment (Excessive Substance Use)  Goal: Improved Behavioral Control (Excessive Substance Use)  Outcome: Ongoing, Progressing  Flowsheets (Taken 2/24/2022 1826)  Mutually Determined Action Steps (Improved Behavioral Control): verbalizes safe alternatives     Patient was intermittently out visible, social with peers, presented with full range affect, denied suicidal and self harm ideations, hygiene was encouraged, nutrition and vitals were adequate, denied pain and discomfort, no concerns noted during shift.

## 2022-09-27 NOTE — PROGRESS NOTES
[facility] Triage and after hours / weekends / holidays:  [phone]    Please call the triage or after hours line if you experience a temperature greater than or equal to 100.4, shaking chills, have uncontrolled nausea, vomiting and/or diarrhea, dizziness, shortness of breath, chest pain, bleeding, unexplained bruising, or if you have any other new/concerning symptoms, questions or concerns.      If you are having any concerning symptoms or wish to speak to a provider before your next infusion visit, please call your care coordinator or triage to notify them so we can adequately serve you.     If you need a refill on a narcotic prescription or other medication, please call before your infusion appointment.                September 2022 Sunday Monday Tuesday Wednesday Thursday Friday Saturday                       1    TREATMENT   3:00 PM   (30 min.)   [facility] RAD ONC FRANCES   [facility] Radiation Oncology 2    TREATMENT   2:00 PM   (30 min.)   [facility] RAD ONC FRANCES   [facility] Radiation Oncology 3       4     5     6    LAB CENTRAL   8:45 AM   (15 min.)   [facility]   [facility]    RETURN   9:15 AM   (45 min.)   Tammy Gutierrez CNP   [facility]    ONC INFUSION 2 HR (120 MIN)  11:00 AM   (120 min.)    ONC INFUSION NURSE   [facility]    TREATMENT   3:00 PM   (30 min.)   [facility] RAD ONC FRANCES   [facility] Radiation Oncology    OTV   3:30 PM   (30 min.)   Katie Jarvis MD   [facility] Radiation Oncology 7    TREATMENT   1:45 PM   (30 min.)   [facility] RAD ONC FRANCES   [facility] Radiation Oncology    RETURN   2:45 PM   (30 min.)   Rosmery Paige MD   [facility] 8    [facility] NUTRITION VISIT   3:00 PM   (15 min.)   Nereida Mace RD   [facility] Radiation Oncology    TREATMENT   3:00 PM   (30 min.)   [facility] RAD ONC FRANCES     [facility]   Psychiatric Progress Note  Hospital Day: 10        Interim History:   The patient's care was discussed with the treatment team during the daily team meeting and/or staff's chart notes were reviewed.  Staff reports that the patient has had no behavioral issues.    Upon interview, the patient was tired and in bed. Denies any issues and indicates he is waiting [facility].    Psychiatric Symptoms: no acute issues    Medication side effects reported: none    Other issues reported by patient: none new         Medications:       Ykbroof-Tfeig-Gcsecgai-TenofAF  1 tablet Oral Daily with breakfast     lactobacillus rhamnosus (GG)  1 capsule Oral BID     sertraline  50 mg Oral Daily     warfarin  10 mg Oral ONCE at 18:00          Allergies:   No Known Allergies       Labs:     Recent Results (from the past 48 hour(s))   INR    Collection Time: 04/11/19  7:43 AM   Result Value Ref Range    INR 2.39 (H) 0.86 - 1.14   INR    Collection Time: 04/12/19 11:42 AM   Result Value Ref Range    INR 2.67 (H) 0.86 - 1.14          Psychiatric Examination:     /85   Pulse 75   Temp 97.4  F (36.3  C) (Tympanic)   Resp 16   Ht 1.829 m (6')   Wt 110.7 kg (244 lb 1.6 oz)   SpO2 98%   BMI 33.11 kg/m    Weight is 244 lbs 1.6 oz  Body mass index is 33.11 kg/m .    Orthostatic Vitals       Most Recent      Sitting Orthostatic /76 04/11 1600    Sitting Orthostatic Pulse (bpm) 77 04/11 1600    Standing Orthostatic /86 04/12 0800    Standing Orthostatic Pulse (bpm) 86 04/12 0800          Appearance: adequately groomed, sleepy and casually dressed  Attitude:  cooperative  Eye Contact:  poor   Mood:  better  Affect:  appropriate and in normal range and mood congruent  Speech:  clear, coherent and normal prosody  Language: fluent and intact in English  Psychomotor, Gait, Musculoskeletal:  no evidence of tardive dyskinesia, dystonia, or tics and intact station, gait and  [facility] Radiation Oncology 9    TREATMENT   3:00 PM   (30 min.)   [facility] RAD ONC FRANCES   [facility] Radiation Oncology 10       11     12    TREATMENT   3:00 PM   (30 min.)   [facility] RAD ONC FRANCES   [facility] Radiation Oncology    MYCHART E-VISIT  To Be Determined   (5 min.)   TELEHEALTH, GENERIC E-VISIT   [facility] 13    LAB CENTRAL   7:30 AM   (15 min.)   [facility]   [facility]    RETURN   7:45 AM   (45 min.)   April Arambula CNP   [facility]    ONC INFUSION 2 HR (120 MIN)   9:00 AM   (120 min.)   [facility] ONC INFUSION NURSE   [facility]    TREATMENT   3:00 PM   (30 min.)   [facility] RAD ONC FRANCES   [facility] Radiation Oncology    OTV   3:30 PM   (30 min.)   Katie Jarvis MD   [facility] Radiation Oncology 14    TREATMENT   3:00 PM   (30 min.)   [facility] RAD ONC FRANCES   [facility] Radiation Oncology 15    TREATMENT   2:30 PM   (30 min.)   [facility] RAD ONC FRANCES   [facility] Radiation Oncology 16    TREATMENT   1:30 PM   (30 min.)   [facility] RAD ONC FRANCES   [facility] Radiation Oncology 17       18     19    TREATMENT   2:30 PM   (30 min.)   [facility] RAD ONC FRANCES   [facility] Radiation Oncology 20    LAB CENTRAL   7:30 AM   (15 min.)   [facility]   [facility]    RETURN   7:45 AM   (45 min.)   April Arambula CNP   [facility]    ONC INFUSION 2 HR (120 MIN)   9:00 AM   (120 min.)   [facility] ONC INFUSION NURSE   [facility]    TREATMENT   3:00 PM   (30 min.)   [facility] RAD ONC FRANCES   [facility] Radiation Oncology    OTV   3:30 PM   (30 min.)   Katie Jarvis MD   [facility] Radiation Oncology 21    TREATMENT   2:30 PM   (30 min.)   [facility] RAD ONC FRANCES   [facility] Radiation Oncology  muscle tone  Throught Process:  logical, linear and goal oriented  Associations:  no loose associations  Thought Content:  no evidence of suicidal ideation or homicidal ideation and no evidence of psychotic thought  Insight:  good  Judgement:  intact  Oriented to:  time, person, and place  Attention Span and Concentration:  intact  Recent and Remote Memory:  intact  Fund of Knowledge:  appropriate    Clinical Global Impressions  First:  Considering your total clinical experience with this particular patient population, how severe are the patient's symptoms at this time?: 7 (04/02/19 1022)  Compared to the patient's condition at the START of treatment, this patient's condition is:: 4 (04/02/19 1022)  Most recent:  Considering your total clinical experience with this particular patient population, how severe are the patient's symptoms at this time?: 4 (04/10/19 1250)  Compared to the patient's condition at the START of treatment, this patient's condition is:: 2 (04/10/19 1250)           Precautions:     Behavioral Orders   Procedures     Code 1 - Restrict to Unit     Elopement precautions     Routine Programming     As clinically indicated     Sexual precautions     Status 15     Every 15 minutes.     Suicide precautions     Patients on Suicide Precautions should have a Combination Diet ordered that includes a Diet selection(s) AND a Behavioral Tray selection for Safe Tray - with utensils, or Safe Tray - NO utensils       Withdrawal precautions          Diagnoses:      1.  Major depressive disorder, recurrent, severe without psychotic features.   2.  Stimulant use disorder, severe, cocaine type.     3.  HIV positive.   4.  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  Herpes outbreak - treated           Assessment & Plan:       Target psychiatric symptoms and interventions:  1. Depression  -sertraline 50 mg and titrate as tolerated and needed    2. Substance use disorder  -approved for lodging plus. Waiting for bed.    Medical  22    RETURN  12:00 PM   (30 min.)   Rosmery Paige MD   [facility]    [facility] NUTRITION VISIT   2:30 PM   (15 min.)   Nereida Mace RD   [facility] Radiation Oncology    TREATMENT   3:00 PM   (30 min.)   [facility] RAD ONC FRANCES   [facility] Radiation Oncology 23    TREATMENT   1:30 PM   (30 min.)   P RAD ONC FRANCES   [facility] Radiation Oncology 24       25     26    TREATMENT   2:30 PM   (30 min.)   P RAD ONC FRANCES   [facility] Radiation Oncology    LAB CENTRAL   3:45 PM   (15 min.)    [facility]   [facility]    RETURN   4:30 PM   (45 min.)   April Arambula CNP   [facility] 27    ONC INFUSION 2 HR (120 MIN)  12:30 PM   (120 min.)    ONC INFUSION NURSE   [facility]    TREATMENT   3:00 PM   (30 min.)   [facility] RAD ONC FRANCES   [facility] Radiation Oncology    OTV   3:30 PM   (30 min.)   Katie Jarvis MD   [facility] Radiation Oncology 28    TREATMENT   3:00 PM   (30 min.)   P RAD ONC FRANCES   [facility] Radiation Oncology 29    TREATMENT   3:00 PM   (30 min.)   P RAD ONC FRANCES   [facility] Radiation Oncology 30    TREATMENT   3:00 PM   (30 min.)   P RAD ONC FRANCES   [facility] Radiation Oncology                    October 2022 Sunday Monday Tuesday Wednesday Thursday Friday Saturday                                 1       2     3     4     5     6     7     8       9     10     11     12     13     14    RETURN  11:00 AM   (15 min.)   Riccardo Jones MD   [facility] [place] 15       16     17     18     19     20     21     22       23     24    TELEPHONE VISIT RETURN  10:00 AM   (30 min.)   Nereida Mace RD   [facility] 25     26     27     28     29       30     31                     Problems and Treatments:  History of DVT  -management per internal medicine/pharmacy  -outpatient provider will resume prescription while patient is in LP.    Behavioral/Psychological/Social:  Encourage unit programming    Disposition Plan   Reason for ongoing admission: awaiting CD treatment  Discharge location: Chemical dependency treatment facility  Discharge Medications: not ordered  Follow-up Appointments: not scheduled  Legal Status: voluntary  Entered by: Nader Ortiz on 4/12/2019 at 1:42 PM                                       Lab Results:  No results found for this or any previous visit (from the past 12 hour(s)).

## 2023-08-28 ENCOUNTER — TELEPHONE (OUTPATIENT)
Dept: BEHAVIORAL HEALTH | Facility: CLINIC | Age: 48
End: 2023-08-28
Payer: COMMERCIAL

## 2023-08-28 ENCOUNTER — HOSPITAL ENCOUNTER (INPATIENT)
Facility: CLINIC | Age: 48
LOS: 1 days | Discharge: LEFT BEFORE DISCHARGE | DRG: 885 | End: 2023-09-01
Attending: PSYCHIATRY & NEUROLOGY | Admitting: PSYCHIATRY & NEUROLOGY
Payer: COMMERCIAL

## 2023-08-28 DIAGNOSIS — Z11.52 ENCOUNTER FOR SCREENING LABORATORY TESTING FOR SEVERE ACUTE RESPIRATORY SYNDROME CORONAVIRUS 2 (SARS-COV-2): ICD-10-CM

## 2023-08-28 DIAGNOSIS — F41.9 ANXIETY: Chronic | ICD-10-CM

## 2023-08-28 DIAGNOSIS — Z79.01 LONG TERM CURRENT USE OF ANTICOAGULANT THERAPY: ICD-10-CM

## 2023-08-28 DIAGNOSIS — F11.251 OPIOID DEPENDENCE WITH OPIOID-INDUCED PSYCHOTIC DISORDER WITH HALLUCINATIONS (H): ICD-10-CM

## 2023-08-28 DIAGNOSIS — F32.1 CURRENT MODERATE EPISODE OF MAJOR DEPRESSIVE DISORDER, UNSPECIFIED WHETHER RECURRENT (H): ICD-10-CM

## 2023-08-28 DIAGNOSIS — D36.7 DERMOID CYST OF RIGHT LOWER EXTREMITY: ICD-10-CM

## 2023-08-28 DIAGNOSIS — B20 HUMAN IMMUNODEFICIENCY VIRUS (HIV) DISEASE (H): Primary | ICD-10-CM

## 2023-08-28 DIAGNOSIS — I82.491 DEEP VEIN THROMBOSIS (DVT) OF OTHER VEIN OF RIGHT LOWER EXTREMITY, UNSPECIFIED CHRONICITY (H): ICD-10-CM

## 2023-08-28 DIAGNOSIS — B20 HUMAN IMMUNODEFICIENCY VIRUS (HIV) DISEASE (H): ICD-10-CM

## 2023-08-28 DIAGNOSIS — F19.10 POLYSUBSTANCE ABUSE (H): ICD-10-CM

## 2023-08-28 DIAGNOSIS — G47.00 INSOMNIA, UNSPECIFIED TYPE: ICD-10-CM

## 2023-08-28 DIAGNOSIS — M62.838 MUSCLE SPASM: ICD-10-CM

## 2023-08-28 PROBLEM — F15.99 OTHER STIMULANT USE, UNSPECIFIED WITH UNSPECIFIED STIMULANT-INDUCED DISORDER (H): Status: ACTIVE | Noted: 2023-08-28

## 2023-08-28 PROCEDURE — 99285 EMERGENCY DEPT VISIT HI MDM: CPT | Performed by: PSYCHIATRY & NEUROLOGY

## 2023-08-28 PROCEDURE — 99285 EMERGENCY DEPT VISIT HI MDM: CPT | Mod: 25 | Performed by: PSYCHIATRY & NEUROLOGY

## 2023-08-28 PROCEDURE — 90791 PSYCH DIAGNOSTIC EVALUATION: CPT

## 2023-08-28 PROCEDURE — 250N000013 HC RX MED GY IP 250 OP 250 PS 637: Performed by: PSYCHIATRY & NEUROLOGY

## 2023-08-28 RX ORDER — RISPERIDONE 0.5 MG/1
1 TABLET, ORALLY DISINTEGRATING ORAL ONCE
Status: COMPLETED | OUTPATIENT
Start: 2023-08-28 | End: 2023-08-28

## 2023-08-28 RX ORDER — OLANZAPINE 10 MG/1
10 TABLET, ORALLY DISINTEGRATING ORAL ONCE
Status: COMPLETED | OUTPATIENT
Start: 2023-08-28 | End: 2023-08-28

## 2023-08-28 RX ORDER — MIRTAZAPINE 7.5 MG/1
7.5 TABLET, FILM COATED ORAL AT BEDTIME
Status: DISCONTINUED | OUTPATIENT
Start: 2023-08-28 | End: 2023-08-31

## 2023-08-28 RX ADMIN — MIRTAZAPINE 7.5 MG: 7.5 TABLET, FILM COATED ORAL at 22:53

## 2023-08-28 RX ADMIN — OLANZAPINE 10 MG: 10 TABLET, ORALLY DISINTEGRATING ORAL at 22:20

## 2023-08-28 RX ADMIN — RISPERIDONE 1 MG: 0.5 TABLET, ORALLY DISINTEGRATING ORAL at 20:23

## 2023-08-28 ASSESSMENT — ACTIVITIES OF DAILY LIVING (ADL)
ADLS_ACUITY_SCORE: 35
ADLS_ACUITY_SCORE: 35

## 2023-08-29 ENCOUNTER — TELEPHONE (OUTPATIENT)
Dept: BEHAVIORAL HEALTH | Facility: CLINIC | Age: 48
End: 2023-08-29
Payer: COMMERCIAL

## 2023-08-29 ENCOUNTER — TELEPHONE (OUTPATIENT)
Dept: BEHAVIORAL HEALTH | Facility: CLINIC | Age: 48
End: 2023-08-29

## 2023-08-29 LAB — SARS-COV-2 RNA RESP QL NAA+PROBE: NEGATIVE

## 2023-08-29 PROCEDURE — 250N000013 HC RX MED GY IP 250 OP 250 PS 637: Performed by: PSYCHIATRY & NEUROLOGY

## 2023-08-29 PROCEDURE — 99255 IP/OBS CONSLTJ NEW/EST HI 80: CPT

## 2023-08-29 PROCEDURE — 87635 SARS-COV-2 COVID-19 AMP PRB: CPT | Performed by: EMERGENCY MEDICINE

## 2023-08-29 PROCEDURE — 250N000011 HC RX IP 250 OP 636: Performed by: EMERGENCY MEDICINE

## 2023-08-29 PROCEDURE — 250N000013 HC RX MED GY IP 250 OP 250 PS 637: Performed by: EMERGENCY MEDICINE

## 2023-08-29 PROCEDURE — 250N000013 HC RX MED GY IP 250 OP 250 PS 637

## 2023-08-29 RX ORDER — OLANZAPINE 10 MG/1
10 TABLET, ORALLY DISINTEGRATING ORAL 2 TIMES DAILY PRN
Status: DISCONTINUED | OUTPATIENT
Start: 2023-08-29 | End: 2023-08-31

## 2023-08-29 RX ORDER — ONDANSETRON 8 MG/1
8 TABLET, ORALLY DISINTEGRATING ORAL EVERY 8 HOURS PRN
Status: DISCONTINUED | OUTPATIENT
Start: 2023-08-29 | End: 2023-09-01 | Stop reason: HOSPADM

## 2023-08-29 RX ORDER — OLANZAPINE 10 MG/2ML
10 INJECTION, POWDER, FOR SOLUTION INTRAMUSCULAR 2 TIMES DAILY PRN
Status: DISCONTINUED | OUTPATIENT
Start: 2023-08-29 | End: 2023-08-31

## 2023-08-29 RX ORDER — RISPERIDONE 1 MG/ML
1 SOLUTION ORAL AT BEDTIME
Status: DISCONTINUED | OUTPATIENT
Start: 2023-08-29 | End: 2023-08-31

## 2023-08-29 RX ORDER — ACETAMINOPHEN 325 MG/1
650 TABLET ORAL EVERY 6 HOURS PRN
Status: DISCONTINUED | OUTPATIENT
Start: 2023-08-29 | End: 2023-08-31

## 2023-08-29 RX ORDER — TRAZODONE HYDROCHLORIDE 50 MG/1
50 TABLET, FILM COATED ORAL
Status: DISCONTINUED | OUTPATIENT
Start: 2023-08-29 | End: 2023-08-31

## 2023-08-29 RX ORDER — HYDROXYZINE HYDROCHLORIDE 50 MG/1
50 TABLET, FILM COATED ORAL EVERY 6 HOURS PRN
Status: DISCONTINUED | OUTPATIENT
Start: 2023-08-29 | End: 2023-08-31

## 2023-08-29 RX ORDER — VALACYCLOVIR HYDROCHLORIDE 1 G/1
1 TABLET, FILM COATED ORAL 2 TIMES DAILY PRN
COMMUNITY
Start: 2023-06-01 | End: 2023-08-30

## 2023-08-29 RX ADMIN — RISPERIDONE 1 MG: 1 SOLUTION ORAL at 21:00

## 2023-08-29 RX ADMIN — ACETAMINOPHEN 650 MG: 325 TABLET, FILM COATED ORAL at 19:32

## 2023-08-29 RX ADMIN — MIRTAZAPINE 7.5 MG: 7.5 TABLET, FILM COATED ORAL at 21:32

## 2023-08-29 RX ADMIN — ONDANSETRON 8 MG: 4 TABLET, ORALLY DISINTEGRATING ORAL at 15:43

## 2023-08-29 RX ADMIN — HYDROXYZINE HYDROCHLORIDE 50 MG: 25 TABLET, FILM COATED ORAL at 19:32

## 2023-08-29 RX ADMIN — TRAZODONE HYDROCHLORIDE 25 MG: 50 TABLET ORAL at 19:32

## 2023-08-29 ASSESSMENT — ACTIVITIES OF DAILY LIVING (ADL)
ADLS_ACUITY_SCORE: 35

## 2023-08-29 NOTE — PROGRESS NOTES
Triage & Transition Services, Extended Care    Client Name: Edi Sorenson    Date: August 29, 2023  Service Type:  Group Therapy    Intervention:    Group process: support, challenge, affirm, psycho-education.     Response:  Patient did not participate in group d/t sleeping.     Kathryn Carty

## 2023-08-29 NOTE — PROGRESS NOTES
Around 1200 PM, the patient came from the ED to [facility], accompanied by ED staff. The patient is pleasant and cooperative with the care and staff. PT contracted for safety. Denied pain and reported chronic suicidal ideation but denied AH/VH. The patient appears sleepy, states that he is tired, and asks for medication for sleep. Pt was educated that he can have sleeping medication later tonight. A safety check is completed every 15 minutes. Will continue to monitor PT.

## 2023-08-29 NOTE — ED TRIAGE NOTES
Pt BIBA from his apartment. Pt has been having auditory and visual hallucinations for 4 days. Pt uses Fentanyl last use was 3/4 days ago.

## 2023-08-29 NOTE — PROGRESS NOTES
IP MH Referral Acuity Rating Score (RARS)    LMHP complete at referral to IP MH, with DEC; and, daily while awaiting IP MH placement. Call score to PPS.  CRITERIA SCORING   New 74 HH and Involuntary for IP MH (not adolescent) 0/1   Boarding over 24 hours 0/1   Vulnerable adult at least 55+ with multiple co morbidities; or, Patient age 11 or under 0/1   Suicide ideation without relief of precipitating factors 1/1   Current plan for suicide 1/1   Current plan for homicide 0/1   Imminent risk or actual attempt to seriously harm another without relief of factors precipitating the attempt 0/1   Severe dysfunction in daily living (ex: complete neglect for self care, extreme disruption in vegetative function, extreme deterioration in social interactions) 1/1   Recent (last 2 weeks) or current physical aggression in the ED 0/1   Restraints or seclusion episode in ED 0/1   Verbal aggression, agitation, yelling, etc., while in the ED 0/1   Active psychosis with psychomotor agitation or catatonia 1/1   Need for constant or near constant redirection (from leaving, from others, etc).  0/1   Intrusive or disruptive behaviors 0/1   TOTAL Acuity Total Score: 4

## 2023-08-29 NOTE — ED NOTES
Pt was having emesis and a running nose. It appears fatigued and tired. A COVID test was collected and sent to the lab. Zofran 8 mg was administered. VSS.

## 2023-08-29 NOTE — TELEPHONE ENCOUNTER
S: [facility] Cynthia , DEC  [place]  calling at 11:35 PM about a 47 year old/Male presenting with SI, withdrawal symptoms from Crack/Fentanyl  and VH/AH    B: Pt arrived via EMS. Presenting problem, stressors: Pt is feeling hopeless, helpless and Suicidal. Pt has been off his medications for a long time. SI is driven by patients substance use of Fentanyl and Crack last use was 4 days ago. Also has a lack of support system.    Pt affect in ED: Labile  Pt Dx: Major Depressive Disorder, Generalized Anxiety Disorder, and Schizophrenia  Previous [facility] hx? Yes: 2022 @ [place]  Pt endorses SI with a plan to starve himself    Hx of suicide attempt? Yes: to cut himself and unknown timeframe  Pt denies SIB  Pt denies HI   Pt endorses auditory hallucinations  and endorses visual hallucination .   Pt RARS Score: 4    Hx of aggression/violence, sexual offenses, legal concerns, Epic care plan? describe: No  Current concerns for aggression this visit? No  Does pt have a history of Civil Commitment? No  Is Pt their own guardian? Yes    Pt is prescribed medication. Is patient medication compliant? No  Pt denies OP services   CD concerns: Actively using/consuming Crack and Fentanyl  Acute or chronic medical concerns: HIV   Does Pt present with specific needs, assistive devices, or exclusionary criteria? None      Pt is ambulatory  Pt is able to perform ADLs independently      A: Pt to be reviewed for [facility] admission. Pt is Voluntary  Preferred placement: [facility]    COVID Symptoms: No  If yes, COVID test required   Utox: Ordered, not yet collected   CMP: Not ordered, intake requested lab  CBC: Not ordered, intake requested lab  HCG: N/A    R: Patient cleared and ready for behavioral bed placement: Yes  Pt placed on [facility] worklist? Yes    Does Patient need a Transfer Center request created? No, Pt is located within [facility] ED, [facility] ED, or [place] ED

## 2023-08-29 NOTE — TELEPHONE ENCOUNTER
R: No Beds within [facility].  Bed search initiated in [facility] - as Pt vol. And only wants [facility] search.    Bedsearch initiated @ 9am;  and again @ 3pm    [facility]:  @ capacity   [facility] ([facility] [place], and [facility] [place]) all at capacity.   [facility]: @ cap   [facility]: @ cap       Pt remains on waitlist pending appropriate placement availability

## 2023-08-29 NOTE — ED PROVIDER NOTES
"ED Provider Note  [facility]      History     Chief Complaint   Patient presents with    Withdrawal    Suicidal     Fentanyl use until 5 days ago, also hearing voices, suicidal ideations    Medication Refill     Pt has not taken his HIV medications for approximately 5 months now and would like them refilled.     HPI  Edi Sorenson is a 47 year old male with PMH of HIV, PE/DVT (on eliquis) 5/21, and polysubstance abuse (alcohol, cocaine, opioid) who presents to the ED BIBA for auditory/visual hallucinations and SI for 4 days. He also reports using fentanyl with last use being 3-4 days ago. He reports being in withdrawal but wants to go \"cold-turkey\" and declined trying Suboxone to help alleviate his withdrawal. Patient however would like to get admitted to inpatient psych for stabilization. He reports hearing voices and feeling suicidal and cannot be safe being in the community. He has been hospitalized previously. He also would like to get back on his HIV meds.    Please see DEC Crisis Assessment on 08/28/23 in Epic for further details.    Past Medical History  Past Medical History:   Diagnosis Date    Asymptomatic human immunodeficiency virus (HIV) infection status (H)     Cocaine abuse (H)     Depression     DVT (deep venous thrombosis) (H)     Last in 6/2018. No hx of PE. On Coumadin.    Genital HSV      History reviewed. No pertinent surgical history.  acetaminophen (TYLENOL) 325 MG tablet  alum & mag hydroxide-simethicone (MAALOX) 200-200-20 MG/5ML SUSP suspension  apixaban ANTICOAGULANT (ELIQUIS) 5 MG tablet  benzocaine-menthol (CEPACOL) 15-3.6 MG lozenge  bictegravir-emtricitabine-tenofovir (BIKTARVY) -25 MG per tablet  gabapentin (NEURONTIN) 100 MG capsule  guaiFENesin (ROBITUSSIN) 20 mg/mL SOLN solution  ibuprofen (ADVIL/MOTRIN) 600 MG tablet  loratadine (CLARITIN) 10 MG tablet  melatonin 3 MG tablet  mirtazapine (REMERON) 7.5 MG tablet  pantoprazole (PROTONIX) 40 MG EC " tablet  polyethylene glycol (MIRALAX) 17 GM/Dose powder  risperiDONE (RISPERDAL) 1 MG tablet  senna-docusate (SENOKOT-S/PERICOLACE) 8.6-50 MG tablet  sertraline (ZOLOFT) 50 MG tablet      No Known Allergies  Family History  History reviewed. No pertinent family history.  Social History   Social History     Tobacco Use    Smoking status: Former    Smokeless tobacco: Never   Substance Use Topics    Alcohol use: Yes    Drug use: Yes     Types: Cocaine     Comment: Used in the right before calling EMS      Past medical history, past surgical history, medications, allergies, family history, and social history were reviewed with the patient. No additional pertinent items.      A medically appropriate review of systems was performed with pertinent positives and negatives noted in the HPI, and all other systems negative.    Physical Exam   BP: 118/79  Pulse: 76  Temp: 98  F (36.7  C)  Resp: 18  SpO2: 95 %  Physical Exam  Vitals and nursing note reviewed.   HENT:      Head: Normocephalic.   Eyes:      Pupils: Pupils are equal, round, and reactive to light.   Pulmonary:      Effort: Pulmonary effort is normal.   Musculoskeletal:         General: Normal range of motion.      Cervical back: Normal range of motion.   Neurological:      General: No focal deficit present.      Mental Status: He is alert.   Psychiatric:         Attention and Perception: Attention and perception normal. He does not perceive auditory or visual hallucinations.         Mood and Affect: Affect normal. Mood is anxious and depressed.         Speech: Speech normal.         Behavior: Behavior normal. Behavior is not agitated, aggressive, hyperactive or combative. Behavior is cooperative.         Thought Content: Thought content is not paranoid or delusional. Thought content includes suicidal ideation. Thought content does not include homicidal ideation.         Cognition and Memory: Cognition and memory normal.         Judgment: Judgment normal.            ED Course, Procedures, & Data      Procedures       A consult was attained from the  DEC service. The case was discussed with the  from that service. The consulting service's recommendations were provided at 9:30 PM.  10 minutes spent discussing case, care and disposition. 10 minutes spent reviewing prior records and interventions.  Mental Health Risk Assessment        PSS-3      Date and Time Over the past 2 weeks have you felt down, depressed, or hopeless? Over the past 2 weeks have you had thoughts of killing yourself? Have you ever attempted to kill yourself? When did this last happen? User   08/28/23 2006 yes yes yes refused JBB          C-SSRS ([place])      Date and Time Q1 Wished to be Dead (Past Month) Q2 Suicidal Thoughts (Past Month) Q3 Suicidal Thought Method Q4 Suicidal Intent without Specific Plan Q5 Suicide Intent with Specific Plan Q6 Suicide Behavior (Lifetime) Within the Past 3 Months? RETIRED: Level of Risk per Screen Screening Not Complete User   08/28/23 2006 yes yes yes no yes no -- -- -- JBB              Suicide assessment completed by mental health (D.E.C., LCSW, etc.)       No results found for any visits on 08/28/23.  Medications   risperiDONE (risperDAL M-TABS) ODT tab 1 mg (1 mg Oral $Given 8/28/23 2023)   OLANZapine zydis (zyPREXA) ODT tab 10 mg (10 mg Oral $Given 8/28/23 2220)     Labs Ordered and Resulted from Time of ED Arrival to Time of ED Departure - No data to display  No orders to display          Critical care was not performed.     Medical Decision Making  The patient's presentation was of high complexity (a chronic illness severe exacerbation, progression, or side effect of treatment).    The patient's evaluation involved:  an assessment requiring an independent historian (see separate area of note for details)  review of external note(s) from 2 sources (see separate area of note for details)    The patient's management necessitated high risk (a decision  regarding hospitalization).    Assessment & Plan    Patient is here reporting that he is going through withdrawal from a month long abuse of fentanyl with last use 4-5 days ago. Patient does not exhibit discomfort nor appears to be in pain. He declined Suboxone to help with his withdrawal. He reports feeling suicidal and hearing voices and wants to get admitted to inpatient psychiatry. Patient is referred for admission. He is voluntary.    I have reviewed the nursing notes. I have reviewed the findings, diagnosis, plan and need for follow up with the patient.    New Prescriptions    No medications on file       Final diagnoses:   Opioid dependence with opioid-induced psychotic disorder with hallucinations (H)   Polysubstance abuse (H)   I, Sergey Baer, am serving as a trained medical scribe to document services personally performed by Joni Hernandez MD based on the provider's statements to me on August 28, 2023.  This document has been checked and approved by the attending provider.    IJoni MD, was physically present and have reviewed and verified the accuracy of this note documented by Sergey Baer medical scribe.      Joni Hernandez MD  [facility] EMERGENCY DEPARTMENT  8/28/2023     Joni Hernandez MD  08/28/23 6042

## 2023-08-29 NOTE — ED NOTES
[facility] ED Mental Health Handoff Note:       Brief HPI:  This is a 47 year old male signed out to me by Dr. Bob.  See initial ED Provider note for full details of the presentation. Opiate abuse. Polysubstance abuse. Psychotic. Suicidal.  Voluntary, have reassessed if he wants to leave.    Home meds reviewed and ordered/administered: Yes    Medically stable for inpatient mental health admission: Yes.    Evaluated by mental health: Yes. The recommendation is for inpatient mental health treatment. Bed search in process    Safety concerns: At the time I received sign out, there were no safety concerns.    Hold Status:  Active Orders   N/A           Exam:   Patient Vitals for the past 24 hrs:   BP Temp Temp src Pulse Resp SpO2   08/29/23 0001 125/82 98.2  F (36.8  C) Oral 66 16 99 %   08/28/23 2220 118/72 -- -- 66 16 99 %   08/28/23 2002 118/79 98  F (36.7  C) Oral 76 18 95 %           ED Course:    Medications   bictegravir-emtricitabine-tenofovir (BIKTARVY) -25 MG per tablet 1 tablet (has no administration in time range)   mirtazapine (REMERON) tablet TABS 7.5 mg (7.5 mg Oral $Given 8/28/23 2253)   risperiDONE (risperDAL M-TABS) ODT tab 1 mg (1 mg Oral $Given 8/28/23 2023)   OLANZapine zydis (zyPREXA) ODT tab 10 mg (10 mg Oral $Given 8/28/23 2220)            There were no significant events during my shift.    Patient was signed out to the oncoming provider, Dr. Ross      Impression:    ICD-10-CM    1. Opioid dependence with opioid-induced psychotic disorder with hallucinations (H)  F11.251       2. Polysubstance abuse (H)  F19.10           Plan:    Awaiting inpatient mental health admission/transfer.      RESULTS:   Results for orders placed or performed during the hospital encounter of 08/28/23 (from the past 24 hour(s))   [facility] ([facility]) Assessment Consult Order:     Status: None ()    Collection Time: 08/28/23  8:42 PM    Narrative    Ramin Maguire LGSW     8/28/2023  "11:27 PM  Diagnostic Evaluation Consultation  Crisis Assessment    Patient Name: Edi Sorenson  Age:  47 year old  Legal Sex: male  Gender Identity: male  Pronouns:   Race: Black or   Ethnicity: Not  or   Language: English      Patient was assessed: In person      Patient location: [facility] EMERGENCY DEPARTMENT                             [facility]-A    Referral Data and Chief Complaint  Edi Sorenson presents to the ED via EMS. Patient is   presenting to the ED for the following concerns: Health   stressors, Substance use, Suicidal ideation, Suicide attempt,   Depression, Anxiety (hallucinations).   Factors that make the   mental health crisis life threatening or complex are:  Pt   presents to ED for concerns of increased SI and withdrawal   symptoms. Pt reports he last used crack cocaine and fentanyl 4   days ago, is now experiencing withdrawal symptoms. Denied offer   for suboxone because he plans to detox \"cold turkey\". Does not   appear to be experiencing dramatic withdrawal symptoms. Pt   reports active SI and states he has been attempting to starve   himself over the past few days. Continues to endorse active SI.   Denies HI, NSSIB. Pt reports increasing auditory and visual   hallucinations. Pt stopped taking his mental health and HIV   medications \"awhile ago\"..      Informed Consent and Assessment Methods  Explained the crisis assessment process, including applicable   information disclosures and limits to confidentiality, assessed   understanding of the process, and obtained consent to proceed   with the assessment.  Assessment methods included conducting a   formal interview with patient, review of medical records,   collaboration with medical staff, and obtaining relevant   collateral information from family and community providers when   available.  : done     Patient response to interventions: acceptance expressed  Coping skills were attempted to reduce " the crisis:  None     History of the Crisis   Hx of chronic substance abuse, depression, anxiety,   schizophrenia. Denies having outpatient supports for mental   health at this time. Previous  admissions, most recently 2022.   No psychiatry, no therapy at  this time. Hx of HIV diagnosis but   has not been taking his medications for this. Denies commitment   history. Hx of suicide attempts via cutting himself (unknown   when) and recently via starvation attempt via pt report.    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)  Barriers in Personal Life:       Significant Clinical History  Current Anxiety Symptoms:  anxious  Current Depression/Trauma:  thoughts of death/suicide, sadness,   impaired decision making, difficulty concentrating  Current Somatic Symptoms:  somatic symptoms (abdominal pain,   headache, tension)  Current Psychosis/Thought Disturbance:  auditory hallucinations,   visual hallucinations  Current Eating Symptoms:   (Not eaten in 4 days per pt report)  Chemical Use History:  Alcohol: None  Benzodiazepines: None  Opiates: None (Fentanyl)  Last Use:: 08/25/23  Cocaine: Smoked (crack cocaine)  Last Use:: 08/25/23  Marijuana: None  Other Use: None  Withdrawal Symptoms: Nausea   Past diagnosis:  Schizophrenia, Suicide attempt(s), Substance Use   Disorder, Depression  Family history:  No known history of mental health or chemical   health concerns, Anxiety Disorder  Past treatment:  Inpatient Hospitalization, Case management,   Other (URSZULA tx)  Details of most recent treatment:  Most recent treatment was in   patient 2022  Other relevant history:  n/a       Collateral Information  Is there collateral information:  (None obtained)        Risk Assessment  [place] Suicide  Severity Rating Scale Full Clinical Version:  Suicidal Ideation  Q1 Wish to be Dead (Lifetime): Yes  Q2 Non-Specific Active Suicidal Thoughts (Lifetime): Yes  3. Active Suicidal Ideation with any Methods (Not Plan) Without   Intent to Act (Lifetime): Yes  Q4 Active Suicidal Ideation with Some Intent to Act, Without   Specific Plan (Lifetime): Yes  Q5 Active Suicidal Ideation with Specific Plan and Intent   (Lifetime): Yes  Q6 Suicide Behavior (Lifetime): yes     Suicidal Behavior (Lifetime)  Actual Attempt (Lifetime): Yes    [place] Suicide Severity Rating Scale Recent:   Suicidal Ideation (Recent)  Q1 Wished to be Dead (Past Month): yes  Q2 Suicidal Thoughts (Past Month): yes  Q3 Suicidal Thought Method: yes  Q4 Suicidal Intent without Specific Plan: no  Q5 Suicide Intent with Specific Plan: yes  Level of Risk per Screen: high risk          Environmental or Psychosocial Events: other life stressors,   ongoing abuse of substances, worsening chronic illness  Protective Factors: Protective Factors: strong bond to family   unit, community support, or employment, responsibilities and   duties to others, including pets and children, help seeking    Does the patient have thoughts of harming others? Feels Like   Hurting Others: no  Previous Attempt to Hurt Others: no  Is the patient engaging in sexually inappropriate behavior?: no    Is the patient engaging in sexually inappropriate behavior?  no          Mental Status Exam   Affect: Labile  Appearance: Disheveled  Attention Span/Concentration: Inattentive  Eye Contact: Avoidant    Fund of Knowledge: Delayed   Language /Speech Content: Non-Fluent  Language /Speech Volume: Soft  Language /Speech Rate/Productions: Minimally Responsive  Recent Memory: Variable  Remote Memory: Variable  Mood: Anxious, Sad, Depressed  Orientation to Person: Yes   Orientation to Place: Yes  Orientation to Time of Day: Yes  Orientation to Date: Yes     Situation (Do they understand why they are  here?): Yes  Psychomotor Behavior: Underactive  Thought Content: Suicidal, Hallucinations  Thought Form: Intact     Medication  Current Facility-Administered Medications   Medication    [START ON 8/29/2023] bictegravir-emtricitabine-tenofovir   (BIKTARVY) -25 MG per tablet 1 tablet    mirtazapine (REMERON) tablet TABS 7.5 mg     Current Outpatient Medications   Medication    acetaminophen (TYLENOL) 325 MG tablet    alum & mag hydroxide-simethicone (MAALOX) 200-200-20 MG/5ML SUSP   suspension    apixaban ANTICOAGULANT (ELIQUIS) 5 MG tablet    benzocaine-menthol (CEPACOL) 15-3.6 MG lozenge    bictegravir-emtricitabine-tenofovir (BIKTARVY) -25 MG per   tablet    gabapentin (NEURONTIN) 100 MG capsule    guaiFENesin (ROBITUSSIN) 20 mg/mL SOLN solution    ibuprofen (ADVIL/MOTRIN) 600 MG tablet    loratadine (CLARITIN) 10 MG tablet    melatonin 3 MG tablet    mirtazapine (REMERON) 7.5 MG tablet    pantoprazole (PROTONIX) 40 MG EC tablet    polyethylene glycol (MIRALAX) 17 GM/Dose powder    risperiDONE (RISPERDAL) 1 MG tablet    senna-docusate (SENOKOT-S/PERICOLACE) 8.6-50 MG tablet    sertraline (ZOLOFT) 50 MG tablet     Facility-Administered Medications Ordered in Other Encounters   Medication    Self Administer Medications: Behavioral Services           Current Care Team  Patient Care Team:  Braydon Puentes MD as PCP - General    Diagnosis  Patient Active Problem List   Diagnosis Code    Depression F32.A    Altered mental status R41.82    Suicidal ideation R45.851    Acute low back pain M54.50    Constipation K59.00    Chemical dependency (H) F19.20    Kimberli (H) F30.9    Anxiety F41.9    Other stimulant use, unspecified with unspecified   stimulant-induced disorder (H) F15.99       Primary Problem This Admission  Active Hospital Problems    Other stimulant use, unspecified with unspecified   stimulant-induced disorder (H)      *Depression        Clinical Summary and Substantiation of Recommendations  "  Pt presents to ED for suicidal ideation and withdrawal symptoms.   Pt has been off of his mental health medications for \"a long   time\" specifics unknown. Pt last used crack cocaine and meth   approximately 4 days ago because he is trying to stop cold   turkey. Pt felt he could no longer manage his psychiatric   symptoms and concerns so he sought out the emergency department.   Pt reports increased auditory and visual hallucinations. Pt   reports active suicidal ideation, stating he has been trying to   starve himselff but it didn't work. Pt has not eaten in several   days. Pt appears visibly stressed, tearful, depressed, apathetic.   Pt willing to come into hospital voluntarily to restart his   medication regimen.       Imminent risk of harm: Suicidal Behavior  Severe psychiatric, behavioral or other comorbid conditions are   appropriate for management at inpatient mental health as   indicated by at least one of the following: Psychiatric Symptoms,   Symptoms of impact to function, Comorbid substance use disorder  Severe dysfunction in daily living is present as indicated by at   least one of the following: Other evidence of severe dysfunction  Situation and expectations are appropriate for inpatient care:   Voluntary treatment at lower level of care is not feasible  Inpatient mental health services are necessary to meet patient   needs and at least one of the following: Specific condition   related to admission diagnosis is present and judged likely to   further improve at proposed level of care      Patient coping skills attempted to reduce the crisis:  None    Disposition  Recommended disposition: Inpatient Mental Health, Medication   Management        Reviewed case and recommendations with attending provider.   Attending Name: Dr. Hernandez       Attending concurs with disposition: yes       Patient and/or validated legal guardian concurs with disposition:     yes       Final disposition:  inpatient mental " health    Legal status on admission: Voluntary/Patient has signed consent   for treatment    Assessment Details   Total duration spent on the patient case in minutes: 30 min     CPT code(s) utilized: 32880 - Psychotherapy for Crisis - 60   (30-74*) min    SHANKAR Hurtado, Psychotherapist  DEC - Triage & Transition Services  Callback: [phone]                    MD Ashlee Artis Gregory Townley, MD  08/31/23 1143

## 2023-08-29 NOTE — PLAN OF CARE
"Edi Silvermanwhite  August 28, 2023  Plan of Care Hand-off Note     Patient Care Path: inpatient mental health    Plan for Care:   Pt presents to ED for suicidal ideation and withdrawal symptoms. Pt has been off of his mental health medications for \"a long time\" specifics unknown. Pt last used crack cocaine and meth approximately 4 days ago because he is trying to stop cold turkey. Pt felt he could no longer manage his psychiatric symptoms and concerns so he sought out the emergency department. Pt reports increased auditory and visual hallucinations. Pt reports active suicidal ideation, stating he has been trying to starve himselff but it didn't work. Pt has not eaten in several days. Pt appears visibly stressed, tearful, depressed, apathetic. Pt willing to come into hospital voluntarily to restart his medication regimen. Pt may be a candidate for discharge prior to IP if he stabilized with medication from psychiatric consult.     Identified Goals and Safety Issues: Restart medication regimen in a safe environment    Legal Status: Legal Status at Admission: Voluntary/Patient has signed consent for treatment    Psychiatry Consult: Ordered       Updated   regarding plan of care.           SHANKAR Hurtado       "

## 2023-08-29 NOTE — ED NOTES
Bed: [facility]  Expected date: 8/28/23  Expected time: 7:29 PM  Means of arrival: Ambulance  Comments:  [address]  27 M  Withdrawal symptoms
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
## 2023-08-29 NOTE — TELEPHONE ENCOUNTER
R: [place]  Access Inpatient Bed Call Log 8/29/23 @ 2:20am     Intake has called facilities that have not updated their bed status within the last 12 hours.??       ADULTS:      *[facility]  [facility]: @ cap per website    [facility]:  @ cap per website   [facility]: @ cap per website   [facility]: @ cap per website - Call after 8AM  [facility]: @ cap per website   [facility]: @ cap per website   [facility]/YA beds - OPENS OCT. 2023  [facility]: @ cap per website   [facility]: @ cap per website   [facility]:  @ cap per website  [facility]: @ cap per website      Pt remains on waitlist pending appropriate placement availability

## 2023-08-29 NOTE — PROGRESS NOTES
Triage & Transition Services, Extended Care     Edi [facility]  August 29, 2023    Edi is followed related to  long wait time for admission . Please see initial DEC Crisis Assessment completed for complete assessment information. Medical record is reviewed. While patient is in the ED, care team is working towards Learn and Demonstrate at [employer] Skill Focused on Crisis Stabilization.     Attempted to meet with Patient however he was sleeping and did not arouse to verbal stimuli. Did not provide further attempts to awaken as he reported to psychiatry is in withdrawal so sleep will be beneficial for him.    Plan:  Inpatient Mental Health: has been experiencing command hallucinations to harm himself and mood instability. Is voluntary for admission.    Plan for Care reviewed with Assigned Medical Provider? Yes. Provider, Dr. Francois, response: agreeable    Extended Care will follow and meet with patient/family/care team as able or requested.     Corinne Romitti, [facility], Extended Care   [phone]
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
## 2023-08-30 ENCOUNTER — TELEPHONE (OUTPATIENT)
Dept: BEHAVIORAL HEALTH | Facility: CLINIC | Age: 48
End: 2023-08-30
Payer: COMMERCIAL

## 2023-08-30 LAB
ALBUMIN SERPL BCG-MCNC: 3.2 G/DL (ref 3.5–5.2)
ALP SERPL-CCNC: 50 U/L (ref 40–129)
ALT SERPL W P-5'-P-CCNC: 20 U/L (ref 0–70)
AMPHETAMINES UR QL SCN: ABNORMAL
ANION GAP SERPL CALCULATED.3IONS-SCNC: 6 MMOL/L (ref 7–15)
AST SERPL W P-5'-P-CCNC: 19 U/L (ref 0–45)
BARBITURATES UR QL SCN: ABNORMAL
BASOPHILS # BLD AUTO: 0 10E3/UL (ref 0–0.2)
BASOPHILS NFR BLD AUTO: 1 %
BENZODIAZ UR QL SCN: ABNORMAL
BILIRUB SERPL-MCNC: 0.3 MG/DL
BUN SERPL-MCNC: 14.2 MG/DL (ref 6–20)
BZE UR QL SCN: ABNORMAL
CALCIUM SERPL-MCNC: 8.9 MG/DL (ref 8.6–10)
CANNABINOIDS UR QL SCN: ABNORMAL
CHLORIDE SERPL-SCNC: 100 MMOL/L (ref 98–107)
CREAT SERPL-MCNC: 0.89 MG/DL (ref 0.67–1.17)
DEPRECATED HCO3 PLAS-SCNC: 32 MMOL/L (ref 22–29)
EOSINOPHIL # BLD AUTO: 0.1 10E3/UL (ref 0–0.7)
EOSINOPHIL NFR BLD AUTO: 2 %
ERYTHROCYTE [DISTWIDTH] IN BLOOD BY AUTOMATED COUNT: 13.2 % (ref 10–15)
GFR SERPL CREATININE-BSD FRML MDRD: >90 ML/MIN/1.73M2
GLUCOSE SERPL-MCNC: 105 MG/DL (ref 70–99)
HCT VFR BLD AUTO: 46.4 % (ref 40–53)
HGB BLD-MCNC: 15.1 G/DL (ref 13.3–17.7)
IMM GRANULOCYTES # BLD: 0 10E3/UL
IMM GRANULOCYTES NFR BLD: 0 %
LYMPHOCYTES # BLD AUTO: 2 10E3/UL (ref 0.8–5.3)
LYMPHOCYTES NFR BLD AUTO: 35 %
MCH RBC QN AUTO: 28.9 PG (ref 26.5–33)
MCHC RBC AUTO-ENTMCNC: 32.5 G/DL (ref 31.5–36.5)
MCV RBC AUTO: 89 FL (ref 78–100)
MONOCYTES # BLD AUTO: 0.6 10E3/UL (ref 0–1.3)
MONOCYTES NFR BLD AUTO: 10 %
NEUTROPHILS # BLD AUTO: 3 10E3/UL (ref 1.6–8.3)
NEUTROPHILS NFR BLD AUTO: 52 %
NRBC # BLD AUTO: 0 10E3/UL
NRBC BLD AUTO-RTO: 0 /100
OPIATES UR QL SCN: ABNORMAL
PLATELET # BLD AUTO: 238 10E3/UL (ref 150–450)
POTASSIUM SERPL-SCNC: 4.3 MMOL/L (ref 3.4–5.3)
PROT SERPL-MCNC: 6.8 G/DL (ref 6.4–8.3)
RBC # BLD AUTO: 5.23 10E6/UL (ref 4.4–5.9)
SODIUM SERPL-SCNC: 138 MMOL/L (ref 136–145)
TSH SERPL DL<=0.005 MIU/L-ACNC: 2.82 UIU/ML (ref 0.3–4.2)
WBC # BLD AUTO: 5.8 10E3/UL (ref 4–11)

## 2023-08-30 PROCEDURE — 87389 HIV-1 AG W/HIV-1&-2 AB AG IA: CPT

## 2023-08-30 PROCEDURE — 250N000013 HC RX MED GY IP 250 OP 250 PS 637

## 2023-08-30 PROCEDURE — 86360 T CELL ABSOLUTE COUNT/RATIO: CPT | Performed by: INTERNAL MEDICINE

## 2023-08-30 PROCEDURE — 99222 1ST HOSP IP/OBS MODERATE 55: CPT | Performed by: INTERNAL MEDICINE

## 2023-08-30 PROCEDURE — 250N000013 HC RX MED GY IP 250 OP 250 PS 637: Performed by: FAMILY MEDICINE

## 2023-08-30 PROCEDURE — 86701 HIV-1ANTIBODY: CPT

## 2023-08-30 PROCEDURE — 250N000013 HC RX MED GY IP 250 OP 250 PS 637: Performed by: EMERGENCY MEDICINE

## 2023-08-30 PROCEDURE — 80307 DRUG TEST PRSMV CHEM ANLYZR: CPT

## 2023-08-30 PROCEDURE — 87340 HEPATITIS B SURFACE AG IA: CPT

## 2023-08-30 PROCEDURE — 87536 HIV-1 QUANT&REVRSE TRNSCRPJ: CPT | Performed by: INTERNAL MEDICINE

## 2023-08-30 PROCEDURE — 80053 COMPREHEN METABOLIC PANEL: CPT

## 2023-08-30 PROCEDURE — 84443 ASSAY THYROID STIM HORMONE: CPT

## 2023-08-30 PROCEDURE — 86705 HEP B CORE ANTIBODY IGM: CPT

## 2023-08-30 PROCEDURE — 86709 HEPATITIS A IGM ANTIBODY: CPT

## 2023-08-30 PROCEDURE — 86702 HIV-2 ANTIBODY: CPT

## 2023-08-30 PROCEDURE — 36415 COLL VENOUS BLD VENIPUNCTURE: CPT

## 2023-08-30 PROCEDURE — 250N000013 HC RX MED GY IP 250 OP 250 PS 637: Performed by: PSYCHIATRY & NEUROLOGY

## 2023-08-30 PROCEDURE — 85025 COMPLETE CBC W/AUTO DIFF WBC: CPT

## 2023-08-30 RX ORDER — IBUPROFEN 600 MG/1
600 TABLET, FILM COATED ORAL EVERY 6 HOURS PRN
Status: DISCONTINUED | OUTPATIENT
Start: 2023-08-30 | End: 2023-08-31

## 2023-08-30 RX ORDER — LOPERAMIDE HCL 2 MG
2 CAPSULE ORAL EVERY 4 HOURS PRN
Status: DISCONTINUED | OUTPATIENT
Start: 2023-08-30 | End: 2023-09-01 | Stop reason: HOSPADM

## 2023-08-30 RX ORDER — METHOCARBAMOL 500 MG/1
500 TABLET, FILM COATED ORAL 3 TIMES DAILY PRN
Status: DISCONTINUED | OUTPATIENT
Start: 2023-08-30 | End: 2023-09-01 | Stop reason: HOSPADM

## 2023-08-30 RX ORDER — GABAPENTIN 100 MG/1
200 CAPSULE ORAL 3 TIMES DAILY PRN
Status: DISCONTINUED | OUTPATIENT
Start: 2023-08-30 | End: 2023-09-01 | Stop reason: HOSPADM

## 2023-08-30 RX ORDER — CLONIDINE HYDROCHLORIDE 0.1 MG/1
0.1 TABLET ORAL EVERY 6 HOURS PRN
Status: DISCONTINUED | OUTPATIENT
Start: 2023-08-30 | End: 2023-09-01 | Stop reason: HOSPADM

## 2023-08-30 RX ORDER — DICYCLOMINE HYDROCHLORIDE 10 MG/1
20 CAPSULE ORAL 3 TIMES DAILY PRN
Status: DISCONTINUED | OUTPATIENT
Start: 2023-08-30 | End: 2023-09-01 | Stop reason: HOSPADM

## 2023-08-30 RX ORDER — ONDANSETRON 4 MG/1
4 TABLET, ORALLY DISINTEGRATING ORAL EVERY 6 HOURS PRN
Status: DISCONTINUED | OUTPATIENT
Start: 2023-08-30 | End: 2023-08-30

## 2023-08-30 RX ADMIN — HYDROXYZINE HYDROCHLORIDE 50 MG: 25 TABLET, FILM COATED ORAL at 16:52

## 2023-08-30 RX ADMIN — MIRTAZAPINE 7.5 MG: 7.5 TABLET, FILM COATED ORAL at 21:11

## 2023-08-30 RX ADMIN — CLONIDINE HYDROCHLORIDE 0.1 MG: 0.1 TABLET ORAL at 16:52

## 2023-08-30 RX ADMIN — RISPERIDONE 1 MG: 1 SOLUTION ORAL at 21:11

## 2023-08-30 RX ADMIN — NICOTINE POLACRILEX 4 MG: 4 GUM, CHEWING BUCCAL at 15:21

## 2023-08-30 RX ADMIN — BICTEGRAVIR SODIUM, EMTRICITABINE, AND TENOFOVIR ALAFENAMIDE FUMARATE 1 TABLET: 50; 200; 25 TABLET ORAL at 17:46

## 2023-08-30 RX ADMIN — TRAZODONE HYDROCHLORIDE 50 MG: 50 TABLET ORAL at 21:14

## 2023-08-30 ASSESSMENT — ACTIVITIES OF DAILY LIVING (ADL)
ADLS_ACUITY_SCORE: 35

## 2023-08-30 NOTE — CONSULTS
"  [facility] GENERAL INFECTIOUS DISEASES CONSULTATION     Patient:  Edi Sorenson   Date of birth 1975, Medical record number [id number]  Date of Visit:  08/30/2023  Date of Admission: 8/28/2023  Consult Requested by:Valdez Curran APRN CNP  Reason for Consult:  HIV medications          Assessment and Recommendations:   ASSESSMENT:  HIV dx 2013   - On Biktarvy , stopped 1 month ago   - undetectable Viral load from 1/21/2014  to 3/17/2022. except for one time high VL 1500, CD4 718 9/29/21   History PE/DVT   Polysubtance abuse   Auditory / visual hallucinations    RECOMMENDATION:  - check CMP, CBC diff, T cell subsets, HIV viral load  - monitor/ manage epigastric pain   - will start him back on Biktarvy -25   1 tab daily ( bictegravir-emtricitabine-tenofovir alafenamide) (  if CrCl > 30 )  - will need follow-up with his primary HIV physician after discharge     Thank you for allowing us to participate in the care of this patient.      Scotty Vigil MD, M.Med.Sc  Staff, Infectious Diseases   Pager : [phone]         History of Present Illness:     Edi Sorenson is a 47 year old male with PMH of HIV ( dx 2013) , PE/DVT (on eliquis) 5/21, and polysubstance abuse (alcohol, cocaine, opioid) who presented to the ED on 8/29/23 with 4 days of  auditory/visual hallucinations and SI. He also reports using fentanyl with last use being 3-4 days ago. He reports being in withdrawal but wants to go \"cold-turkey\" and declined trying Suboxone to help alleviate his withdrawal. Patient however would like to get admitted to inpatient psych for stabilization. He reports hearing voices and feeling suicidal and cannot be safe being in the community. He has been hospitalized previously. He also would like to get back on his HIV meds. ( per Admission H+P)    He was on Biktarvy. with undetectable Viral load from 1/21/2014  to 3/17/2022. one time high VL 1500, CD4 718 9/29/21 . He says he stopped Biktarvy " about 1 month ago. His ID/ HIV physician is Dr Puentes at [facility].     He is not a good historial, answers YES to all questions.  he says he has been having fever, chills, cough and stomach pain.          Review of Systems:   CONSTITUTIONAL:    EYES: negative for icterus  ENT:  negative for hearing loss, tinnitus and sore throat  RESPIRATORY:  see HPI   CARDIOVASCULAR:  negative for chest pain, dyspnea  GASTROINTESTINAL:  negative for nausea, vomiting, diarrhea and constipation  GENITOURINARY:  negative for dysuria  HEME:  No easy bruising  INTEGUMENT:  negative for rash and pruritus  NEURO:  see HPI          Past Medical History:     Past Medical History:   Diagnosis Date    Asymptomatic human immunodeficiency virus (HIV) infection status (H)     Cocaine abuse (H)     Depression     DVT (deep venous thrombosis) (H)     Last in 6/2018. No hx of PE. On Coumadin.    Genital HSV           Past Surgical History:   History reviewed. No pertinent surgical history.         Family History:   History reviewed. No pertinent family history.         Social History:     Social History     Tobacco Use    Smoking status: Former    Smokeless tobacco: Never   Substance Use Topics    Alcohol use: Yes     History   Sexual Activity    Sexual activity: Not on file          Current Medications (antimicrobials listed in bold):      mirtazapine  7.5 mg Oral At Bedtime    risperiDONE  1 mg Oral At Bedtime          Allergies:   No Known Allergies         Physical Exam:   Vitals were reviewed  Patient Vitals for the past 24 hrs:   BP Temp Temp src Pulse Resp SpO2 Height Weight   08/30/23 0818 116/54 -- -- 80 12 98 % -- --   08/29/23 1853 134/86 99.1  F (37.3  C) Oral 71 16 99 % -- --   08/29/23 1556 124/85 98.9  F (37.2  C) Oral 89 16 99 % -- --   08/29/23 1203 110/79 98.3  F (36.8  C) Oral 87 18 98 % -- --   08/29/23 1155 -- -- -- -- -- -- 1.829 m (6') 69.3 kg (152 lb 12.8 oz)   08/29/23 1002 123/79 98.3  F (36.8  C) Oral 64  16 98 % -- --     Ranges for his vital signs:  Temp:  [98.3  F (36.8  C)-99.1  F (37.3  C)] 99.1  F (37.3  C)  Pulse:  [64-89] 80  Resp:  [12-18] 12  BP: (110-134)/(54-86) 116/54  SpO2:  [98 %-99 %] 98 %  Physical Examination:  GENERAL:  wakes up when I call his name.  in bed in no acute distress.   HEENT:  Head is normocephalic, atraumatic   EYES:  Eyes have anicteric sclerae without conjunctival injection   ENT:  Oropharynx is moist without exudates or ulcers. Tongue is midline  NECK:  Supple. No  Cervical lymphadenopathy  LUNGS:  Clear to auscultation bilateral.   CARDIOVASCULAR:  Regular rate and rhythm with no murmurs  ABDOMEN:  Normal bowel sounds, soft, some discomfort in epigastrium   SKIN:  No acute rashes.    Extremities : no edema   NEUROLOGIC:  Grossly nonfocal. Active x4 extremities         Laboratory Data:     Hematology Studies    Recent Labs   Lab Test 02/09/22  0816 01/10/21  2249 04/02/19  0804   WBC 5.0 11.2* 5.1   ANEU  --  7.1 1.7   AEOS  --  0.2 0.2   HGB 15.2 13.6 14.0   MCV 87 89 92    220 177     Metabolic Studies     Recent Labs   Lab Test 02/22/22  0732 02/09/22  0816 01/11/21  0446 01/10/21  2249 04/03/19  0723 04/02/19  0804   NA  --  145* 140 138 143 143   POTASSIUM  --  3.9 5.1 3.2* 3.9 4.0   CHLORIDE  --  113* 107 101 109 111*   CO2  --  27 26 29 27 26   BUN  --  11 12 18 14 14   CR 0.90 0.89 1.02 1.46* 0.92 0.88   GFRESTIMATED >90 >90 89 58* >90 >90     Hepatic Studies    Recent Labs   Lab Test 02/09/22  0816 01/10/21  2249 04/02/19  0804   BILITOTAL 0.2 0.6 0.2   ALKPHOS 44 71 40   ALBUMIN 3.0* 3.8 2.8*   AST 7 75* 13   ALT 15 40 14     Thyroid Studies    Recent Labs   Lab Test 02/09/22  0816 04/02/19  0804   TSH 1.90 4.88*   T4  --  0.89     Urine Studies    Recent Labs   Lab Test 01/11/21  0317   LEUKEST Negative   WBCU 2

## 2023-08-30 NOTE — ED NOTES
IP MH Referral Acuity Rating Score (RARS)    LMHP complete at referral to IP MH, with DEC; and, daily while awaiting IP MH placement. Call score to PPS.  CRITERIA SCORING   New 74 HH and Involuntary for IP MH (not adolescent) 0/1   Boarding over 24 hours 1/1   Vulnerable adult at least 55+ with multiple co morbidities; or, Patient age 11 or under 0/1   Suicide ideation without relief of precipitating factors 1/1   Current plan for suicide 1/1   Current plan for homicide 0/1   Imminent risk or actual attempt to seriously harm another without relief of factors precipitating the attempt 0/1   Severe dysfunction in daily living (ex: complete neglect for self care, extreme disruption in vegetative function, extreme deterioration in social interactions) 1/1   Recent (last 2 weeks) or current physical aggression in the ED 0/1   Restraints or seclusion episode in ED 0/1   Verbal aggression, agitation, yelling, etc., while in the ED 1/1   Active psychosis with psychomotor agitation or catatonia 0/1   Need for constant or near constant redirection (from leaving, from others, etc).  0/1   Intrusive or disruptive behaviors 0/1   TOTAL Acuity Total Score: 5

## 2023-08-30 NOTE — PROGRESS NOTES
Triage & Transition Services, Extended Care    Client Name: Edi Sorenson    Date: August 30, 2023  Service Type:  Group Therapy  Session Start Time:  12:35pm    Session End Time: 1:00pm  Session Length: 25 minutes  Site Location: [facility]  Attendees: Patient and other group members  Facilitator: SOLO Roger     Topic:   Anxiety    Intervention:    Group process: support, challenge, affirm, psycho-education.     Response:  Patient did not participate in group.        SOLO Roger

## 2023-08-30 NOTE — PROGRESS NOTES
Pt appears to be withdrawing from fentanyl. He was having emesis and a running nose. He also reported having some anxieties. The provider was notified and asked the patient if he would like to receive some Suboxone, and the patient refused. Pt reported he was having headaches, and his temperature read 99.1. Tylenol 650 mg for headache and 50 mg of Atarax for anxiety. Pt received schedule sleeping 7.5 mg  Mirtazapine and trazodone 75 mg PRN. After Tylenol was given Temp 98.6. Will continue to monitor PT.

## 2023-08-30 NOTE — ED NOTES
Patient up this am around 7:30am.  Patient vital signs taken and started MH assessment.   Patient denies AVH at this time.  Patient somber, quiet, cooperative in discussion.  Patient does identify SI and identified a plan that he would use a metal pop can and crush it and cut his throat.  The patient after discussion contracted for safety and agreed to come discuss with medical staff first.   Patient asked for toast and provided with cranberry juice.  Patient eating breakfast 20mins later.  Patient identifies he has this taste in his mouth that won't go away.      Patient ate 100% of breakfast and lunch.  Patient also had a snack and states his tastes buds are coming back.

## 2023-08-30 NOTE — CONSULTS
"  Edi Sorenson MRN# [id number]   Age: 47 year old YOB: 1975   Date of Admission to ED: 8/28/2023    In person visit Details:     Patient was assessed and interviewed face-to-face in person with this writer ford. Patient was observed to be able to participate in the assessment as evidenced by verbal consent. Assessment methods included conducting a formal interview with patient, review of medical records, collaboration with medical staff, and obtaining relevant collateral information from family and community providers when available.        Reason for Consult:   This note is being entered to supplement the psychiatry consultation note that was completed on August 28, 2023 and August 29, 2023 by the licensed mental health professional Andrez Faustin LGSW and Ramandeep Garcia, CNP, APRN psychiatric provider have reviewed the pertinent clinical details related to their encounter. I am being consulted to offer additional guidance on psychiatric pharmacological interventions to follow this patient as primary while he is in the emergency room.    Writer met patient in his room face-to-face by himself, patient was irritable during assessment and interview willing to engage in conversation.  Currently patient endorsed suicidal ideation due to voice command.  Patient reported that he is currently withdrawing from fentanyl reported as being feeling chills and shakiness, COWS protocol started this morning patient adamantly refused any Suboxone, and he said \" I do not want Suboxone or methadone anymore.\"  Agreed to take clonidine, gabapentin, any other medication to help him during this withdrawal.  Also patient is currently voluntary for inpatient mental health unit, he is voluntary for his PTA medication risperidone and Remeron.  Infection disease recommendation restart his antiviral medication appreciate the consult.      I have reviewed the nursing notes. I have reviewed the findings, diagnosis, plan and need " "for follow up with the patient.         HPI:       Per initial ED provider note 8/28: \"Edi Sorenson is a 47 year old male with PMH of HIV, PE/DVT (on eliquis) 5/21, and polysubstance abuse (alcohol, cocaine, opioid) who presents to the ED BIBA for auditory/visual hallucinations and SI for 4 days. He also reports using fentanyl with last use being 3-4 days ago. He reports being in withdrawal but wants to go \"cold-turkey\" and declined trying Suboxone to help alleviate his withdrawal. Patient however would like to get admitted to inpatient psych for stabilization. He reports hearing voices and feeling suicidal and cannot be safe being in the community. He has been hospitalized previously. He also would like to get back on his HIV meds.\"     He was started back on his HIV medications. He received Risperdal 1mg at bedtime last night as well as Zyprexa 10mg last night. Remeron 7.5mg was restarted at bedtime. He was sleeping in the hallways and refused to sit up for assessment. He reports the voices are telling him to \"kill myself\" and they have been going on for 4 days. He denies visual hallucinations, denies wanting suboxone. He would not engage in conversation related to medications. When I asked if he felt like he was withdrawing, he abruptly sat up became agitated and said \"of course I'm withdrawing from fetty.\" And quickly got up to use the bathroom, terminating interview.       Pt has *not required locked seclusion or restraints in the past 24 hours to maintain safety, please refer to RN documentation for further details.  Substance use does appear to be playing a contributing role in the patient's presentation severe stimulant use disorder  Brief Therapeutic Intervention(s):  Provided active listening, unconditional positive regard, and validation. Engaged in cognitive restructuring/ reframing, looked at common cognitive distortions and challenged negative thoughts. Engaged in guided discovery, explored " patient's perspectives and helped expand them through socratic dialogue. Provided positive reinforcement for progress towards goals, gains in knowledge, and application of skills previously taught.  Engaged in social skills training. Explored and identified early warning signs to anger.        Past Psychiatric History:     Hx of chronic substance abuse, depression, anxiety, schizophrenia. Denies having outpatient supports for mental health at this time. Previous  admissions, most recently 2022. No psychiatry, no therapy at this time. Hx of HIV diagnosis but has not been taking his medications for this. Denies commitment history.         Substance Use and History:     Polysubstance use disorder        Past Medical History:   PAST MEDICAL HISTORY:   Past Medical History:   Diagnosis Date    Asymptomatic human immunodeficiency virus (HIV) infection status (H)     Cocaine abuse (H)     Depression     DVT (deep venous thrombosis) (H)     Last in 6/2018. No hx of PE. On Coumadin.    Genital HSV        PAST SURGICAL HISTORY: History reviewed. No pertinent surgical history.            Allergies:   No Known Allergies          Medications:   I have reviewed this patient's current medications  Current Facility-Administered Medications   Medication    acetaminophen (TYLENOL) tablet 650 mg    bictegravir-emtricitabine-tenofovir (BIKTARVY) -25 MG per tablet 1 tablet    cloNIDine (CATAPRES) tablet 0.1 mg    dicyclomine (BENTYL) capsule 20 mg    gabapentin (NEURONTIN) capsule 200 mg    hydrOXYzine (ATARAX) tablet 50 mg    ibuprofen (ADVIL/MOTRIN) tablet 600 mg    loperamide (IMODIUM) capsule 2 mg    methocarbamol (ROBAXIN) tablet 500 mg    mirtazapine (REMERON) tablet TABS 7.5 mg    OLANZapine zydis (zyPREXA) ODT tab 10 mg    Or    OLANZapine (zyPREXA) injection 10 mg    ondansetron (ZOFRAN ODT) ODT tab 8 mg    risperiDONE (risperDAL) solution 1 mg    traZODone (DESYREL) half-tab 25 mg    Or    traZODone (DESYREL) tablet 50  mg     Current Outpatient Medications   Medication Sig    apixaban ANTICOAGULANT (ELIQUIS) 5 MG tablet Take 1 tablet (5 mg) by mouth 2 times daily    bictegravir-emtricitabine-tenofovir (BIKTARVY) -25 MG per tablet Take 1 tablet by mouth daily    mirtazapine (REMERON) 7.5 MG tablet Take 1 tablet (7.5 mg) by mouth nightly as needed (sleep)    sertraline (ZOLOFT) 50 MG tablet Take 1 tablet (50 mg) by mouth daily     Facility-Administered Medications Ordered in Other Encounters   Medication    Self Administer Medications: Behavioral Services              Family History:   FAMILY HISTORY: History reviewed. No pertinent family history.           Social History:   Upbringing: born and raised   Educational History:   Relationships:  Children:   Current Living Situation:   Occupational History: *  Financial Support: limited  Legal History:   Abuse/Trauma History:        - Collateral information from the famly/friend: none         PTA Medications:   (Not in a hospital admission)         Allergies:   No Known Allergies       Labs:     Recent Results (from the past 48 hour(s))   Asymptomatic COVID-19 Virus (Coronavirus) by PCR Nose    Collection Time: 08/29/23  3:58 PM    Specimen: Nose; Swab   Result Value Ref Range    SARS CoV2 PCR Negative Negative          Physical and Psychiatric Examination:     /54   Pulse 80   Temp 99.1  F (37.3  C) (Oral)   Resp 12   Ht 1.829 m (6')   Wt 69.3 kg (152 lb 12.8 oz)   SpO2 98%   BMI 20.72 kg/m    Weight is 152 lbs 12.8 oz  Body mass index is 20.72 kg/m .    Mental Status Exam:  Appearance: awake, alert and dressed in hospital scrubs  Attitude:  guarded and uncooperative  Eye Contact:  poor   Mood:  angry, anxious, sad , and depressed  Affect:  guarded  Speech:  clear, coherent  Language: fluent and intact in English  Psychomotor, Gait, Musculoskeletal:  physical agitation  Thought Process:  disorganized and illogical  Associations:  no loose associations  Thought  Content:  active suicidal ideation present, auditory hallucinations present, and visual hallucinations present  Insight:  limited  Judgement:  poor  Oriented to:  time, person, and place  Attention Span and Concentration:  poor  Recent and Remote Memory:  poor  Fund of Knowledge:  low-normal         Diagnoses:      Opioid dependence with opioid-induced psychotic disorder with hallucinations (H)  Polysubstance abuse (H)         Recommendations:     1.Pt displays the following risk factors that support IP admission: SI, due to voice command, unable to contract for safety. Pt is unable to engage in safety planning to mitigate risk level in a non-secure setting. Lower levels of care have not been successful in mitigating risk. Due to this IP is the least restrictive option of care for pt. Pt should remain in IP until deemed safe to return to the community and engage in OP  supports    - Continue to recommend inpatient psychiatric hospitalizations for further stabilization   2.  Continue risperidone 1 mg at bedtime and Remeron 7.5 mg at bedtime  Check drug urine toxicology lab  3.  Infection disease consult  4.  Consult psychiatry as needed  4.   Refer to psychiatric provider for medication management.*   treatment per ED team    - Consulted with [facility], ED pharmacist consult, patient's ED RN regarding this case.    Please call [facility]/DEC at [phone] if you have follow-up questions or wish to place another consult.  Valdez Curran, Psychiatric Nurse practitioner    Attestation:  Time with:  Patient: 25 minutes  Treatment Team: 30 Minutes  Chart Review: 30 minutes    Total time spent was 85 minutes. Over 50% of times was spent counseling and coordination of care.        IValdez, CNP, APRN, Psychiatric Nurse Practitioner have personally performed an examination of this patient.  I have edited the note to reflect all relevant changes.  I have discussed this patient with the care team August 30, 2023.  I  have reviewed all vitals and laboratory findings.    Disclaimer: This note consists of symbols derived from keyboarding,

## 2023-08-30 NOTE — TELEPHONE ENCOUNTER
R: [place]  Access Inpatient Bed Call Log 8/30/23 @ 2:10am     Intake has called facilities that have not updated their bed status within the last 12 hours.??       ADULTS:      *[facility]  [facility]: @ cap per website    [facility]:  @ cap per website   [facility]: @ cap per website   [facility]: @ cap per website - Call after 8AM  [facility]: @ cap per website   [facility]: @ cap per website   [facility]/YA beds - OPENS OCT. 2023  [facility]: @ cap per website   [facility]: @ cap per website   [facility]:  @ cap per website  [facility]: @ cap per website       Pt remains on waitlist pending appropriate placement availability

## 2023-08-30 NOTE — ED NOTES
Patient score on COWS is 14.  Administered clonodine and additionally due to anxiety  level at 8 of 1-10 score Atarax prn administered.   Patient using headphones for distraction.

## 2023-08-30 NOTE — PHARMACY-ADMISSION MEDICATION HISTORY
Pharmacist Admission Medication History    Admission medication history is complete. The information provided in this note is only as accurate as the sources available at the time of the update.    Medication reconciliation/reorder completed by provider prior to medication history? No    Information Source(s): CareEverywhere/SureScripts     Pertinent Information:   - Chart/Care Everywhere reviewed as multiple attempts were made to interview patient without success. Appears patient has been lost to follow-up since visit in 11/2022 @ [facility]. Patient at that time was on Biktarvy for HIV, Eliquis for hx of PE and DVTs 5/2021, sertraline and mirtazapine. Patient was supposed to have a follow-up appointment with hematology to evaluate if he should remain on anticoagulation, but appears patient missed this appointment. Left medications on his list as patient should be evaluated if it is appropriate to continue treatment   - Last fills for Biktarvy, sertraline and mirtazapine are from 11/22/22 for 30 day-supplies. No fills for Eliquis in the past year. It has been documented multiple times that patient was non-adherent to medications (including to anti-retrovirals and anticoagulation)      Changes made to PTA medication list:  Added: None  Deleted:   From discharge 2/2022, never filled  Cepacol lozenges PRN  Gabapentin 100 mg TID PRN anxiety  Risperidone 1 mg TID PRN hallucinations  Doc-senna PRN  Valtrex 1g BID PRN   Changed: None    Medication Affordability:       Allergies reviewed with patient and updates made in EHR: unable to assess    Medication History Completed By: BERTA GIBBS RPH 8/30/2023 10:08 AM    Prior to Admission medications    Medication Sig Last Dose Taking? Auth Provider Long Term End Date   apixaban ANTICOAGULANT (ELIQUIS) 5 MG tablet Take 1 tablet (5 mg) by mouth 2 times daily Unknown Yes Alicia Raza MD     bictegravir-emtricitabine-tenofovir (BIKTARVY) -25 MG per tablet Take 1 tablet by  mouth daily Unknown Yes Alicia Raza MD     mirtazapine (REMERON) 7.5 MG tablet Take 1 tablet (7.5 mg) by mouth nightly as needed (sleep) Unknown   Yes Alicia Raza MD Yes    sertraline (ZOLOFT) 50 MG tablet Take 1 tablet (50 mg) by mouth daily Unknown Yes Alicia Raza MD Yes

## 2023-08-30 NOTE — PROGRESS NOTES
Triage & Transition Services, Extended Care     Edi [facility]  August 30, 2023    Edi is followed related to  long wait time for admission . Please see initial DEC Crisis Assessment completed for complete assessment information. Medical record is reviewed. While patient is in the ED, care team is working towards Learn and Demonstrate at [facility] [employer] Focused on Crisis Stabilization.     Patient was sleeping upon arrival but awoke to verbal stimuli. He sat up in bed but presented as irritable throughout interaction and provided short answers to writer. He is open to expanding bed search for inpatient mental health. He does continue to endorse suicidal ideation. He did not provide further details. He then laid down and fell back asleep.    Plan:  Inpatient Mental Health: Presents with mood instability and suicidal ideation. Inpatient hospitalization is needed for further stabilization.    Plan for Care reviewed with Assigned Medical Provider? Yes. Provider, Valdez Curran, response: agreeable.    Extended Care will follow and meet with patient/family/care team as able or requested.     Corinne Romitti, [facility], Extended Care   [phone]

## 2023-08-30 NOTE — ED NOTES
Staff called writer and endorsed the patient slipped an his left side in the bathroom. Patient vital signs stable, denied hitting of head or having any pain. Patient endorsed not hurting anywhere. MD notified. Patient was attempting to stand up from the toilet sit when he felt a bit dizzy but stood up and walked back to his chair in the hallways. Writer put a fall risk wrist band on the patient.

## 2023-08-31 ENCOUNTER — APPOINTMENT (OUTPATIENT)
Dept: ULTRASOUND IMAGING | Facility: CLINIC | Age: 48
DRG: 885 | End: 2023-08-31
Payer: COMMERCIAL

## 2023-08-31 ENCOUNTER — TELEPHONE (OUTPATIENT)
Dept: BEHAVIORAL HEALTH | Facility: CLINIC | Age: 48
End: 2023-08-31
Payer: COMMERCIAL

## 2023-08-31 PROBLEM — R45.851 SUICIDAL IDEATIONS: Status: ACTIVE | Noted: 2023-08-31

## 2023-08-31 LAB
CD3 CELLS # BLD: 1940 CELLS/UL (ref 603–2990)
CD3 CELLS NFR BLD: 88 % (ref 49–84)
CD3+CD4+ CELLS # BLD: 699 CELLS/UL (ref 441–2156)
CD3+CD4+ CELLS NFR BLD: 32 % (ref 28–63)
CD3+CD4+ CELLS/CD3+CD8+ CLL BLD: 0.59 % (ref 1.4–2.6)
CD3+CD8+ CELLS # BLD: 1181 CELLS/UL (ref 125–1312)
CD3+CD8+ CELLS NFR BLD: 53 % (ref 10–40)
HAV IGM SERPL QL IA: NONREACTIVE
HBV CORE IGM SERPL QL IA: NONREACTIVE
HBV SURFACE AG SERPL QL IA: NONREACTIVE
HCV AB SERPL QL IA: NONREACTIVE
HIV 1+2 AB+HIV1 P24 AG SERPL QL IA: REACTIVE
HIV1 RNA # PLAS NAA DL=20: ABNORMAL COPIES/ML
HIV1 RNA SERPL NAA+PROBE-LOG#: 4.1 {LOG_COPIES}/ML
INR PPP: 1.03 (ref 0.85–1.15)
RADIOLOGIST FLAGS: ABNORMAL
T CELL COMMENT: ABNORMAL

## 2023-08-31 PROCEDURE — 99222 1ST HOSP IP/OBS MODERATE 55: CPT

## 2023-08-31 PROCEDURE — 250N000013 HC RX MED GY IP 250 OP 250 PS 637: Performed by: PSYCHIATRY & NEUROLOGY

## 2023-08-31 PROCEDURE — 76882 US LMTD JT/FCL EVL NVASC XTR: CPT | Mod: 26 | Performed by: RADIOLOGY

## 2023-08-31 PROCEDURE — G0177 OPPS/PHP; TRAIN & EDUC SERV: HCPCS

## 2023-08-31 PROCEDURE — 36415 COLL VENOUS BLD VENIPUNCTURE: CPT

## 2023-08-31 PROCEDURE — 93971 EXTREMITY STUDY: CPT | Mod: 26 | Performed by: RADIOLOGY

## 2023-08-31 PROCEDURE — 93971 EXTREMITY STUDY: CPT | Mod: RT

## 2023-08-31 PROCEDURE — 250N000013 HC RX MED GY IP 250 OP 250 PS 637

## 2023-08-31 PROCEDURE — 85610 PROTHROMBIN TIME: CPT

## 2023-08-31 PROCEDURE — 76882 US LMTD JT/FCL EVL NVASC XTR: CPT | Mod: RT

## 2023-08-31 PROCEDURE — 250N000013 HC RX MED GY IP 250 OP 250 PS 637: Performed by: EMERGENCY MEDICINE

## 2023-08-31 PROCEDURE — 99223 1ST HOSP IP/OBS HIGH 75: CPT | Mod: AI | Performed by: PSYCHIATRY & NEUROLOGY

## 2023-08-31 PROCEDURE — H2032 ACTIVITY THERAPY, PER 15 MIN: HCPCS

## 2023-08-31 PROCEDURE — 124N000002 HC R&B MH UMMC

## 2023-08-31 RX ORDER — OLANZAPINE 10 MG/2ML
10 INJECTION, POWDER, FOR SOLUTION INTRAMUSCULAR 3 TIMES DAILY PRN
Status: DISCONTINUED | OUTPATIENT
Start: 2023-08-31 | End: 2023-09-01 | Stop reason: HOSPADM

## 2023-08-31 RX ORDER — RISPERIDONE 0.5 MG/1
0.5 TABLET ORAL 2 TIMES DAILY
Status: DISCONTINUED | OUTPATIENT
Start: 2023-08-31 | End: 2023-09-01 | Stop reason: HOSPADM

## 2023-08-31 RX ORDER — NICOTINE 21 MG/24HR
1 PATCH, TRANSDERMAL 24 HOURS TRANSDERMAL DAILY
Status: CANCELLED | OUTPATIENT
Start: 2023-08-31

## 2023-08-31 RX ORDER — AMOXICILLIN 250 MG
1 CAPSULE ORAL 2 TIMES DAILY PRN
Status: DISCONTINUED | OUTPATIENT
Start: 2023-08-31 | End: 2023-09-01 | Stop reason: HOSPADM

## 2023-08-31 RX ORDER — POLYETHYLENE GLYCOL 3350 17 G
2 POWDER IN PACKET (EA) ORAL
Status: DISCONTINUED | OUTPATIENT
Start: 2023-08-31 | End: 2023-09-01 | Stop reason: HOSPADM

## 2023-08-31 RX ORDER — MIRTAZAPINE 15 MG/1
15 TABLET, FILM COATED ORAL AT BEDTIME
Status: DISCONTINUED | OUTPATIENT
Start: 2023-08-31 | End: 2023-09-01 | Stop reason: HOSPADM

## 2023-08-31 RX ORDER — ACETAMINOPHEN 325 MG/1
650 TABLET ORAL EVERY 4 HOURS PRN
Status: DISCONTINUED | OUTPATIENT
Start: 2023-08-31 | End: 2023-09-01 | Stop reason: HOSPADM

## 2023-08-31 RX ORDER — OLANZAPINE 10 MG/1
10 TABLET ORAL 3 TIMES DAILY PRN
Status: DISCONTINUED | OUTPATIENT
Start: 2023-08-31 | End: 2023-09-01 | Stop reason: HOSPADM

## 2023-08-31 RX ORDER — HYDROXYZINE HYDROCHLORIDE 25 MG/1
25 TABLET, FILM COATED ORAL EVERY 4 HOURS PRN
Status: DISCONTINUED | OUTPATIENT
Start: 2023-08-31 | End: 2023-09-01 | Stop reason: HOSPADM

## 2023-08-31 RX ORDER — ONDANSETRON 4 MG/1
4 TABLET, ORALLY DISINTEGRATING ORAL EVERY 6 HOURS PRN
Status: CANCELLED | OUTPATIENT
Start: 2023-08-31

## 2023-08-31 RX ORDER — TRAZODONE HYDROCHLORIDE 50 MG/1
50 TABLET, FILM COATED ORAL
Status: DISCONTINUED | OUTPATIENT
Start: 2023-08-31 | End: 2023-09-01 | Stop reason: HOSPADM

## 2023-08-31 RX ADMIN — TRAZODONE HYDROCHLORIDE 50 MG: 50 TABLET ORAL at 20:21

## 2023-08-31 RX ADMIN — SENNOSIDES AND DOCUSATE SODIUM 1 TABLET: 50; 8.6 TABLET ORAL at 16:52

## 2023-08-31 RX ADMIN — MIRTAZAPINE 15 MG: 15 TABLET, FILM COATED ORAL at 20:21

## 2023-08-31 RX ADMIN — GABAPENTIN 200 MG: 100 CAPSULE ORAL at 13:44

## 2023-08-31 RX ADMIN — HYDROXYZINE HYDROCHLORIDE 50 MG: 25 TABLET, FILM COATED ORAL at 02:19

## 2023-08-31 RX ADMIN — BICTEGRAVIR SODIUM, EMTRICITABINE, AND TENOFOVIR ALAFENAMIDE FUMARATE 1 TABLET: 50; 200; 25 TABLET ORAL at 08:22

## 2023-08-31 RX ADMIN — NICOTINE POLACRILEX 2 MG: 2 LOZENGE ORAL at 16:51

## 2023-08-31 RX ADMIN — RISPERIDONE 0.5 MG: 0.5 TABLET ORAL at 20:21

## 2023-08-31 RX ADMIN — RISPERIDONE 0.5 MG: 0.5 TABLET ORAL at 13:44

## 2023-08-31 RX ADMIN — HYDROXYZINE HYDROCHLORIDE 25 MG: 25 TABLET, FILM COATED ORAL at 16:52

## 2023-08-31 RX ADMIN — APIXABAN 10 MG: 5 TABLET, FILM COATED ORAL at 20:21

## 2023-08-31 RX ADMIN — CLONIDINE HYDROCHLORIDE 0.1 MG: 0.1 TABLET ORAL at 13:44

## 2023-08-31 RX ADMIN — ACETAMINOPHEN 650 MG: 325 TABLET, FILM COATED ORAL at 16:52

## 2023-08-31 ASSESSMENT — ACTIVITIES OF DAILY LIVING (ADL)
WALKING_OR_CLIMBING_STAIRS_DIFFICULTY: NO
ADLS_ACUITY_SCORE: 29
FALL_HISTORY_WITHIN_LAST_SIX_MONTHS: YES
DRESSING/BATHING_DIFFICULTY: NO
ADLS_ACUITY_SCORE: 29
NUMBER_OF_TIMES_PATIENT_HAS_FALLEN_WITHIN_LAST_SIX_MONTHS: 20
LAUNDRY: WITH SUPERVISION
HEARING_DIFFICULTY_OR_DEAF: NO
CHANGE_IN_FUNCTIONAL_STATUS_SINCE_ONSET_OF_CURRENT_ILLNESS/INJURY: YES
DRESS: INDEPENDENT
DOING_ERRANDS_INDEPENDENTLY_DIFFICULTY: NO
DIFFICULTY_EATING/SWALLOWING: NO
ADLS_ACUITY_SCORE: 45
HYGIENE/GROOMING: INDEPENDENT
ADLS_ACUITY_SCORE: 35
ADLS_ACUITY_SCORE: 35
ADLS_ACUITY_SCORE: 45
ADLS_ACUITY_SCORE: 29
ADLS_ACUITY_SCORE: 29
CONCENTRATING,_REMEMBERING_OR_MAKING_DECISIONS_DIFFICULTY: YES
TOILETING_ISSUES: NO
DIFFICULTY_COMMUNICATING: NO
WEAR_GLASSES_OR_BLIND: NO
ADLS_ACUITY_SCORE: 35
ORAL_HYGIENE: INDEPENDENT
ADLS_ACUITY_SCORE: 35
ADLS_ACUITY_SCORE: 29
ADLS_ACUITY_SCORE: 35

## 2023-08-31 ASSESSMENT — LIFESTYLE VARIABLES
AUDIT-C TOTAL SCORE: 0
SKIP TO QUESTIONS 9-10: 1

## 2023-08-31 NOTE — CARE PLAN
INITIAL OCCUPATIONAL THERAPY NOTE   08/31/23 1518   Group Therapy Session   Group Attendance attended group session   Time Session Began 1400   Time Session Ended 1445   Total Time (minutes) 45   Total # Attendees 7   Group Type life skill;other (see comments)  (OT)   Group Topic Covered balanced lifestyle;cognitive activities;leisure exploration/use of leisure time;structured socialization   Group Session Detail OT - Topic:  Focus of group activity was use of cognitive techniques through word associations with working toward matching other players. Also opportunity to practice socialization and ways to support others through enjoyable leisure activities.   Patient Response/Contribution able to recall/repeat info presented;cooperative with task;expressed understanding of topic   Patient Participation Detail Pt initiated coming to group. He followed directions for the activity and able to identify some creative words and did match others. Affect was flat and low mood and energy.
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
## 2023-08-31 NOTE — H&P
"[facility] [place]   Psychiatric History and Physical.    Chief complaint and reason for admission:     Suicidal thoughts and Auditory hallucinations.    History of present illness: per DEC assessment: Edi Sorenson presents to the ED via EMS. Patient is presenting to the ED for the following concerns: Health stressors, Substance use, Suicidal ideation, Suicide attempt, Depression, Anxiety (hallucinations).   Factors that make the mental health crisis life threatening or complex are:  Pt presents to ED for concerns of increased SI and withdrawal symptoms. Pt reports he last used crack cocaine and fentanyl 4 days ago, is now experiencing withdrawal symptoms. Denied offer for suboxone because he plans to detox \"cold turkey\". Does not appear to be experiencing dramatic withdrawal symptoms. Pt reports active SI and states he has been attempting to starve himself over the past few days. Continues to endorse active SI. Denies HI, NSSIB. Pt reports increasing auditory and visual hallucinations. Pt stopped taking his mental health and HIV medications \"awhile ago\".    During visit with this provider patient presented as fatigued, but cooperative and polite. He reported that he had stopped all his meds including meds for HIV and MI about one year ago and was engaged with daily use of illicit drugs. His drugs of choice are cocaine and Fentanyl. Reported sometimes hearing voices, sometimes whistling sounds that irritate and scare him. Said that at times voices tell him that he would never make it and tell him to kill self. Admitted that using cocaine makes voices worse. Said that at times he feels like TV generates voices? He also reports disrupted sleep and significant weight loss (about 100 lbs in one year). UDS was positive for cocaine and negative for other substances.     Past psychiatric history: reports history of previous psychiatric hospitalizations and Suicide attempt by cutting self, " "also apparently, tried to starve self? Per chart review patient denied commitment history. Most recent meds prescribed were Risperdal, Zoloft and Remeron. Risperdal was reportedly helpful for voices. Has been off them for one year. Reports being in 2 CD treatment, last couple of years ago. Said that he is open to going to residential CD program now. After coming to ED was offered Suboxone for detox, but refused, said that he wanted detox \"cold turkey\".     Past medical history:   Diagnosis Date    Asymptomatic human immunodeficiency virus (HIV) infection status (H)      Cocaine abuse (H)      Depression      DVT (deep venous thrombosis) (H)       Last in 6/2018. No hx of PE. On Coumadin.    Genital HSV      Past Surgical History   History reviewed. No pertinent surgical history.     Family and social history: originally from [place]. Has been in [place] for the last 9 years. Single and lives alone. Said that he has 2 living children in [place], one child passed. 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 Psychiatric Family Hx: None known, per patient          Medications:      bictegravir-emtricitabine-tenofovir  1 tablet Oral Daily    mirtazapine  15 mg Oral At Bedtime    risperiDONE  0.5 mg Oral BID          Allergies:   No Known Allergies       Labs:     Recent Results (from the past 24 hour(s))   Hepatitis A antibody IgM    Collection Time: 08/30/23  3:06 PM   Result Value Ref Range    Hepatitis A Antibody IgM Nonreactive Nonreactive   Hepatitis B surface antigen    Collection Time: 08/30/23  3:06 PM   Result Value Ref Range    Hepatitis B Surface Antigen Nonreactive Nonreactive   Hepatitis B core antibody IgM    Collection Time: 08/30/23  3:06 PM   Result Value Ref Range    Hepatitis B Core Antibody IgM Nonreactive Nonreactive   Hepatitis C antibody    Collection Time: 08/30/23  3:06 PM   Result Value Ref Range    Hepatitis C Antibody Nonreactive Nonreactive   Comprehensive " metabolic panel    Collection Time: 08/30/23  3:06 PM   Result Value Ref Range    Sodium 138 136 - 145 mmol/L    Potassium 4.3 3.4 - 5.3 mmol/L    Chloride 100 98 - 107 mmol/L    Carbon Dioxide (CO2) 32 (H) 22 - 29 mmol/L    Anion Gap 6 (L) 7 - 15 mmol/L    Urea Nitrogen 14.2 6.0 - 20.0 mg/dL    Creatinine 0.89 0.67 - 1.17 mg/dL    Calcium 8.9 8.6 - 10.0 mg/dL    Glucose 105 (H) 70 - 99 mg/dL    Alkaline Phosphatase 50 40 - 129 U/L    AST 19 0 - 45 U/L    ALT 20 0 - 70 U/L    Protein Total 6.8 6.4 - 8.3 g/dL    Albumin 3.2 (L) 3.5 - 5.2 g/dL    Bilirubin Total 0.3 <=1.2 mg/dL    GFR Estimate >90 >60 mL/min/1.73m2   TSH with free T4 reflex    Collection Time: 08/30/23  3:06 PM   Result Value Ref Range    TSH 2.82 0.30 - 4.20 uIU/mL   HIV-1 RNA quantitative    Collection Time: 08/30/23  3:06 PM   Result Value Ref Range    HIV-1 RNA Copies/mL, Instrument 11,700 (H) <1 copies/mL    HIV-1 log 4.1    T cell subset profile    Collection Time: 08/30/23  3:06 PM   Result Value Ref Range    CD3% Total T Cells 88 (H) 49 - 84 %    Absolute CD3, Total T Cells 1,940 603 - 2,990 cells/uL    CD4% Colby T Cells 32 28 - 63 %    Absolute CD4, [place] T Cells 699 441 - 2,156 cells/uL    CD8% Suppressor T Cells 53 (H) 10 - 40 %    Absolute CD8, Suppressor T Cells 1,181 125 - 1,312 cells/uL    CD4:CD8 Ratio 0.59 (L) 1.40 - 2.60    T Cell Comment     CBC with platelets and differential    Collection Time: 08/30/23  3:06 PM   Result Value Ref Range    WBC Count 5.8 4.0 - 11.0 10e3/uL    RBC Count 5.23 4.40 - 5.90 10e6/uL    Hemoglobin 15.1 13.3 - 17.7 g/dL    Hematocrit 46.4 40.0 - 53.0 %    MCV 89 78 - 100 fL    MCH 28.9 26.5 - 33.0 pg    MCHC 32.5 31.5 - 36.5 g/dL    RDW 13.2 10.0 - 15.0 %    Platelet Count 238 150 - 450 10e3/uL    % Neutrophils 52 %    % Lymphocytes 35 %    % Monocytes 10 %    % Eosinophils 2 %    % Basophils 1 %    % Immature Granulocytes 0 %    NRBCs per 100 WBC 0 <1 /100    Absolute Neutrophils 3.0 1.6 - 8.3 10e3/uL     Absolute Lymphocytes 2.0 0.8 - 5.3 10e3/uL    Absolute Monocytes 0.6 0.0 - 1.3 10e3/uL    Absolute Eosinophils 0.1 0.0 - 0.7 10e3/uL    Absolute Basophils 0.0 0.0 - 0.2 10e3/uL    Absolute Immature Granulocytes 0.0 <=0.4 10e3/uL    Absolute NRBCs 0.0 10e3/uL   Drug abuse screen 1 urine (ED)    Collection Time: 08/30/23  3:30 PM   Result Value Ref Range    Amphetamines Urine Screen Negative Screen Negative    Barbituates Urine Screen Negative Screen Negative    Benzodiazepine Urine Screen Negative Screen Negative    Cannabinoids Urine Screen Negative Screen Negative    Cocaine Urine Screen Positive (A) Screen Negative    Opiates Urine Screen Negative Screen Negative          Psychiatric Examination:     /87 (BP Location: Right arm, Patient Position: Sitting, Cuff Size: Adult Regular)   Pulse 67   Temp 98.4  F (36.9  C) (Oral)   Resp 18   Ht 1.829 m (6')   Wt 76.2 kg (168 lb)   SpO2 100%   BMI 22.78 kg/m    Weight is 168 lbs 0 oz  Body mass index is 22.78 kg/m .                Sitting Orthostatic BP: 129/87      Sitting Orthostatic Pulse: 67 bpm      Standing Orthostatic BP: 90/62              Appearance: dressed in hospital scrubs and poorly groomed  Attitude:  cooperative  Eye Contact:  poor   Mood:  depressed  Affect:  restricted range  Speech:  decreased prosody and soft  Psychomotor Behavior:  no evidence of tardive dyskinesia, dystonia, or tics and physical retardation  Throught Process:  goal oriented  Associations:  no loose associations  Thought Content:  passive suicidal ideation present and auditory hallucinations present  Insight:  partial  Judgement:  fair  Oriented to:  time, person, and place  Attention Span and Concentration:  fair  Recent and Remote Memory:  fair       Review of systems:     For physical examination and 12 point review of system please, see note of Dr. Hernandez from 8/28/23.  I reviewed that note and agree with it.         DIagnoses:     Major depressive disorder,  recurrent, severe vs Substance induced depression;  Stimulant (cocaine) use disorder; opiate use disorder; HIV positive.         Plan:     Will ask for IM consult due to being off anticoagulants, HIV meds for a long time. Nutritionist consult. Will restart on Risperdal and Remeron. Will continue to provide support and structure.      Total time spent was 56 minutes. Over 50% of times was spent counseling and coordination of care regarding coping skills, medication and discharge planning.

## 2023-08-31 NOTE — PROGRESS NOTES
"          08/31/23 1800   Group Therapy Session   Time Session Began 1715   Time Session Ended 1810   Total Time (minutes) 45   Total # Attendees 9   Group Type expressive therapy   Group Topic Covered balanced lifestyle;emotions/expression;relaxation techniques   Group Session Detail Relaxation   Patient Response/Contribution cooperative with task   Patient Participation Detail Cooperatively engaged in Evening Music Relaxation group to decrease anxiety and promote relaxation, Responded well to the music, especiall when he did become tearful during the Alfredo Wonder song \"As\", appearing to have safe emotional release.  Appeared somewhat dazed after session, but greeted writer on her way out.             "

## 2023-08-31 NOTE — PROVIDER NOTIFICATION
08/31/23 1256   Individualization/Patient Specific Goals   Patient Personal Strengths expressive of needs;resourceful;resilient;positive vocational history;expressive of emotions   Patient Vulnerabilities substance abuse/addiction;history of unsuccessful treatment;adverse childhood experience(s);traumatic event   Anxieties, Fears or Concerns Pt has been withdrawing from fentanyl and crack cocaine use in the ED. Pt was recently admitted to [facility] and has been calm and cooperative.   Individualized Care Needs Pt was recently admitted to [facility] and continues to endorse suicidal ideations. Pt would benefit from continued symptom stabilization and medication management as pt has been off his medications for a while.   Interprofessional Rounds   Summary Pt has endorsed suicidal ideation and attempted via starvation but was unsuccessful. Pt had not eaten for four days when brought to ED. Pt has been withdrawing from fentanyl and crack cocaine.   Participants nursing;OT;psychiatrist;[facility]   Behavioral Team Discussion   Participants Rudy Lizarraga MD; Judith Neves MSW, LICSW; Blekis Beatty MSW, LICSW; Kierra Cohen, OTR/L; Ena Livingston RN   Progress Pt was recently admitted to the unit and would benefit from symptom stabilization and medication management. Pt endorses active suicidal ideation.   Anticipated length of stay Pt was admitted to the unit today and has been off all medications for some time. Pt would benefit from medication management and symptom stabilization prior to referrals for continued support.   Continued Stay Criteria/Rationale N/A   Medical/Physical Pt is HIV positive   Precautions Suicide precautions   Plan Multidisciplinary team evaluation, medication management, care coordination   Rationale for change in precautions or plan N/A   Safety Plan Per unit protocol   Anticipated Discharge Disposition substance use treatment;home or self-care

## 2023-08-31 NOTE — PROGRESS NOTES
Infectious disease brief note    Biktarvy was restarted yesterday. normal Cr. CD 4 is 699 and VL 11.7 K   spoke with IM team. patient is stable. no concerns.     Plan/Rec  - recheck HIV VL and T cell subset in 4-6 weeks   - he should f/u with his HIV physician Dr Puentes at [facility].     ID will sign off. Plan discussed with IM team     Scotty Vigil MD,M.Med.Sc.  Infectious Diseases

## 2023-08-31 NOTE — PLAN OF CARE
Problem: Suicidal Behavior  Goal: Suicidal Behavior is Absent or Managed  Outcome: Progressing    Patient was admitted voluntarily from [facility] for suicidal ideation with plan to starve himself or cut his wrists and A/VH. Pt denies any hallucinations during interview but reports he has had them intermittently since bringing himself to the ED, reports they are command in nature but only tell him to harm himself. Pt reports he had plan to find a can to cut his wrists but he is no longer feeling active SI and there are no cans available in the hospital, continues to endorse depression. Pt UTOX positive for cocaine, reports cocaine and fentanyl use, last use 5 days ago. Pt appears to be having mild withdrawal sx at this time, reports some nasal drip and poor appetite but no nausea, COWS 2 on admission. Pt contracts for safety on the unit, reports he has been off medications for the last year.     Pt notified a friend of his arrival to the unit.     Mental Health History: Per chart review pt has a hx of schizophrenia, depression, and anxiety as well as polysubstance use. Pt reports hx of CD tx x 2, last IP for [facility] in 2022. Pt reports hx of SIB via cutting several years ago, denies any recent SIB other than using substances.       Medical History: Pt reports he has hx of HIV and DVT/PE. Per pt he has had greater than 20 falls in the last 6 months due to substance use and feeling lightheaded, pt denies any dizziness upon admission and gait appears steady. Pt reports he has lost over 100 lbs since using substances and not taking his HIV medications, reports poor appetite while using and that all food tastes bad/the same, nutrition consult placed, pt reports baseline weight is around 250 lbs.       Plan: Patient was cooperative with search and admission process. Patient affect is flat and blunted but pleasant and able to make needs known appropriately. Patient was placed on suicide precautions. Patient is  code 1 status 15. Will encourage patient to complete safety plan and participate in groups. Will encourage patient to take prescribed medications.

## 2023-08-31 NOTE — CONSULTS
Name: Edi Sorenson  : 1975  Date: 2023  Time: 1:27am  Location of patient: [facility]  Location of doctor: [place]  Spoke with: Rebeca  HPI:  The patient is a 47 year old male with a past psychiatric history of MDD, SOFIA, schizophrenia, and polysubstance abuse who presents with SI and AVH. He noted a lack of social support, noncompliance with medications, and crack cocaine and Fentanyl use (last 4 days ago). His UDS is positive for cocaine. He denies HI or self-harming behaviors. He's calm and cooperative. He is HIV positive but labs are noncontributory.   Plan/Recommendations: Voluntary admission to behavioral health

## 2023-08-31 NOTE — ED NOTES
"Pt presents calm, pleasant, and cooperative. Pt reports SI and feels depressed. Pt denies HI, SIB, and hallucinations at this time. Pt is soft spoken and appears very sad. Pt inquired about when he will be admitted to [facility]. Writer explained that once the nurse is ready for report, we will transfer him up there ASAP.   Writer asked Pt if he was experiencing any pain, and Pt responded \"my heart\". Writer followed-up on this response and Pt clarified this is emotional pain, not physical.   VSS, med compliant, behaviorally in control.   "

## 2023-08-31 NOTE — TELEPHONE ENCOUNTER
R: Patient cleared and ready for behavioral bed placement: Yes    1:20am Intake called [facility].     1:28am Intake received a call from [facility] accepting this pt [facility].     1:47am Intake called [facility]    1:48am Intake called [facility] and provided placement information to [facility].

## 2023-08-31 NOTE — ED NOTES
Patient sleeping till dinnertime.  Patient ate dinner and also had a snack at 9pm.  Patient calm and cooperative.  Denies AVH, SIB, and no SI plan.  Completed COWs at 9:30pm approximately  with a score of 1.   Patient requested Trazadone prn. For sleep.

## 2023-08-31 NOTE — CARE PLAN
08/31/23 1026   Patient Belongings   Did you bring any home meds/supplements to the hospital?  No   Patient Belongings other (see comments)   Patient Belongings Sent Home other (see comments)   Belongings Search Yes   Clothing Search Yes   Second Staff Arnol       In locker #11:  Black hat  White shirt  Black shirt  Black sweatpants  White James shoes  White shoes  Keys  Lighter    Sent to security:  $4 cash    A               Admission:  I am responsible for any personal items that are not sent to the safe or pharmacy.  [facility] is not responsible for loss, theft or damage of any property in my possession.    Signature:  _________________________________ Date: _______  Time: _____                                              Staff Signature:  ____________________________ Date: ________  Time: _____      2nd Staff person, if patient is unable/unwilling to sign:    Signature: ________________________________ Date: ________  Time: _____     Discharge:  [facility] has returned all of my personal belongings:    Signature: _________________________________ Date: ________  Time: _____                                          Staff Signature:  ____________________________ Date: ________  Time: _____

## 2023-08-31 NOTE — PROGRESS NOTES
CLINICAL NUTRITION SERVICES - ASSESSMENT NOTE     Nutrition Prescription    RECOMMENDATIONS FOR MDs/PROVIDERS TO ORDER:  Consider adding MVI in setting of poor nutrition. Consider adding vitamin D in setting of HIV.    Malnutrition Status:    Patient does not meet two of the established criteria necessary for diagnosing malnutrition but is at risk for malnutrition    Recommendations already ordered by Registered Dietitian (RD):  Double portions at meals  Snacks 1000: HB eggs, 1400: Special K bar, 2000: tuna with crackers  Write-in options: chicken or cheese quesadilla, hot chicken sandwich    Future/Additional Recommendations:  Monitor weight trends, intakes and snack tolerance       REASON FOR ASSESSMENT  Edi Sorenson is a 47 year old male assessed by the dietitian for positive Admission Nutrition Risk Screen- 34+ lb loss, decreased appetite r/t substance abuse. MD Consult: pt has lost significant wt recently, hx HIV, struggling with appetite    Reason for admission: hallucinations and SI   PMH:  HIV, PE/DVT (on eliquis) 5/21, and polysubstance abuse (alcohol, cocaine, opioid)     NUTRITION HISTORY  Reports poor appetite while using crack and that all food tastes bad/the same   Pt reports when using he does not eat anything and reports current crack martinez has been going on for over one year. Pt not willing to elaborate on what he does eat or drink for minimal sustenance.  Pt requests double portions and agrees to snacks TID. Pt declines Ensure supplements.    CURRENT NUTRITION ORDERS  Diet: Regular  Intake/Tolerance: No intakes recorded. Pt reports eating breakfast this [facility]    LABS  Labs reviewed    MEDICATIONS  Medications reviewed  Current Facility-Administered Medications   Medication    acetaminophen (TYLENOL) tablet 650 mg    apixaban ANTICOAGULANT (ELIQUIS) tablet 10 mg    Followed by    [START ON 9/7/2023] apixaban ANTICOAGULANT (ELIQUIS) tablet 5 mg    benzocaine-menthol (CHLORASEPTIC) 6-10 MG  "lozenge 1 lozenge    bictegravir-emtricitabine-tenofovir (BIKTARVY) -25 MG per tablet 1 tablet    cloNIDine (CATAPRES) tablet 0.1 mg    dicyclomine (BENTYL) capsule 20 mg    gabapentin (NEURONTIN) capsule 200 mg    hydrOXYzine (ATARAX) tablet 25 mg    loperamide (IMODIUM) capsule 2 mg    methocarbamol (ROBAXIN) tablet 500 mg    mirtazapine (REMERON) tablet 15 mg    nicotine (COMMIT) lozenge 2 mg    OLANZapine (zyPREXA) tablet 10 mg    Or    OLANZapine (zyPREXA) injection 10 mg    ondansetron (ZOFRAN ODT) ODT tab 8 mg    risperiDONE (risperDAL) tablet 0.5 mg    senna-docusate (SENOKOT-S/PERICOLACE) 8.6-50 MG per tablet 1 tablet    traZODone (DESYREL) tablet 50 mg     Facility-Administered Medications Ordered in Other Encounters   Medication    Self Administer Medications: Behavioral Services     ANTHROPOMETRICS  Height: 182.9 cm (6' 0\")  Most Recent Weight: 76.2 kg (168 lb)    IBW: 81 kg  Body mass index is 22.78 kg/m .  BMI: Normal BMI  Weight History: Pt reports  lbs and believes he was last at that weight within one year. EMR does not support this claim. Per chart review, likely last at that weight in 2021.  08/31/23  76.2 kg (168 lb)  02/24/22  100 kg (220 lb 8 oz)  02/13/22  97.3 kg (214 lb 9.6 oz)  04/16/19  111.3 kg (245 lb 6.4 oz)  Per Care Everywhere:  91.6 kg (202 lb)  09/25/2022 (16.8% loss x 11 months)  95.3 kg (210 lb) 09/19/2022     109.4 kg (241 lb 2.9 oz) 09/29/2021     Dosing Weight: 76.2 kg    ASSESSED NUTRITION NEEDS  Estimated Energy Needs: 2285 - 2670 kcals/day (30 - 35 kcals/kg)  Justification: Maintenance  Estimated Protein Needs: 91- 114 grams protein/day (1.2 - 1.5 grams of pro/kg)  Justification: Maintenance  Estimated Fluid Needs: 2285 - 2670 mL/day (30 - 35 mL/kg)   Justification: Maintenance    PHYSICAL FINDINGS  See malnutrition section below.  No abnormal nutrition-related physical findings observed.   Oscar: 22    MALNUTRITION  % Intake: < 75% for >/= 3 months " (moderate)  % Weight Loss: Weight loss does not meet criteria  Subcutaneous Fat Loss: Facial region:  Mild and Upper arm:  Mild  Muscle Loss: Temporal:  Mild, Scapular bone:  Mild, and Thoracic region (clavicle, acromium bone, deltoid, trapezius, pectoral):  Mild  Fluid Accumulation/Edema: None noted  Malnutrition Diagnosis: Moderate malnutrition in the context of chronic illness    NUTRITION DIAGNOSIS  Inadequate oral intake related to disordered eating habits r/t substance abuse as evidenced by pt self report, failure to maintain weight      INTERVENTIONS  Implementation    Medical food supplement therapy  Modify composition of meals/snacks     Goals  Patient to consume % of nutritionally adequate meal trays TID, or the equivalent with supplements/snacks.     Monitoring/Evaluation  Progress toward goals will be monitored and evaluated per protocol.  Angelia ABARCA  Mental Health Pager (M-F): [phone]  On Call Pager (weekends only): [phone]

## 2023-08-31 NOTE — DISCHARGE INSTRUCTIONS
"Behavioral Discharge Planning and Instructions    Summary: You were admitted on 8/28/2023  due to Suicidal Ideations and Chemical Use Issues.  You were treated by Rudy Lizarraga MD and discharged on 9/1/23 against medical advice from [facility].    Main Diagnosis:   - Major depressive disorder, recurrent, severe vs Substance induced depression  - Stimulant (cocaine) use disorder  - Opiate use disorder    Commitment:  You were a voluntary patient.    Health Care Follow-up:   You are being discharged against medical advice.     Appointment type: Specialty Follow Up  Date/time:  Tuesday September 12th, 2023 @ 2:20 PM  In person  Provider:  Braydon Puentes MD, MPH  Address:  [place] [address]  Phone:  [phone]  Fax:  [phone]  Note:   Please call to reschedule if you cannot attend appointment.    You declined assistance for additional follow-up appointments. You are encouraged to establish an outpatient therapist and psychiatrist in the community for continued medication management and support.     For assistance with appointments, housing, and mental health support following hospitalization, you can establish a . [facility] or [facility] case management could be a great resource for you to provide additional support and assistance with establishing care and supporting you on an ongoing basis. Their information is noted below in the \"Resources\" section.    Major Treatments, Procedures and Findings: You were provided with: a psychiatric assessment, assessed for medical stability, medication evaluation and/or management, group therapy, family therapy, individual therapy, milieu management, and medical interventions    Symptoms to Report: feeling more aggressive, increased confusion, losing more sleep, mood getting worse, or thoughts of suicide    Early warning signs can include: increased depression or anxiety sleep disturbances " "increased thoughts or behaviors of suicide or self-harm     Safety and Wellness:  Take all medicines as directed. Make no changes unless your doctor suggests them. Follow treatment recommendations. Refrain from alcohol and non-prescribed drugs. Ask your support system to help you reduce your access to items that could harm yourself or others. Items could include:    - Firearms  - Medicines (both prescribed and over-the-counter)  - Knives and other sharp objects  - Ropes and like materials  - Car keys  If there is a concern for safety, call 911.     Resources:   Crisis Intervention: 820.986.1449 or 823-670-9010 (TTY: 324.421.3697).  Call anytime for help.  Suicide Awareness Voices of Education (SAVE) (www.save.org): 957-467-MWRR (4876)  National Suicide Prevention Line (www.mentalhealthmn.org): 981-305-LKBA (8977)  [facility] Crisis (COPE) Response - Adult 033 043-3915  Text 4 Life: txt \"LIFE\" to 71282 for immediate support and crisis intervention  Crisis text line: Text \"[place]\" to 636169. Free, confidential, 24/7.  [facility] Mobile Mental Health Crisis Team - Child: 779.228.5530  [facility] [place] ([employer] [place] [employer] Mental [employer]ness) improves the lives of children and adults with mental illnesses and their families by providing free classes on mental illnesses and support groups for adults with mental illnesses, parents and family members. For more information: Phone: 422.388.3346 Toll free: 8-184-SDNA-HELPS Website: www.namihelps.orghttp://www.[facility].org/    [facility] Project: Support for people living with HIV. Services provided include: mental health, case management, and housing support.  Website: https://aliveness.org/  Phone: 412.405.3740  Email: info@[facility].org    [facility] Adult Case Management  Website: https://www.[place]/health-medical/mental-health-substance-use  Phone:  464.927.1396    AIDSLine: The [place] AIDSLine is [facility] statewide information and referral " service that can answer your questions about HIV and link you to help.  Phone: 926.519.1540  Email: aidsline@[facility].org  Text: AIDSLine to 751103    General Medication Instructions:   See your medication sheet(s) for instructions.   Take all medicines as directed.  Make no changes unless your doctor suggests them.   Go to all your doctor visits.  Be sure to have all your required lab tests. This way, your medicines can be refilled on time.  Do not use any drugs not prescribed by your doctor.  Avoid alcohol.    Advance Directives:   Scanned document on file with [employer]? No scanned doc  Is document scanned? Pt states no documents  Honoring Choices Your Rights Handout: Informed and given  Was more information offered? Materials given    The Treatment team has appreciated the opportunity to work with you. If you have any questions or concerns about your recent admission, you can contact the unit which can receive your call 24 hours a day, 7 days a week. They will be able to get in touch with a Provider if needed. The unit number is [phone].

## 2023-08-31 NOTE — PROGRESS NOTES
Triage & Transition Services, Extended Care     Client Name: Edi Sorenson    Date: August 30, 2023  Service Type: Group Therapy  Session Start Time: 2:53 pm  Session End Time: 3:15 pm  Session Length: 22 min  Site Location: [facility]  Attendees: Patient and other group members  Facilitator: Alia Ugalde     Topic:    Art Group: Collaging    Intervention:    Group process: support, challenge, affirm, psycho-education.    Response:  Patient did participate in group. He was quiet but did make a collage. Another patient expressed distress about a recent event and patient was observed providing engagement and support to them.     Alia Ugalde

## 2023-08-31 NOTE — ED NOTES
Pt transferred to [facility]. Pt transferred via transport with all belongings.   Pt transferred with VSS, med compliant, and behaviorally in control.

## 2023-08-31 NOTE — ED NOTES
Patient slept intermittently throughout the shift with no s/s of acute distress. Pt requested snacks multiple times throughout the shift and tolerated oral intake. Pt displayed no s/s of withdrawals. The patient was noted to be breathing with ease during safety rounds. Patient experienced no safety events or escalations during the shift. Will continue to monitor.

## 2023-08-31 NOTE — CONSULTS
"[facility]  Consult Note - Hospitalist Service  Date of Admission:  8/28/2023  Consult Requested by: Dr. Rand   Reason for Consult: \"HIV positive, history of pulmonary embolism, off meds. Please, help with management\"    Assessment & Plan   Edi Sorenson is a 47 year old male admitted on 8/28/2023m with 4-days of auditory/visual hallucinations and SI. He has a past medical history of  Depression, HIV, PE/DVT on Eliquis. Medicine has been consulted for medical management of HIV positive pt with history of DVT/PE off medication.     Medicine will follow in the periphery for RLE US.     # Substance use disorder  # Depression  # SI  # Hallucinations  - Per Psychiatry     # HIV: [place] [facility] [place]. Pt has varying reports of how long he has been off anti-viral medication for, notes for me as \"around a year,\" but per pharmacy med rec, appears to have had a 30-day supply filled on 11/22. ID followed, now signed off. CD4 699, HIV RNA 11,700. Cr, hepatic panel WNL.  Likely d/t lack of compliance.   - Continue Biktarvy -25 mg daily   - Recheck HIV viral loads, T-cell subset in 4-6 weeks per discussion with ID (~9/30)  - Follow-up OP with ID at [facility] [place]. Puentes (please help arrange at discharge).     # Hx of acute saddle PE without cor pulmonale (5/2021)  # Hx of BL DVTs, recurrent  #Chronic proximal and distal right femoral vein nonocclusive thrombus  # RLE swelling  # RLE nickel sizde mobile mass:Extensive history of DVTs: 2010?  2018, 2019, 2020, 2021 with Warfarin and then Xarelto with poor compliance. Thought to be due to heavy cocaine usage and immobility while driving. Found to have large saddle pulmonary embolus on 5/5/21 CTPA, new left popliteal vein thrombus and chronic right femoral thrombus on lower extremity ultrasounds. Repeat CT chest PE protocol in 9/2021 negative for PE but did show sequelae of prior PE in RLL and enlargement of main PA w/ " "mild bowing of interventricular septum c/w pulmonary hypertension. Is supposed to be on Eliquis BID indefinitely per Hematology noted during  [facility] stay in 5/2021,  however has been off \"for over a year.\" Per Pharmacy med rec, no fills in the past year (was refilled in 11/22, but pt never picked up). Patient was supposed to follow-up with Hematology OP to discuss if he should remain on AC, but did not go to the appointment. Appearing with RLE swelling, mild TTP, erythema, and a nickel sized mobile mass. Pt unsure of how long swelling and mass have been going on. Denies dyspnea, CP, and SOB. I suspect he has a recurrent DVT given swelling and extensive history as above. In regards to the nickel sized mobile mass, Ddx remains broad including abscess and MSK presentation, but will assess for abscess with US below first.   - Pharmacy liaison consult for apixaban   - RLE venous and non-vascular US   - INR   - Resume apixaban 5 mg BID, likely will have to remain on it indefinitely   - Pending results above, may need to consider APS work-up/Hematology consult given recurrent DVTs  - Follow-up with Hematology OP, may need to consider reduced dosage to 2.5 mg BID  - For new SOB, CP, hemodynamic instability, would consider CTA PE      # Weight loss: pt reports weight loss ~100 lbs. Suspect d/t untreated HIV. CBC WNL upon presentation. Less concerned regarding nefarious sources at this time.   - Agree with RD consult  - Monitor   - Follow-up OP with PCP to monitor        The patient's care was discussed with the Bedside Nurse, Patient and Primary team (via this note).     Clinically Significant Risk Factors Present on Admission              # Hypoalbuminemia: Lowest albumin = 3.2 g/dL at 8/30/2023  3:06 PM, will monitor as appropriate  # Drug Induced Coagulation Defect: home medication list includes an anticoagulant medication             # Support System: poor social support noted in nursing assessment         Latha Vick, " "[facility]  Hospitalist Service  Securely message with [employer] (more info)  Text page via [facility] Paging/Directory   ______________________________________________________________________    Chief Complaint   \"HIV positive, history of pulmonary embolism, off meds. Please, help with management\"    Swollen right calf     History is obtained from the patient    History of Present Illness   Edi Sorenson is a 47 year old male with PMH of HIV ( dx in 2013) , PE/DVT (previously on Eliquis-5/21), and polysubstance abuse (alcohol, cocaine, opioids) who presented to the ED on 8/29/23 with 4 days of  auditory/visual hallucinations and SI. Medicine has been consulted for medical management of HIV positive pt with history of DVT/PE off medication.     Pt reports using fentanyl with last use being 3-4 days ago. He reports being in withdrawal but wants to go \"cold-turkey\" and declined trying Suboxone to help alleviate his withdrawal in the ED. Patient however would like to get admitted to inpatient psych for stabilization due to hearing voices and feeling suicidal and cannot be safe being in the community.Pt also reporting he would like to be back on HIV medications. Upon interview and exam, pt stating he hasn't taken his HIV or AC for \"about a year.\" Notes some right calf pain that has been going on for a \"while\" and also aware of nickel size movable mass on right calf, but \"unsure what from\" and also how long it has been there.  Denies any headaches, fevers, dysphagia, GERD s/s, SOB, chest pain, N/V, abdominal pain, dysuria, back pain or leg swelling.         Past Medical History    Past Medical History:   Diagnosis Date     Asymptomatic human immunodeficiency virus (HIV) infection status (H)      Cocaine abuse (H)      Depression      DVT (deep venous thrombosis) (H)     Last in 6/2018. No hx of PE. On Coumadin.     Genital HSV        Past Surgical History   History reviewed. No pertinent surgical history.    Medications   I " have reviewed this patient's current medications  Medications Prior to Admission   Medication Sig Dispense Refill Last Dose     apixaban ANTICOAGULANT (ELIQUIS) 5 MG tablet Take 1 tablet (5 mg) by mouth 2 times daily 60 tablet 0 Unknown     bictegravir-emtricitabine-tenofovir (BIKTARVY) -25 MG per tablet Take 1 tablet by mouth daily 30 tablet 0 Unknown     mirtazapine (REMERON) 7.5 MG tablet Take 1 tablet (7.5 mg) by mouth nightly as needed (sleep) 30 tablet 0 Unknown     sertraline (ZOLOFT) 50 MG tablet Take 1 tablet (50 mg) by mouth daily 30 tablet 0 Unknown          Review of Systems    The 10 point Review of Systems is negative other than noted in the HPI or here.     Social History   I have reviewed this patient's social history and updated it with pertinent information if needed.  Social History     Tobacco Use     Smoking status: Former     Smokeless tobacco: Never   Substance Use Topics     Alcohol use: Yes     Drug use: Yes     Types: Cocaine     Comment: Used in the right before calling EMS       Allergies   No Known Allergies     Physical Exam   Vital Signs: Temp: 98.4  F (36.9  C) Temp src: Oral BP: 129/87 Pulse: 67   Resp: 18 SpO2: 100 % O2 Device: None (Room air)    Weight: 168 lbs 0 oz    General Appearance: In NAD, sitting in bed  Respiratory: LS clear b/l, normal RR  Cardiovascular: S1, S2, no m/r/g  GI: BS+, all 4 quadrants, no masses, non-tender upon palpation  Skin/MSK: Intact on face, arms, legs. Right calve swelling, warm, mild erythema noted. Movable dime sized hard lesion on right medial, posterior calf. No wounds, bruising, or new lesions noted. Old healing lesions noted throughout legs.   Other: A&Ox4, moving all extremities    Medical Decision Making       60 MINUTES SPENT BY ME on the date of service doing chart review, history, exam, documentation & further activities per the note.      Data   Imaging results reviewed over the past 24 hrs:   No results found for this or any  previous visit (from the past 24 hour(s)).  Recent Labs   Lab 08/30/23  1506   WBC 5.8   HGB 15.1   MCV 89         POTASSIUM 4.3   CHLORIDE 100   CO2 32*   BUN 14.2   CR 0.89   ANIONGAP 6*   VERNA 8.9   *   ALBUMIN 3.2*   PROTTOTAL 6.8   BILITOTAL 0.3   ALKPHOS 50   ALT 20   AST 19     Most Recent 2 LFT's:Recent Labs   Lab Test 08/30/23  1506 02/09/22  0816   AST 19 7   ALT 20 15   ALKPHOS 50 44   BILITOTAL 0.3 0.2     T Cell Subset:  Absolute CD4   Date Value Ref Range Status   04/03/2019 827 441 - 2,156 cells/uL Final     Absolute CD4, Loiza T Cells   Date Value Ref Range Status   08/30/2023 699 441 - 2,156 cells/uL Final     CD4 Loiza T   Date Value Ref Range Status   04/03/2019 43 28 - 63 % Final     CD4% Loiza T Cells   Date Value Ref Range Status   08/30/2023 32 28 - 63 % Final     CD8 Suppressor T   Date Value Ref Range Status   04/03/2019 46 (H) 10 - 40 % Final     CD8% Suppressor T Cells   Date Value Ref Range Status   08/30/2023 53 (H) 10 - 40 % Final     CD3 Mature T   Date Value Ref Range Status   04/03/2019 90 (H) 49 - 84 % Final     CD3% Total T Cells   Date Value Ref Range Status   08/30/2023 88 (H) 49 - 84 % Final     CD4:CD8 Ratio   Date Value Ref Range Status   08/30/2023 0.59 (L) 1.40 - 2.60 Final   04/03/2019 0.93 (L) 1.40 - 2.60 Final     WBC   Date Value Ref Range Status   01/10/2021 11.2 (H) 4.0 - 11.0 10e9/L Final     WBC Count   Date Value Ref Range Status   08/30/2023 5.8 4.0 - 11.0 10e3/uL Final     % Lymphocytes   Date Value Ref Range Status   08/30/2023 35 % Final   01/10/2021 25.8 % Final     Absolute CD3   Date Value Ref Range Status   04/03/2019 1,735 603 - 2,990 cells/uL Final     Absolute CD3, Total T Cells   Date Value Ref Range Status   08/30/2023 1,940 603 - 2,990 cells/uL Final     Absolute CD8   Date Value Ref Range Status   04/03/2019 878 125 - 1,312 cells/uL Final     Absolute CD8, Suppressor T Cells   Date Value Ref Range Status   08/30/2023 1,181 125 -  1,312 cells/uL Final       HIV-1 RNA Quantitative:  HIV-1 RNA Copies/mL, Instrument   Date Value Ref Range Status   08/30/2023 11,700 (H) <1 copies/mL Final     HIV-1 log   Date Value Ref Range Status   08/30/2023 4.1  Final

## 2023-08-31 NOTE — ED NOTES
Call received from patient placement. Informed patient has been accepted to [facility] under Dr Lizarraga. Unit will accept patient on day shift.

## 2023-08-31 NOTE — ED NOTES
Report received from previous shift. Assumed care of patient sleeping with no s/s of acute distress. Pt noted to be breathing with ease. Will continue to monitor.

## 2023-08-31 NOTE — PROGRESS NOTES
"Triage & Transition Services, Extended Care      Client Name: Eid Sorenson \"Edi\"   Date: August 30, 2023  Service Type:  Group Therapy  Site Location: [facility]  Facilitator: Alia Ugalde     Topic:   [address] Grounding Exercise, Box Breathing      Patient was in his room sleeping and did not participate in group.     Alia Ugalde  Extended Care Coordinator  "

## 2023-09-01 VITALS
HEIGHT: 72 IN | SYSTOLIC BLOOD PRESSURE: 112 MMHG | RESPIRATION RATE: 18 BRPM | WEIGHT: 168 LBS | TEMPERATURE: 97 F | HEART RATE: 75 BPM | OXYGEN SATURATION: 98 % | BODY MASS INDEX: 22.75 KG/M2 | DIASTOLIC BLOOD PRESSURE: 74 MMHG

## 2023-09-01 LAB
CHOLEST SERPL-MCNC: 124 MG/DL
D DIMER PPP FEU-MCNC: 1.86 UG/ML FEU (ref 0–0.5)
GGT SERPL-CCNC: 14 U/L (ref 8–61)
HBA1C MFR BLD: 5.7 %
HDLC SERPL-MCNC: 42 MG/DL
HIV 1+2 AB+HIV1P24 AG SERPLBLD IA.RAPID: ABNORMAL
HIV 2 AB SERPLBLD QL IA.RAPID: NONREACTIVE
HIV1 AB SERPLBLD QL IA.RAPID: REACTIVE
LDLC SERPL CALC-MCNC: 67 MG/DL
NONHDLC SERPL-MCNC: 82 MG/DL
TRIGL SERPL-MCNC: 74 MG/DL
TROPONIN T SERPL HS-MCNC: 6 NG/L

## 2023-09-01 PROCEDURE — 36415 COLL VENOUS BLD VENIPUNCTURE: CPT | Performed by: PSYCHIATRY & NEUROLOGY

## 2023-09-01 PROCEDURE — 80061 LIPID PANEL: CPT | Performed by: PSYCHIATRY & NEUROLOGY

## 2023-09-01 PROCEDURE — 99233 SBSQ HOSP IP/OBS HIGH 50: CPT

## 2023-09-01 PROCEDURE — 99223 1ST HOSP IP/OBS HIGH 75: CPT | Mod: GC | Performed by: INTERNAL MEDICINE

## 2023-09-01 PROCEDURE — 84484 ASSAY OF TROPONIN QUANT: CPT

## 2023-09-01 PROCEDURE — 85379 FIBRIN DEGRADATION QUANT: CPT

## 2023-09-01 PROCEDURE — 99239 HOSP IP/OBS DSCHRG MGMT >30: CPT | Performed by: PSYCHIATRY & NEUROLOGY

## 2023-09-01 PROCEDURE — 250N000013 HC RX MED GY IP 250 OP 250 PS 637: Performed by: PSYCHIATRY & NEUROLOGY

## 2023-09-01 PROCEDURE — 250N000013 HC RX MED GY IP 250 OP 250 PS 637

## 2023-09-01 PROCEDURE — 82977 ASSAY OF GGT: CPT | Performed by: PSYCHIATRY & NEUROLOGY

## 2023-09-01 PROCEDURE — 83036 HEMOGLOBIN GLYCOSYLATED A1C: CPT | Performed by: PSYCHIATRY & NEUROLOGY

## 2023-09-01 PROCEDURE — 36415 COLL VENOUS BLD VENIPUNCTURE: CPT

## 2023-09-01 RX ORDER — TRAZODONE HYDROCHLORIDE 50 MG/1
50 TABLET, FILM COATED ORAL
Qty: 30 TABLET | Refills: 0 | Status: ON HOLD | OUTPATIENT
Start: 2023-09-01 | End: 2023-11-13

## 2023-09-01 RX ORDER — RISPERIDONE 0.5 MG/1
0.5 TABLET ORAL 2 TIMES DAILY
Qty: 60 TABLET | Refills: 0 | Status: ON HOLD | OUTPATIENT
Start: 2023-09-01 | End: 2023-11-13

## 2023-09-01 RX ORDER — METHOCARBAMOL 500 MG/1
500 TABLET, FILM COATED ORAL 3 TIMES DAILY PRN
Qty: 60 TABLET | Refills: 0 | Status: ON HOLD | OUTPATIENT
Start: 2023-09-01 | End: 2023-11-13

## 2023-09-01 RX ORDER — MIRTAZAPINE 15 MG/1
15 TABLET, FILM COATED ORAL AT BEDTIME
Qty: 30 TABLET | Refills: 0 | Status: ON HOLD | OUTPATIENT
Start: 2023-09-01 | End: 2023-11-13

## 2023-09-01 RX ORDER — LIDOCAINE 40 MG/G
CREAM TOPICAL
Status: DISCONTINUED | OUTPATIENT
Start: 2023-09-01 | End: 2023-09-01 | Stop reason: HOSPADM

## 2023-09-01 RX ORDER — GABAPENTIN 100 MG/1
200 CAPSULE ORAL 3 TIMES DAILY PRN
Qty: 60 CAPSULE | Refills: 0 | Status: SHIPPED | OUTPATIENT
Start: 2023-09-01 | End: 2024-01-08

## 2023-09-01 RX ADMIN — APIXABAN 10 MG: 5 TABLET, FILM COATED ORAL at 09:31

## 2023-09-01 RX ADMIN — RISPERIDONE 0.5 MG: 0.5 TABLET ORAL at 09:30

## 2023-09-01 RX ADMIN — GABAPENTIN 200 MG: 100 CAPSULE ORAL at 09:30

## 2023-09-01 RX ADMIN — BICTEGRAVIR SODIUM, EMTRICITABINE, AND TENOFOVIR ALAFENAMIDE FUMARATE 1 TABLET: 50; 200; 25 TABLET ORAL at 09:30

## 2023-09-01 ASSESSMENT — ACTIVITIES OF DAILY LIVING (ADL)
ADLS_ACUITY_SCORE: 29

## 2023-09-01 NOTE — PLAN OF CARE
Problem: Anxiety  Goal: Anxiety Reduction or Resolution  Outcome: Progressing   Goal Outcome Evaluation:    The patient came out of his room at about 0030 and requested snacks. His COWS score was 1, with no withdrawal symptoms. He slept for about 5.5 hours.

## 2023-09-01 NOTE — CONSULTS
Hematology  Consult Note   Date of Service: 09/01/2023    Patient: Edi Sorenson  MRN: [id number]  Admission Date: 8/28/2023    Reason for Consult: Recurrent VTE, recommendations for anticoagulation, and any additional work-up needed    Chart, labs, and imaging reviewed.  Discussed with medicine team.  More formal consultation not felt necessary.    Edi Sorenson is a 47 year old male with PMHx significant for recurent DVT/PE, HIV infection, MDD, substance use disorder (cocaine, opioids) who was admitted 8/28/2023 with suicidal thoughts and auditory hallucinations.    Review of outside records indicates that he has a history of recurrent DVTs: ~2010, 2018, 2019, and 2020, felt related to heavy cocaine use and resultant decreased mobility for several weeks.  Has been treated with warfarin and rivaroxaban in the past, but there have been recurrent issues with nonadherence.  In May 2021 he was found to have a pulmonary embolism, new left popliteal deep vein thrombus and chronic right femoral vein thrombus on lower extremity ultrasounds. Subsequent CT showed signs compatible with pulmonary hypertension.  He was placed on apixaban and it was recommended that he remain on anticoagulation indefinitely per Hematology note from [facility] in May 2021.  However, he has reportedly been off medication for over a year, verified by absence of any documented refill history.      APS panel done at [facility] in May 2021 showed positive cardiolipin IgG (17, normal range 0-9.9), with negative lupus anticoagulant and beta-2 glycoprotein IgG/IgM.  No repeat testing was done as he was subsequently lost to follow-up.  However, this low titer isolated cardiolipin IgG antibody is very unlikely to be of any clinical significance, and would not meet diagnostic criteria for APS even if persistent.    This admission, he was found to have right lower extremity swelling, and ultrasound showed DVT in the right femoral, popliteal, and posterior  tibial veins (8/31/2023).  Outside ultrasounds were not available for direct comparison, but based on review of those reports, this clot is most likely the same chronic clot that was noted back in May 2021.      ASSESSMENT / RECOMMENDATIONS:  History of recurrent VTE.  History of medication nonadherence.    Patient has a history of recurrent VTE and clearly needs long-term anticoagulation.  Additional thrombophilia testing is not indicated, as those results will not .  The isolated low titer cardiolipin IgG antibody found in May 2021 does not need further follow-up.  Even if this was found to be persistent, it would not meet diagnostic criteria for APS due to the low titer.    Because of his mental health challenges, would recommend making his anticoagulation regimen as simple as possible.  From that perspective, a once daily oral drug that does not require monitoring would be the best option.  Thus, recommend rivaroxaban 20 mg daily.  He does not need the loading dose of 15 mg twice daily for 3 weeks as his currently identified clot is most likely not acute, and treating him in this way would unnecessarily complicate his care, increasing the chances of nonadherence.    He does not need follow-up by a hematologist.  He has a clear indication for long-term anticoagulation and that will not change.  From the perspective of trying to simplify his medical follow-up to help increase his adherence, would recommend that his long-term anticoagulation simply be followed by whomever he sees most regularly in a primary care context.    Please call with questions ([phone]).      Griffin Upton MD  Professor of Medicine  Division of Hematology, Oncology, and Transplantation  Director, [facility]

## 2023-09-01 NOTE — PLAN OF CARE
Goal Outcome Evaluation:    Pt spent the entire shift isolative and withdrawn in his room resting in bed this shift. Pt presents with flat blunted anxious and depressed mood. Pt observed attending and participating in the Music group that we had this shift. Pt denies SI/SIB/HI/AVH but endorsed high anxiety and depression rated 8/10. Prn hydroxyzine was utilized. Pt had RLE US this shift and results indicated a DVT. IM was notified of the US results and anticoagulant ordered and first dose administered this shift. Pt denied any SOB this shift as well as pain. Pt had 100% of his meals this shift and was medication compliant. Pt reported sleep as so so and requested PRN trazodone at bedtime. COWS score was 2 this shift and no s/s of withdrawal noted. Staff will continue to monitor and support with current POC.      Plan of Care Reviewed With: patient

## 2023-09-01 NOTE — CONSULTS
Consulted by Jeramy Vick to run a test claim for Eliquis (apixaban) & Xarelto (rivaroxaban).    Patient has pharmacy benefits have lapsed but previously had insurance through [employer] (pre-paid medical assistance plan). If reactivated, both are covered and preferred under the plan. Voucher for free 30-day fill of each medication available at discharge pharmacy.      Please feel free to contact me with any other test claims, prior authorizations, or insurance questions regarding outpatient medications.     Thanks!      Stefanie Solitario CPhT  [facility] [facility] Liaison  Pronouns: She/Her/Hers    [facility]  [address] [place] [address]  [address] [place] [address] [address] [address]   [email or web address]  [email or web address]   Phone: [phone]  Pager: [phone]  Fax: [phone]
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
## 2023-09-01 NOTE — PLAN OF CARE
Care Coordinator Note(s):    Care Request(s):   Specialist (Referred)  - Existing  Preferences: 3 weeks, in person  Notes: Dr. Puentes infectious disease and hematology follow-up at [facility].    Patient has history of no showing to previous appointment with this provider.    Care Outcome(s):  Appointment type: Specialty Follow Up  Date/time:  Tuesday September 12th, 2023 @ 2:20 PM  In person  Provider:  Braydon Puentes MD, MPH  Address:  [place] [address]  Phone:  [phone]  Fax:  [phone]  Note:   Please call to reschedule if you cannot attend appointment.     Further Actions:  None.    -Maria Eugenia Posada  Adult Behavioral Health Care Coordinator

## 2023-09-01 NOTE — PLAN OF CARE
Care Coordinator Note(s):    Care Request(s):   Transportation  - New  Notes: discharge to home 23 1:30 PM  Insurance transportation: [phone] spoke with [facility] insurance ended 23.    Care Outcome(s):    Transportation      Address: [facility]: [address] [place] [address], [id number]  Date/ Time: 23 @ 1:30 PM PEND discharge time pushed back.    Drop off  Address: [address] [place] [address]   Insurance transportation: [phone] (Medicaid) Spoke with: [employer] company:  Phone:  Conformation #:  Call on arrival:[address] #: [phone]         Further Actions:  Pend updated discharge time. Patient acquired his own ride.    -Maria Eugenia Posada  Adult Behavioral Health Care Coordinator

## 2023-09-01 NOTE — PLAN OF CARE
Team Note Due:  Thursday    Assessment/Intervention/Current Symtoms and Care Coordination:  Chart review and met with team, discussed pt progress, symptomology, and response to treatment.  Discussed the discharge plan and any potential impediments to discharge.    Tasks Completed:   - Provider shared that pt is leaving against medical advice. Met with pt and he shared he will need transportation home. Shared that he does not want any follow-up appointments scheduled. Shared that infectious disease follow-up will be scheduled and pt shared he is okay with this but declined further assistance with follow-up appointments. Discussed case management as pt reported desire in this earlier. Shared and recommended both [facility] and [facility] case management resources with pt - included on AVS.  - Per internal medicine consult, hematology and infectious disease appointments need to be scheduled. Hematologist shared that follow-up does not need to be scheduled. Placed request with care coordinator to schedule infectious disease follow-up. AVS updated.   - Placed request with care coordinator to schedule transportation for discharge.  - Left message with financial counselors to report that pt is discharging.      Discharge Plan or Goal:  Pt discharged today to home.     Barriers to Discharge:  Pt discharged today against medical advice.     Referral Status:  Pt refused assistance with care coordination and follow-up appointments. Care coordinator did schedule follow-up appointment with infectious disease doctor. CTC discussed case management options with pt and shared resources on AVS for pt.     Legal Status:  Pt is voluntary.     Contacts:  None     Upcoming Meetings and Dates/Important Information and next steps:  None

## 2023-09-01 NOTE — PLAN OF CARE
Goal Outcome Evaluation:      Plan of Care Reviewed With: patient    Overall Patient Progress: no changeOverall Patient Progress: no change    Outcome Evaluation: Providing double portions at meals, additional write-in options at meals, TID snacks. Please see note for details.    Angelia ABARCA  Mental Health Pager (M-F): [phone]  On Call Pager (weekends only): [phone]'

## 2023-09-01 NOTE — PLAN OF CARE
"Problem: Depressive Signs/Symptoms  Goal: Optimized Energy Level (Depressive Signs/Symptoms)  Outcome: Progressing  Intervention: Optimize Energy Level  Recent Flowsheet Documentation  Taken 9/1/2023 0338 by Castillo Negron RN  Diversional Activity: television   Goal Outcome Evaluation:  Pt was notably out in the milieu most of the shift.  Appeared tense with flat and blunted affect, Reported mood as \"I am sad\"...... not sure why, maybe it's this place, the food here is not enough\". He was positive for anxiety of 5/10 and depression os 6/10. Negative for SI/SIB/HI, AH & VH. Asked to discharge but redirected to talk to his provider. PRN Gabapentin 200 mg was administered for anxiety with relief. Pt was medication compliant. He reported that appetite and sleep are adequate. He attended group therapies.   At lunch around 12.45 pm pt reported that he had emesis. On assessment it was noted that pt had medium emesis consisting of food contents. He stated that the food was disgusting. He requested for a different food try which was given. Pt ate 100% of the second tray without any problem.   Pt has an order for CT chest..... Medical flyer attempted to insert the PIV x 3 without success. Writer contacted peds vascular access team to assist to insert the PIV..... they came to the unit but still the pt declined. The psych provider and Medicine team were notified. Writer went through the discharge paper work with the pt, he refused to take his medication information..... he however took all his discharge medication. Pt discharge home at 2.50 pm with all his belongings and discharge medications safely.    "

## 2023-09-04 NOTE — DISCHARGE SUMMARY
"Psychiatric Discharge Summary    Edi Sorenson MRN# [id number]   Age: 47 year old YOB: 1975     Date of Admission:  8/28/2023  Date of Discharge:  9/4/2023  Admitting Physician:  Rudy Lizarraga MD  Discharge Physician:  Rudy Lizarraga MD (Contact: [phone])         Event Leading to Hospitalization:   Suicidal thoughts and Auditory hallucinations.     History of present illness: per DEC assessment: Edi Sorenson presents to the ED via EMS. Patient is presenting to the ED for the following concerns: Health stressors, Substance use, Suicidal ideation, Suicide attempt, Depression, Anxiety (hallucinations).   Factors that make the mental health crisis life threatening or complex are:  Pt presents to ED for concerns of increased SI and withdrawal symptoms. Pt reports he last used crack cocaine and fentanyl 4 days ago, is now experiencing withdrawal symptoms. Denied offer for suboxone because he plans to detox \"cold turkey\". Does not appear to be experiencing dramatic withdrawal symptoms. Pt reports active SI and states he has been attempting to starve himself over the past few days. Continues to endorse active SI. Denies HI, NSSIB. Pt reports increasing auditory and visual hallucinations. Pt stopped taking his mental health and HIV medications \"awhile ago\".     During visit with this provider patient presented as fatigued, but cooperative and polite. He reported that he had stopped all his meds including meds for HIV and MI about one year ago and was engaged with daily use of illicit drugs. His drugs of choice are cocaine and Fentanyl. Reported sometimes hearing voices, sometimes whistling sounds that irritate and scare him. Said that at times voices tell him that he would never make it and tell him to kill self. Admitted that using cocaine makes voices worse. Said that at times he feels like TV generates voices? He also reports disrupted sleep and significant weight loss (about 100 " "lbs in one year). UDS was positive for cocaine and negative for other substances.          See Admission note by by admitting physician/nurse-practitioner Rudy Lizarraga MD for additional details.          DIagnoses:   Major depressive disorder, recurrent, severe vs Substance induced depression;  Stimulant (cocaine) use disorder; opiate use disorder; HIV positive.         Labs:              Consults:   Was consulted by hematology to address anticoagulation issues and by IM, see an excerpt from note below, For more details, please, see Internal Medicine note. Appreciate medical team's input.     \"Pt would like to leave AMA, pt is decisional per Psychiatry. His D-dimer is 1.86, normal trop, with some RLE swelling with US showing DVT (possibly chronic per discussion with hematology), extensive DVT/PE hx, off AC PTA, and admitting to \"SOB for a while,\" PERC score of 1, reasonable to consider CTA PE, which has been ordered. Pt aware that we should complete scan to ensure no blood clot as able before leaving. If he leaves, he has a 30-day supply of Xarelto and Biktarvy as well follow-up with PCP/ID for HIV and for new Rx for long-term continuation of Xarelto (see heme note). No need for IP APS work-up or follow-up with Hematology OP. He should also follow-up with dermatology for possible pilomaticoma on RLE (coonsult placed OP). If pt decides to leave AMA, he should present IMMEDIATELY  to an ED or PCP if he has new SOB and should get a CTA PE. If pt continues to stay, Medicine will follow for CTA PE results.\"         Hospital Course:   Edi Sorenson was admitted to [facility] with attending  as a voluntary patient. The patient was placed under status 15 (15 minute checks) to ensure patient safety. Edi presented as sad and irritable. He stated that he clearly understood strong impact illicit drugs have had on his life and requested to send him to residential CD program. He was seen by IM, Hematology, " "Infectious Diseases, restarted on his PTA meds. He was seen by Nutrition services, per their note patient didn't meet criteria for malnutrition, however, his request for double portions was honored. Edi spent only one full day on station 30 and on the next day requested discharge. He quite openly stated that he wanted to leave with intentions to sell drugs: \"because today is a of them month and people got their checks. All attempts to convince him to stay were unsuccessful. Edi also met with IM PA who recommended him to stay at this hospital due to concerns of DVT and suspicion of pulmonary embolism and to do CT of lungs and pulmonary scan. Edi, first, agreed to do so, then, after one or two hours angrily demanded to be discharged. He denied Suicidal ideation, Homicidal thoughts. He was discharged AMA.        Edi Sorenson did not participate in groups and was visible in the milieu.     The patient's symptoms of depression have somewhat improved.     Edi Sorenson was released to home. At the time of discharge Edi Sorenson was determined to not be a danger to himself or others.          Discharge Medications:     Discharge Medication List as of 9/1/2023  2:19 PM        START taking these medications    Details   gabapentin (NEURONTIN) 100 MG capsule Take 2 capsules (200 mg) by mouth 3 times daily as needed (anxiety), Disp-60 capsule, R-0, E-Prescribe      methocarbamol (ROBAXIN) 500 MG tablet Take 1 tablet (500 mg) by mouth 3 times daily as needed for muscle spasms, Disp-60 tablet, R-0, E-Prescribe      risperiDONE (RISPERDAL) 0.5 MG tablet Take 1 tablet (0.5 mg) by mouth 2 times daily, Disp-60 tablet, R-0, E-Prescribe      rivaroxaban ANTICOAGULANT (XARELTO) 20 MG TABS tablet Take 1 tablet (20 mg) by mouth daily, Disp-30 tablet, R-0, E-Prescribe      traZODone (DESYREL) 50 MG tablet Take 1 tablet (50 mg) by mouth nightly as needed for sleep (may repeat after 60 minutes), Disp-30 tablet, R-0, " E-Prescribe           CONTINUE these medications which have CHANGED    Details   bictegravir-emtricitabine-tenofovir (BIKTARVY) -25 MG per tablet Take 1 tablet by mouth daily for 30 days, Disp-30 tablet, R-0, E-Prescribe      mirtazapine (REMERON) 15 MG tablet Take 1 tablet (15 mg) by mouth At Bedtime, Disp-30 tablet, R-0, E-Prescribe           STOP taking these medications       apixaban ANTICOAGULANT (ELIQUIS) 5 MG tablet Comments:   Reason for Stopping:         apixaban ANTICOAGULANT (ELIQUIS) 5 MG tablet Comments:   Reason for Stopping:         apixaban ANTICOAGULANT (ELIQUIS) 5 MG tablet Comments:   Reason for Stopping:         sertraline (ZOLOFT) 50 MG tablet Comments:   Reason for Stopping:                    Psychiatric Examination:   Appearance:  awake, alert  Attitude:  uncooperative  Eye Contact:  intense  Mood:  angry  Affect:  constricted mobility  Speech:  clear, coherent  Psychomotor Behavior:  no evidence of tardive dyskinesia, dystonia, or tics  Thought Process:  linear  Associations:  no loose associations  Thought Content:  no evidence of suicidal ideation or homicidal ideation and no evidence of psychotic thought  Insight:  limited  Judgment:  poor  Oriented to:  time, person, and place  Attention Span and Concentration:  limited  Recent and Remote Memory:  fair  Language: Able to name objects, Able to repeat phrases, and Able to read and write  Fund of Knowledge: low-normal  Muscle Strength and Tone: normal  Gait and Station: Normal         Discharge Plan:       Health Care Follow-up:   You are being discharged against medical advice.      Appointment type: Specialty Follow Up  Date/time:  Tuesday September 12th, 2023 @ 2:20 PM  In person  Provider:  Braydon Puentes MD, MPH  Address:  [place] [address]  Phone:  [phone]  Fax:  [phone]  Note:   Please call to reschedule if you cannot attend appointment.     You declined assistance for  "additional follow-up appointments. You are encouraged to establish an outpatient therapist and psychiatrist in the community for continued medication management and support.      For assistance with appointments, housing, and mental health support following hospitalization, you can establish a . The [facility] or [facility] case management could be a great resource for you to provide additional support and assistance with establishing care and supporting you on an ongoing basis. Their information is noted below in the \"Resources\" section.            Attestation:  The patient has been seen and evaluated by me,  Rudy Lizarraga MD    Total time spent was 45 minutes. Over 50% of times was spent counseling and coordination of care regarding coping skills, medication and discharge planning.     "

## 2023-10-23 ENCOUNTER — APPOINTMENT (OUTPATIENT)
Dept: CT IMAGING | Facility: CLINIC | Age: 48
DRG: 897 | End: 2023-10-23
Attending: EMERGENCY MEDICINE
Payer: COMMERCIAL

## 2023-10-23 ENCOUNTER — TELEPHONE (OUTPATIENT)
Dept: BEHAVIORAL HEALTH | Facility: CLINIC | Age: 48
End: 2023-10-23

## 2023-10-23 ENCOUNTER — HOSPITAL ENCOUNTER (INPATIENT)
Facility: CLINIC | Age: 48
LOS: 17 days | Discharge: PSYCHIATRIC OR SUBSTANCE USE FACILITY | DRG: 897 | End: 2023-11-14
Attending: EMERGENCY MEDICINE | Admitting: PSYCHIATRY & NEUROLOGY
Payer: COMMERCIAL

## 2023-10-23 DIAGNOSIS — B20 HUMAN IMMUNODEFICIENCY VIRUS (HIV) DISEASE (H): Primary | ICD-10-CM

## 2023-10-23 DIAGNOSIS — F32.1 CURRENT MODERATE EPISODE OF MAJOR DEPRESSIVE DISORDER, UNSPECIFIED WHETHER RECURRENT (H): ICD-10-CM

## 2023-10-23 DIAGNOSIS — R45.851 SUICIDAL IDEATION: ICD-10-CM

## 2023-10-23 DIAGNOSIS — F19.20 CHEMICAL DEPENDENCY (H): ICD-10-CM

## 2023-10-23 DIAGNOSIS — G47.00 INSOMNIA, UNSPECIFIED TYPE: ICD-10-CM

## 2023-10-23 DIAGNOSIS — F30.9 MANIA (H): ICD-10-CM

## 2023-10-23 DIAGNOSIS — F19.929 DRUG INTOXICATION WITH COMPLICATION (H): ICD-10-CM

## 2023-10-23 DIAGNOSIS — F32.9 REACTIVE DEPRESSION: ICD-10-CM

## 2023-10-23 LAB
AMPHETAMINES UR QL SCN: ABNORMAL
ANION GAP SERPL CALCULATED.3IONS-SCNC: 9 MMOL/L (ref 7–15)
BARBITURATES UR QL SCN: ABNORMAL
BASO+EOS+MONOS # BLD AUTO: ABNORMAL 10*3/UL
BASO+EOS+MONOS NFR BLD AUTO: ABNORMAL %
BASOPHILS # BLD AUTO: 0 10E3/UL (ref 0–0.2)
BASOPHILS NFR BLD AUTO: 0 %
BENZODIAZ UR QL SCN: ABNORMAL
BUN SERPL-MCNC: 17.4 MG/DL (ref 6–20)
BZE UR QL SCN: ABNORMAL
CALCIUM SERPL-MCNC: 9.5 MG/DL (ref 8.6–10)
CANNABINOIDS UR QL SCN: ABNORMAL
CHLORIDE SERPL-SCNC: 101 MMOL/L (ref 98–107)
CREAT SERPL-MCNC: 1.16 MG/DL (ref 0.67–1.17)
DEPRECATED HCO3 PLAS-SCNC: 30 MMOL/L (ref 22–29)
EGFRCR SERPLBLD CKD-EPI 2021: 79 ML/MIN/1.73M2
EOSINOPHIL # BLD AUTO: 0 10E3/UL (ref 0–0.7)
EOSINOPHIL NFR BLD AUTO: 1 %
ERYTHROCYTE [DISTWIDTH] IN BLOOD BY AUTOMATED COUNT: 13.6 % (ref 10–15)
ETHANOL SERPL-MCNC: <0.01 G/DL
FENTANYL UR QL: ABNORMAL
GLUCOSE SERPL-MCNC: 118 MG/DL (ref 70–99)
HCT VFR BLD AUTO: 52 % (ref 40–53)
HGB BLD-MCNC: 16.2 G/DL (ref 13.3–17.7)
HOLD SPECIMEN: NORMAL
IMM GRANULOCYTES # BLD: 0 10E3/UL
IMM GRANULOCYTES NFR BLD: 0 %
LYMPHOCYTES # BLD AUTO: 1.4 10E3/UL (ref 0.8–5.3)
LYMPHOCYTES NFR BLD AUTO: 30 %
MCH RBC QN AUTO: 29.7 PG (ref 26.5–33)
MCHC RBC AUTO-ENTMCNC: 31.2 G/DL (ref 31.5–36.5)
MCV RBC AUTO: 95 FL (ref 78–100)
MONOCYTES # BLD AUTO: 0.3 10E3/UL (ref 0–1.3)
MONOCYTES NFR BLD AUTO: 7 %
NEUTROPHILS # BLD AUTO: 2.9 10E3/UL (ref 1.6–8.3)
NEUTROPHILS NFR BLD AUTO: 62 %
NRBC # BLD AUTO: 0 10E3/UL
NRBC BLD AUTO-RTO: 0 /100
OPIATES UR QL SCN: ABNORMAL
PCP QUAL URINE (ROCHE): ABNORMAL
PLATELET # BLD AUTO: 273 10E3/UL (ref 150–450)
POTASSIUM SERPL-SCNC: 4.2 MMOL/L (ref 3.4–5.3)
RBC # BLD AUTO: 5.46 10E6/UL (ref 4.4–5.9)
SODIUM SERPL-SCNC: 140 MMOL/L (ref 135–145)
WBC # BLD AUTO: 4.6 10E3/UL (ref 4–11)

## 2023-10-23 PROCEDURE — 250N000013 HC RX MED GY IP 250 OP 250 PS 637: Performed by: PSYCHIATRY & NEUROLOGY

## 2023-10-23 PROCEDURE — 70450 CT HEAD/BRAIN W/O DYE: CPT

## 2023-10-23 PROCEDURE — 82077 ASSAY SPEC XCP UR&BREATH IA: CPT | Performed by: EMERGENCY MEDICINE

## 2023-10-23 PROCEDURE — 250N000011 HC RX IP 250 OP 636: Performed by: PSYCHIATRY & NEUROLOGY

## 2023-10-23 PROCEDURE — 99285 EMERGENCY DEPT VISIT HI MDM: CPT | Mod: 25 | Performed by: EMERGENCY MEDICINE

## 2023-10-23 PROCEDURE — 80048 BASIC METABOLIC PNL TOTAL CA: CPT | Performed by: EMERGENCY MEDICINE

## 2023-10-23 PROCEDURE — 99285 EMERGENCY DEPT VISIT HI MDM: CPT | Performed by: EMERGENCY MEDICINE

## 2023-10-23 PROCEDURE — 85025 COMPLETE CBC W/AUTO DIFF WBC: CPT | Performed by: EMERGENCY MEDICINE

## 2023-10-23 PROCEDURE — 36415 COLL VENOUS BLD VENIPUNCTURE: CPT | Performed by: EMERGENCY MEDICINE

## 2023-10-23 PROCEDURE — 250N000013 HC RX MED GY IP 250 OP 250 PS 637: Performed by: EMERGENCY MEDICINE

## 2023-10-23 PROCEDURE — 80307 DRUG TEST PRSMV CHEM ANLYZR: CPT | Performed by: EMERGENCY MEDICINE

## 2023-10-23 RX ORDER — BICTEGRAVIR SODIUM, EMTRICITABINE, AND TENOFOVIR ALAFENAMIDE FUMARATE 30; 120; 15 MG/1; MG/1; MG/1
1 TABLET ORAL DAILY
Status: ON HOLD | COMMUNITY
End: 2023-11-13

## 2023-10-23 RX ORDER — ONDANSETRON 4 MG/1
4 TABLET, ORALLY DISINTEGRATING ORAL EVERY 6 HOURS PRN
Status: DISCONTINUED | OUTPATIENT
Start: 2023-10-23 | End: 2023-11-14 | Stop reason: HOSPADM

## 2023-10-23 RX ORDER — RISPERIDONE 0.5 MG/1
0.5 TABLET, ORALLY DISINTEGRATING ORAL 2 TIMES DAILY
Status: DISCONTINUED | OUTPATIENT
Start: 2023-10-23 | End: 2023-10-24

## 2023-10-23 RX ORDER — MIRTAZAPINE 15 MG/1
15 TABLET, FILM COATED ORAL AT BEDTIME
Status: DISCONTINUED | OUTPATIENT
Start: 2023-10-23 | End: 2023-11-02

## 2023-10-23 RX ORDER — GABAPENTIN 100 MG/1
200 CAPSULE ORAL 3 TIMES DAILY PRN
Status: DISCONTINUED | OUTPATIENT
Start: 2023-10-23 | End: 2023-11-14 | Stop reason: HOSPADM

## 2023-10-23 RX ORDER — EMTRICITABINE AND TENOFOVIR DISOPROXIL FUMARATE 200; 300 MG/1; MG/1
1 TABLET, FILM COATED ORAL DAILY
COMMUNITY
End: 2023-10-23

## 2023-10-23 RX ORDER — NICOTINE 21 MG/24HR
1 PATCH, TRANSDERMAL 24 HOURS TRANSDERMAL DAILY
Status: DISCONTINUED | OUTPATIENT
Start: 2023-10-23 | End: 2023-11-14 | Stop reason: HOSPADM

## 2023-10-23 RX ADMIN — RIVAROXABAN 20 MG: 10 TABLET, FILM COATED ORAL at 18:18

## 2023-10-23 RX ADMIN — NICOTINE 1 PATCH: 21 PATCH, EXTENDED RELEASE TRANSDERMAL at 13:59

## 2023-10-23 RX ADMIN — MIRTAZAPINE 15 MG: 15 TABLET, FILM COATED ORAL at 20:58

## 2023-10-23 RX ADMIN — RISPERIDONE 0.5 MG: 0.5 TABLET, ORALLY DISINTEGRATING ORAL at 12:45

## 2023-10-23 RX ADMIN — RISPERIDONE 0.5 MG: 0.5 TABLET, ORALLY DISINTEGRATING ORAL at 20:58

## 2023-10-23 RX ADMIN — ONDANSETRON 4 MG: 4 TABLET, ORALLY DISINTEGRATING ORAL at 13:59

## 2023-10-23 RX ADMIN — BICTEGRAVIR SODIUM, EMTRICITABINE, AND TENOFOVIR ALAFENAMIDE FUMARATE 1 TABLET: 50; 200; 25 TABLET ORAL at 19:01

## 2023-10-23 ASSESSMENT — ACTIVITIES OF DAILY LIVING (ADL)
ADLS_ACUITY_SCORE: 35

## 2023-10-23 ASSESSMENT — LIFESTYLE VARIABLES: TOTAL_SCORE: 3

## 2023-10-23 NOTE — ED NOTES
"Patient transferred to room 6 after having a possible seizure. Pt reportedly was \"lying in bed, difficult to arouse and had a seizure.\" Ct scan ordered.   Writer triaging pt, who reports trying to hang himself today but didn't have a long enough rope.  Will continue to monitor.     Tex Wesley RN on 10/23/2023 at 5:48 AM    "

## 2023-10-23 NOTE — ED PROVIDER NOTES
"ED Provider Note  [facility]      History     Chief Complaint   Patient presents with    Suicidal     HPI  Edi Sorenson is a 48 year old male with past medical history of HIV, PE/DVT (on Xarelto), polysubstance abuse (alcohol, cocaine/crack, opioids), who presents to the ED for mental health evaluation.  He tells me \"I need help.  I need help or I am going to die\".  He says \"I have no other choice other than to \"drink and do drugs until I die\".  He says that he drinks daily and uses crack cocaine daily.  He will use any other drugs that he can find as well.  He says that he is not taking his medications regularly for the past year.  He is supposed to be on Xarelto, trazodone, Risperdal, Biktarvy, Robaxin, and gabapentin.  He feels generally weak.  No other medical complaints.    Past Medical History  Past Medical History:   Diagnosis Date    Asymptomatic human immunodeficiency virus (HIV) infection status (H)     Cocaine abuse (H)     Depression     DVT (deep venous thrombosis) (H)     Last in 6/2018. No hx of PE. On Coumadin.    Genital HSV      No past surgical history on file.  gabapentin (NEURONTIN) 100 MG capsule  methocarbamol (ROBAXIN) 500 MG tablet  mirtazapine (REMERON) 15 MG tablet  risperiDONE (RISPERDAL) 0.5 MG tablet  rivaroxaban ANTICOAGULANT (XARELTO) 20 MG TABS tablet  traZODone (DESYREL) 50 MG tablet      No Known Allergies  Family History  No family history on file.  Social History   Social History     Tobacco Use    Smoking status: Former    Smokeless tobacco: Never   Substance Use Topics    Alcohol use: Yes    Drug use: Yes     Types: Cocaine     Comment: Used in the right before calling EMS         A medically appropriate review of systems was performed with pertinent positives and negatives noted in the HPI, and all other systems negative.    Physical Exam   BP: (!) 136/94  Pulse: 82  Temp: 99.7  F (37.6  C)  Resp: 18  SpO2: 98 %  Physical Exam  Vitals and " nursing note reviewed.   Constitutional:       General: He is not in acute distress.     Appearance: Normal appearance.      Comments: Disheveled appearance   HENT:      Head: Normocephalic.      Nose: Nose normal.   Eyes:      Pupils: Pupils are equal, round, and reactive to light.      Comments: Injected sclera b/l   Cardiovascular:      Rate and Rhythm: Normal rate and regular rhythm.   Pulmonary:      Effort: Pulmonary effort is normal.   Abdominal:      General: There is no distension.   Musculoskeletal:         General: No deformity. Normal range of motion.      Cervical back: Normal range of motion.   Skin:     General: Skin is warm.   Neurological:      Mental Status: He is alert and oriented to person, place, and time.   Psychiatric:         Mood and Affect: Mood is depressed.         Thought Content: Thought content includes suicidal ideation.           ED Course, Procedures, & Data     ED Course as of 10/23/23 0658   Mon Oct 23, 2023   0546 I was contacted by the nurse to evaluate the patient immediately.  Apparently, they tried to get him up from his chair but he was unresponsive.  He was staring straight at the wall.  He was drooling out of the side of the mouth.  They pushed on his nailbeds but he did not respond to pain.  He seemed generally tense but was not convulsing.  On my examination he seems lethargic but is now responding.  He is able to follow commands and has equal  strength bilaterally.  I asked him how he was feeling and he mumbled some words and became tearful.  Was brought to room 6.  We will obtain emergent head CT.            Results for orders placed or performed during the hospital encounter of 10/23/23   CT Head w/o Contrast     Status: None    Narrative    EXAM: CT HEAD W/O CONTRAST  LOCATION: [facility]  DATE: 10/23/2023    INDICATION: altered mental status, possible seizure  COMPARISON: None.  TECHNIQUE: Routine CT Head without IV  contrast. Multiplanar reformats. Dose reduction techniques were used.    FINDINGS:  INTRACRANIAL CONTENTS: No intracranial hemorrhage, extraaxial collection, or mass effect.  No CT evidence of acute infarct. Normal parenchymal attenuation. Normal ventricles and sulci.     VISUALIZED ORBITS/SINUSES/MASTOIDS: No intraorbital abnormality. Mild scattered paranasal sinus mucosal disease. No middle ear or mastoid effusion.    BONES/SOFT TISSUES: No acute abnormality.      Impression    IMPRESSION:  1.  No acute intracranial abnormality.   Ethyl Alcohol Level     Status: Normal   Result Value Ref Range    Alcohol ethyl <0.01 <=0.01 g/dL   Basic metabolic panel     Status: Abnormal   Result Value Ref Range    Sodium 140 135 - 145 mmol/L    Potassium 4.2 3.4 - 5.3 mmol/L    Chloride 101 98 - 107 mmol/L    Carbon Dioxide (CO2) 30 (H) 22 - 29 mmol/L    Anion Gap 9 7 - 15 mmol/L    Urea Nitrogen 17.4 6.0 - 20.0 mg/dL    Creatinine 1.16 0.67 - 1.17 mg/dL    GFR Estimate 79 >60 mL/min/1.73m2    Calcium 9.5 8.6 - 10.0 mg/dL    Glucose 118 (H) 70 - 99 mg/dL   Urine Drug Screen Panel     Status: Abnormal   Result Value Ref Range    Amphetamines Urine Screen Positive (A) Screen Negative    Barbituates Urine Screen Negative Screen Negative    Benzodiazepine Urine Screen Positive (A) Screen Negative    Cannabinoids Urine Screen Positive (A) Screen Negative    Cocaine Urine Screen Positive (A) Screen Negative    Fentanyl Qual Urine Screen Positive (A) Screen Negative    Opiates Urine Screen Negative Screen Negative    PCP Urine Screen Negative Screen Negative   CBC with platelets and differential     Status: Abnormal   Result Value Ref Range    WBC Count 4.6 4.0 - 11.0 10e3/uL    RBC Count 5.46 4.40 - 5.90 10e6/uL    Hemoglobin 16.2 13.3 - 17.7 g/dL    Hematocrit 52.0 40.0 - 53.0 %    MCV 95 78 - 100 fL    MCH 29.7 26.5 - 33.0 pg    MCHC 31.2 (L) 31.5 - 36.5 g/dL    RDW 13.6 10.0 - 15.0 %    Platelet Count 273 150 - 450 10e3/uL    %  Neutrophils 62 %    % Lymphocytes 30 %    % Monocytes 7 %    Mids % (Monos, Eos, Basos)      % Eosinophils 1 %    % Basophils 0 %    % Immature Granulocytes 0 %    NRBCs per 100 WBC 0 <1 /100    Absolute Neutrophils 2.9 1.6 - 8.3 10e3/uL    Absolute Lymphocytes 1.4 0.8 - 5.3 10e3/uL    Absolute Monocytes 0.3 0.0 - 1.3 10e3/uL    Mids Abs (Monos, Eos, Basos)      Absolute Eosinophils 0.0 0.0 - 0.7 10e3/uL    Absolute Basophils 0.0 0.0 - 0.2 10e3/uL    Absolute Immature Granulocytes 0.0 <=0.4 10e3/uL    Absolute NRBCs 0.0 10e3/uL   Extra Tube     Status: None    Narrative    The following orders were created for panel order Extra Tube.  Procedure                               Abnormality         Status                     ---------                               -----------         ------                     Extra Purple Top Tube[[id number]]                            Final result                 Please view results for these tests on the individual orders.   Extra Purple Top Tube     Status: None   Result Value Ref Range    Hold Specimen [facility]    Urine Drug Screen     Status: Abnormal    Narrative    The following orders were created for panel order Urine Drug Screen.  Procedure                               Abnormality         Status                     ---------                               -----------         ------                     Urine Drug Screen Panel[[id number]]      Abnormal            Final result                 Please view results for these tests on the individual orders.   CBC with platelets differential     Status: Abnormal    Narrative    The following orders were created for panel order CBC with platelets differential.  Procedure                               Abnormality         Status                     ---------                               -----------         ------                     CBC with platelets and d...[[id number]]  Abnormal            Final result                 Please view results for  these tests on the individual orders.     Medications - No data to display  Labs Ordered and Resulted from Time of ED Arrival to Time of ED Departure   BASIC METABOLIC PANEL - Abnormal       Result Value    Sodium 140      Potassium 4.2      Chloride 101      Carbon Dioxide (CO2) 30 (*)     Anion Gap 9      Urea Nitrogen 17.4      Creatinine 1.16      GFR Estimate 79      Calcium 9.5      Glucose 118 (*)    URINE DRUG SCREEN PANEL - Abnormal    Amphetamines Urine Screen Positive (*)     Barbituates Urine Screen Negative      Benzodiazepine Urine Screen Positive (*)     Cannabinoids Urine Screen Positive (*)     Cocaine Urine Screen Positive (*)     Fentanyl Qual Urine Screen Positive (*)     Opiates Urine Screen Negative      PCP Urine Screen Negative     CBC WITH PLATELETS AND DIFFERENTIAL - Abnormal    WBC Count 4.6      RBC Count 5.46      Hemoglobin 16.2      Hematocrit 52.0      MCV 95      MCH 29.7      MCHC 31.2 (*)     RDW 13.6      Platelet Count 273      % Neutrophils 62      % Lymphocytes 30      % Monocytes 7      Mids % (Monos, Eos, Basos)        % Eosinophils 1      % Basophils 0      % Immature Granulocytes 0      NRBCs per 100 WBC 0      Absolute Neutrophils 2.9      Absolute Lymphocytes 1.4      Absolute Monocytes 0.3      Mids Abs (Monos, Eos, Basos)        Absolute Eosinophils 0.0      Absolute Basophils 0.0      Absolute Immature Granulocytes 0.0      Absolute NRBCs 0.0     ETHYL ALCOHOL LEVEL - Normal    Alcohol ethyl <0.01       CT Head w/o Contrast   Final Result   IMPRESSION:   1.  No acute intracranial abnormality.             Critical care was not performed.     Medical Decision Making  The patient's presentation was of high complexity (an acute health issue posing potential threat to life or bodily function).    The patient's evaluation involved:  ordering and/or review of 3+ test(s) in this encounter (see separate area of note for details)    The patient's management necessitated further  care after sign-out to Dr Woo (see their note for further management).    Assessment & Plan    Patient presents to the ED seeking help with chemical dependency and suicidal thoughts.    On arrival, patient appears intoxicated.  Endorses using THC Gummies and crack cocaine just prior to arrival.  He also drinks alcohol daily but states he has not had a drink in approximately 4 to 6 hours.  He does not think he is withdrawing at this time.  He has a very depressed affect and is tearful during the exam.  He has suicidal thoughts and feels that he is a significant risk to himself.    Plan for mental health evaluation.     0546 I was contacted by the nurse to evaluate the patient immediately.  Apparently, they tried to get him up from his chair but he was unresponsive.  He was staring straight at the wall.  He was drooling out of the side of the mouth.  They pushed on his nailbeds but he did not respond to pain.  He seemed generally tense but was not convulsing.  On my examination he seems lethargic but is now responding.  He is able to follow commands and has equal  strength bilaterally.  I asked him how he was feeling and he mumbled some words and became tearful.  Was brought to room 6.  We will obtain emergent head CT.     CT head negative for intracranial hemorrhage.  Labs ordered/reviewed.  Metabolic panel shows normal electrolytes and normal renal function.  CBC without leukocytosis or anemia.  Glucose normal at 118.  On my reassessment, patient is conversant.  He has a nonfocal neuro exam.  Low suspicion for ischemic CVA.  No ill appearance, abnormal vitals/labs to suggest encephalitis/meningitis.  This brief episode did not seem to be consistent with seizure activity.  Exact etiology is unclear but most likely related to drug intoxication.  UDS is positive for vitamins, fentanyl, cocaine, benzodiazepines, and cannabinoids.    Patient will likely need more time to clear from intoxicating substances.  Will  need to undergo mental health evaluation when clinically sober.  Will sign out to morning provider with plan to follow-up on the recommendations and disposition accordingly.      I have reviewed the nursing notes. I have reviewed the findings, diagnosis, plan and need for follow up with the patient.    New Prescriptions    No medications on file       Final diagnoses:   Suicidal ideation   Drug intoxication with complication (H)       Luke Bob DO  [facility] EMERGENCY DEPARTMENT  10/23/2023     Luke Bob DO  10/23/23 0700
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
## 2023-10-23 NOTE — CONSULTS
10/23/2023  Edi Sorenson 1975     Writer consulted with ED provider, ED Nurse,  on this date at  06:41 AM. It was determined that pt would not benefit from assessment at this time due to Pt unable able to participate in assessment Pt came back positive for multiple drugs in system Pt not able to participate. Check back at 0742.     ED will call DEC at [phone] when pt is ready and able to participate in assessment.       Diego Trammell

## 2023-10-23 NOTE — ED NOTES
Writer, RN went to triage patient, when writer noticed patient wasn't as responsive. Writer called patient name multiple times, patient wasn't responding, writer notice patient leaning on one side, while very tense.  RNMOHSEN came to help in the process, she pressed on patient nails bed, pt was still not responding, patient started to dew from mouth and sweating on forehead. Doc was called but patient had came out of what looks like a seizure. All lasted about 1 min. Patient is moved to room 6 to medical side of ED. Patient is more alert, NAD, ambulate to bathroom with no issue.

## 2023-10-23 NOTE — ED NOTES
10/23/2023  Edi aYs 1975     Writer consulted with ED provider, FREDDIE Villarreal,  on this date at  8:15 AM. It was determined that pt would not benefit from assessment at this time due to Pt unable able to participate in assessment.  made multiple attempts to engage with the patient.  His eyes were closed, slurring speech and not responding appropriately to questions. Patient also commented that he spoke with  when he first came in, which is not accurate.  It appears patient tested positive for multiple substances and may continue to be impaired.  Nurse and  agreed an in person assessment may be more effective as well as a little more time for the patient to clear.     ED will call DEC at [phone] when pt is ready and able to participate in assessment.         Jocelyn Kehr Sparby, BILLIEC, LADC

## 2023-10-23 NOTE — PHARMACY-ADMISSION MEDICATION HISTORY
Pharmacist Admission Medication History    Admission medication history is complete. The information provided in this note is only as accurate as the sources available at the time of the update.    Information Source(s): Patient via in-person    Pertinent Information:   - Patient was discharged 9/1/23. Patient states he took his medications until he ran out, but then never received refills. He has not taken any medications (including Biktarvy and Xarelto for > 1 month)   - Patient was able to confirm medications he is supposed to be on. Patient also states he is supposed to be on sertraline, however it appears this was never restarted on last admission/continued upon discharge.     Changes made to PTA medication list:  Added: Biktarvy  Deleted: None  Changed: None    Medication Affordability:       Allergies reviewed with patient and updates made in EHR: yes    Medication History Completed By: BERTA GIBBS RPH 10/23/2023 2:38 PM    PTA Med List   Medication Sig Last Dose    emtricitabine-tenofovir (TRUVADA) 200-300 MG per tablet Take 1 tablet by mouth daily More than a month    gabapentin (NEURONTIN) 100 MG capsule Take 2 capsules (200 mg) by mouth 3 times daily as needed (anxiety) More than a month    methocarbamol (ROBAXIN) 500 MG tablet Take 1 tablet (500 mg) by mouth 3 times daily as needed for muscle spasms More than a month    mirtazapine (REMERON) 15 MG tablet Take 1 tablet (15 mg) by mouth At Bedtime More than a month    risperiDONE (RISPERDAL) 0.5 MG tablet Take 1 tablet (0.5 mg) by mouth 2 times daily More than a month    rivaroxaban ANTICOAGULANT (XARELTO) 20 MG TABS tablet Take 1 tablet (20 mg) by mouth daily More than a month    traZODone (DESYREL) 50 MG tablet Take 1 tablet (50 mg) by mouth nightly as needed for sleep (may repeat after 60 minutes) More than a month

## 2023-10-23 NOTE — CONSULTS
"Patient has been recommended by DEC staff to go to inpatient Psychiatry for \"I'm Suicidal\".  Please re-consult when patients mental health has been stabilized.     Kavin Luna [facility] on 10/23/2023 at 1:07 PM    "
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
## 2023-10-23 NOTE — PROGRESS NOTES
Signout from Dr. Bob pending DEC assessment    DEC assessed the patient and are recommending admission to inpatient psychiatry.  We will order his home medications.  We will proceed with inpatient admission process.

## 2023-10-23 NOTE — ED NOTES
Writer attempted to gather collateral from pt's emergency contact, Marsha Verma, [phone]. Contact did not answer so left VM for them to call back the hospital.
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
## 2023-10-24 ENCOUNTER — TELEPHONE (OUTPATIENT)
Dept: BEHAVIORAL HEALTH | Facility: CLINIC | Age: 48
End: 2023-10-24
Payer: COMMERCIAL

## 2023-10-24 PROCEDURE — 250N000013 HC RX MED GY IP 250 OP 250 PS 637: Performed by: NURSE PRACTITIONER

## 2023-10-24 PROCEDURE — 250N000013 HC RX MED GY IP 250 OP 250 PS 637: Performed by: EMERGENCY MEDICINE

## 2023-10-24 PROCEDURE — 99222 1ST HOSP IP/OBS MODERATE 55: CPT | Performed by: NURSE PRACTITIONER

## 2023-10-24 PROCEDURE — 250N000013 HC RX MED GY IP 250 OP 250 PS 637: Performed by: PSYCHIATRY & NEUROLOGY

## 2023-10-24 RX ORDER — SERTRALINE HYDROCHLORIDE 100 MG/1
100 TABLET, FILM COATED ORAL DAILY
Status: DISCONTINUED | OUTPATIENT
Start: 2023-10-27 | End: 2023-11-14 | Stop reason: HOSPADM

## 2023-10-24 RX ORDER — RISPERIDONE 1 MG/1
1 TABLET, ORALLY DISINTEGRATING ORAL 2 TIMES DAILY
Status: DISCONTINUED | OUTPATIENT
Start: 2023-10-24 | End: 2023-11-14 | Stop reason: HOSPADM

## 2023-10-24 RX ADMIN — BICTEGRAVIR SODIUM, EMTRICITABINE, AND TENOFOVIR ALAFENAMIDE FUMARATE 1 TABLET: 50; 200; 25 TABLET ORAL at 08:34

## 2023-10-24 RX ADMIN — RISPERIDONE 1 MG: 1 TABLET, ORALLY DISINTEGRATING ORAL at 20:46

## 2023-10-24 RX ADMIN — MIRTAZAPINE 15 MG: 15 TABLET, FILM COATED ORAL at 21:49

## 2023-10-24 RX ADMIN — NICOTINE 1 PATCH: 21 PATCH, EXTENDED RELEASE TRANSDERMAL at 08:35

## 2023-10-24 RX ADMIN — RIVAROXABAN 20 MG: 10 TABLET, FILM COATED ORAL at 20:46

## 2023-10-24 RX ADMIN — RISPERIDONE 0.5 MG: 0.5 TABLET, ORALLY DISINTEGRATING ORAL at 08:34

## 2023-10-24 RX ADMIN — SERTRALINE HYDROCHLORIDE 50 MG: 50 TABLET ORAL at 12:42

## 2023-10-24 ASSESSMENT — ACTIVITIES OF DAILY LIVING (ADL)
ADLS_ACUITY_SCORE: 35

## 2023-10-24 NOTE — TELEPHONE ENCOUNTER
R:   Pt in on the adult worklist awaiting bed placement -  [facility] Kristen @ Capacity.  [place] [facility] [place].    Bed search initiated @ 10:30am     [facility] is posting 0 beds. [phone].  per call to Kathryn at [facility].   [facility] ([facility], [facility], [facility],  [facility].)    [phone]   [facility] is posting 0 beds. Negative covid required. [phone]; per         call to [facility] Closed to admits d/t COVID Exposure on unit.       [facility] is posting 0 beds. [phone].  per call to Maria Eugenia at [facility].   [facility] is posting 4 bed. LOW acuity ONLY. Mixed unit 12+. -  Due to Substance use/psychosis:  No a good fit for their acuity @ This time per Christine -      Negative covid- [phone].      [facility] is posting 0 beds. Negative covid. [phone]    [facility] ([facility] 0 beds. Low acuity only, [facility] 0 beds,  Duane Rinaldi         @ Capacity)   [phone].       Pt will review on worklist pending appropriate bed availability for review.

## 2023-10-24 NOTE — CONSULTS
[facility]  Initial Psychiatric Consult   Consult date: October 24, 2023    Total time:  62 minutes  -Chart review  -Face-to-face with patient  -Coordination with hospital/healthcare staff  -Documentation            Reason for Consult, requesting source:      1.  Psychotropic management while awaiting inpatient psychiatric hospitalization    2.  Requesting source:  Dr. JOEL Bob DO          HPI:     Edi Sorenson is a 48 year old male who was admitted to a [facility] on 10/23/2023 related to an exacerbation in mental health symptoms, including motivation to seek help for polysubstance abuse.    Please see the following encounters for further details:  -10/23/2023 ED provider notes by Dr. JOEL Bob DO  -10/23/2023 DEC encounter        Past Psychiatric History:       Suicide attempts:  -Yes per cutting and trying to starve self to death    Inpatient hospitalizations:  -Multiple  -Most recent: [facility], [facility] from 8/28 to 9/4/2023    ECT:  -None per chart review    Medication Trials:      SSRIs:  -Zoloft 50 mg    Antipsychotics:  -Seroquel per patient report    Sleep aides:  -Trazodone 50 to 100 mg            Substance Use and History:     Polysubstance abuse:  -Alcohol  -THC  -Opioids  -Crack/cocaine    ----------    -Has previously been to c/d treatment 2 to 3 times          Past Medical History:   PAST MEDICAL HISTORY:   Past Medical History:   Diagnosis Date    Asymptomatic human immunodeficiency virus (HIV) infection status (H)     Cocaine abuse (H)     Depression     DVT (deep venous thrombosis) (H)     Last in 6/2018. No hx of PE. On Coumadin.    Genital HSV        PAST SURGICAL HISTORY: No past surgical history on file.          Family History:   FAMILY HISTORY: No family history on file.        Social History:   SOCIAL HISTORY:   Social History     Tobacco Use    Smoking status: Former    Smokeless tobacco: Never   Substance Use Topics    Alcohol use: Yes       -See 10/23/2023 DEC encounter by  RONDA Alvares, for further details of social hx         PTA Medications:   (Not in a hospital admission)            Allergies:   No Known Allergies         Labs:     Recent Results (from the past 48 hour(s))   Urine Drug Screen Panel    Collection Time: 10/23/23  4:50 AM   Result Value Ref Range    Amphetamines Urine Screen Positive (A) Screen Negative    Barbituates Urine Screen Negative Screen Negative    Benzodiazepine Urine Screen Positive (A) Screen Negative    Cannabinoids Urine Screen Positive (A) Screen Negative    Cocaine Urine Screen Positive (A) Screen Negative    Fentanyl Qual Urine Screen Positive (A) Screen Negative    Opiates Urine Screen Negative Screen Negative    PCP Urine Screen Negative Screen Negative   Ethyl Alcohol Level    Collection Time: 10/23/23  5:04 AM   Result Value Ref Range    Alcohol ethyl <0.01 <=0.01 g/dL   Basic metabolic panel    Collection Time: 10/23/23  5:04 AM   Result Value Ref Range    Sodium 140 135 - 145 mmol/L    Potassium 4.2 3.4 - 5.3 mmol/L    Chloride 101 98 - 107 mmol/L    Carbon Dioxide (CO2) 30 (H) 22 - 29 mmol/L    Anion Gap 9 7 - 15 mmol/L    Urea Nitrogen 17.4 6.0 - 20.0 mg/dL    Creatinine 1.16 0.67 - 1.17 mg/dL    GFR Estimate 79 >60 mL/min/1.73m2    Calcium 9.5 8.6 - 10.0 mg/dL    Glucose 118 (H) 70 - 99 mg/dL   CBC with platelets and differential    Collection Time: 10/23/23  5:04 AM   Result Value Ref Range    WBC Count 4.6 4.0 - 11.0 10e3/uL    RBC Count 5.46 4.40 - 5.90 10e6/uL    Hemoglobin 16.2 13.3 - 17.7 g/dL    Hematocrit 52.0 40.0 - 53.0 %    MCV 95 78 - 100 fL    MCH 29.7 26.5 - 33.0 pg    MCHC 31.2 (L) 31.5 - 36.5 g/dL    RDW 13.6 10.0 - 15.0 %    Platelet Count 273 150 - 450 10e3/uL    % Neutrophils 62 %    % Lymphocytes 30 %    % Monocytes 7 %    Mids % (Monos, Eos, Basos)      % Eosinophils 1 %    % Basophils 0 %    % Immature Granulocytes 0 %    NRBCs per 100 WBC 0 <1 /100    Absolute Neutrophils 2.9 1.6 - 8.3 10e3/uL    Absolute Lymphocytes  1.4 0.8 - 5.3 10e3/uL    Absolute Monocytes 0.3 0.0 - 1.3 10e3/uL    Mids Abs (Monos, Eos, Basos)      Absolute Eosinophils 0.0 0.0 - 0.7 10e3/uL    Absolute Basophils 0.0 0.0 - 0.2 10e3/uL    Absolute Immature Granulocytes 0.0 <=0.4 10e3/uL    Absolute NRBCs 0.0 10e3/uL   Extra Purple Top Tube    Collection Time: 10/23/23  5:04 AM   Result Value Ref Range    Hold Specimen JIC             Physical and Psychiatric Examination:     /83   Pulse 62   Temp 98.4  F (36.9  C) (Oral)   Resp 16   SpO2 100%   Weight is 0 lbs 0 oz  There is no height or weight on file to calculate BMI.    Physical Exam:  I have reviewed the physical exam as documented by by the medical team and agree with findings and assessment and have no additional findings to add at this time.      Mental Status Exam:  Appearance: Adequately Groomed, Attire Appropriate for the Season, Appears Older than stated age, Laying in Bed  General Behavior:  Cooperative  Speech: Fluent, Normal rate, Monotone  Musculoskeletal:    -Gait not observed during t.h. visit  -No facial tics/tremors observed   -Motor coordination is grossly intact   Mood: Depressed  Affect: Tired, Eyes are closed  Attention: Intact  Orientation:  Person, Place, Situation  Thought Associations:  Intact  Thought Content: Reality based   Thought Processes: Organized, Normal rate  Memory: No overt impairment; no screenings or formal testing performed  Language: Intact  Judgement: Fair   Insight: Fair           DSM-5 Diagnosis:     1.  Major depressive disorder, recurrent, moderate to severe    2..  Polysubstance abuse: (alcohol, THC, opioids, cocaine/crack)    3. Grief Reaction    4.  History of: SOFIA, Schizophrenia, Trauma    Rule outs:    5.  Substance induced mood disorders and or psychosis    6.  PTSD          Assessment:     Edi Sorenson is a 47 y/o male with prior mental health dx as noted above.    Per chart review psychosis + zachary symptoms have usually occurred in the  context   of substance abuse (therefore accuracy of prior dx of Schizophrenia is questionable).    Came to the ED on 10/23/2023 in the context of worsening mental health symptoms,   as well as seeking assistance for ongoing substance use disorders.   Recently has undergone the loss of his fiance who was pregnant, which is also impacting   depressive symptom profile.    Edi outlines he is very depressed right now and requests to restart Zoloft.   Additionally, he isn't sleeping well, and mentions needing/wanting Seroquel, however is willing to modify Risperdal dosing to minimize polypharmacy.    Endorses ongoing passive suicidal ideations + wanting to give up but states he is able to maintain safety (no plan or intent) while in the ED.            Summary of Recommendations:     1.  Continue to await inpatient psychiatric hospitalization.    2.  Medication changes:  -Risperdal increase to 1 mg BID; if to sedating during the daytime can modify to 2 mg HS    -Zoloft 50 mg x 3 days, then increase to 100 mg daily thereafter    3.  Re-consult psychiatry as needed.          Abigail TITUS-CNP, [facility]-BC  Consult/Liaison Psychiatry  [facility]  If I am unavailable, please contact [facility] at [phone] to reach the on-call provider.

## 2023-10-24 NOTE — ED NOTES
Patient arrived on unit and was orientated to unit, provided a welcome folder and reviewed snack options, schedule and expectations.  Patient denies HI, AVH and exhibits SI and contracted for safety.   Patient states he is tired and wants to sleep.  Patient relaxing in the milieu on the bean bag.

## 2023-10-24 NOTE — TELEPHONE ENCOUNTER
R: [place]  Access Inpatient Bed Call Log 10/24/23 @ 1:00am   Intake has called facilities that have not updated their bed status within the last 12 hours.??     *METRO:  [place] -- [facility]: @ cap per website.  [place] -- [facility]:  @ cap per website.  [place] -- [facility]: @ cap per website.  [place] -- [facility]: TEMPORARILY CLOSED DUE TO COVID EXPOSURE.  [place] -- [facility]: @ cap per website.  [facility] -- [facility]: @ cap per website.  [facility]/ beds - POSTING 2 BEDS. Ages 18-25, Voluntary only, COVID test req'd, NO aggression, physical or sexual assault, violence hx or drug abuse  [place] [facility]: @ cap per website.  [place] -- [facility]: @ cap per website.  [place] -- [facility]: @ cap per website.    *METRO +1HR (by distance):  [facility]: POSTING 2 BEDS. Mixed unit/Low acuity only.   [facility]: @ cap per website. Low acuity, No aggression  [facility]: @ cap per website.   [facility]: @ cap per website. Low acuity only. No current aggression.  [facility]:  @ cap per website. COVID negative test req. Lower acuity only  [facility]: @ cap per website.. Low acuity only. Prefer med adjustments placement.  [facility]: POSTING 1 BED.  No aggression     Pt remains on waitlist pending appropriate placement availability

## 2023-10-24 NOTE — ED NOTES
"Patient sleeping in milieu.  States \"extremely tired, just want to sleep\"  Patient refused vital signs will re-approach.   Patient completed VS.  Patient hate 100%.  Patient denies AV states SI with no plan.  Patient moved to [facility] room.   Patient sleeping.   "

## 2023-10-24 NOTE — PROGRESS NOTES
"Triage & Transition Services, Extended Care     Therapy Progress Note    Patient: Edi goes by \"Edi,\" uses he/him pronouns  Date of Service: October 24, 2023  Site of Service: [facility] EMERGENCY DEPARTMENT                             ED12  Patient was seen in-person.     Presenting problem:   Edi is followed related to Boarding Status. Please see initial DEC/[facility] Crisis Assessment completed by Meredith Alvares on 10.23.23 for complete assessment information. Notable concerns include trauma and substance use.     Individuals Present: Edi & Meredith Alvares    Session start: 10:00 am   Session end: 10:25 am   Session duration in minutes: 25  Session number: 1  Anticipated number of sessions or this episode of care: 1-3  CPT utilized: 55794 - Psychotherapy (with patient) - 30 (16-37*) min    Current Presentation:   Pt was laying in bed watching TV. Pt smiled, laughed and expressed gratitude when meeting with writer. Pt continues to endorse SI and state if he were released he would be unable to keep himself safe. Pt continues to advocate to go inpatient and is eager to restart medications. Pt was calm and pleasant during session and has gotten his appetite back. Pt reported his is not feeling any withdrawal symptoms at this time.      Mental Status Exam:   Appearance: awake, alert and dressed in hospital scrubs  Attitude: cooperative  Eye Contact: good  Mood: good  Affect: appropriate and in normal range  Speech: clear, coherent  Psychomotor Behavior: no evidence of tardive dyskinesia, dystonia, or tics  Thought Process:  logical and goal oriented  Associations: no loose associations  Thought Content: active suicidal ideation present and plan for suicide present  Insight: good  Judgement: intact  Oriented to: time, person, and place  Attention Span and Concentration: intact  Recent and Remote Memory: intact    Diagnosis:   Anxiety F41.9     Chemical dependency (H) F19.20     Depression F32.A "     Therapeutic Intervention(s):   Provided active listening, unconditional positive regard, and validation. Provided positive reinforcement for progress towards goals, gains in knowledge, and application of skills previously taught.  Identified stress relief practices.    Treatment Objective(s) Addressed:   The focus of this session was on rapport building and assessing safety .     Progress Towards Goals:   Patient reports improving symptoms. Patient is making progress towards treatment goals as evidenced by continuing to meet with providers to restart medication and continue to endorse wanting to go inpatient.       General Recommendations:   Continue to monitor for harm. Consider: Use a positive, direct and calm approach. Pt's tend to match the energy/mood of the staff. Keep focus positive and upbeat, Listen in a neutral, non-judgmental way. Offer reassurance, and Be mindful of your nonverbal cues (body language, facial expressions)    Plan:   Inpatient Mental Health: Pt continues to endorse active suicidal ideation with a plan via overdose. Pt continues to endorse increased auditory and visual hallucinations.  Pt would benefit from an inpaitent mental health stay for safety, stablization and medication management. Pt continues to be unable to engage in safety planing to midigate risk due to the recent grief and loss of his fiance' and limited outpatient supports and services.     Plan for Care reviewed with Assigned Medical Provider? Yes. Provider, Dr. Encarnacion, response: agreeable     Meredith Alvares   Licensed Mental Health Professional (LMHP), [facility]  [phone]

## 2023-10-24 NOTE — ED NOTES
IP MH Referral Acuity Rating Score (RARS)    LMHP complete at referral to IP MH, with DEC; and, daily while awaiting IP MH placement. Call score to PPS.  CRITERIA SCORING   New 72 HH and Involuntary for IP MH (not adolescent) 0/1   Boarding over 24 hours 1/1   Vulnerable adult at least 55+ with multiple co morbidities; or, Patient age 11 or under 0/1   Suicide ideation without relief of precipitating factors 1/1   Current plan for suicide 1/1   Current plan for homicide 0/1   Imminent risk or actual attempt to seriously harm another without relief of factors precipitating the attempt 0/1   Severe dysfunction in daily living (ex: complete neglect for self care, extreme disruption in vegetative function, extreme deterioration in social interactions) 1/1   Recent (last 2 weeks) or current physical aggression in the ED 0/1   Restraints or seclusion episode in ED 0/1   Verbal aggression, agitation, yelling, etc., while in the ED 0/1   Active psychosis with psychomotor agitation or catatonia 0/1   Need for constant or near constant redirection (from leaving, from others, etc).  0/1   Intrusive or disruptive behaviors 0/1   TOTAL Acuity Total Score: 4

## 2023-10-25 ENCOUNTER — TELEPHONE (OUTPATIENT)
Dept: BEHAVIORAL HEALTH | Facility: CLINIC | Age: 48
End: 2023-10-25
Payer: COMMERCIAL

## 2023-10-25 PROCEDURE — 250N000013 HC RX MED GY IP 250 OP 250 PS 637: Performed by: EMERGENCY MEDICINE

## 2023-10-25 PROCEDURE — 250N000013 HC RX MED GY IP 250 OP 250 PS 637: Performed by: NURSE PRACTITIONER

## 2023-10-25 PROCEDURE — 250N000013 HC RX MED GY IP 250 OP 250 PS 637: Performed by: PSYCHIATRY & NEUROLOGY

## 2023-10-25 RX ADMIN — NICOTINE 1 PATCH: 21 PATCH, EXTENDED RELEASE TRANSDERMAL at 10:45

## 2023-10-25 RX ADMIN — MIRTAZAPINE 15 MG: 15 TABLET, FILM COATED ORAL at 20:23

## 2023-10-25 RX ADMIN — RIVAROXABAN 20 MG: 10 TABLET, FILM COATED ORAL at 17:50

## 2023-10-25 RX ADMIN — SERTRALINE HYDROCHLORIDE 50 MG: 50 TABLET ORAL at 10:40

## 2023-10-25 RX ADMIN — RISPERIDONE 1 MG: 1 TABLET, ORALLY DISINTEGRATING ORAL at 20:22

## 2023-10-25 RX ADMIN — BICTEGRAVIR SODIUM, EMTRICITABINE, AND TENOFOVIR ALAFENAMIDE FUMARATE 1 TABLET: 50; 200; 25 TABLET ORAL at 13:07

## 2023-10-25 RX ADMIN — RISPERIDONE 1 MG: 1 TABLET, ORALLY DISINTEGRATING ORAL at 10:41

## 2023-10-25 ASSESSMENT — ACTIVITIES OF DAILY LIVING (ADL)
ADLS_ACUITY_SCORE: 35

## 2023-10-25 NOTE — ED PROVIDER NOTES
[facility] who [facility], that was signed out to me by the night physician in the main emergency department at shift change.  The patient has been evaluated by mental health, completed a DEC assessment, and deemed in need of admission pending stabilization or other change in status.  [facility] remains in the [facility] while awaiting inpatient bed placement.  The patient has been medically cleared, and home medications are being administered.  Any necessary psychiatric consultation has been ordered.  I will be available to manage this patient for any acute issues that should arise until the provider in the [facility] [place] arrives to assume care today, at which time they will assume care of the patient.  I will continue to be available and address any pending mental health recommendations or medical issues, that should arise either by nursing, the psychiatric consultant, or by the behavioral extended care team, until the time of that signout.       Frandy Gordon MD  10/25/23 0903

## 2023-10-25 NOTE — TELEPHONE ENCOUNTER
R: [place]  Access Inpatient Bed Call Log 10/25/23 @ 1:00am   Intake has called facilities that have not updated their bed status within the last 12 hours.??     *METRO:  [place] -- [facility]: @ cap per website.  [place] -- [facility]:  @ cap per website.  [place] -- [facility]: @ cap per website.  [place] -- [facility]: TEMPORARILY CLOSED DUE TO COVID EXPOSURE.  [place] -- [facility]: @ cap per website.  [facility] -- [facility]: @ cap per website.  [facility]/ beds - POSTING 1 BED. Ages 18-25, Voluntary only, COVID test req'd, NO aggression, physical or sexual assault, violence hx or drug abuse  [place] -- [facility]: @ cap per website.  [place] -- [facility]: @ cap per website.  [place] -- [facility]: @ cap per website.    *METRO +1HR (by distance):  [facility]: POSTING 2 BEDS. Mixed unit/Low acuity only.   [facility]: @ cap per website. Low acuity, No aggression  [facility]: @ cap per website.   [facility]: @ cap per website. Low acuity only. No current aggression.  [facility]:  @ cap per website. COVID negative test req. Lower acuity only  [facility]: POSTING 2 BEDS. Low acuity only. Prefer med adjustments placement.    Pt remains on waitlist pending appropriate placement availability

## 2023-10-25 NOTE — TELEPHONE ENCOUNTER
R:  No available beds within [facility]     Bed search initiated @ 1pm                  [facility] is at capacity.               [facility] is posting 0 beds. [phone].     [facility] ( [facility] 0 beds,  [facility]  0 Beds,  [facility]  0 Beds). Negative         covid required.        [phone]            [facility] is posting 0 beds. Negative covid required. [phone] Per call          at 7:54am to [place] currently FULL.    [facility] is posting 0 beds. [phone].         [facility] is posting 2 beds. LOW acuity ONLY. Mixed unit 12+. Negative covid- [phone]. -  Called [place], and now are @ Capacity.     [facility] ([place], [place], [place]) is posting 0 beds. Low acuity [phone].       [facility] has 0 beds posted. No aggression. Negative Covid. Low acuity.       [facility] is posting 0 beds. Negative covid. [phone]    Pt will remaIN ON adult worklist pending bed availability

## 2023-10-25 NOTE — ED NOTES
Patient has appeared to sleep most of the shift, he was up one time time for a snack and once to use the bathroom. He denies feeling like hurting himself or others. Will continue to monitor.

## 2023-10-26 ENCOUNTER — TELEPHONE (OUTPATIENT)
Dept: BEHAVIORAL HEALTH | Facility: CLINIC | Age: 48
End: 2023-10-26
Payer: COMMERCIAL

## 2023-10-26 PROCEDURE — 250N000013 HC RX MED GY IP 250 OP 250 PS 637: Performed by: EMERGENCY MEDICINE

## 2023-10-26 PROCEDURE — 250N000013 HC RX MED GY IP 250 OP 250 PS 637: Performed by: PSYCHIATRY & NEUROLOGY

## 2023-10-26 PROCEDURE — 250N000013 HC RX MED GY IP 250 OP 250 PS 637: Performed by: NURSE PRACTITIONER

## 2023-10-26 RX ORDER — OLANZAPINE 5 MG/1
5 TABLET, ORALLY DISINTEGRATING ORAL ONCE
Status: COMPLETED | OUTPATIENT
Start: 2023-10-26 | End: 2023-10-26

## 2023-10-26 RX ADMIN — GABAPENTIN 200 MG: 100 CAPSULE ORAL at 15:28

## 2023-10-26 RX ADMIN — SERTRALINE HYDROCHLORIDE 50 MG: 50 TABLET ORAL at 08:19

## 2023-10-26 RX ADMIN — MIRTAZAPINE 15 MG: 15 TABLET, FILM COATED ORAL at 21:12

## 2023-10-26 RX ADMIN — NICOTINE 1 PATCH: 21 PATCH, EXTENDED RELEASE TRANSDERMAL at 08:17

## 2023-10-26 RX ADMIN — RISPERIDONE 1 MG: 1 TABLET, ORALLY DISINTEGRATING ORAL at 19:01

## 2023-10-26 RX ADMIN — RIVAROXABAN 20 MG: 10 TABLET, FILM COATED ORAL at 17:56

## 2023-10-26 RX ADMIN — RISPERIDONE 1 MG: 1 TABLET, ORALLY DISINTEGRATING ORAL at 08:19

## 2023-10-26 RX ADMIN — OLANZAPINE 5 MG: 5 TABLET, ORALLY DISINTEGRATING ORAL at 20:06

## 2023-10-26 RX ADMIN — BICTEGRAVIR SODIUM, EMTRICITABINE, AND TENOFOVIR ALAFENAMIDE FUMARATE 1 TABLET: 50; 200; 25 TABLET ORAL at 09:53

## 2023-10-26 ASSESSMENT — ACTIVITIES OF DAILY LIVING (ADL)
ADLS_ACUITY_SCORE: 35

## 2023-10-26 NOTE — TELEPHONE ENCOUNTER
R: [place]  Access Inpatient Bed Call Log 10/26/23 @ 1:00am   Intake has called facilities that have not updated their bed status within the last 12 hours.??     *METRO:  [place] -- [facility]: @ cap per website.  [place] -- [facility]:  @ cap per website.  [place] -- [facility]: @ cap per website.  [place] -- [facility]: TEMPORARILY CLOSED DUE TO COVID EXPOSURE.  [place] -- [facility]: @ cap per website.  [facility] -- [facility]: @ cap per website.  [facility]/ beds - POSTING 1 BED. Ages 18-25, Voluntary only, COVID test req'd, NO aggression, physical or sexual assault, violence hx or drug abuse  [place] -- [facility]: @ cap per website.  [place] -- [facility]: @ cap per website.  [place] -- [facility]: @ cap per website.     Pt remains on waitlist pending appropriate placement availability

## 2023-10-26 NOTE — ED NOTES
IP MH Referral Acuity Rating Score (RARS)    LMHP complete at referral to IP MH, with DEC; and, daily while awaiting IP MH placement. Call score to PPS.  CRITERIA SCORING   New 72 HH and Involuntary for IP MH (not adolescent) 1/1   Boarding over 24 hours 1/1   Vulnerable adult at least 55+ with multiple co morbidities; or, Patient age 11 or under 0/1   Suicide ideation without relief of precipitating factors 1/1   Current plan for suicide 0/1   Current plan for homicide 0/1   Imminent risk or actual attempt to seriously harm another without relief of factors precipitating the attempt 0/1   Severe dysfunction in daily living (ex: complete neglect for self care, extreme disruption in vegetative function, extreme deterioration in social interactions) 1/1   Recent (last 2 weeks) or current physical aggression in the ED 0/1   Restraints or seclusion episode in ED 0/1   Verbal aggression, agitation, yelling, etc., while in the ED 0/1   Active psychosis with psychomotor agitation or catatonia 0/1   Need for constant or near constant redirection (from leaving, from others, etc).  0/1   Intrusive or disruptive behaviors 0/1   TOTAL Acuity Total Score: 4

## 2023-10-26 NOTE — PROGRESS NOTES
"  Type Of Assessment: Inpatient Substance Use Comprehensive Assessment    Referral Source:  [facility] ED  MRN: [id number]    DATE OF SERVICE: October 26, 2023  Date of previous URSZULA Assessment: 2022  Patient confirmed identity through two factor verification: Full Legal Name and SSN    PATIENT'S NAME: Edi Sorenson  Age: 48 year old  Last 4 SSN: [id number]  Sex: male   Gender Identity: male  Sexual Orientation: Heterosexual  Cultural Background: \"Black\"  YOB: 1975  Current Address:   [address] [place] [address]  Patient Phone Number:  [phone]   Patient's E-Mail Contact:  No e-mail address on record  Funding: [employer]  PMI: [id number]   Emergency Contact: Extended Emergency Contact Information  Primary Emergency Contact: Marsha Verma  Home Phone: [phone]  Mobile Phone: [phone]  Relation: Friend    JAVIER information was provided to patient and patient does not want a copy.     Telemedicine Visit: The patient's condition can be safely assessed and treated via synchronous audio and visual telemedicine encounter.    Reason for Telemedicine Visit: Services only offered telehealth  Originating Site (Patient Location): [address] [address]  Distant Site (Provider Location): Provider Remote Setting- Home Office  Consent:  The patient/guardian has verbally consented to: the potential risks and benefits of telemedicine (video visit) versus in person care; bill my insurance or make self-payment for services provided; and responsibility for payment of non-covered services.   Mode of Communication:  telephone    START TIME: 12:14pm  END TIME: 12:41pm    As the provider I attest to compliance with applicable laws and regulations related to telemedicine.   Edi Sorenson was seen for a substance use disorder consult on 10/26/2023 by JANA Wilson.    Reason " "for Substance Use Disorder Consult:  Pt is interested in URSZULA treatment at this time for \"drugs and alcohol.\"    Are you currently having severe withdrawal symptoms that are putting yourself or others in danger? No  Are you currently having severe medical problems that require immediate attention? No  Are you currently having severe emotional or behavioral problems that are putting yourself or others at risk of harm? No    Have you participated in prior substance use disorder evaluations? Yes. When, Where, and What circumstances: 2022   Have you ever been to detox, inpatient or outpatient treatment for substance related use? List previous treatment: Yes. When, Where, and What circumstances: about 5x and the last one was 1-2 years ago.   Have you ever had a gambling problem or had treatment for compulsive gambling? No  Have you ever felt the need to bet more and more money? No  Have you ever had to lie to people important to you about how much you gambled? No    Patient does not appear to be in severe withdrawal, an imminent safety risk to self or others, or requiring immediate medical attention and may proceed with the assessment interview.  Comprehensive Substance Use History   X X = Primary Drug Used Age of First Use    Pattern of Substance Use   (heaviest use in life and a use history within the past year if applicable) (DSM-5: Sx #3) Date /  Quantity of last use if within the past 30 days Withdrawal Potential?   Method of use  (Oral, smoked, snorted, IV, etc)    Alcohol   20/19 HU: age [age]    Past year: he drinks 3-4 days per week. He will drink 1 pint-1/5th of vodka per day.   10/23/23  1 pint     x Marijuana/Hashish   23 HU: 20s    Past year: he eats gummies almost every day. He will take 3-4 gummies per day.   10/23/23 no oral   x Cocaine/Crack 31 HU: daily use since age 31.    Past year: daily use of 1/2oz per day.   10/23/23 no smoke    Meth/Amphetamines   31 Meth:  First use: 31  Last use: 10/23/23  HU: " "none  Used over 100 times in his life.   10/23/23 no smoke   xx Heroin   37 HU: early 2023. He was using every day all day. He was using about 1-2 grams per day.    Past month: using off and on, about every other day.   10/23/23 no Smoke, snort, IV    Other Opiates/Synthetics   No use        Inhalants  No use        Benzodiazepines   No use        Hallucinogens   No use        Barbiturates/Sedatives/Hypnotics   No use        Over-the-Counter Drugs   No use        Other   No use        Nicotine   20 Cigarettes: 1/2 ppd 10/23/23 no smoke     Withdrawal symptoms: Have you had any of the following withdrawal symptoms?  stomach hurting, shaking, tired, muscle aches, vomiting, agitated.     Have you experienced any cravings?  Yes    Have you had periods of abstinence?  Yes   What was your longest period? 18 months  How: attending NA meetings.  Any circumstances that lead to relapse? \"I don't know. I put my recovery second and my girlfriends first.\"    What activities have you engaged in when using alcohol/other drugs that could be hazardous to you or others?  driving, getting into fights, IVDU.    A description of any risk-taking behavior, including behavior that puts the client at risk of exposure to blood-borne or sexually transmitted diseases: IVDU    Arrests and legal interventions related to substance use: No pending charges or CSC.    A description of how the patient's use affected their ability to function appropriately in a work setting:  \"stopped me from being who I am in the community.\"    A description of how the patient's use affected their ability to function appropriately in an educational setting: \"put a wedge in it.\"    Leisure time activities that are associated with substance use: none reported.    Do you think your substance use has become a problem for you? He agrees he has a substance abuse problem.    MEDICAL HISTORY  Physical or medical concerns or diagnoses: \"I need my teeth fixed.\"  Patient Active " "Problem List   Diagnosis    Depression    Altered mental status    Suicidal ideation    Acute low back pain    Constipation    Chemical dependency (H)    Kimberli (H)    Anxiety    Other stimulant use, unspecified with unspecified stimulant-induced disorder (H)    Suicidal ideations     Do you have any current medical treatment needs not being addressed by inpatient treatment?  no    Do you need a referral for a medical provider? no    Current medications:   Current Facility-Administered Medications   Medication    bictegravir-emtricitabine-tenofovir (BIKTARVY) -25 MG per tablet 1 tablet    gabapentin (NEURONTIN) capsule 200 mg    mirtazapine (REMERON) tablet 15 mg    nicotine (NICODERM CQ) 21 MG/24HR 24 hr patch 1 patch    nicotine Patch in Place    nicotine polacrilex (NICORETTE) gum 4 mg    ondansetron (ZOFRAN ODT) ODT tab 4 mg    risperiDONE (risperDAL M-TABS) ODT tab 1 mg    rivaroxaban ANTICOAGULANT (XARELTO) tablet 20 mg    [START ON 10/27/2023] sertraline (ZOLOFT) tablet 100 mg     Current Outpatient Medications   Medication    Bictegravir-Emtricitab-Tenofov (BIKTARVY) -15 MG TABS    gabapentin (NEURONTIN) 100 MG capsule    methocarbamol (ROBAXIN) 500 MG tablet    mirtazapine (REMERON) 15 MG tablet    risperiDONE (RISPERDAL) 0.5 MG tablet    rivaroxaban ANTICOAGULANT (XARELTO) 20 MG TABS tablet    traZODone (DESYREL) 50 MG tablet     Facility-Administered Medications Ordered in Other Encounters   Medication    Self Administer Medications: Behavioral Services       Are you pregnant? NA, Male    Do you have any specific physical needs/accommodations? No    MENTAL HEALTH HISTORY:  Have you ever had  hospitalizations or treatment for mental health illness: Yes. When, Where, and What circumstances: He was hospitalized for the first time a couple of months ago.     Mental health history, including diagnosis and symptoms, and the effect on the client's ability to function: \"depression. " "Schizophrenia.\"    Current mental health treatment including psychotropic medication needed to maintain stability: (Note: The assessment must utilize screening tools approved by the commissioner pursuant to section 245.4863 to identify whether the client screens positive for co-occurring disorders): none reported.    GAIN-SS Tool:      10/26/2023    12:00 PM   When was the last time that you had significant problems...   with feeling very trapped, lonely, sad, blue, depressed or hopeless about the future? Past month   with sleep trouble, such as bad dreams, sleeping restlessly, or falling asleep during the day? Past Month   with feeling very anxious, nervous, tense, scared, panicked or like something bad was going to happen? Past month   with becoming very distressed & upset when something reminded you of the past? Past month   with thinking about ending your life or committing suicide? Past month         10/26/2023    12:00 PM   When was the last time that you did the following things 2 or more times?   Lied or conned to get things you wanted or to avoid having to do something? Never   Had a hard time paying attention at school, work or home? Never   Had a hard time listening to instructions at school, work or home? Never   Were a bully or threatened other people? Never   Started physical fights with other people? Never     Have you ever been verbally, emotionally, physically or sexually abused?   No    Family history of substance use and misuse: none reported.    The patient's desire for family involvement in the treatment program: no  Level of family support: \"they supportive.\"    Social network in relation to expected support for recovery: he has a history of attending NA meetings.    Are you currently in a significant relationship? No    Do you have any children (include living arrangements/custody/contact)?:  no    What is your current living situation? \"I stay at [facility].\"    Are " you employed/attending school? no    SUMMARY:  Ability to understand written treatment materials: Yes  Ability to understand patient rules and patient rights: Yes  Does the patient recognize needs related to substance use and is willing to follow treatment recommendations: Yes  Does the patient have an opioid use disorder:  does not have a history of opiate use.    ASAM Dimension Scale Ratings:    Dimension 1 -  Acute Intoxication/Withdrawal: 0 - No Problem  Dimension 2 - Biomedical: 0 - No Problem  Dimension 3 - Emotional/Behavioral/Cognitive Conditions: 2 - Moderate Problem  Dimension 4 - Readiness to Change:  1 - Minor Problem  Dimension 5 - Relapse/Continued Use/ Continued Problem Potential: 4 - Extreme Problem  Dimension 6 - Recovery Environment:  4 - Extreme Problem    Category of Substance Severity (ICD-10 Code / DSM 5 Code)     Alcohol Use Disorder Severe  (10.20) (303.90)   Cannabis Use Disorder Moderate  (F12.20) (304.30)   Hallucinogen Use Disorder The patient does not meet the criteria for a Hallucinogen use disorder.   Inhalant Use Disorder The patient does not meet the criteria for an Inhalant use disorder.   Opioid Use Disorder Severe   (F11.20) (304.00)   Sedative, Hypnotic, or Anxiolytic Use Disorder The patient does not meet the criteria for a Sedative/Hypnotic use disorder.   Stimulant Related Disorder Severe   (F14.20) (304.20) Cocaine   Tobacco Use Disorder Moderate   (F17.200) (305.1)   Other (or unknown) Substance Use Disorder The patient does not meet the criteria for a Other (or unknown) Substance use disorder.     A problematic pattern of alcohol/drug use leading to clinically significant impairment or distress, as manifested by at least two of the following, occurring within a 12-month period:    1.) Alcohol/drug is often taken in larger amounts or over a longer period than was intended.  2.) There is a persistent desire or unsuccessful efforts to cut down or control alcohol/drug use  3.)  A great deal of time is spent in activities necessary to obtain alcohol, use alcohol, or recover from its effects.  4.) Craving, or a strong desire or urge to use alcohol/drug  5.) Recurrent alcohol/drug use resulting in a failure to fulfill major role obligations at work, school or home.  6.) Continued alcohol use despite having persistent or recurrent social or interpersonal problems caused or exacerbated by the effects of alcohol/drug.  7.) Important social, occupational, or recreational activities are given up or reduced because of alcohol/drug use.  8.) Recurrent alcohol/drug use in situations in which it is physically hazardous.  9.) Alcohol/drug use is continued despite knowledge of having a persistent or recurrent physical or psychological problem that is likely to have been caused or exacerbated by alcohol.  10.) Tolerance, as defined by either of the following: A need for markedly increased amounts of alcohol/drug to achieve intoxication or desired effect.  11.) Withdrawal, as manifested by either of the following: The characteristic withdrawal syndrome for alcohol/drug (refer to Criteria A and B of the criteria set for alcohol/drug withdrawal).    Specify if: In early remission:  After full criteria for alcohol/drug use disorder were previously met, none of the criteria for alcohol/drug use disorder have been met for at least 3 months but for less than 12 months (with the exception that Criterion A4,  Craving or a strong desire or urge to use alcohol/drug  may be met).     In sustained remission:   After full criteria for alcohol use disorder were previously met, non of the criteria for alcohol/drug use disorder have been met at any time during a period of 12 months or longer (with the exception that Criterion A4,  Craving or strong desire or urge to use alcohol/drug  may be met).     Specify if:   This additional specifier is used if the individual is in an environment where access to alcohol is  restricted.    Mild: Presence of 2-3 symptoms  Moderate: Presence of 4-5 symptoms  Severe: Presence of 6 or more symptoms    Collateral information:   The patient's medical record at [facility] was reviewed and the information contained in the medical record supported the patient's account of his chemical use history and chemical use consequences.    Recommendations:   1)  Complete a residential based or similar treatment program.  2)  Abstain from all mood-altering chemicals unless prescribed by a licensed provider.   3)  Attend, at minimum, 2 weekly support group meetings, such as 12 step based (AA/NA), SMART Recovery, Health Realizations, and/or Refuge Recovery meetings.     4)  Actively work with a male mentor/sponsor on a weekly basis.   5)  Follow all the recommendations of your treatment/medical providers.    Clinical Substantiation:    Pt has a long history of substance use. He is willing to attend an IP URSZULA tx program to help him get back to his sobriety. His use increased over the past month after the dead of his girlfriend.    Referrals/ Alternatives:  [facility]  [address] [place] [address], [address]  [place] [address]  Phone: [phone]  Fax: [phone]  Email: [email or web address]  Phone: [phone]  Fax: [phone]  [email or web address]    [facility]  [address] [place] [address]  Admissions Phone: [phone]  [facility] waitlist: [phone]  [email or web address]     URSZULA consult completed by: JANA Wilson.  [facility] Mental Health and Addiction Services Evaluation Department  [address] [place] [address]     *Due to regulation of Title 42 of the Code of Federal Regulations (CFR) Part 2: Confidentiality laws apply to this note and the information wherein.   Thus, this note cannot be copy and pasted into any other health care staff's note nor can it be included in general medical records sent to ANY outside agency without the patient's written consent.

## 2023-10-26 NOTE — PROGRESS NOTES
"Triage & Transition Services, Extended Care     Therapy Progress Note    Patient: Edi goes by \"Edi,\" uses he/him pronouns  Date of Service: October 25, 2023  Site of Service: [facility]  Patient was seen in-person.     Presenting problem:   Edi is followed related to Long wait time for admission:   . Please see initial DEC/[facility] Crisis Assessment completed by Meredith MORENO on 10/23 for complete assessment information. Notable concerns include Substance use and suicidal ideation.     Individuals Present: Edi & SHANKAR Frias    Session start: 400pm  Session end: 430pm  Session duration in minutes: 30  Session number: 2  Anticipated number of sessions or this episode of care: 1-4  CPT utilized: 65835 - Psychotherapy (with patient) - 30 (16-37*) min    Current Presentation:   The pt was tired but alert and able to engage in therapy. The pt was in a good mood, continues to endorse SI, continuing to request assistance with URSZULA treatment. Chart review indicated LADC saw the URSZULA consult order and then declined it until pt is receiving inpatient treatment. Therapist added the URSZULA assessment back into chart as the pt would like to discuss options for treatment.  The patient was able to engage in discussion about his coping skills, strengths, and plans for the future. The pt apologized multiple times for sleeping and being difficult to arouse. Writer provided education on the body's need for sleep and nutrition. Pt responded positively and planned to go back to sleep  Mental Status Exam:   Appearance: fatigued, alert, and cooperative  Attitude: cooperative  Eye Contact: good  Mood: better  Affect: appropriate and in normal range  Speech: clear, coherent  Psychomotor Behavior: no evidence of tardive dyskinesia, dystonia, or tics and fidgeting  Thought Process:  logical  Associations: no loose associations  Thought Content: active suicidal ideation present  Insight: good  Judgement: fair  Oriented to: time, person, and " place  Attention Span and Concentration: fair  Recent and Remote Memory: fair    Diagnosis:     F19. 10: Poly Substance Abuse, use unspecified  F41.9 Anxiety disorder, unspecified    Therapeutic Intervention(s):   Provided active listening, unconditional positive regard, and validation. Engaged in cognitive restructuring/ reframing, looked at common cognitive distortions and challenged negative thoughts. Engaged in guided discovery, explored patient's perspectives and helped expand them through socratic dialogue. Reviewed healthy living that supports positive mental health, including looking at sleep hygiene, regular movement, nutrition, and regular socialization.    Treatment Objective(s) Addressed:   The focus of this session was on rapport building and orienting the patient to therapy     Progress Towards Goals:   Patient reports stable symptoms. Patient is making progress towards treatment goals as evidenced by his willingness to engage and plan.     Case Management:   Reordered URSZULA assessment    General Recommendations:   Continue to monitor for harm. Consider: Use a positive, direct and calm approach. Pt's tend to match the energy/mood of the staff. Keep focus positive and upbeat, Use clear and concise directions, too many words can be overwhelming, and Be firm but gentle when redirecting    Plan:   Inpatient Mental Health:     The pt continues to endorse suicidal ideation, have thoughts about self harm, and expresses a high level of hopelessness.   Plan for Care reviewed with Assigned Medical Provider? No. Provider, Dr Barajas, No significant status change     [facility]   Licensed Mental Health Professional (LMHP), [facility]  [phone]

## 2023-10-26 NOTE — TELEPHONE ENCOUNTER
R:  No beds within [facility].    Bed search initiated @ 11:45am    [facility] is at capacity.               [facility] is posting 0 beds. [phone].     [facility] ( [facility] 0 beds,  [facility]  0 Beds,  [facility]  0 Beds). Negative         covid required.        [phone]            [facility] is posting 0 beds. Negative covid required. [phone] Per call          at 7:54am [facility] [phone].     [facility] is posting 2 beds. LOW acuity ONLY. Mixed unit 12+. Negative covid- [phone].  [facility] ([place], [place], [place]) is posting 0 beds. Low acuity          only. Negative covid.  [phone].       [facility] has 0 beds posted. No aggression. Negative Covid. Low acuity.       [facility] is posting 0 beds. Negative covid. [phone]    Pt to remain on worklist pending bed availability

## 2023-10-26 NOTE — CONSULTS
10/26/2023  Pt completed his URSZULA CA today. Ideally, pt will attend an IP URSZULA tx upon discharge from an IP psych unit for MH stabilization.    DAANES Assessment ID: [id number]    Recommendations:   1)  Complete a residential based or similar treatment program.  2)  Abstain from all mood-altering chemicals unless prescribed by a licensed provider.   3)  Attend, at minimum, 2 weekly support group meetings, such as 12 step based (AA/NA), SMART Recovery, Health Realizations, and/or Refuge Recovery meetings.     4)  Actively work with a male mentor/sponsor on a weekly basis.   5)  Follow all the recommendations of your treatment/medical providers.    Clinical Substantiation:    Pt has a long history of substance use. He is willing to attend an IP URSZULA tx program to help him get back to his sobriety. His use increased over the past month after the dead of his girlfriend.    Referrals/ Alternatives:  [facility]  [facility] [address] [place] [address], [address]  [place] [address]  Phone: [phone]  Fax: [phone]  Email: [email or web address]  Phone: [phone]  Fax: [phone]  [email or web address]    [facility]  [address] [place] [address]  Admissions Phone: [phone]  [facility] waitlist: [phone]  [email or web address]     URSZULA consult completed by: JANA Wilson.  [facility] Mental Health and Addiction Services Evaluation Department  [address] [place] [address]     *Due to regulation of Title 42 of the Code of Federal Regulations (CFR) Part 2: Confidentiality laws apply to this note and the information wherein.  Thus, this note cannot be copy and pasted into any other health care staff's note nor can it be included in general medical records sent to ANY outside agency without the patient's  written consent.

## 2023-10-26 NOTE — PROGRESS NOTES
"Triage & Transition Services, Extended Care     Therapy Progress Note    Patient: Edi goes by \"Edi,\" uses he/him pronouns  Date of Service: October 26, 2023  Site of Service: [facility] ED  Patient was seen in-person.     Presenting problem:   Edi is followed related to Long wait time for admission: pt waiting for inpt . Please see initial DEC/[facility] Crisis Assessment completed by Meredith Alvares on 10/23/2023 for complete assessment information. Notable concerns include SI, Trauma, CD.     Individuals Present: Edi & Austin Khan [facility]    Session start: 0850  Session end: 0906  Session duration in minutes: 16  Session number: 3  Anticipated number of sessions or this episode of care: 4+  CPT utilized: 77222 - Psychotherapy (with patient) - 30 (16-37*) min    Current Presentation:   Writer observed pt in the hallway putting away breakfast tray, and writer introduced self and role with extended care. Reflected that he was sleeping much of yesterday and was unable to meet with writer. He expressed that he had been bored and slept while he's been waiting to go upstairs for inpt psych treatment. He does continue to endorse SI and that he would not be able to maintain his safety outside of the hospital without treatment. Attempted to explore his SI further and he was disorganized in his responses, and mumbled statements that were not clear. At one point he states \"I smoke Nikes\". When asked for clarification he states that he has \"horrible\" dreams and that sometimes it feels he is dreaming when he is awake. He then stated that he was to be meeting with another , and writer discussed the URSZULA assessment to explore additional services outpatient for him. He returned to his room as another patient was escalating in the hallway.      Mental Status Exam:   Appearance: awake, alert and dressed in hospital scrubs  Attitude: somewhat cooperative  Eye Contact: fair  Mood: depressed  Affect: intensity is flat  Speech: " "mumbling  Psychomotor Behavior: no evidence of tardive dyskinesia, dystonia, or tics  Thought Process:  disorganized and tangental  Associations: no loose associations  Thought Content: passive suicidal ideation present  Insight: limited  Judgement: limited  Oriented to: time, person, and place  Attention Span and Concentration: intact  Recent and Remote Memory: intact    Diagnosis:   Anxiety F41.9      Chemical dependency (H) F19.20      Depression F32.A       Therapeutic Intervention(s):   Provided active listening, unconditional positive regard, and validation. Engaged in cognitive restructuring/ reframing, looked at common cognitive distortions and challenged negative thoughts. Engaged in guided discovery, explored patient's perspectives and helped expand them through socratic dialogue.    Treatment Objective(s) Addressed:   The focus of this session was on rapport building and orienting the patient to therapy .     General Recommendations:   Continue to monitor for harm. Consider: Complete environmental rounding at least 1x/ shift: check for and remove objects which could be use for self/other directed violence, Use a positive, direct and calm approach. Pt's tend to match the energy/mood of the staff. Keep focus positive and upbeat, Provide the pt with options to provide a sense of control. Try to tell the pt what they can do instead of what they can't do, Allow family calls/visits, Use \"First.. Then...\" language, Verbally state expectations , Be firm but gentle when redirecting, Listen in a neutral, non-judgmental way. Offer reassurance, and Be mindful of your nonverbal cues (body language, facial expressions)    Plan:   Inpatient Mental Health: The pt continues to endorse suicidal ideation, have thoughts about self harm, and expresses a high level of hopelessness. His thought process does appear to be disorganized at times. He is voluntary for inpt placement at this time.     Plan for Care reviewed with " Assigned Medical Provider? Yes. Provider, Shama Encarnacion MD, response: Ryan Khan, [facility]   Licensed Mental Health Professional (LMHP), [facility]  [phone]

## 2023-10-26 NOTE — TELEPHONE ENCOUNTER
[place]: [place]  Access Inpatient Bed Call Log 10/25/23 10:30 PM   Intake has called facilities that have not updated the bed status within the last 12 hours.                                 [facility] is at capacity.               [facility] is posting 0 beds. [phone].     [facility] is posting 0 beds. Negative covid required.   Per Alejandrina at 10:35PM, at capacity.                 [facility] is posting 0 beds. Negative covid required. [phone] Per call at 8:30PM, per Fatimah, no beds available.   [facility] is posting 0 bed. [phone]  Per Alejandrina at 10:35PM, at capacity.                 [facility] is posting 0 beds. [phone].     [facility] is posting 0 bed. Negative covid. [phone].    [facility] is posting 0 beds    Per Alejandrina at 10:35PM, at capacity.                       [facility] is posting 0 beds.  Per Alejandrina at 10:35PM, at capacity.                      [facility] is posting 0 beds. LOW acuity ONLY. Mixed unit 12+. Negative covid- [phone].      [facility] has 0 beds posted. No aggression. Negative Covid. Low acuity.     Per Alejandrina at 10:35PM, at capacity.                        Pt remains on the work list pending appropriate bed availability.

## 2023-10-27 ENCOUNTER — TELEPHONE (OUTPATIENT)
Dept: BEHAVIORAL HEALTH | Facility: CLINIC | Age: 48
End: 2023-10-27
Payer: COMMERCIAL

## 2023-10-27 PROCEDURE — 250N000013 HC RX MED GY IP 250 OP 250 PS 637: Performed by: PSYCHIATRY & NEUROLOGY

## 2023-10-27 PROCEDURE — 250N000013 HC RX MED GY IP 250 OP 250 PS 637: Performed by: NURSE PRACTITIONER

## 2023-10-27 PROCEDURE — 250N000013 HC RX MED GY IP 250 OP 250 PS 637: Performed by: EMERGENCY MEDICINE

## 2023-10-27 RX ADMIN — SERTRALINE HYDROCHLORIDE 100 MG: 100 TABLET ORAL at 09:04

## 2023-10-27 RX ADMIN — RISPERIDONE 1 MG: 1 TABLET, ORALLY DISINTEGRATING ORAL at 21:25

## 2023-10-27 RX ADMIN — GABAPENTIN 200 MG: 100 CAPSULE ORAL at 14:59

## 2023-10-27 RX ADMIN — RISPERIDONE 1 MG: 1 TABLET, ORALLY DISINTEGRATING ORAL at 09:04

## 2023-10-27 RX ADMIN — BICTEGRAVIR SODIUM, EMTRICITABINE, AND TENOFOVIR ALAFENAMIDE FUMARATE 1 TABLET: 50; 200; 25 TABLET ORAL at 08:59

## 2023-10-27 RX ADMIN — NICOTINE 1 PATCH: 21 PATCH, EXTENDED RELEASE TRANSDERMAL at 09:06

## 2023-10-27 RX ADMIN — RIVAROXABAN 20 MG: 10 TABLET, FILM COATED ORAL at 16:54

## 2023-10-27 RX ADMIN — MIRTAZAPINE 15 MG: 15 TABLET, FILM COATED ORAL at 21:25

## 2023-10-27 ASSESSMENT — ACTIVITIES OF DAILY LIVING (ADL)
ADLS_ACUITY_SCORE: 35

## 2023-10-27 NOTE — TELEPHONE ENCOUNTER
[place]: [place]  Access Inpatient Bed Call Log 10/27/23 8:55 AM   Intake has called facilities that have not updated the bed status within the last 12 hours.                               [facility] is at capacity.              [facility] is posting 0 beds. [phone].    [facility] is posting 0 beds. Negative covid required.                 [facility] is posting 0 beds. Negative covid required. [phone] Per call @8:52am, CRN in meeting until after 9am.  [facility] is posting 0 bed. [phone]   [facility] is posting 0 beds. [phone].    [facility] is posting 1 bed. Negative covid. [phone]. Per call @8:33am   [facility] is posting 0 beds           [facility] is posting [facility] beds.       [facility] is posting 3 beds. LOW acuity ONLY. Mixed unit 12+. Negative covid- [phone].     [facility] has 0 beds posted. No aggression. Negative Covid. Low acuity.      [facility] is posting 0 beds. Negative covid. [phone]        Pt remains on the work list pending appropriate bed availability.

## 2023-10-27 NOTE — ED PROVIDER NOTES
ED Observation Progress Note  [facility]  Note Date: 10/27/2023    Edi Sorenson MRN: [id number]   Age: 48 year old YOB: 1975     Interval History   About the same today.  Vitals signs stable.  Tolerating medications and treatment plan without significant side effects or problems.  Eating and voiding well.  No new concerns today.      Physical Exam   /83   Pulse 56   Temp 97.8  F (36.6  C) (Oral)   Resp 16   SpO2 100%   Physical Exam  General: . Appears stated age.   HENT: MMM, no oropharyngeal lesions  Eyes: PERRL, normal sclerae   Cardio: Regular rate, extremities well perfused  Resp: Normal work of breathing, normal respiratory rate  Neuro: alert and fully oriented. CN II-XII grossly intact. Grossly normal strength and sensation in all extremities.   MSK: no deformities. Grossly normal ROM.  Integumentary/Skin: no rash visualized, normal color  Psych:  suicidal ideations    Results   All laboratory and imaging data in the past 24 hours reviewed   -   -     Assessments & Plan (with Medical Decision Making)   Edi Sorenson is a 48 year old male admitted to ED Observation status with suicidal ideations.     On this date, the patient did require medications for agitation, and did not require restraints/seclusion for patient and/or provider safety.     Notable events and plan updates today: no major updates; he did require medication for agitation today     The patient's condition is such that further monitoring for psychiatric stabilization and/or coordination of a safe disposition is still indicated. The observation plan includes serial assessments of psychiatric condition, potential administration of medications if indicated, further disposition pending the patient's psychiatric course during the monitoring period.     --  Carlos Louise MD  [facility] EMERGENCY DEPARTMENT  10/27/2023        Carlos Louise MD  10/27/23 0078

## 2023-10-27 NOTE — PROGRESS NOTES
"Triage & Transition Services, Extended Care     Therapy Progress Note    Patient: Edi goes by \"Edi,\" uses he/him pronouns  Date of Service: October 27, 2023  Site of Service: [facility] EMERGENCY DEPARTMENT                             ED12  Patient was seen in-person.     Presenting problem:   Edi is followed related to Boarding Status. Please see initial DEC/[facility] Crisis Assessment completed by Meredith Alvares on 10.23.23 for complete assessment information. Notable concerns include trauma and substance use.     Individuals Present: Edi & Meredith Alvares    Session start: 12:50 pm   Session end: 1:10 pm  Session duration in minutes: 20  Session number: 3  Anticipated number of sessions or this episode of care: 1-3  CPT utilized: 94943 - Psychotherapy (with patient) - 30 (16-37*) min    Current Presentation:   Pt was in the hallway waiting for is peanut butter sandwich. Pt was pleasant and eager to learn next steps and timelines when he would be moving to an inpatient unit. Writer explained the process of moving to an inpatient floor. Pt continues to endorse SI and is unable to contract for safety. Pt was pleasant and spoke about feeling overwhelmed from not being able to start inpatient supports. Pt was able to navigate a frustrating experience with his food by using stop and thinks.     Mental Status Exam:   Appearance: awake, alert and dressed in hospital scrubs  Attitude: cooperative  Eye Contact: good  Mood: good  Affect: appropriate and in normal range  Speech: clear, coherent  Psychomotor Behavior: no evidence of tardive dyskinesia, dystonia, or tics  Thought Process:  logical and goal oriented  Associations: no loose associations  Thought Content: no evidence of suicidal ideation or homicidal ideation and active suicidal ideation present  Insight: good  Judgement: intact  Oriented to: time, person, and place  Attention Span and Concentration: intact  Recent and Remote Memory: " intact    Diagnosis:   Anxiety F41.9      Chemical dependency (H) F19.20      Depression F32.A       Therapeutic Intervention(s):   Provided active listening, unconditional positive regard, and validation. Engaged in cognitive restructuring/ reframing, looked at common cognitive distortions and challenged negative thoughts.    Treatment Objective(s) Addressed:   The focus of this session was on rapport building and assessing safety .     Progress Towards Goals:   Patient reports stable symptoms. Patient is making progress towards treatment goals as evidenced by wanting to go to inpatient along with residential support for substance use.     Case Management  Writer obtained signed SYLVAIN for Residential support. Writer reached out to [facility] to send paperwork to begin looking for residential placement     General Recommendations:   Continue to monitor for harm. Consider: Provide the pt with options to provide a sense of control. Try to tell the pt what they can do instead of what they can't do, Be firm but gentle when redirecting, and Listen in a neutral, non-judgmental way. Offer reassurance    Plan:   Inpatient Mental Health: Pt continues to endorse active suicidal ideation with a plan via overdose.Pt would benefit from an inpaitent mental health stay for safety, stablization and medication management along with residential care for substance challenges for long - term stabilization. Pt continues to be unable to engage in safety planing to midigate risk due to the recent grief and loss of his fiance' and limited outpatient supports and services.        Plan for Care reviewed with Assigned Medical Provider? Yes. Provider, Dr. Louise, response: agreeable     Meredith Alvares   Licensed Mental Health Professional (LMHP), [facility]  [phone]

## 2023-10-27 NOTE — TELEPHONE ENCOUNTER
R: [facility] Access Inpatient Bed Call Log 10/26/23 @ 7:45PM   Intake has called facilities that have not updated the bed status within the last 12 hours.                               [facility] is posting 0 beds.              [facility] is posting 0 beds. [phone].   [facility] is posting 0 beds. Negative covid required.                  [facility] is posting 0 beds. Negative covid required.    [facility] is posting 0 bed. [phone]    [facility] is posting 0 beds. [phone].     [facility] is posting 1 bed. Negative covid. [phone].    [facility] is [facility] beds            [facility] is posting 0 beds.         [facility] is posting 3 beds. LOW acuity ONLY. Mixed unit 12+. Negative covid- [phone]. Declined due to acuity 10/25      [facility] has 0 beds posted. No aggression. Negative Covid. Low acuity.       [facility] is posting 0 beds. Negative covid. [phone]   [facility] is posting 0 beds. Low acuity only. Negative covid.  [phone].     [facility] is posting 0 bed available. Negative covid.  [phone].        [facility] is posting 0 beds. Low acuity only. Negative covid.  [phone].         Pt remains on the work list pending appropriate bed availability.

## 2023-10-27 NOTE — TELEPHONE ENCOUNTER
Updated Bed Search @ 545PM  Per chart review, intake can look in the metro and 1 hour out for placement     [facility] has 0 appropriate beds available. Phone: [phone]  [facility] posting 0 available beds. Phone: [phone]  [facility] posting 0 available beds. Phone: [phone]  [facility] posting 0 available beds. Phone: [phone]  [facility] posting 0 available beds. Phone: [phone]  [facility] posting 0 available beds. Phone:[phone]  [facility] positi 1 available beds. Phone: [phone] Negative covid required, no recent or significant aggression, violence, or sexual assault. Ages 18-26 only. Pt not appropriate.  [facility] posting 0 available beds. Phone: [phone]. Negative covid required.   [facility] posting 0 available beds. Phone: [phone]   [facility] posting 0 available beds. Phone: [phone]   [facility] posting 0 available beds. Phone: [phone]  [facility] posting 3 available beds. Phone: [phone]. Need a negative covid. Mixed unit with adolescents, adults, geriatric patients. Age 12 and up. Low acuity referrals only. Pt already reviewed and declined, not appropriate.     Pt remains on work list pending appropriate bed placement.

## 2023-10-27 NOTE — TELEPHONE ENCOUNTER
[place]: [place]  Access Inpatient Bed Call Log 10/27/23 @ 12:30am   Intake has called facilities that have not updated their bed status within the last 12 hours.??      *METRO:  [place] -- [facility]: @ cap per website.  [place] -- [facility]:  @ cap per website.  [place] -- [facility]: @ cap per website.  [place] -- [facility]: TEMPORARILY CLOSED DUE TO COVID EXPOSURE.  [place] -- [facility]: @ cap per website.  [facility]: @ cap per website.  [facility]/ beds - POSTING 1 BED. Ages 18-25, Voluntary only, COVID test req'd, NO aggression, physical or sexual assault, violence hx or drug abuse  [place] [facility] @ cap per website.  [place] -- [facility]: @ cap per website.  [place] -- [facility]: @ cap per website.     *STATEWIDE (by distance):  [facility]: POSTING 3 BEDS. Mixed unit/Low acuity only.   [facility]: @ cap per website. Low acuity, No aggression  [facility]: @ cap per website.      Pt remains on waitlist pending appropriate placement availability

## 2023-10-27 NOTE — ED NOTES
Patient has been sleeping tonight with rounding, up to toilet this am and denies concerns, is calm/cooperative.

## 2023-10-27 NOTE — ED NOTES
Pt reported to nurse that he was feeling agitated and anxious and was requesting to take something that might help with that, provider notified.

## 2023-10-28 ENCOUNTER — TELEPHONE (OUTPATIENT)
Dept: BEHAVIORAL HEALTH | Facility: CLINIC | Age: 48
End: 2023-10-28
Payer: COMMERCIAL

## 2023-10-28 PROBLEM — F19.929: Status: ACTIVE | Noted: 2023-10-28

## 2023-10-28 LAB
ALBUMIN UR-MCNC: NEGATIVE MG/DL
APPEARANCE UR: CLEAR
BILIRUB UR QL STRIP: NEGATIVE
COLOR UR AUTO: ABNORMAL
GLUCOSE UR STRIP-MCNC: NEGATIVE MG/DL
HGB UR QL STRIP: NEGATIVE
KETONES UR STRIP-MCNC: NEGATIVE MG/DL
LEUKOCYTE ESTERASE UR QL STRIP: NEGATIVE
MUCOUS THREADS #/AREA URNS LPF: PRESENT /LPF
NITRATE UR QL: NEGATIVE
PH UR STRIP: 5 [PH] (ref 5–7)
RBC URINE: <1 /HPF
SP GR UR STRIP: 1.03 (ref 1–1.03)
SQUAMOUS EPITHELIAL: <1 /HPF
UROBILINOGEN UR STRIP-MCNC: NORMAL MG/DL
WBC URINE: 2 /HPF

## 2023-10-28 PROCEDURE — 250N000013 HC RX MED GY IP 250 OP 250 PS 637

## 2023-10-28 PROCEDURE — 81001 URINALYSIS AUTO W/SCOPE: CPT

## 2023-10-28 PROCEDURE — 250N000013 HC RX MED GY IP 250 OP 250 PS 637: Performed by: EMERGENCY MEDICINE

## 2023-10-28 PROCEDURE — 250N000013 HC RX MED GY IP 250 OP 250 PS 637: Performed by: NURSE PRACTITIONER

## 2023-10-28 PROCEDURE — 124N000002 HC R&B MH UMMC

## 2023-10-28 PROCEDURE — 250N000013 HC RX MED GY IP 250 OP 250 PS 637: Performed by: PSYCHIATRY & NEUROLOGY

## 2023-10-28 RX ORDER — LANOLIN ALCOHOL/MO/W.PET/CERES
3 CREAM (GRAM) TOPICAL
Status: DISCONTINUED | OUTPATIENT
Start: 2023-10-28 | End: 2023-11-14 | Stop reason: HOSPADM

## 2023-10-28 RX ORDER — HYDROXYZINE HYDROCHLORIDE 25 MG/1
25 TABLET, FILM COATED ORAL EVERY 4 HOURS PRN
Status: DISCONTINUED | OUTPATIENT
Start: 2023-10-28 | End: 2023-11-14 | Stop reason: HOSPADM

## 2023-10-28 RX ORDER — ACETAMINOPHEN 325 MG/1
650 TABLET ORAL EVERY 4 HOURS PRN
Status: DISCONTINUED | OUTPATIENT
Start: 2023-10-28 | End: 2023-11-14 | Stop reason: HOSPADM

## 2023-10-28 RX ORDER — POLYETHYLENE GLYCOL 3350 17 G/17G
17 POWDER, FOR SOLUTION ORAL DAILY PRN
Status: DISCONTINUED | OUTPATIENT
Start: 2023-10-28 | End: 2023-11-14 | Stop reason: HOSPADM

## 2023-10-28 RX ORDER — QUETIAPINE FUMARATE 50 MG/1
50 TABLET, FILM COATED ORAL
Status: DISCONTINUED | OUTPATIENT
Start: 2023-10-28 | End: 2023-10-30

## 2023-10-28 RX ORDER — OLANZAPINE 10 MG/1
10 TABLET ORAL 3 TIMES DAILY PRN
Status: DISCONTINUED | OUTPATIENT
Start: 2023-10-28 | End: 2023-11-14 | Stop reason: HOSPADM

## 2023-10-28 RX ORDER — OLANZAPINE 10 MG/2ML
10 INJECTION, POWDER, FOR SOLUTION INTRAMUSCULAR 3 TIMES DAILY PRN
Status: DISCONTINUED | OUTPATIENT
Start: 2023-10-28 | End: 2023-11-14 | Stop reason: HOSPADM

## 2023-10-28 RX ADMIN — RISPERIDONE 1 MG: 1 TABLET, ORALLY DISINTEGRATING ORAL at 21:22

## 2023-10-28 RX ADMIN — RISPERIDONE 1 MG: 1 TABLET, ORALLY DISINTEGRATING ORAL at 09:03

## 2023-10-28 RX ADMIN — QUETIAPINE FUMARATE 50 MG: 50 TABLET ORAL at 21:26

## 2023-10-28 RX ADMIN — BICTEGRAVIR SODIUM, EMTRICITABINE, AND TENOFOVIR ALAFENAMIDE FUMARATE 1 TABLET: 50; 200; 25 TABLET ORAL at 09:04

## 2023-10-28 RX ADMIN — NICOTINE 1 PATCH: 21 PATCH, EXTENDED RELEASE TRANSDERMAL at 09:04

## 2023-10-28 RX ADMIN — MIRTAZAPINE 15 MG: 15 TABLET, FILM COATED ORAL at 21:22

## 2023-10-28 RX ADMIN — SERTRALINE HYDROCHLORIDE 100 MG: 100 TABLET ORAL at 09:04

## 2023-10-28 RX ADMIN — RIVAROXABAN 20 MG: 10 TABLET, FILM COATED ORAL at 18:50

## 2023-10-28 ASSESSMENT — ACTIVITIES OF DAILY LIVING (ADL)
ADLS_ACUITY_SCORE: 35
ADLS_ACUITY_SCORE: 28
ADLS_ACUITY_SCORE: 35
ADLS_ACUITY_SCORE: 45
ADLS_ACUITY_SCORE: 35
ADLS_ACUITY_SCORE: 35

## 2023-10-28 NOTE — TELEPHONE ENCOUNTER
[place]: [place]  Access Inpatient Bed Call Log 10/28/23 7:30 AM   Intake has called facilities that have not updated the bed status within the last 12 hours.   (Metro +1 hr)  [facility] is at capacity.               [facility] is posting 0 beds. [phone].     [facility] is posting 0 beds. Negative covid required.                  [facility] is posting 0 beds. Negative covid required. [phone] 10/28 Per call at 7:35am to [facility] currently no beds available.   [facility] is posting 0 bed. [phone]  10/28 Per call at 9:26am to [facility] currently at capacity to callback 10/29 after 10 am for updated avail.        [facility] is posting 0 beds. [phone]. 10/28 Per call 8:54 am to [facility] at capacity.      [facility] is posting 3 bed. Negative covid. [phone]. 10/28  Per call at 7:37am to [place] all acuity avail except [facility] beds for Child /Adolescent and YA. 10/28 Pt not appropriate d/t facility restrictions.  [facility] is posting 0 beds            [facility] is posting 0 beds.      [facility] is posting 2 beds. LOW acuity ONLY. Mixed unit 12+. Negative covid- [phone].  10/24: [place] d/t acuity (seizures too) Medical complexity     [facility] has 0 bed. posted. No aggression. Negative Covid. Low acuity.       [facility] is posting 0 beds. Negative covid. [phone]    [facility] is posting 0 beds. Low acuity only. Negative covid.  [phone]. 10/28 Per call at 7:41am to [facility] no bed available.     Patient remains on the work list pending appropriate bed availability.          10:40 AM Per call to [place] only dany ratliff which is outside of patients preferences.  12:02 PM Call to [facility] ED.  12:51 PM Per call to ED pt is fine with roommate.  12:56 PM Paged Resident Angelica.  1:03 PM Per cb from Resident Dominguez self and nursing will review chart further admit to unit 22.        3:12 PM Patient accepted to unit 22.         3:14 PM CRN informed of pt in queue will be evening shift for admission.        3:23 PM ED notified.        3:27 PM Indicia Completed.
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
## 2023-10-28 NOTE — TELEPHONE ENCOUNTER
[place]: [place]  Access Inpatient Bed Call Log 10/28/23 @ 1:00am   Intake has called facilities that have not updated their bed status within the last 12 hours.??     *METRO:  [place] -- [facility]: @ cap per website.  [place] -- [facility]:  @ cap per website.  [place] -- [facility]: @ cap per website.  [employer] -- [facility]: TEMPORARILY CLOSED DUE TO COVID EXPOSURE.  [place] -- [facility]: @ cap per website.  [facility] -- [facility]: @ cap per website.  [facility]/ beds - POSTING 3 BEDS. Ages 18-25, Voluntary only, COVID test req'd, NO aggression, physical or sexual assault, violence hx or drug abuse  [place] -- [facility]: @ cap per website.  [place] -- [facility]: @ cap per website.  [place] -- [facility]: @ cap per website.    *METRO+1HR (by distance):  [facility]: POSTING 2 BEDS. Mixed unit/Low acuity only.   [facility]: @ cap per website. Low acuity, No aggression  [facility]: @ cap per website.   [facility]: @ cap per website. Low acuity only. No current aggression.  [facility]:  @ cap per website. COVID negative test req. Lower acuity only  [facility]: @ cap per website. Low acuity only. Prefer med adjustments placement.  [facility]: POSTING 1 BED.  No aggression     Pt remains on waitlist pending appropriate placement availability

## 2023-10-29 LAB
FOLATE SERPL-MCNC: 14.5 NG/ML (ref 4.6–34.8)
INR PPP: 1.08 (ref 0.85–1.15)
T PALLIDUM AB SER QL: NONREACTIVE
TSH SERPL DL<=0.005 MIU/L-ACNC: 2.91 UIU/ML (ref 0.3–4.2)
VIT B12 SERPL-MCNC: 500 PG/ML (ref 232–1245)

## 2023-10-29 PROCEDURE — 86706 HEP B SURFACE ANTIBODY: CPT

## 2023-10-29 PROCEDURE — 87591 N.GONORRHOEAE DNA AMP PROB: CPT

## 2023-10-29 PROCEDURE — 85610 PROTHROMBIN TIME: CPT

## 2023-10-29 PROCEDURE — 250N000013 HC RX MED GY IP 250 OP 250 PS 637

## 2023-10-29 PROCEDURE — 86803 HEPATITIS C AB TEST: CPT

## 2023-10-29 PROCEDURE — 124N000002 HC R&B MH UMMC

## 2023-10-29 PROCEDURE — 36415 COLL VENOUS BLD VENIPUNCTURE: CPT

## 2023-10-29 PROCEDURE — 87491 CHLMYD TRACH DNA AMP PROBE: CPT

## 2023-10-29 PROCEDURE — 82607 VITAMIN B-12: CPT

## 2023-10-29 PROCEDURE — 84443 ASSAY THYROID STIM HORMONE: CPT

## 2023-10-29 PROCEDURE — 87536 HIV-1 QUANT&REVRSE TRNSCRPJ: CPT

## 2023-10-29 PROCEDURE — 99222 1ST HOSP IP/OBS MODERATE 55: CPT

## 2023-10-29 PROCEDURE — G0177 OPPS/PHP; TRAIN & EDUC SERV: HCPCS

## 2023-10-29 PROCEDURE — 87340 HEPATITIS B SURFACE AG IA: CPT

## 2023-10-29 PROCEDURE — 82746 ASSAY OF FOLIC ACID SERUM: CPT

## 2023-10-29 PROCEDURE — 86780 TREPONEMA PALLIDUM: CPT

## 2023-10-29 RX ADMIN — RISPERIDONE 1 MG: 1 TABLET, ORALLY DISINTEGRATING ORAL at 20:08

## 2023-10-29 RX ADMIN — RIVAROXABAN 20 MG: 10 TABLET, FILM COATED ORAL at 18:07

## 2023-10-29 RX ADMIN — QUETIAPINE FUMARATE 50 MG: 50 TABLET ORAL at 20:09

## 2023-10-29 RX ADMIN — MIRTAZAPINE 15 MG: 15 TABLET, FILM COATED ORAL at 20:08

## 2023-10-29 RX ADMIN — HYDROXYZINE HYDROCHLORIDE 25 MG: 25 TABLET, FILM COATED ORAL at 13:29

## 2023-10-29 RX ADMIN — RISPERIDONE 1 MG: 1 TABLET, ORALLY DISINTEGRATING ORAL at 08:47

## 2023-10-29 RX ADMIN — BICTEGRAVIR SODIUM, EMTRICITABINE, AND TENOFOVIR ALAFENAMIDE FUMARATE 1 TABLET: 50; 200; 25 TABLET ORAL at 09:09

## 2023-10-29 RX ADMIN — SERTRALINE HYDROCHLORIDE 100 MG: 100 TABLET ORAL at 08:47

## 2023-10-29 RX ADMIN — NICOTINE 1 PATCH: 21 PATCH, EXTENDED RELEASE TRANSDERMAL at 08:51

## 2023-10-29 ASSESSMENT — ACTIVITIES OF DAILY LIVING (ADL)
ADLS_ACUITY_SCORE: 28
ADLS_ACUITY_SCORE: 28
DRESS: INDEPENDENT
LAUNDRY: WITH SUPERVISION
ADLS_ACUITY_SCORE: 28
HYGIENE/GROOMING: INDEPENDENT
LAUNDRY: WITH SUPERVISION
ADLS_ACUITY_SCORE: 28
HYGIENE/GROOMING: INDEPENDENT
ADLS_ACUITY_SCORE: 28
ADLS_ACUITY_SCORE: 28
DRESS: INDEPENDENT
ORAL_HYGIENE: INDEPENDENT
ORAL_HYGIENE: INDEPENDENT
ADLS_ACUITY_SCORE: 28

## 2023-10-29 NOTE — PLAN OF CARE
Problem: Depressive Signs/Symptoms  Goal: Optimized Energy Level (Depressive Signs/Symptoms)  Outcome: Progressing  Intervention: Optimize Energy Level  Recent Flowsheet Documentation  Taken 10/29/2023 1200 by Brennan Moya RN  Activity (Behavioral Health): up ad shawn     Problem: Suicidal Behavior  Goal: Suicidal Behavior is Absent or Managed  Outcome: Progressing   Goal Outcome Evaluation:    Plan of Care Reviewed With: patient             Patient isolative to his room most of the shift. Said he was tired and just wanted to rest, though came out for meals and medications. Patient was calm and cooperative, thoughts and speech were organized and logical. No psychosis noted, Medication compliant. Eats and drinks well. Contracted for safety. Napped most of the shift. Requested and received hydroxyzine 25 mg for anxiety.  Says was effective. No behavior issues. Staff will continue to monitor and offer support as needed.

## 2023-10-29 NOTE — CARE PLAN
10/28/23 2146   Patient Belongings   Did you bring any home meds/supplements to the hospital?  No   Patient Belongings locker   Patient Belongings Put in Hospital Secure Location (Security or Locker, etc.) clothing;shoes   Belongings Search Yes   Clothing Search Yes   Second Staff Pablo LOCKE     Locker: shoes, 2 socks,headphones, underwear, sweater, 2 sweat pants, long sleeve shirt     A               Admission:  I am responsible for any personal items that are not sent to the safe or pharmacy.  [facility] is not responsible for loss, theft or damage of any property in my possession.    Signature:  _________________________________ Date: _______  Time: _____                                              Staff Signature:  ____________________________ Date: ________  Time: _____      2nd Staff person, if patient is unable/unwilling to sign:    Signature: ________________________________ Date: ________  Time: _____     Discharge:  [facility] has returned all of my personal belongings:    Signature: _________________________________ Date: ________  Time: _____                                          Staff Signature:  ____________________________ Date: ________  Time: _____

## 2023-10-29 NOTE — CARE PLAN
10/29/23 1815   Group Therapy Session   Group Attendance attended group session   Time Session Began 0515   Time Session Ended 0600   Total Time (minutes) 45   Total # Attendees 3   Group Type psychotherapeutic   Group Topic Covered coping skills/lifestyle management;leisure exploration/use of leisure time;emotions/expression;self-care activities   Group Session Detail This psychotherapy group consisted of empowering questions and discussion and affirmations. Encouraging each member to discuss and reflect on their experiences with each scenario the card presented.   Patient Response/Contribution expressed understanding of topic;cooperative with task;able to recall/repeat info presented;listened actively   Patient Participation Detail Pt was active in the discussioni today.  Engaged and cooperative.  He processed the recent loss of his fiance with the group and discussed grief. He also provided positive feedback to his peers.

## 2023-10-29 NOTE — PROGRESS NOTES
"SPIRITUAL HEALTH SERVICES  SPIRITUAL ASSESSMENT PROGRESS Note  [facility] ([facility]  ON-CALL     REFERRAL SOURCE: Admission request    Pt explained that he needed someone to \"pray over me.\" Pt wanted healing for \"myself from drugs, so I can get back to being involved with the community. And for my fiancee, she  in my arms and I miss her so much.\" I provided prayer for healing, wisdom, courage, and peace.     PLAN: Will communicate care to unit chaplain [place]. Spiritual health services remains available for any follow-up or requests.    Talita Hopkins, [facility]  [facility]ate   Pager: [phone]    "
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
## 2023-10-29 NOTE — CONSULTS
[facility]  Consult Note - Hospitalist Service  Date of Admission:  10/23/2023  Consult Requested by: Psychiatry; Dr. Ambrocio  Reason for Consult: HIV management, r/o UTI    Assessment & Plan   Edi Sorenson is a 48 year old male admitted on 10/23/2023. He has a PMH significant for chronic DVT (on Xarelto), PE, HIV, genital HSV, depression, anxiety, insomnia, opioid use disorder w/ hx of accidental overdose, polysubstance use    #HIV 1  Most recent RNA quant 11,700, CD4 count 699. Had not been taking ART for at least 1 year.  - Continue Biktarvy daily  - Follow up w/ PCP to establish routine follow up and cares    #HSV, genital  Has PTA valacyclovir 1000mg BID PRN for flares.  - Continue PTA valacyclovir 100mg PRN if c/o HSV flare    #Hx of PE (May 2021)  #Chronic DVT, L popliteal vein & R femoral vein  Previously took Eliquis, now on Xarelto. Presented to ED on 8/28/23 w/ psychiatric concerns. D dimer 1.86 w/ US- confirmed DVT (unknown if acute of chronic) iso poor adherence to AC (not taking for at least 1 year). Pt left AMA, did not complete recommended CT PE. Instructed to follow up w/ his PCP regarding his symptoms, but does not appear to have done so.   This admission, pt denies chest pain, shortness of breath, worsening of LE symptoms. VSS, notably w/ normal HR and good O2 saturations. In the absence of current symptoms, I do not feel strongly that the patient should undergo a CT PE. D dimer drawn at previous ER visit is not as helpful in determining PE risk as pt has chronic DVTs so d dimer is expected to be elevated. Therefore, I do not feel that the elevated D dimer requires persuading patient to undergo a scan in the absence of any clinical indication at this time.   - Continue PTA rivaroxaban 15mg once daily  - Notify medicine if pt c/o chest pain or SOB or develops persistent tachycardia, has O2 < 90%    #Abdominal discomfort, associated w/  urination  Consulted for UTI r/o iso pt report of lower abdominal pain w/ urination 2 weeks ago. Pain is still present, only when urinating. No burning w/ peeing, no hematuria. On exam, no tenderness to palpation over lower abdomen, no CVA tenderness. UA 10/28 not infectious, no blood noted. No documented hx of stones. Possible that this is an atypical symptom associated w/ gonorrhea or chlamydia. Pt presentation and data not consistent w/ UTI, stone, pyelonephritis.  - GC/CT test  - Continue to monitor    #Depression  #Anxiety  #Insomnia  #Opioid use d/o w/ hx of accidental overdose   #Polysubstance use (cocaine & fentanyl drugs of choice; amphetamines, cannabis)  - Management per psychiatry    Medicine will continue to follow along.  Recommendations relayed to primary team via this progress note.  Thank you for the opportunity to be involved in this patient's care.     Clinically Significant Risk Factors Present on Admission               # Drug Induced Coagulation Defect: home medication list includes an anticoagulant medication             # Financial/Environmental Concerns: other (see comments) (Pt recently moved into housing that supports those diagnoised with HIV. Pt struggles with living in a space where people know his diagnosis.)         Joes Lisa PA-C  Hospitalist Service  Securely message with [employer] (more info)  Text page via [employer] Paging/Directory   ______________________________________________________________________    Chief Complaint   Suicidal ideation    History is obtained from the patient, chart review.    History of Present Illness   Edi Sorenson is a 48 year old male who has a PMH significant for chronic DVT (on Xarelto), PE, HIV, genital HSV, depression, anxiety, insomnia, opioid use disorder w/ hx of accidental overdose, polysubstance use.  Medicine was consulted for HIV treatment and to evaluate for a UTI.   Pt reports not taking his HIV ART for at least 1 year. Amenable to  starting it again here. Also on apixaban which he has not been taking for a year as well. Has hx of chronic DVT in BL LE and hx of PE. Pt seen in August 2023 and d dimer was elevated. Recommended he undergo CTA PE, but he left AMA. This admission, pt denies shortness of breath, chest pain, changes to the appearance of either lower extremity, increase in pain in either lower extremity.   Pt reports 2 weeks of lower abdominal pain associated with urination. Pain does not occur at any other time. No hematuria, no dysuria. No rash on penis. No flank pain, no history of kidney stones. No recent fevers.      Past Medical History    Past Medical History:   Diagnosis Date    Asymptomatic human immunodeficiency virus (HIV) infection status (H)     Cocaine abuse (H)     Depression     DVT (deep venous thrombosis) (H)     Last in 6/2018. No hx of PE. On Coumadin.    Genital HSV        Past Surgical History   No past surgical history on file.    Medications   I have reviewed this patient's current medications  Current Facility-Administered Medications   Medication    acetaminophen (TYLENOL) tablet 650 mg    bictegravir-emtricitabine-tenofovir (BIKTARVY) -25 MG per tablet 1 tablet    gabapentin (NEURONTIN) capsule 200 mg    hydrOXYzine (ATARAX) tablet 25 mg    melatonin tablet 3 mg    mirtazapine (REMERON) tablet 15 mg    nicotine (NICODERM CQ) 21 MG/24HR 24 hr patch 1 patch    nicotine Patch in Place    nicotine polacrilex (NICORETTE) gum 4 mg    OLANZapine (zyPREXA) tablet 10 mg    Or    OLANZapine (zyPREXA) injection 10 mg    ondansetron (ZOFRAN ODT) ODT tab 4 mg    polyethylene glycol (MIRALAX) Packet 17 g    QUEtiapine (SEROquel) tablet 50 mg    risperiDONE (risperDAL M-TABS) ODT tab 1 mg    rivaroxaban ANTICOAGULANT (XARELTO) tablet 20 mg    sertraline (ZOLOFT) tablet 100 mg     Facility-Administered Medications Ordered in Other Encounters   Medication    Self Administer Medications: Behavioral Services           Review of Systems    The 5 point Review of Systems is negative other than noted in the HPI or here.     Allergies   No Known Allergies  ------------------------------------------------------------------------     Physical Exam   Vital Signs: Temp: 97.4  F (36.3  C) Temp src: Temporal BP: (!) 144/81 Pulse: 89   Resp: 16 SpO2: 97 % O2 Device: None (Room air)    Weight: 183 lbs 3.2 oz    General Appearance: Patient is well appearing, NAD.   Respiratory: Lungs CTAB, breathing comfortably on room air.   Cardiovascular: RRR, no murmur appreciated.   GI: Abdomen non distended, soft, no tenderness to palpation. No guarding, no rigidity.   : No CVA tenderness.   Skin: No obvious abnormalities noted on gross examination of exposed skin.   Other: Somewhat flat affect, but cooperative w/ provider.      Medical Decision Making       45 MINUTES SPENT BY ME on the date of service doing chart review, history, exam, documentation & further activities per the note.      Data   ------------------------- PAST 24 HR DATA REVIEWED -----------------------------------------------    I have personally reviewed the following data over the past 24 hrs:    TSH: 2.91 T4: N/A A1C: N/A     INR:  1.08 PTT:  N/A   D-dimer:  N/A Fibrinogen:  N/A       Imaging results reviewed over the past 24 hrs:   No results found for this or any previous visit (from the past 24 hour(s)).  Recent Labs   Lab 10/29/23  0839 10/23/23  0504   WBC  --  4.6   HGB  --  16.2   MCV  --  95   PLT  --  273   INR 1.08  --    NA  --  140   POTASSIUM  --  4.2   CHLORIDE  --  101   CO2  --  30*   BUN  --  17.4   CR  --  1.16   ANIONGAP  --  9   VERNA  --  9.5   GLC  --  118*
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
## 2023-10-29 NOTE — PLAN OF CARE
"  Problem: Adult Behavioral Health Plan of Care  Goal: Adheres to Safety Considerations for Self and Others  Outcome: Progressing  Intervention: Develop and Maintain Individualized Safety Plan  Recent Flowsheet Documentation  Taken 10/29/2023 0600 by Daily Carlson RN  Safety Measures: safety rounds completed     Problem: Suicidal Behavior  Goal: Suicidal Behavior is Absent or Managed  Outcome: Progressing   Goal Outcome Evaluation: Ongoing    Observed to have slept well for approx 6.5   hrs on all Q 15\" rounds overnight w/ regular non-labored respirations and no reported or observed distress. Wakeful , up for a snack at approx 0115.  [employer] Interactive w/ staff, denying any concerns or discomforts.  Readily back to bed/sleep.  Safe, therapeutic environment maintained.                         "

## 2023-10-30 LAB
C TRACH DNA SPEC QL NAA+PROBE: NEGATIVE
C TRACH DNA SPEC QL PROBE+SIG AMP: NEGATIVE
HBV SURFACE AB SERPL IA-ACNC: 13.36 M[IU]/ML
HBV SURFACE AB SERPL IA-ACNC: REACTIVE M[IU]/ML
HBV SURFACE AG SERPL QL IA: NONREACTIVE
HCV AB SERPL QL IA: NONREACTIVE
N GONORRHOEA DNA SPEC QL NAA+PROBE: NEGATIVE
N GONORRHOEA DNA SPEC QL NAA+PROBE: NEGATIVE

## 2023-10-30 PROCEDURE — 250N000013 HC RX MED GY IP 250 OP 250 PS 637

## 2023-10-30 PROCEDURE — 124N000002 HC R&B MH UMMC

## 2023-10-30 PROCEDURE — 250N000013 HC RX MED GY IP 250 OP 250 PS 637: Performed by: STUDENT IN AN ORGANIZED HEALTH CARE EDUCATION/TRAINING PROGRAM

## 2023-10-30 PROCEDURE — H2032 ACTIVITY THERAPY, PER 15 MIN: HCPCS

## 2023-10-30 PROCEDURE — 99232 SBSQ HOSP IP/OBS MODERATE 35: CPT | Performed by: PSYCHIATRY & NEUROLOGY

## 2023-10-30 RX ORDER — QUETIAPINE FUMARATE 100 MG/1
100 TABLET, FILM COATED ORAL
Status: DISCONTINUED | OUTPATIENT
Start: 2023-10-30 | End: 2023-11-03

## 2023-10-30 RX ADMIN — NICOTINE 1 PATCH: 21 PATCH, EXTENDED RELEASE TRANSDERMAL at 08:20

## 2023-10-30 RX ADMIN — QUETIAPINE 100 MG: 100 TABLET ORAL at 20:21

## 2023-10-30 RX ADMIN — HYDROXYZINE HYDROCHLORIDE 25 MG: 25 TABLET, FILM COATED ORAL at 08:20

## 2023-10-30 RX ADMIN — RISPERIDONE 1 MG: 1 TABLET, ORALLY DISINTEGRATING ORAL at 20:20

## 2023-10-30 RX ADMIN — MIRTAZAPINE 15 MG: 15 TABLET, FILM COATED ORAL at 20:20

## 2023-10-30 RX ADMIN — SERTRALINE HYDROCHLORIDE 100 MG: 100 TABLET ORAL at 08:19

## 2023-10-30 RX ADMIN — RIVAROXABAN 20 MG: 10 TABLET, FILM COATED ORAL at 18:15

## 2023-10-30 RX ADMIN — RISPERIDONE 1 MG: 1 TABLET, ORALLY DISINTEGRATING ORAL at 08:19

## 2023-10-30 RX ADMIN — BICTEGRAVIR SODIUM, EMTRICITABINE, AND TENOFOVIR ALAFENAMIDE FUMARATE 1 TABLET: 50; 200; 25 TABLET ORAL at 08:24

## 2023-10-30 RX ADMIN — POLYETHYLENE GLYCOL 3350 17 G: 17 POWDER, FOR SOLUTION ORAL at 14:11

## 2023-10-30 ASSESSMENT — ACTIVITIES OF DAILY LIVING (ADL)
ADLS_ACUITY_SCORE: 28
LAUNDRY: WITH SUPERVISION
ADLS_ACUITY_SCORE: 28
LAUNDRY: WITH SUPERVISION
ADLS_ACUITY_SCORE: 28
DRESS: INDEPENDENT
ADLS_ACUITY_SCORE: 28
ORAL_HYGIENE: INDEPENDENT
DRESS: INDEPENDENT
ADLS_ACUITY_SCORE: 28
HYGIENE/GROOMING: INDEPENDENT
ORAL_HYGIENE: INDEPENDENT
HYGIENE/GROOMING: INDEPENDENT
ADLS_ACUITY_SCORE: 28
ADLS_ACUITY_SCORE: 28

## 2023-10-30 NOTE — PROVIDER NOTIFICATION
10/30/23 1710   Individualization/Patient Specific Goals   Patient Personal Strengths expressive of needs;resourceful;resilient;positive vocational history;expressive of emotions   Patient Vulnerabilities substance abuse/addiction;history of unsuccessful treatment;adverse childhood experience(s)   Interprofessional Rounds   Participants psychiatrist;CTC;nursing   Behavioral Team Discussion   Anticipated length of stay assessment ongoing   Continued Stay Criteria/Rationale initial assessment and treatment planning - admitted after presenting for voluntary admission -symptoms of SI - substance enduced psychosis.   Medical/Physical medically cleared for admission - see psychiatry physician progress note and consult for further details.   Precautions see below   Plan Psychiatric assessment/Medication management. Therapeutic Milieu. Individual care planning and after care planning. Patient to participate in unit groups and activities. Individual and group support on unit.  patient had URSZULA assessment and reccomendations were made - CTC will assist in following up with referrals.   Rationale for change in precautions or plan per assessment   Safety Plan per unit protocol and precautions as listed below   Anticipated Discharge Disposition substance use treatment         PRECAUTIONS AND SAFETY    Behavioral Orders   Procedures    Code 1 - Restrict to Unit    Discontinue 1:1 attendant for suicide risk     Order Specific Question:   I have performed an in person assessment of the patient     Answer:   Based on this assessment the patient no longer requires a one on one attendant at this point in time.     Order Specific Question:   Rationale     Answer:   Patient States able to remain safe in hospital    Routine Programming     As clinically indicated    Status 15     Every 15 minutes.    Suicide precautions     Patients on Suicide Precautions should have a Combination Diet ordered that includes a Diet selection(s) AND a  Behavioral Tray selection for Safe Tray - with utensils, or Safe Tray - NO utensils         Safety  Safety WDL: WDL  Patient Location: patient room, own  Observed Behavior: calm  Observed Behavior (Comment): calm  Safety Measures: safety rounds completed  Diversional Activity: television  Suicidality: Status 15

## 2023-10-30 NOTE — PLAN OF CARE
Team Note Due:  Monday    Assessment/Intervention/Current Symtoms and Care Coordination:  - chart review  - team meeting - discussed pt progress, symptomology, and response to treatment.  Discussed the discharge plan and any potential impediments to discharge.  - post team rounds team meeting / discussion  - Behavioral Team Discussion Note completed.    Discharge Plan or Goal:  Pending stabilization & development of a safe discharge plan.  Considerations include: Co Occurring substance use program    Barriers to Discharge:  Patient requires further psychiatric stabilization due to current symptomology       Referral Status:  Substance Use Programming   Referrals for residential programming placed on 10/26/23 - assessment completed while in ED - referrals to:  [facility]  [address] [place] [address], [address]  [place] [address]  Phone: [phone]  Fax: [phone]  Email: [email or web address]     [facility]   [address] [place] [address]  Phone: [phone]  Fax: [phone]  [email or web address]     [facility]  [address] [place] [address]  Admissions Phone: [phone]  [facility] waitlist: [phone]  [email or web address]          Legal Status:  Voluntary     Contacts:        Upcoming Meetings and Dates/Important Information and next steps:  - CTC to follow up with admissions for above URSZULA programs.

## 2023-10-30 NOTE — PLAN OF CARE
Problem: Adult Inpatient Plan of Care  Goal: Optimal Comfort and Wellbeing  Outcome: Progressing     Problem: Suicidal Behavior  Goal: Suicidal Behavior is Absent or Managed  Outcome: Progressing     Problem: Sleep Disturbance  Goal: Adequate Sleep/Rest  Outcome: Progressing   Goal Outcome Evaluation:     Observed to have slept for approx  5.5 hrs overnight.  Up for snacks several times;  No difficulty returning to sleep.  Safe, therapeutic environment maintained.

## 2023-10-30 NOTE — CARE PLAN
"Occupational Therapy Group Note      10/30/23 1441   Group Therapy Session   Group Attendance attended group session   Group Type expressive therapy;psychotherapeutic;recreation   Group Topic Covered coping skills/lifestyle management;leisure exploration/use of leisure time;emotions/expression;structured socialization;cognitive activities   Group Session Detail Qigong sequence and wellness discussion (10:15-11:00; 4 participants) for parsympathetic nervous system activation, sensory modulation, building mind-body awareness, building insight into total body wellness and coping with stress/sxs.   Open Clinic (11:00-11:45; 4 participants) for creative expression, promoting autonomy, building sense of self-worth, coping with stress/symptoms and opportunity to exercise cognitive skills (i.e. initiation, planning, organization, sequencing, sustained attention, follow-through, termination of task and overall safety awareness).   Cognitive activity (13:15-14:00; 6 participants) (Yovani Galaviz) for attention, concentration, new learning, follow through, visuospatial recognition, opportunity for social engagement, leisure education/exploration, reality orientation and managing mood/symptoms.   Patient Response/Contribution cooperative with task;organized    Patient joined group for 20 minutes.  Pt sat in the back of the room and ate a snack.  Pt did not actively participate in the activity.   Patient joined group for 30 minutes.  Pt opted to make a beaded bracelet with his name on it.  Writer gave verbal cues to support pt's attention to detail.  Pt was quietly focused on his project but was pleasant upon approach. Pt shared with writer and staff that his fiancee passed away two months ago from the \"hot chip challenge\".  Pt's affect was blunted; mood appeared calm but somewhat dysphoric.  Pt appeared proud of his finished project.    Patient joined group for 10 minutes.  Pt was able to learn the game without difficulty.  " Processing speed appeared within normal limits.  Pt's affect was congruent and pt used humor with staff and peers.

## 2023-10-30 NOTE — PROGRESS NOTES
"  ----------------------------------------------------------------------------------------------------------  [facility]  Psychiatry Progress Note  Hospital Day #2     Interim History:     The patient's care was discussed with the treatment team and chart notes were reviewed.    Vitals: VSS  Sleep: 5.5 hours (10/30/23 0649)  Scheduled medications: Took all scheduled medications as prescribed  Psychiatric PRN medications: PRN quetiapine 50 mg & hydroxyzine 25 mg requested and given    Staff Report:   No acute events or safety concerns overnight. Please see staff notes for details.      Subjective:     Patient Interview:  Edi Sorenson was interviewed in his room. Reports he is doing \"okay\" today. Has been to two groups so far today. Hadn't been sleeping well when he was in the ED, but last night slept better once on the delgado. Says he feels safe here, did not feel safe in the ED. Eating normally. Reports rib pain from recent injury, but otherwise no pain.    Last  presented to the ED for suicidal thoughts. Reports wanting to attempt suicide by hanging himself from a tree. Described being driven by feeling \"tired\" and grief from the recent loss of his fiancee. Reports his fiancee  on  while 2 months pregnant and he is just now starting to process the loss. Hadn't sought treatment or support at that time, just increased his drug and alcohol use. Had not taken any of his prescribed medications for about a year prior to coming into the ED. Stopped because he convinced himself that he didn't need to take them and because he was focused on substance use.     Current symptoms include hearing a persistent TV static noise/whistle sound. Has heard voices before, but not experiencing that currently. Denies visual hallucinations at present, but has endorsed them in the past. Notes he is experiencing withdrawal symptoms including fatigue, jitteriness, and " difficulty staying asleep. Not currently having any thoughts of hurting himself. No thoughts of hurting others.     Has previously been involved with community engagement in his neighborhood and would like to return to that once he is doing better. States that his goal for this hospital stay is to get re-established and stable on his medications. Interested in entering chemical dependency treatment upon discharge and will work with social work on this.     Would like to increase his seroquel dosage at night because it helps him calm down. Denies any side effects.      ROS:  Patient has pain rib pain from recent injury, unchanged  Patient denies acute concerns     Objective:     Vitals:  /88   Pulse 74   Temp 97.1  F (36.2  C)   Resp 16   Ht 1.829 m (6')   Wt 83.1 kg (183 lb 3.2 oz)   SpO2 99%   BMI 24.85 kg/m      Allergies:  No Known Allergies    Current Medications:  Scheduled:  Current Facility-Administered Medications   Medication Dose Route Frequency    bictegravir-emtricitabine-tenofovir  1 tablet Oral Daily    mirtazapine  15 mg Oral At Bedtime    nicotine  1 patch Transdermal Daily    nicotine   Transdermal Q8H    risperiDONE  1 mg Oral BID    rivaroxaban ANTICOAGULANT  20 mg Oral Daily with supper    sertraline  100 mg Oral Daily       PRN:  Current Facility-Administered Medications   Medication Dose Route Frequency    acetaminophen  650 mg Oral Q4H PRN    gabapentin  200 mg Oral TID PRN    hydrOXYzine  25 mg Oral Q4H PRN    melatonin  3 mg Oral At Bedtime PRN    nicotine  4 mg Buccal Q1H PRN    OLANZapine  10 mg Oral TID PRN    Or    OLANZapine  10 mg Intramuscular TID PRN    ondansetron  4 mg Oral Q6H PRN    polyethylene glycol  17 g Oral Daily PRN    QUEtiapine  50 mg Oral At Bedtime PRN       Labs and Imaging:  New results:   Recent Results (from the past 24 hour(s))   Treponema Abs w Reflex to RPR and Titer    Collection Time: 10/29/23 11:54 AM   Result Value Ref Range    Treponema  "Antibody Total Nonreactive Nonreactive       Data this admission:  - CBC unremarkable  - CMP unremarkable  - TSH normal  - UDS positive for amphetamines, fentanyl, cocaine, benzodiazepines, and cannabinoids  - Hgb A1c mildly elevated at 5.7  - Lipids unremarkable  - Vit B12 normal  - Folate normal  - Urinalysis unremarkable but with mucus present  - GC/CT negative     Mental Status Exam:     Oriented to:  Grossly Oriented  General:  Awake and Alert  Appearance:  appears stated age  Behavior/Attitude:  Cooperative, Engaged, and Open  Eye Contact: Appropriate  Psychomotor: Normal no catatonia present  Speech:  appropriate volume/tone  Language: Fluent in English with appropriate syntax and vocabulary.  Mood:  \"okay\"  Affect:  appropriate, congruent with mood, broad/full, and dysphoric   Thought Process:  linear, coherent, goal directed, rambling, and tangential  Thought Content:   No HI, No VH, and auditory hallucinations of static/whistling noises ; No apparent delusions  Associations:  intact  Insight:  partial, improving due to awareness that he needs help with his mental health and substance use  Judgment:  partial, improving due to recent suicidal ideation/attempts  Impulse control: limited  Attention Span:  grossly intact  Concentration:  grossly intact  Recent and Remote Memory:  not formally assessed  Fund of Knowledge: average  Muscle Strength and Tone: normal  Gait and Station: Normal     Psychiatric Assessment     Edi Sorenson is a 48 year old male with previous psychiatric diagnoses of MDD, polysubstance use disorder (amphetamine, cocaine, cannabis, opiates, benzo) and substance-induced psychosis, and past medical history of HIV and 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 Most recent psychiatric hospitalization was at [facility] in Aug 2023 for SI and AH which he left AMA. Significant symptoms on admission " include SI, AH, and occasional VH. The MSE on admission was pertinent for SI, AH, and occasional VH. His UDS was positive for multiple substances including cocaine, amphetamine, fentanyl, benzo, cannabis. Biological contributions to mental health presentation include daily polysubstance use, past psychiatric diagnosis of MDD and substance-induced psychosis. Psychological contributions to mental health presentation include maladaptive coping skill. Social factors contributing to mental health presentation include recent loss of his fiance. Protective factors include motivation and voluntary engagement in  treatment.      In summary, the patient's reported symptoms of SI, AH, and VH in the context of substance use are consistent with previous diagnosis of substance-induced psychosis and substance-induced mood disorder worsened by recent psychosocial stressor vs MDD with psychosis. He will likely benefit from this admission.     Given that he currently has SI and psychosis, patient warrants inpatient psychiatric hospitalization to maintain his safety.      Psychiatric Plan by Diagnosis      Today's changes:  - increase quetiapine 50 mg to 100 mg prn at bedtime     # Substance-induced psychosis and Substance-induced mood disorder  1. Medications:  - Continue risperidone 1 mg bid  - Continue sertraline 100 mg daily  - Continue mirtazapine 15 mg qhs  - Continue prn quetiapine 100 mg at bedtime     - Patient will be treated in therapeutic milieu with appropriate individual and group therapies as described       Psychiatric Hospital Course:      Edi Sorenson was admitted to [facility] as a voluntary patient.   Medications:  Risperidone 1mg BID, sertraline 100mg Qday, mirtazapine 15mg at bedtime from ED were continued.   New medications started at the time of admission include PRN Quetiapine 50mg at bedtime. Increased to 100 mg PRN at bedtime on 10/30.     The risks, benefits, alternatives, and side effects were  discussed and understood by the patient.     Medical Assessment and Plan     Medical diagnoses to be addressed this admission:    # HIV  - had not taken ART for at least past year, most recent CD4 count 699  - Continue biktarvy daily  - Medicine consult 10/29 recommend follow up with PCP to establish routine care post-admission     # Hx of PE/DVT  - Continue rivaroxaban 15 mg daily  - notify medicine if patient c/o chest pain or SOB, or develops persistent tachycardia or has O2 sats <90%     # Abdominal discomfort during urination  - UA normal 10/28  - Med consult 10/29: not consistent with UTI, kidney stone, pyelonephritis; GC/CT testing negative 10/30    Medical course: Patient was physically examined by the ED prior to being transferred to the unit and was found to be medically stable and appropriate for admission.     Consults: medicine for HIV/possible UTI management     Checklist     Legal Status: Voluntary     Safety Assessment:   Behavioral Orders   Procedures    Code 1 - Restrict to Unit    Discontinue 1:1 attendant for suicide risk     Order Specific Question:   I have performed an in person assessment of the patient     Answer:   Based on this assessment the patient no longer requires a one on one attendant at this point in time.     Order Specific Question:   Rationale     Answer:   Patient States able to remain safe in hospital    Routine Programming     As clinically indicated    Status 15     Every 15 minutes.    Suicide precautions     Patients on Suicide Precautions should have a Combination Diet ordered that includes a Diet selection(s) AND a Behavioral Tray selection for Safe Tray - with utensils, or Safe Tray - NO utensils         Risk Assessment:  Risk for harm is moderate.  Risk factors: SI, maladaptive coping, substance use, and past behaviors  Protective factors: family and engaged in treatment     SIO: no    Disposition: TBD. Pending stabilization, medication optimization, & development of a  safe discharge plan.     Attestations     Ena Glaser, MS3  [facility] Medical Student       Physician Attestation   I, Ryan Elder MD, was present with the medical student who participated in the service and in the documentation of the note.  I have verified the history and personally performed the physical exam and medical decision making.  I agree with the assessment and plan of care as documented in the note.      Ryan Elder MD  Date of Service (when I saw the patient): 10/30/23

## 2023-10-30 NOTE — PLAN OF CARE
"  Problem: Depressive Signs/Symptoms  Goal: Improved Mood Symptoms (Depressive Signs/Symptoms)  Outcome: Progressing  Intervention: Promote Mood Improvement  Recent Flowsheet Documentation  Taken 10/29/2023 2040 by Pablo Ortiz, RN  Diversional Activity: television   Goal Outcome Evaluation:    Plan of Care Reviewed With: patient      Nursing Assessment    Recent Vitals: B/P:143/84  T:98.1  P:90     General Shift Summary  Visible in milieu and social with peers. Pt did not have any behavioral concerns and was calm and cooperative this shift. Pt reports they are trying to keep busy on the unit and helped staff clean the unit appropriately this shift. Pt did not seem to have any disorganized behavior or thought process this shift. Pt says that they are having some cravings from fentanyl withdrawal, but their family dropped them off some snacks and that helped them cope. Pt reports being, \"in a grateful state.\" Pt reports that they enjoyed process group this evening. Pt reports depression 5/10. Pt was asked what their depression level two days ago was and they said 10/10. Pt denies any SI/SIB thoughts.     Patient is medication compliant and reported no side effects. Pt received PRN Seroquel with their HS medication.     Hygiene and appetite are appropriate.     Pablo Ortiz, RN   "

## 2023-10-30 NOTE — PLAN OF CARE
Goal Outcome Evaluation:    Plan of Care Reviewed With: patient      Problem: Adult Behavioral Health Plan of Care  Goal: Plan of Care Review  Outcome: Progressing  Flowsheets (Taken 10/30/2023 0900)  Patient Agreement with Plan of Care: agrees     Patient was met in his room lying in bed but awake. Patient though had flat affect but was pleasant on approach. He reported having had a good night sleep. Patient complained that he was hungry. RN reassured patient that breakfast would soon arrive. Patient endorsed anxiety of 7 and depression of 6. He denied SI/HI and hallucinations. Patient agreed to take PRN medication for anxiety. PRN Hydroxyzine given together with his morning medications. Patient was compliant with his medications and was cooperative with vital signs check. Stayed briefly out in the lounge and watched television. No social interactions noted. Patient ate well. He attended group therapies

## 2023-10-30 NOTE — PROGRESS NOTES
Brief Medicine Note    Follow up on gonorrhea and chlamydia testing.      Today's vital signs, medications, and nursing notes were reviewed.    Laboratory and Imaging studies reviewed in the results review section of Epic. Pertinent studies are as below:         /88   Pulse 74   Temp 97.1  F (36.2  C)   Resp 16   Ht 1.829 m (6')   Wt 83.1 kg (183 lb 3.2 oz)   SpO2 99%   BMI 24.85 kg/m    General: A&O. NAD.       A/P:    #Abdominal discomfort, associated w/ urination  Consulted for UTI r/o iso pt report of lower abdominal pain w/ urination 2 weeks ago. Pain is still present, only when urinating. No burning w/ peeing, no hematuria. On exam, no tenderness to palpation over lower abdomen, no CVA tenderness. UA 10/28 not infectious, no blood noted. No documented hx of stones. Possible that this is an atypical symptom associated w/ gonorrhea or chlamydia. Pt presentation and data not consistent w/ UTI, stone, pyelonephritis.  -Gonorrhea and chlamydia testing negative    Currently, medically stable and internal medicine will sign off. Please contact if future questions or concerns arise. Thank you for the opportunity to be a part of this patient's care.    ARIELA Montalvo CNP  Internal Medicine [facility] Hospitalist  Page job code [phone] (3B), [phone] (3A), or [phone] ([facility] and 4A)  Text paging via [employer] is appreciated  October 30, 2023

## 2023-10-30 NOTE — PLAN OF CARE
BEH IP Unit Acuity Rating Score (UARS)  Patient is given one point for every criteria they meet.    CRITERIA SCORING   On a 72 hour hold, court hold, committed, stay of commitment, or revocation 0    Patient LOS on BEH unit exceeds 20 days 0  LOS: 2   Patient under guardianship, 55+, otherwise medically complex, or under age 11 0   Suicide ideation without relief of precipitating factors 1   Current plan for suicide 0   Current plan for homicide 0   Imminent risk or actual attempt to seriously harm another without relief of factors precipitating the attempt 0   Severe dysfunction in daily living (ex: complete neglect for self care, extreme disruption in vegetative function, extreme deterioration in social interactions) 0   Recent (last 7 days) or current physical aggression in the ED or on unit 0   Restraints or seclusion episode in past 72 hours 0   Recent (last 7 days) or current verbal aggression, agitation, yelling, etc., while in the ED or unit 0   Active psychosis 1   Need for constant or near constant redirection (from leaving, from others, etc).  0   Intrusive or disruptive behaviors 0   TOTAL 2

## 2023-10-31 LAB
HIV1 RNA # PLAS NAA DL=20: 928 COPIES/ML
HIV1 RNA SERPL NAA+PROBE-LOG#: 3 {LOG_COPIES}/ML

## 2023-10-31 PROCEDURE — H2032 ACTIVITY THERAPY, PER 15 MIN: HCPCS

## 2023-10-31 PROCEDURE — 250N000013 HC RX MED GY IP 250 OP 250 PS 637

## 2023-10-31 PROCEDURE — 124N000002 HC R&B MH UMMC

## 2023-10-31 PROCEDURE — 250N000013 HC RX MED GY IP 250 OP 250 PS 637: Performed by: STUDENT IN AN ORGANIZED HEALTH CARE EDUCATION/TRAINING PROGRAM

## 2023-10-31 PROCEDURE — 99232 SBSQ HOSP IP/OBS MODERATE 35: CPT | Performed by: PSYCHIATRY & NEUROLOGY

## 2023-10-31 RX ORDER — FAMOTIDINE 10 MG
10 TABLET ORAL 2 TIMES DAILY PRN
Status: DISCONTINUED | OUTPATIENT
Start: 2023-10-31 | End: 2023-11-14 | Stop reason: HOSPADM

## 2023-10-31 RX ADMIN — RIVAROXABAN 20 MG: 10 TABLET, FILM COATED ORAL at 18:15

## 2023-10-31 RX ADMIN — QUETIAPINE 100 MG: 100 TABLET ORAL at 20:17

## 2023-10-31 RX ADMIN — NICOTINE 1 PATCH: 21 PATCH, EXTENDED RELEASE TRANSDERMAL at 09:18

## 2023-10-31 RX ADMIN — RISPERIDONE 1 MG: 1 TABLET, ORALLY DISINTEGRATING ORAL at 09:18

## 2023-10-31 RX ADMIN — BICTEGRAVIR SODIUM, EMTRICITABINE, AND TENOFOVIR ALAFENAMIDE FUMARATE 1 TABLET: 50; 200; 25 TABLET ORAL at 09:20

## 2023-10-31 RX ADMIN — SERTRALINE HYDROCHLORIDE 100 MG: 100 TABLET ORAL at 09:18

## 2023-10-31 RX ADMIN — RISPERIDONE 1 MG: 1 TABLET, ORALLY DISINTEGRATING ORAL at 20:17

## 2023-10-31 RX ADMIN — MIRTAZAPINE 15 MG: 15 TABLET, FILM COATED ORAL at 20:17

## 2023-10-31 ASSESSMENT — ACTIVITIES OF DAILY LIVING (ADL)
ADLS_ACUITY_SCORE: 28
HYGIENE/GROOMING: INDEPENDENT
ADLS_ACUITY_SCORE: 28
LAUNDRY: WITH SUPERVISION
ADLS_ACUITY_SCORE: 28
ADLS_ACUITY_SCORE: 28
ORAL_HYGIENE: INDEPENDENT
DRESS: INDEPENDENT
ADLS_ACUITY_SCORE: 28
ORAL_HYGIENE: INDEPENDENT
LAUNDRY: WITH SUPERVISION
DRESS: INDEPENDENT
HYGIENE/GROOMING: INDEPENDENT

## 2023-10-31 NOTE — PROVIDER NOTIFICATION
Pt. in stat [facility] rom 235 Edi JOE complains of heart burn , no PRN ordered for heart burn medication to be given . Can this be added to MAR to give.        Thank you   [phone]    Response : No reply

## 2023-10-31 NOTE — PLAN OF CARE
Problem: Depressive Signs/Symptoms  Goal: Improved Mood Symptoms (Depressive Signs/Symptoms)  Outcome: Progressing    Pt was withdrawn, minimally social with peers. He ate meals and was compliant with medications, no prns given this shift. Affect was blunted but pt was polite and cooperative upon approach. Mood was mostly calm. No SI/SIB noted or observed this shift. Will continue to monitor.

## 2023-10-31 NOTE — CARE PLAN
Occupational Therapy Group Note      10/31/23 1512   Group Therapy Session   Group Attendance attended group session   Group Session Detail OT: Social Dice activity to promote social engagement and reciprocal interactions, connect to personal values, build insight into recovery and to cope with stress/sxs.   Patient Response/Contribution cooperative with task;organized    Patient joined group for 15 minutes.  When in group, pt was calm, cooperative and engaged.  Affect was congruent.  Patient appeared reality oriented in context of group task and discussion.

## 2023-10-31 NOTE — PLAN OF CARE
Team Note Due:  Monday    Assessment/Intervention/Current Symtoms and Care Coordination:  - chart review  - team meeting - discussed pt progress, symptomology, and response to treatment.  Discussed the discharge plan and any potential impediments to discharge.  - post team rounds team meeting / discussion  -  see referral status section       Discharge Plan or Goal:  Pending stabilization & development of a safe discharge plan.  Considerations include: Co Occurring substance use program    Barriers to Discharge:  Patient requires further psychiatric stabilization due to current symptomology       Referral Status:  Substance Use Programming   Referrals for residential programming placed on 10/26/23 - assessment completed while in ED - referrals to:    [facility]  [address] [place] [address], [address]  [place] [address]  Phone: [phone]  Fax: [phone]  Email: [email or web address]     [facility]   [address] [place] [address]  Phone: [phone] Fax: [phone] [email or web address]  - 10/31/23 - spoke with intake with [facility] -- they will contact this writer after further review.    [facility]  [address] [place] [address]  Admissions Phone: [phone]  [facility] waitlist: [phone]  [email or web address]          Legal Status:  Voluntary     Contacts:        Upcoming Meetings and Dates/Important Information and next steps:  - CTC to follow up with admissions for above [facility] programs.

## 2023-10-31 NOTE — PROGRESS NOTES
"  ----------------------------------------------------------------------------------------------------------  [facility]  Psychiatry Progress Note  Hospital Day #3     Interim History:     The patient's care was discussed with the treatment team and chart notes were reviewed.    Vitals: VSS  Sleep: 6 hours (10/31/23 0600)  Scheduled medications: Took all scheduled medications as prescribed  Psychiatric PRN medications: PRN quetiapine 100 mg requested and given    Staff Report:   No acute events or safety concerns overnight. Had heartburn last night. Please see staff notes for details.      Subjective:     Patient Interview:  Edi Sorenson was interviewed in his room. Says he feels \"good\" today. Says he just woke up, slept in until past 11. Doesn't feel that he was extra tired or related to his seroquel increase yesterday, just chose to sleep in this morning.     Reports he heard the whistling noise this morning but it went away. No other auditory changes. Denies visual hallucinations.     Denies any side effects from re-starting his medications. Still experiencing some lightheadedness from standing but it has improved since he started drinking more water.    Requests a mattress topper because the current bed he's using has been aggravating his back pain.      ROS:  Patient has pain rib pain from recent injury, unchanged  Patient denies acute concerns     Objective:     Vitals:  /76 (BP Location: Left arm, Patient Position: Sitting, Cuff Size: Adult Regular)   Pulse 89   Temp 98.3  F (36.8  C) (Oral)   Resp 16   Ht 1.829 m (6')   Wt 83.1 kg (183 lb 3.2 oz)   SpO2 99%   BMI 24.85 kg/m      Allergies:  No Known Allergies    Current Medications:  Scheduled:  Current Facility-Administered Medications   Medication Dose Route Frequency    bictegravir-emtricitabine-tenofovir  1 tablet Oral Daily    mirtazapine  15 mg Oral At Bedtime    nicotine  1 patch " "Transdermal Daily    nicotine   Transdermal Q8H    risperiDONE  1 mg Oral BID    rivaroxaban ANTICOAGULANT  20 mg Oral Daily with supper    sertraline  100 mg Oral Daily       PRN:  Current Facility-Administered Medications   Medication Dose Route Frequency    acetaminophen  650 mg Oral Q4H PRN    famotidine  10 mg Oral BID PRN    gabapentin  200 mg Oral TID PRN    hydrOXYzine  25 mg Oral Q4H PRN    melatonin  3 mg Oral At Bedtime PRN    nicotine  4 mg Buccal Q1H PRN    OLANZapine  10 mg Oral TID PRN    Or    OLANZapine  10 mg Intramuscular TID PRN    ondansetron  4 mg Oral Q6H PRN    polyethylene glycol  17 g Oral Daily PRN    QUEtiapine  100 mg Oral At Bedtime PRN       Labs and Imaging:  New results:   No results found for this or any previous visit (from the past 24 hour(s)).      Data this admission:  - CBC unremarkable  - CMP unremarkable  - TSH normal  - UDS positive for amphetamines, fentanyl, cocaine, benzodiazepines, and cannabinoids  - Hgb A1c mildly elevated at 5.7  - Lipids unremarkable  - Vit B12 normal  - Folate normal  - Urinalysis unremarkable but with mucus present  - GC/CT negative     Mental Status Exam:     Oriented to:  Grossly Oriented  General:  Awake and Alert  Appearance:  appears stated age  Behavior/Attitude:  Cooperative, Engaged, and Open  Eye Contact: Appropriate  Psychomotor: Normal no catatonia present  Speech:  appropriate volume/tone  Language: Fluent in English with appropriate syntax and vocabulary.  Mood:  \"okay\"  Affect:  appropriate, congruent with mood, broad/full, and dysphoric   Thought Process:  linear, coherent, goal directed, rambling, and tangential  Thought Content:   No HI, No VH, and auditory hallucinations of static/whistling noises ; No apparent delusions  Associations:  intact  Insight:  partial, improving due to continued desire to receive CD treatment  Judgment:  partial, improving due to recent suicidal ideation/attempts  Impulse control: limited  Attention " Span:  grossly intact  Concentration:  grossly intact  Recent and Remote Memory:  not formally assessed  Fund of Knowledge: average  Muscle Strength and Tone: normal  Gait and Station: Normal     Psychiatric Assessment     Edi Sorenson is a 48 year old male with previous psychiatric diagnoses of MDD, polysubstance use disorder (amphetamine, cocaine, cannabis, opiates, benzo) and substance-induced psychosis, and past medical history of HIV and 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 Most recent psychiatric hospitalization was at [facility] in Aug [facility] for SI and AH which he left AMA. Significant symptoms on admission include SI, AH, and occasional VH. The MSE on admission was pertinent for SI, AH, and occasional VH. His UDS was positive for multiple substances including cocaine, amphetamine, fentanyl, benzo, cannabis. Biological contributions to mental health presentation include daily polysubstance use, past psychiatric diagnosis of MDD and substance-induced psychosis. Psychological contributions to mental health presentation include maladaptive coping skill. Social factors contributing to mental health presentation include recent loss of his fiance. Protective factors include motivation and voluntary engagement in  treatment.      In summary, the patient's reported symptoms of SI, AH, and VH in the context of substance use are consistent with previous diagnosis of substance-induced psychosis and substance-induced mood disorder worsened by recent psychosocial stressor vs MDD with psychosis. He will likely benefit from this admission.     Given that he currently has SI and psychosis, patient warrants inpatient psychiatric hospitalization to maintain his safety.      Psychiatric Plan by Diagnosis      Today's changes:  - egg crate mattress topper ordered     # Substance-induced psychosis and Substance-induced mood  disorder  1. Medications:  - Continue risperidone 1 mg bid  - Continue sertraline 100 mg daily  - Continue mirtazapine 15 mg qhs  - Continue prn quetiapine 100 mg at bedtime     - Patient will be treated in therapeutic milieu with appropriate individual and group therapies as described       Psychiatric Hospital Course:      Edi Sorenson was admitted to [facility] as a voluntary patient.   Medications:  Risperidone 1mg BID, sertraline 100mg Qday, mirtazapine 15mg at bedtime from ED were continued.   New medications started at the time of admission include PRN Quetiapine 50mg at bedtime. Increased to 100 mg PRN at bedtime on 10/30.     The risks, benefits, alternatives, and side effects were discussed and understood by the patient.     Medical Assessment and Plan     Medical diagnoses to be addressed this admission:    # HIV  - had not taken ART for at least past year, most recent CD4 count 699  - Continue biktarvy daily  - Medicine consult 10/29 recommend follow up with PCP to establish routine care post-admission     # Hx of PE/DVT  - Continue rivaroxaban 15 mg daily  - notify medicine if patient c/o chest pain or SOB, or develops persistent tachycardia or has O2 sats <90%     # Abdominal discomfort during urination  - UA normal 10/28  - Med consult 10/29: not consistent with UTI, kidney stone, pyelonephritis; GC/CT testing negative 10/30    Medical course: Patient was physically examined by the ED prior to being transferred to the unit and was found to be medically stable and appropriate for admission.     Consults: medicine for HIV/possible UTI management     Checklist     Legal Status: Voluntary     Safety Assessment:   Behavioral Orders   Procedures    Code 1 - Restrict to Unit    Discontinue 1:1 attendant for suicide risk     Order Specific Question:   I have performed an in person assessment of the patient     Answer:   Based on this assessment the patient no longer requires a one on one attendant at  this point in time.     Order Specific Question:   Rationale     Answer:   Patient States able to remain safe in hospital    Routine Programming     As clinically indicated    Status 15     Every 15 minutes.    Suicide precautions     Patients on Suicide Precautions should have a Combination Diet ordered that includes a Diet selection(s) AND a Behavioral Tray selection for Safe Tray - with utensils, or Safe Tray - NO utensils         Risk Assessment:  Risk for harm is moderate.  Risk factors: SI, maladaptive coping, substance use, and past behaviors  Protective factors: family and engaged in treatment     SIO: no    Disposition: TBD. Pending stabilization, medication optimization, & development of a safe discharge plan.     Attestations     Ena Glaser, MS3  [facility]      Physician Attestation   I, Ryan Elder MD, was present with the medical student who participated in the service and in the documentation of the note.  I have verified the history and personally performed the physical exam and medical decision making.  I agree with the assessment and plan of care as documented in the note.        Ryan Elder MD  Date of Service (when I saw the patient): 10/31/23

## 2023-10-31 NOTE — PLAN OF CARE
BEH IP Unit Acuity Rating Score (UARS)  Patient is given one point for every criteria they meet.    CRITERIA SCORING   On a 72 hour hold, court hold, committed, stay of commitment, or revocation 0    Patient LOS on BEH unit exceeds 20 days 0  LOS: 3   Patient under guardianship, 55+, otherwise medically complex, or under age 11 0   Suicide ideation without relief of precipitating factors 1   Current plan for suicide 0   Current plan for homicide 0   Imminent risk or actual attempt to seriously harm another without relief of factors precipitating the attempt 0   Severe dysfunction in daily living (ex: complete neglect for self care, extreme disruption in vegetative function, extreme deterioration in social interactions) 0   Recent (last 7 days) or current physical aggression in the ED or on unit 0   Restraints or seclusion episode in past 72 hours 0   Recent (last 7 days) or current verbal aggression, agitation, yelling, etc., while in the ED or unit 0   Active psychosis 0   Need for constant or near constant redirection (from leaving, from others, etc).  0   Intrusive or disruptive behaviors 0   TOTAL 1

## 2023-10-31 NOTE — PROGRESS NOTES
10/31/23 1800   Group Therapy Session   Time Session Began 1720   Time Session Ended 1810   Total Time (minutes) 30   Total # Attendees 4   Group Type expressive therapy   Group Topic Covered balanced lifestyle;relaxation techniques   Group Session Detail Relaxation   Patient Response/Contribution cooperative with task   Patient Participation Detail Cooperatively engaged in Evening Music Relaxation group to decrease anxiety and promote mood uplift.  Bright, well-engaged affect, appropriately engaged in session, responding well to the music.

## 2023-10-31 NOTE — PLAN OF CARE
Problem: Sleep Disturbance  Goal: Adequate Sleep/Rest  Outcome: Progressing   Pt awake at the start of shift. Slept for a total of 6 hours per q15 safety checks . No s/s of pain or discomfort noted, even resp and non labored breathing. No behavorial concerns

## 2023-10-31 NOTE — PLAN OF CARE
"  Problem: Depressive Signs/Symptoms  Goal: Improved Mood Symptoms (Depressive Signs/Symptoms)  Outcome: Progressing  Intervention: Promote Mood Improvement  Recent Flowsheet Documentation  Taken 10/30/2023 2121 by Pablo Ortiz, RN  Diversional Activity: television   Goal Outcome Evaluation:    Plan of Care Reviewed With: patient        Nursing Assessment    Recent Vitals: B/P:126/76  T:98.3  P:89    General Shift Summary  Visible in milieu, attends groups, no behavioral concerns. Pt endorses depression but says that, \"my mood is stable.\" Pt remains motivated to maintain sobriety and work on their mental health. 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 Pt talks  Patient is medication compliant and reported no side effects. No PRN medications given this shift. Pt reports their light headedness has gone away since increasing fluid intake. Pt continues to have constipation but says, \"I can feel it coming.\"     Hygiene and appetite are appropriate.     Pablo Ortiz, RN   "

## 2023-10-31 NOTE — CARE PLAN
"   10/30/23 1900   Group Therapy Session   Group Attendance attended group session   Time Session Began 1700   Time Session Ended 1800   Total Time (minutes) 45   Total # Attendees 5   Group Type expressive therapy   Group Topic Covered emotions/expression   Patient Response/Contribution cooperative with task     Art Therapy directive was to create art in response to a selected watercolor prompt on a handout given by author. Pt's were encouraged to use mindfulness skills while working with watercolors.  Goals of directive: emotional expression, emotional regulation, mindfulness, media exploration.  Pt was an engaged participant, focused on task for the majority of group. Pt briefly referred to actively grieving the loss of her girlfriend who passed away a month ago. Pt painted her name on one sheet of paper. Pt then painted \"I love you Edi\" and verbally processed that he is working on \"self-acceptance, self love\" and referred to having low self esteem in the past and always taking care of others while neglecting his own needs.  Pts mood was calm, pleasant participant.     " Phone: [phone]                 [facility] [place] [facility]    Fax: [phone]                       Outpatient Occupational Therapy                 DAILY TREATMENT NOTE    Date: 1/19/2022  Patients Name:  Bethany Gagnon  YOB: 2012 (11 y.o.)  Gender:  male  MRN:  [id number]  [facility] #: [id number]  Referring Physician: Chemo Andrews  Diagnosis: Diagnosis: Developmental Delay (R62.0); Autism (F84.0)    Precautions:      INSURANCE  OT Insurance Information: IVETH/Demar/AARON      Total # of Visits Approved: 40   Total # of Visits to Date: 1     PAIN  [x]No     []Yes      Location: N/A  Pain Rating (0-10 pain scale): 0  Pain Description:  N/A    SUBJECTIVE  Patient present to clinic with mother. Mother requests this be last sessions for OT and ST. Good transition from ST to OT tx. GOALS/ TREATMENT SESSION:    Current Progress   Long Term Goal:  Child will demonstrate 3 self-regulation strategies following education with less than 2 verbal cues. See Short Term Goal Notes Below for Present Levels []Met  [x]Partially met  []Not met     Long Term Goal: Child will demonstrate appropriate executive functioning during a multi-step directional game with less than 3 cues for guidance. []Met  [x]Partially met  []Not met   Short Term Goals:  Time Frame for Short term goals: 90 days    Short term goal 1: Child will complete an age-appropriate visual scanning activity with less than 3 visual cues. Completed age appropriate visual spatial matching figure ground activity with 100% accuracy. Participated in visual scanning activity/hidden picture with 2 errors and 75% competition within 10 minute time frame allotted. [x]Met  []Partially met  []Not met   Short term goal 2: Child will self-generate two sentences with less than 2 verbal prompts. Emelina Rivers identified errors in letter size in sentence sample with minimal assistance.   Emelina Rivers then copied sample sentence with 2 verbal prompts and 2 errors for letter Radha Lorenzana also corrected 10 word word list and rewrote with 100% accuracy. []Met  [x]Partially met  []Not met   Short term goal 3: Initiate Education/HEP. Continue with new information. [x]Met  []Partially met  []Not met      []Met  []Partially met  []Not met      []Met  []Partially met  []Not met      []Met  []Partially met  []Not met   OBJECTIVE  Last tx session for OT and ST per parent request.          EDUCATION  Education provided to patient/family/caregiver: Provided with worksheets and handouts to continue working at home.     Method of Education:     [x]Discussion     []Demonstration    [x]Written     []Other  Evaluation of Patients Response to Education:        [x]Patient and or Caregiver verbalized understanding  []Patient and or Caregiver Demonstrated without assistance   []Patient and or Caregiver Demonstrated with assistance  []Needs additional instruction to demonstrate understanding of education    ASSESSMENT  Patient tolerated todays treatment session:    [x]Good   []Fair   []Poor  Limitations/difficulties with treatment session due to:   Goal Assessment: []No Change    [x]Improved  Comments: STG 1,3 met    PLAN  [x]Continue with current plan of care  [][facility]  []IHold per patient request  []Change Treatment plan:  []Insurance hold  []Other     TIME   Time Treatment session was INITIATED 5:00   Time Treatment session was STOPPED 5:30   Timed Code Treatment Minutes 30 minutes       Electronically signed by: GLENN Crane        Date:1/19/2022

## 2023-11-01 PROCEDURE — 90834 PSYTX W PT 45 MINUTES: CPT

## 2023-11-01 PROCEDURE — 250N000013 HC RX MED GY IP 250 OP 250 PS 637

## 2023-11-01 PROCEDURE — H2032 ACTIVITY THERAPY, PER 15 MIN: HCPCS

## 2023-11-01 PROCEDURE — 99232 SBSQ HOSP IP/OBS MODERATE 35: CPT | Mod: GC | Performed by: PSYCHIATRY & NEUROLOGY

## 2023-11-01 PROCEDURE — 124N000002 HC R&B MH UMMC

## 2023-11-01 PROCEDURE — 250N000013 HC RX MED GY IP 250 OP 250 PS 637: Performed by: STUDENT IN AN ORGANIZED HEALTH CARE EDUCATION/TRAINING PROGRAM

## 2023-11-01 RX ADMIN — BICTEGRAVIR SODIUM, EMTRICITABINE, AND TENOFOVIR ALAFENAMIDE FUMARATE 1 TABLET: 50; 200; 25 TABLET ORAL at 08:48

## 2023-11-01 RX ADMIN — SERTRALINE HYDROCHLORIDE 100 MG: 100 TABLET ORAL at 08:41

## 2023-11-01 RX ADMIN — Medication 1 LOZENGE: at 03:24

## 2023-11-01 RX ADMIN — RISPERIDONE 1 MG: 1 TABLET, ORALLY DISINTEGRATING ORAL at 21:20

## 2023-11-01 RX ADMIN — NICOTINE 1 PATCH: 21 PATCH, EXTENDED RELEASE TRANSDERMAL at 08:41

## 2023-11-01 RX ADMIN — RIVAROXABAN 20 MG: 10 TABLET, FILM COATED ORAL at 18:06

## 2023-11-01 RX ADMIN — HYDROXYZINE HYDROCHLORIDE 25 MG: 25 TABLET, FILM COATED ORAL at 13:29

## 2023-11-01 RX ADMIN — Medication 1 LOZENGE: at 01:09

## 2023-11-01 RX ADMIN — MIRTAZAPINE 15 MG: 15 TABLET, FILM COATED ORAL at 21:20

## 2023-11-01 RX ADMIN — RISPERIDONE 1 MG: 1 TABLET, ORALLY DISINTEGRATING ORAL at 08:41

## 2023-11-01 RX ADMIN — QUETIAPINE 100 MG: 100 TABLET ORAL at 21:20

## 2023-11-01 ASSESSMENT — ACTIVITIES OF DAILY LIVING (ADL)
ADLS_ACUITY_SCORE: 28
DRESS: INDEPENDENT
LAUNDRY: WITH SUPERVISION
ADLS_ACUITY_SCORE: 28
HYGIENE/GROOMING: INDEPENDENT
ADLS_ACUITY_SCORE: 28
ADLS_ACUITY_SCORE: 28
HYGIENE/GROOMING: INDEPENDENT
ADLS_ACUITY_SCORE: 28
ADLS_ACUITY_SCORE: 28
LAUNDRY: WITH SUPERVISION
DRESS: INDEPENDENT
ADLS_ACUITY_SCORE: 28
ORAL_HYGIENE: INDEPENDENT
ORAL_HYGIENE: INDEPENDENT
ADLS_ACUITY_SCORE: 28

## 2023-11-01 NOTE — PLAN OF CARE
BEH IP Unit Acuity Rating Score (UARS)  Patient is given one point for every criteria they meet.    CRITERIA SCORING   On a 72 hour hold, court hold, committed, stay of commitment, or revocation 0    Patient LOS on BEH unit exceeds 20 days 0  LOS: 4   Patient under guardianship, 55+, otherwise medically complex, or under age 11 0   Suicide ideation without relief of precipitating factors 1   Current plan for suicide 0   Current plan for homicide 0   Imminent risk or actual attempt to seriously harm another without relief of factors precipitating the attempt 0   Severe dysfunction in daily living (ex: complete neglect for self care, extreme disruption in vegetative function, extreme deterioration in social interactions) 0   Recent (last 7 days) or current physical aggression in the ED or on unit 0   Restraints or seclusion episode in past 72 hours 0   Recent (last 7 days) or current verbal aggression, agitation, yelling, etc., while in the ED or unit 0   Active psychosis 0   Need for constant or near constant redirection (from leaving, from others, etc).  0   Intrusive or disruptive behaviors 0   TOTAL 1

## 2023-11-01 NOTE — PLAN OF CARE
"  Duration: Met with patient for a total of 40 minutes.    Time start: 1115  Time end 1150    Modality Used:CBT, Person Centered, and [facility]    Goals: Maintain sobriety, substance use residential treatment     Pt progress: Pt reports that he is ready to stop using drugs and alcohol, that his \"relationship\" with substances is over, misses being healthy and misses his family and this time is different. Pt shared that losing his fiancee was a wake up call. Pt tearful describing her recent death; to cope Pt journals and wrote a card to her. Writer offered condolences and commended Pt for utilizing healthy coping skills.     Pt reports his main issues are depression and impulsiveness. Reports protective factors for sobriety are AA meetings and his support network. Discussed employing supports and coping skills to replace substances - former primary coping strategy. Pt was engaged, appeared to benefit from verbal processing, and appears motivated to change.       Treatment Objective(s) Addressed:   The focus of this session was on orienting the patient to therapy, identifying and practicing coping strategies, and processing feelings related to death of filipe.      Progress Towards Goals:   Unable to assess progress towards goals - first meeting with Pt.      Therapeutic Intervention(s):   Provided active listening, unconditional positive regard, and validation. Worked on relapse prevention planning (review of stressors, early warning signs). Identified and practiced coping skills. Explored motivation for behavioral change.    Plan/next step: Writer will remain available for 1:1 sessions as requested and encouraged Pt to attend group therapy sessions.       Kathryn Forrester [facility]  Psychotherapist   "

## 2023-11-01 NOTE — PLAN OF CARE
Team Note Due:  Monday    Assessment/Intervention/Current Symtoms and Care Coordination:  - chart review  - team meeting - discussed pt progress, symptomology, and response to treatment.  Discussed the discharge plan and any potential impediments to discharge.    - spoke with patient regarding treatment programs and referral. In regards to [facility] he would be open to considering other programs than just [facility] - prefers not within the city - such as no [place].  Informed him I had a call from [facility] they are in initial steps for reviewing. Message left with [facility] and no reply at this time, also waiting to hear back from [facility]. INformed him when I call [facility] back I will also let them know he is open to those other locations and I will keep him updated.     Discharge Plan or Goal:  Pending stabilization & development of a safe discharge plan.  Considerations include: Co Occurring substance use program - residential        Barriers to Discharge:  Patient requires further psychiatric stabilization due to current symptomology     Referral Status:  Substance Use Programming   Referrals for residential programming placed on 10/26/23 - assessment completed while in ED - referrals to:    [facility]  [address] [place] [address], [address] [place] [address]  Phone: [phone] Fax: [phone]  Email: [email or web address]  - 10/31/23- message sent to residential admissions team    [facility]   [address] [place] [address]  Phone: [phone] Fax: [phone] [email or web address]  -10/31/23 - spoke with intake with [facility] -- they will contact this writer after further review.  -11/1/23 - voicemail from [place] in [facility] [place] - asked for call back to clarify further information / questions: - does patient have any history of criminal sexual charges - is patient under a commitment and - is there  a secondary insurance (these are standard questions for their referral/assessment process     [facility]  [address], [place] [address]  Admissions Phone: [phone]  [facility] waitlist: [phone]  [email or web address]  11/1/23 - voicemail left with admissions      Legal Status:  voluntary    Contacts:         Upcoming Meetings and Dates/Important Information and next steps:  [facility] will continue to follow up with [facility] programs as noted in referral status section of this note.

## 2023-11-01 NOTE — PROGRESS NOTES
"  ----------------------------------------------------------------------------------------------------------  [facility]  Psychiatry Progress Note  Hospital Day #4     Interim History:     The patient's care was discussed with the treatment team and chart notes were reviewed.    Vitals: VSS  Sleep: 6 hours (11/01/23 0600)  Scheduled medications: Took all scheduled medications as prescribed  Psychiatric PRN medications: PRN quetiapine 100 mg requested and given    Staff Report:   No acute events or safety concerns overnight. Cooperative, taking part in groups. Had some reported episodes of light headedness. Eating meals, has been napping and sleeping. Please see staff notes for details.      Subjective:     Patient Interview:  Edi Sorenson was interviewed at the end of the hallway . Patient reports that \"he is feeling alright\"      Patient reports that he was feeling dizzy this morning but he is increasing his water intake. Patient reports that the dizziness started when he was admitted in the psych unit. He does not report difficulty stabilizing himself with the dizziness, rather reports that it is just a little dizzy.     Patient reports that he is sleeping well. He believes that he is taking naps to make it to meal times as he has been deprived of meals in the past. Patient feels like things are going too fast during his time on the unit. He does report that in the past he as went on shopping sprees. Patient is consulted to work to regulate his sleep schedule and to make note if his lightheadedness gets worse or affects his ability to function.     Patient has auditory hallucinations of bird sounds. Endorses that he feels like things are going 'fast' for him and endorses periods of gambling and increased spending in his past. Feels he is struggling to fall asleep, and also acknowledges that he is napping a lot which may affect his nighttime sleepiness.     Patient " felt very depressed yesterday as he was not being able to go trick or treating for halloween with his little cousins and nephews. Patient feels that he is doing better during this admit as his family has been more supportive and been visiting him and bringing him snacks.       ROS:  Patient has  lightheadedness  Patient denies acute concerns     Objective:     Vitals:  /75 (BP Location: Left arm, Cuff Size: Adult Regular)   Pulse 82   Temp 97.9  F (36.6  C) (Temporal)   Resp 18   Ht 1.829 m (6')   Wt 83.6 kg (184 lb 6.4 oz)   SpO2 100%   BMI 25.01 kg/m      Allergies:  No Known Allergies    Current Medications:  Scheduled:  Current Facility-Administered Medications   Medication Dose Route Frequency    bictegravir-emtricitabine-tenofovir  1 tablet Oral Daily    mirtazapine  15 mg Oral At Bedtime    nicotine  1 patch Transdermal Daily    nicotine   Transdermal Q8H    risperiDONE  1 mg Oral BID    rivaroxaban ANTICOAGULANT  20 mg Oral Daily with supper    sertraline  100 mg Oral Daily       PRN:  Current Facility-Administered Medications   Medication Dose Route Frequency    acetaminophen  650 mg Oral Q4H PRN    sore throat  1 lozenge Buccal Q1H PRN    famotidine  10 mg Oral BID PRN    gabapentin  200 mg Oral TID PRN    hydrOXYzine  25 mg Oral Q4H PRN    melatonin  3 mg Oral At Bedtime PRN    nicotine  4 mg Buccal Q1H PRN    OLANZapine  10 mg Oral TID PRN    Or    OLANZapine  10 mg Intramuscular TID PRN    ondansetron  4 mg Oral Q6H PRN    polyethylene glycol  17 g Oral Daily PRN    QUEtiapine  100 mg Oral At Bedtime PRN       Labs and Imaging:  New results:   No results found for this or any previous visit (from the past 24 hour(s)).      Data this admission:  - CBC unremarkable  - CMP unremarkable  - TSH normal  - UDS positive for amphetamines, fentanyl, cocaine, benzodiazepines, and cannabinoids  - Hgb A1c mildly elevated at 5.7  - Lipids unremarkable  - Vit B12 normal  - Folate normal  - Urinalysis  "unremarkable but with mucus present  - GC/CT negative     Mental Status Exam:     Oriented to:  Grossly Oriented  General:  Awake and Alert  Appearance:  appears stated age  Behavior/Attitude:  Cooperative, Engaged, and Open  Eye Contact: Appropriate  Psychomotor: Normal no catatonia present  Speech:  appropriate volume/tone  Language: Fluent in English with appropriate syntax and vocabulary.  Mood:  \"things are going fast\"  Affect:  appropriate, congruent with mood, and broad/full  Thought Process:  linear, coherent, goal directed, rambling, and tangential  Thought Content:   No HI, No VH, and auditory hallucinations of birds ; No apparent delusions  Associations:  intact  Insight:  fair as he is able to elicit periods of time in his past where he might have demonstrated manic behavior  Judgment:  fair due to being engaged in his care   Impulse control: limited  Attention Span:  grossly intact  Concentration:  grossly intact  Recent and Remote Memory:  not formally assessed  Fund of Knowledge: average  Muscle Strength and Tone: normal  Gait and Station: Normal     Psychiatric Assessment     Edi Sorenson is a 48 year old male with previous psychiatric diagnoses of MDD, polysubstance use disorder (amphetamine, cocaine, cannabis, opiates, benzo) and substance-induced psychosis, and past medical history of HIV and 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 Most recent psychiatric hospitalization was at [facility] in Aug 2023 for SI and AH which he left AMA. Significant symptoms on admission include SI, AH, and occasional VH. The MSE on admission was pertinent for SI, AH, and occasional VH. His UDS was positive for multiple substances including cocaine, amphetamine, fentanyl, benzo, cannabis. Biological contributions to mental health presentation include daily polysubstance use, past psychiatric diagnosis of MDD and " substance-induced psychosis. Psychological contributions to mental health presentation include maladaptive coping skill. Social factors contributing to mental health presentation include recent loss of his fiance. Protective factors include motivation and voluntary engagement in  treatment.      In summary, the patient's reported symptoms of SI, AH, and VH in the context of substance use are consistent with previous diagnosis of substance-induced psychosis and substance-induced mood disorder worsened by recent psychosocial stressor vs MDD with psychosis. He will likely benefit from this admission.     Given that he currently has SI and psychosis, patient warrants inpatient psychiatric hospitalization to maintain his safety.      Psychiatric Plan by Diagnosis      Today's changes:  - none      # Substance-induced psychosis and Substance-induced mood disorder  1. Medications:  - Continue risperidone 1 mg bid  - Continue sertraline 100 mg daily  - Continue mirtazapine 15 mg qhs  - Continue prn quetiapine 100 mg at bedtime     - Patient will be treated in therapeutic milieu with appropriate individual and group therapies as described     Psychiatric Hospital Course:      Edi Sorenson was admitted to [facility] as a voluntary patient.   Medications:  Risperidone 1mg BID, sertraline 100mg Qday, mirtazapine 15mg at bedtime from ED were continued.   New medications started at the time of admission include PRN Quetiapine 50mg at bedtime. Increased to 100 mg PRN at bedtime on 10/30.     The risks, benefits, alternatives, and side effects were discussed and understood by the patient.     Medical Assessment and Plan     Medical diagnoses to be addressed this admission:    # HIV  - had not taken ART for at least past year, most recent CD4 count 699  - Continue biktarvy daily  - Medicine consult 10/29 recommend follow up with PCP to establish routine care post-admission     # Hx of PE/DVT  - Continue rivaroxaban 15 mg  daily  - notify medicine if patient c/o chest pain or SOB, or develops persistent tachycardia or has O2 sats <90%     # Abdominal discomfort during urination  - UA normal 10/28  - Med consult 10/29: not consistent with UTI, kidney stone, pyelonephritis; GC/CT testing negative 10/30    Medical course: Patient was physically examined by the ED prior to being transferred to the unit and was found to be medically stable and appropriate for admission.     Consults: medicine for HIV/possible UTI management     Checklist     Legal Status: Voluntary     Safety Assessment:   Behavioral Orders   Procedures    Code 1 - Restrict to Unit    Discontinue 1:1 attendant for suicide risk     Order Specific Question:   I have performed an in person assessment of the patient     Answer:   Based on this assessment the patient no longer requires a one on one attendant at this point in time.     Order Specific Question:   Rationale     Answer:   Patient States able to remain safe in hospital    Routine Programming     As clinically indicated    Status 15     Every 15 minutes.    Suicide precautions     Patients on Suicide Precautions should have a Combination Diet ordered that includes a Diet selection(s) AND a Behavioral Tray selection for Safe Tray - with utensils, or Safe Tray - NO utensils         Risk Assessment:  Risk for harm is moderate.  Risk factors: SI, maladaptive coping, substance use, and past behaviors  Protective factors: family and engaged in treatment     SIO: no    Disposition: TBD. Pending stabilization, medication optimization, & development of a safe discharge plan.     Attestations     Stuart Saldana DO  PGY2 Psychiatry     This patient has been seen and evaluated by me, Ryan Joseph.  I have discussed this patient with the psychiatry resident and I agree with the findings and plan in this note.    I have reviewed today's vital signs, medications, labs and imaging.     Ryan Elder MD on 11/1/2023  at 9:06 PM

## 2023-11-01 NOTE — PLAN OF CARE
Problem: Suicidal Behavior  Goal: Suicidal Behavior is Absent or Managed  Outcome: Progressing   Goal Outcome Evaluation:    Plan of Care Reviewed With: patient       Nursing Assessment    Recent Vitals: B/P:137/75  T:97.9  P:82    General Shift Summary  Pt napping in bed, but attended groups. No behavioral concerns. Continues to endorse anxiety and depression but says that mood is stable. Remains motivated for recovery. Pt had some dizziness this shift. VS WNL and pt drinking adequate amounts of fluid. Pt reports that this dizziness has been coming and going since they have been here.     Patient is medication compliant and reported no side effects. PRN hydroxyzine given for anxiety with effect.     Hygiene and appetite are appropriate.     Pablo Ortiz RN

## 2023-11-01 NOTE — CARE PLAN
Occupational Therapy Group Note      11/01/23 1345   Group Therapy Session   Group Attendance attended group session   Group Type expressive therapy;psychotherapeutic;recreation   Group Topic Covered coping skills/lifestyle management;leisure exploration/use of leisure time;structured socialization;emotions/expression   Group Session Detail OT: [facility] (10:00-11:00; 8 participants) for creative expression, promoting autonomy, building sense of self-worth, coping with stress/symptoms and opportunity to exercise cognitive skills (i.e. initiation, planning, organization, sequencing, sustained attention, follow-through, termination of task and overall safety awareness).   Patient Response/Contribution cooperative with task;organized    Patient joined group for 45 minutes.  Pt appeared indifferent about what task he worked on and asked writer to select something for him.  Pt worked in a goal directed manner on a 1-step task. Pt demonstrated capacity for divided attention as he engaged in casual conversation with peers while working on project.  Mood appeared calm/pleasant; affect was congruent.

## 2023-11-01 NOTE — PLAN OF CARE
"Problem: Adult Behavioral Health Plan of Care  Goal: Optimized Coping Skills in Response to Life Stressors  Outcome: Progressing     Pt has a full range affect and is pleasant. He spent most of the shift out in the milieu, interacting with select peers and staff. Pt is pleasant upon approach and cooperative, often telling staff \"Thank you for everything you've done for me!\". Pt attended group. Pt denies SI/SIB/HI/AH/VH. He expressed anxiety and depression of 8/10. Pt was offered PRN but declined, stating \"The Hydroxizine and Gabapentin doesn't help me. I'll just wait until bedtime to get my Seroquel.\" Pt was offered notebook and pen as he stated he \"would like to journal\". Pt stated that this was effective and that he wrote a letter to his fiance who passed away. Pt had c/o sore throat and received Chamomile tea which he stated was effective. Later in shift, pt c/o sore throat again and requested a PRN throat lozenge. MD notified and order was placed. When lozenge arrived to unit, writer went to offer PRN to patient however he was asleep. Pt received PRN Seroquel 100 mg @ 2017.  "

## 2023-11-01 NOTE — CARE PLAN
Occupational Therapy   Brief Assessment   [facility]    Information is based on chart review, interdisciplinary team correspondence and writer's observations and interactions with the patient in groups and on the unit.      Data     11/01/23 0900   Clinical Impression   Affect Appropriate to situation   Orientation Oriented to person, place and time   Appearance and ADLs General cleanliness observed in most areas   Attention to Internal Stimuli No observed signs   Interaction Skills Interacts appropriately with peers;Interacts appropriately with staff   Ability to Communicate Needs Independent   Verbal Content Clear;Appropriate to topic   Ability to Maintain Boundaries Maintains appropriate verbal boundaries;Maintains appropriate physical boundaries   Participation Independently participates  (opts to observe at times)   Ability to Concentrate With structure   Follows and Comprehends Directions Independently follows 2 step verbal directions   Organization Independently organizes simple tasks  (limited attention to detail and limited planning ahead)   Decision Making Needs choices limited to 2 choices   Planning and Problem Solving Occasionally needs assist/feedback   Ability to Apply and Learn Concepts Applies within group structure   Frustrations / Stress Tolerance Indirect responses to frustration;Utilizes coping skills with prompts   Level of Insight Some insight   Self Esteem Accepts positive feedback   Social Supports Needs further assessment   BEH OT Care Plan Goals   OT Care Plan ADL, work, leisure process skills   Practice using coping strategies to manage stress and reduce symptoms initiated 11/1/23       Assessment    Anticipate patient will benefit from a safe, structured environment focusing on recovery strategies while stabilizing on medication.  Patient appears to have limited knowledge of and follow through with coping skills and self-management strategies, thus contributing to exacerbation of mental  health symptoms.  Patient's insight into their mental illness is moderate and emerging at this time.  Patient would benefit from OT groups for exploration of coping skills and leisure interests for symptom and stress management, exploration of and practice using relapse prevention strategies, and opportunities for: Self-care skills, social interaction skills, self-management skills and ADL/work/leisure/processing skills training.      Plan    Care plan goals have been initiated (see flowsheet above).  Plan to offer graded occupation-based activities to support progress towards goals and independence with ADLs/IADLs in the community upon discharge.  Continue to correspond with interdisciplinary team regarding ongoing treatment plan, potential changes in patient's status, and discharge planning.     Ramandeep Ellis, OT, OTR/L  11/1/2023

## 2023-11-02 PROCEDURE — 250N000013 HC RX MED GY IP 250 OP 250 PS 637

## 2023-11-02 PROCEDURE — 90853 GROUP PSYCHOTHERAPY: CPT

## 2023-11-02 PROCEDURE — 250N000013 HC RX MED GY IP 250 OP 250 PS 637: Performed by: STUDENT IN AN ORGANIZED HEALTH CARE EDUCATION/TRAINING PROGRAM

## 2023-11-02 PROCEDURE — G0177 OPPS/PHP; TRAIN & EDUC SERV: HCPCS

## 2023-11-02 PROCEDURE — 124N000002 HC R&B MH UMMC

## 2023-11-02 PROCEDURE — 99232 SBSQ HOSP IP/OBS MODERATE 35: CPT | Mod: GC | Performed by: PSYCHIATRY & NEUROLOGY

## 2023-11-02 RX ADMIN — FAMOTIDINE 10 MG: 10 TABLET ORAL at 22:56

## 2023-11-02 RX ADMIN — BICTEGRAVIR SODIUM, EMTRICITABINE, AND TENOFOVIR ALAFENAMIDE FUMARATE 1 TABLET: 50; 200; 25 TABLET ORAL at 10:07

## 2023-11-02 RX ADMIN — NICOTINE 1 PATCH: 21 PATCH, EXTENDED RELEASE TRANSDERMAL at 10:06

## 2023-11-02 RX ADMIN — SERTRALINE HYDROCHLORIDE 100 MG: 100 TABLET ORAL at 10:07

## 2023-11-02 RX ADMIN — RISPERIDONE 1 MG: 1 TABLET, ORALLY DISINTEGRATING ORAL at 19:29

## 2023-11-02 RX ADMIN — QUETIAPINE 100 MG: 100 TABLET ORAL at 20:06

## 2023-11-02 RX ADMIN — RISPERIDONE 1 MG: 1 TABLET, ORALLY DISINTEGRATING ORAL at 10:07

## 2023-11-02 RX ADMIN — RIVAROXABAN 20 MG: 10 TABLET, FILM COATED ORAL at 18:10

## 2023-11-02 RX ADMIN — HYDROXYZINE HYDROCHLORIDE 25 MG: 25 TABLET, FILM COATED ORAL at 19:29

## 2023-11-02 ASSESSMENT — ACTIVITIES OF DAILY LIVING (ADL)
ADLS_ACUITY_SCORE: 28
ADLS_ACUITY_SCORE: 28
ORAL_HYGIENE: INDEPENDENT
ORAL_HYGIENE: INDEPENDENT
HYGIENE/GROOMING: INDEPENDENT
ADLS_ACUITY_SCORE: 28
HYGIENE/GROOMING: INDEPENDENT
DRESS: INDEPENDENT
ADLS_ACUITY_SCORE: 28
ADLS_ACUITY_SCORE: 28
LAUNDRY: WITH SUPERVISION
ADLS_ACUITY_SCORE: 28
ADLS_ACUITY_SCORE: 28
LAUNDRY: WITH SUPERVISION
DRESS: INDEPENDENT

## 2023-11-02 NOTE — PROGRESS NOTES
----------------------------------------------------------------------------------------------------------  [facility]  Psychiatry Progress Note  Hospital Day #5     Interim History:     The patient's care was discussed with the treatment team and chart notes were reviewed.    Vitals: VSS  Sleep: 6.25 hours (23 0600)  Scheduled medications: Took all scheduled medications as prescribed  Psychiatric PRN medications: PRN hydroxyzine 25 mg and quetiapine 100 mg requested and given    Staff Report:   No acute events or safety concerns overnight. Working to spend less time in bed. Attending groups and cooperating pleasantly with staff. Please see staff notes for details.      Subjective:     Patient Interview:  Edi Sorenson was interviewed in his room. Says he is more tired today. Tried staying up later last night since he was advised to try sleeping less. Felt groggier and slower this morning. Reports he usually goes to sleep at home somewhat early.     Has concerns with remaining off of drugs if he was to be discharged today. Doesn't trust himself yet. Thinks he needs to continue to work on therapy and is very interested in discharging from this unit to somewhere that could continue to help him with his chemical dependency issues.. Would be interested in speaking with a , reports being interested in talking to people with different dianne backgrounds.    Showed us some of the art he has been working on and shares he wrote a letter to his  sudhakar. Endorses having fleeting thoughts of hurting himself or leaving treatment, but he has been able to ignore them and move on. Has been focusing on trying to quiet his mind with deep breathing.     While in with patient, he received an update from social work regarding his referral to [facility]. Someone will come by today to speak with him. Patient reports enthusiasm towards going into transitional care  before going home. Reports he wanted [place] go [place] the cities because he is concerned he will be tempted more if he is in the cities.       ROS:  Patient has  lightheadedness , rib pain (unchanged)  Patient denies acute concerns     Objective:     Vitals:  /89 (BP Location: Left arm, Patient Position: Sitting)   Pulse 81   Temp 97.7  F (36.5  C) (Temporal)   Resp 18   Ht 1.829 m (6')   Wt 85.4 kg (188 lb 4.8 oz)   SpO2 100%   BMI 25.54 kg/m      Allergies:  No Known Allergies    Current Medications:  Scheduled:  Current Facility-Administered Medications   Medication Dose Route Frequency    bictegravir-emtricitabine-tenofovir  1 tablet Oral Daily    mirtazapine  15 mg Oral At Bedtime    nicotine  1 patch Transdermal Daily    nicotine   Transdermal Q8H    risperiDONE  1 mg Oral BID    rivaroxaban ANTICOAGULANT  20 mg Oral Daily with supper    sertraline  100 mg Oral Daily       PRN:  Current Facility-Administered Medications   Medication Dose Route Frequency    acetaminophen  650 mg Oral Q4H PRN    sore throat  1 lozenge Buccal Q1H PRN    famotidine  10 mg Oral BID PRN    gabapentin  200 mg Oral TID PRN    hydrOXYzine  25 mg Oral Q4H PRN    melatonin  3 mg Oral At Bedtime PRN    nicotine  4 mg Buccal Q1H PRN    OLANZapine  10 mg Oral TID PRN    Or    OLANZapine  10 mg Intramuscular TID PRN    ondansetron  4 mg Oral Q6H PRN    polyethylene glycol  17 g Oral Daily PRN    QUEtiapine  100 mg Oral At Bedtime PRN       Labs and Imaging:  New results:   No results found for this or any previous visit (from the past 24 hour(s)).      Data this admission:  - CBC unremarkable  - CMP unremarkable  - TSH normal  - UDS positive for amphetamines, fentanyl, cocaine, benzodiazepines, and cannabinoids  - Hgb A1c mildly elevated at 5.7  - Lipids unremarkable  - Vit B12 normal  - Folate normal  - Urinalysis unremarkable but with mucus present  - GC/CT negative     Mental Status Exam:     Oriented to:  Grossly  "Oriented  General:  Awake and Alert  Appearance:  appears stated age  Behavior/Attitude:  Cooperative, Engaged, and Open  Eye Contact: Appropriate  Psychomotor: Normal no catatonia present  Speech:  appropriate volume/tone  Language: Fluent in English with appropriate syntax and vocabulary.  Mood:  \"I'm doing better\"  Affect:  appropriate, congruent with mood, broad/full, and animated  Thought Process:  linear, coherent, goal directed, rambling, and tangential  Thought Content:   No HI, No VH, and auditory hallucinations of birds ; No apparent delusions  Associations:  intact  Insight:  good as he is able to reflect that it may not be safe for him to be discharged without further support   Judgment:  good due to being able to calm himself down when he is feeling overwhelmed with breathing techniques   Impulse control: limited  Attention Span:  grossly intact  Concentration:  grossly intact  Recent and Remote Memory:  not formally assessed  Fund of Knowledge: average  Muscle Strength and Tone: normal  Gait and Station: Normal     Psychiatric Assessment     Edi Sorenson is a 48 year old male with previous psychiatric diagnoses of MDD, polysubstance use disorder (amphetamine, cocaine, cannabis, opiates, benzo) and substance-induced psychosis, and past medical history of HIV and 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 Most recent psychiatric hospitalization was at [facility] in Aug 2023 for SI and AH which he left AMA. Significant symptoms on admission include SI, AH, and occasional VH. The MSE on admission was pertinent for SI, AH, and occasional VH. His UDS was positive for multiple substances including cocaine, amphetamine, fentanyl, benzo, cannabis. Biological contributions to mental health presentation include daily polysubstance use, past psychiatric diagnosis of MDD and substance-induced psychosis. Psychological " contributions to mental health presentation include maladaptive coping skill. Social factors contributing to mental health presentation include recent loss of his fiance. Protective factors include motivation and voluntary engagement in MH treatment.      In summary, the patient's reported symptoms of SI, AH, and VH in the context of substance use are consistent with previous diagnosis of substance-induced psychosis and substance-induced mood disorder worsened by recent psychosocial stressor vs MDD with psychosis. He will likely benefit from this admission.     Given that he currently has SI and psychosis, patient warrants inpatient psychiatric hospitalization to maintain his safety.      Psychiatric Plan by Diagnosis      Today's changes:  - Discontinue mirtazapine      # Substance-induced psychosis and Substance-induced mood disorder  1. Medications:  - Continue risperidone 1 mg bid  - Continue sertraline 100 mg daily  - Continue prn quetiapine 100 mg at bedtime     - Patient will be treated in therapeutic milieu with appropriate individual and group therapies as described     Psychiatric Hospital Course:      Edi Sorenson was admitted to [facility] as a voluntary patient.   Medications:  Risperidone 1mg BID, sertraline 100mg Qday, mirtazapine 15mg at bedtime from ED were continued.   New medications started at the time of admission include PRN Quetiapine 50mg at bedtime. Increased to 100 mg PRN at bedtime on 10/30.  On 11/2, mirtazapine 15mg was discontinued for concern of zachary and him being on two antidepressants      The risks, benefits, alternatives, and side effects were discussed and understood by the patient.     Medical Assessment and Plan     Medical diagnoses to be addressed this admission:    # HIV  - had not taken ART for at least past year, most recent CD4 count 699  - Continue biktarvy daily  - Medicine consult 10/29 recommend follow up with PCP to establish routine care post-admission     # Hx  of PE/DVT  - Continue rivaroxaban 15 mg daily  - notify medicine if patient c/o chest pain or SOB, or develops persistent tachycardia or has O2 sats <90%     # Abdominal discomfort during urination  - UA normal 10/28  - Med consult 10/29: not consistent with UTI, kidney stone, pyelonephritis; GC/CT testing negative 10/30    Medical course: Patient was physically examined by the ED prior to being transferred to the unit and was found to be medically stable and appropriate for admission.     Consults: medicine for HIV/possible UTI management     Checklist     Legal Status: Voluntary     Safety Assessment:   Behavioral Orders   Procedures    Code 1 - Restrict to Unit    Discontinue 1:1 attendant for suicide risk     Order Specific Question:   I have performed an in person assessment of the patient     Answer:   Based on this assessment the patient no longer requires a one on one attendant at this point in time.     Order Specific Question:   Rationale     Answer:   Patient States able to remain safe in hospital    Routine Programming     As clinically indicated    Status 15     Every 15 minutes.    Suicide precautions     Patients on Suicide Precautions should have a Combination Diet ordered that includes a Diet selection(s) AND a Behavioral Tray selection for Safe Tray - with utensils, or Safe Tray - NO utensils         Risk Assessment:  Risk for harm is moderate.  Risk factors: SI, maladaptive coping, substance use, and past behaviors  Protective factors: family and engaged in treatment     SIO: no    Disposition: TBD. Pending stabilization, medication optimization, & development of a safe discharge plan.     Attestations       Stuart Saldana DO   PGY2 Psychiatry     This patient has been seen and evaluated by me, Ryan Joseph.  I have discussed this patient with the psychiatry resident and I agree with the findings and plan in this note.    I have reviewed today's vital signs, medications, labs and imaging.      Ryan Elder MD

## 2023-11-02 NOTE — PLAN OF CARE
Problem: Adult Behavioral Health Plan of Care  Goal: Adheres to Safety Considerations for Self and Others  Outcome: Progressing     Problem: Suicidal Behavior  Goal: Suicidal Behavior is Absent or Managed  Outcome: Progressing     Problem: Thought Process Alteration  Goal: Optimal Thought Clarity  Outcome: Progressing     Pt's VSS in this shift. Pt denied pain and stated that his abdomen is feeling better. Pt stated that he is feeling down and rated his depression 8/10. Pt endorsed anxiety 6/10; declined prn when offered by this writer. Pt denied all the other mental health symptoms. Pt was calm and cooperative with cares. Pt ate snacks and dinner without any problem. Pt stated that he is waiting on his family to bring some snacks to him. Pt requested and received prn Atarax around 1930. Pt is compliant with meds. Pt requested and received prn Seroquel around 2000. Pt reported of having heartburn; requested and received prn Pepcid around 2255.     Goal Outcome Evaluation:    Plan of Care Reviewed With: patient

## 2023-11-02 NOTE — PLAN OF CARE
BEH IP Unit Acuity Rating Score (UARS)  Patient is given one point for every criteria they meet.    CRITERIA SCORING   On a 72 hour hold, court hold, committed, stay of commitment, or revocation 0    Patient LOS on BEH unit exceeds 20 days 0  LOS: 5   Patient under guardianship, 55+, otherwise medically complex, or under age 11 0   Suicide ideation without relief of precipitating factors 1   Current plan for suicide 0   Current plan for homicide 0   Imminent risk or actual attempt to seriously harm another without relief of factors precipitating the attempt 0   Severe dysfunction in daily living (ex: complete neglect for self care, extreme disruption in vegetative function, extreme deterioration in social interactions) 0   Recent (last 7 days) or current physical aggression in the ED or on unit 0   Restraints or seclusion episode in past 72 hours 0   Recent (last 7 days) or current verbal aggression, agitation, yelling, etc., while in the ED or unit 0   Active psychosis 0   Need for constant or near constant redirection (from leaving, from others, etc).  0   Intrusive or disruptive behaviors 0   TOTAL 1

## 2023-11-02 NOTE — PLAN OF CARE
"  Problem: Depressive Signs/Symptoms  Goal: Optimized Energy Level (Depressive Signs/Symptoms)  Intervention: Optimize Energy Level  Recent Flowsheet Documentation  Taken 11/2/2023 1200 by Stefanie Jason, RN  Patient Performed Hygiene: dressed  Diversional Activity:   art work   television  Activity (Behavioral Health): up ad shawn     Problem: Depressive Signs/Symptoms  Goal: Increased Participation and Engagement (Depressive Signs/Symptoms)  Outcome: Progressing  Intervention: Facilitate Participation and Engagement  Recent Flowsheet Documentation  Taken 11/2/2023 1200 by Stefanie Jason, RN  Supportive Measures:   positive reinforcement provided   active listening utilized  Diversional Activity:   art work   television     Problem: Depressive Signs/Symptoms  Goal: Improved Mood Symptoms (Depressive Signs/Symptoms)  Intervention: Promote Mood Improvement  Recent Flowsheet Documentation  Taken 11/2/2023 1200 by Stefanie Jason, RN  Supportive Measures:   positive reinforcement provided   active listening utilized  Diversional Activity:   art work   television   Goal Outcome Evaluation:    Plan of Care Reviewed With: patient        Patient oriented to person, place, time and situation.  Patient appears well-kempt, declined shower this shift but stated he take one this evening.  Affect flat and anxious.  Reports mood as \"stable.\"  Edi said he \"would like to go straight to treatment after this hospitalization.\"   Patient was helping an RN clean up a spilled coffee today and said, \"I feel a lot better when I can be helpful to other.\"  Patient stated, \"I did something crazy last night, I stayed up until about 11:00 pm.\"  Edi said he did that so he would be able to sleep during the night and not sleep so much during the day.  He did attend groups today and was seen out in the milieu interacting with staff and peers.  Edi endorses having anxiety 6/10 and \"not too much\" depression, but declined PRN anxiety " medication and thinks his depression is improving.  Denies IS/SIB/HI/AH/VH.  Ate 100% of breakfast and lunch and had a bowel movement today but still feels constipated.  Milk of Magnesia ordered but not verified by 15:00.   Patient was calm and cooperative this shift.  Will continue plan of care.

## 2023-11-02 NOTE — PLAN OF CARE
"  Problem: Adult Inpatient Plan of Care  Goal: Optimal Comfort and Wellbeing  Outcome: Progressing     Problem: Adult Behavioral Health Plan of Care  Goal: Adheres to Safety Considerations for Self and Others  Outcome: Progressing     Problem: Sleep Disturbance  Goal: Adequate Sleep/Rest  Outcome: Progressing   Goal Outcome Evaluation: Ongoing    Observed to have slept well for approx 6.5 hrs on all Q 15\" rounds overnight .  Up for snack x's 1.  Pleasantly appreciative.  Back to bed/sleep w/o difficulty.  Safe, therapeutic environment maintained.                        "

## 2023-11-02 NOTE — CARE PLAN
Occupational Therapy Group Note      11/02/23 1429   Group Therapy Session   Group Attendance attended group session   Group Type expressive therapy;psychotherapeutic;recreation   Group Topic Covered coping skills/lifestyle management;emotions/expression;leisure exploration/use of leisure time;structured socialization   Group Session Detail Open Clinic (10:10-11:50; 8 participants) for creative expression, promoting autonomy, building sense of self-worth, coping with stress/symptoms and opportunity to exercise cognitive skills (i.e. initiation, planning, organization, sequencing, sustained attention, follow-through, termination of task and overall safety awareness).       Cognitive activity (Pictionary; 13:15-14:05; 4 participants) and education on using leisure activities to build daily living skills. Purpose: Mood improvement, promoting social engagement, initiation/sequencing/follow-through/attention, reality orientation and coping with stress/symptoms.   Patient Response/Contribution cooperative with task;organized    Pt joined group for 65 minutes.  Pt worked on a project that he started the day prior.  Pt is considering sending this project to his 15-year-old step-daughter.  Pt's mood brightened when writer asked him about his step-daughter.  Overall mood appeared calm and content and his affect was congruent.    Patient joined group for check in and then opted to leave.  Pt shared that he enjoys cooking, reading, playing cards and dancing.  Mood/affect consistent with AM groups.

## 2023-11-02 NOTE — PLAN OF CARE
"Team Note Due:  Monday    Assessment/Intervention/Current Symtoms and Care Coordination:  - chart review  - team meeting - discussed pt progress, symptomology, and response to treatment.  Discussed the discharge plan and any potential impediments to discharge.  - team rounds/pt interview addressed patient needs/concerns - Edi continues to discuss that he feels he needs to do something different this time - attend a residential program before going home as has relapsed in the past. Also talks about knowing he needs to change how he has approached and \"behaviors\" he had in the past when in treatment. Talks about how he has in the past gotten frustrated or stating feeling better so then will leave treatment sooner. Speaks with insight on the importance of sticking with a program to completion. Talks about feeling strongly he can't go directly home from the hospital, but needs to go to a residential type program.   At the time I had joined the team meeting I also informed Edi of my conversation with [facility] staff and that they were planning to come to the unit to meet with him further. He was happy to hear this.     - post team rounds team meeting / discussion    - met with Edi to inform him that I heard back from [facility] and they are going to review further and will not be coming to meet with him today, will learn more tomorrow. He said he feels that one of the programs will work out and that all he can do is trust in this and higher power as he doesn't have control over those decisions. He again says he does think that he should go to a program before home, discussed with him that is also our recommendation and we share this goal.     - see referral status re [facility]      Discharge Plan or Goal:  Pending stabilization & development of a safe discharge plan.  Considerations include: Co Occurring substance use program - residential        Barriers to Discharge:  Requires discharge to [facility] " with Lodging or Residential [facility] program.     No longer endorses SI , however precipitating factors in the community leading to SI remain present if were to discharge back to same environment /situation without first attending recommended level of care.        Referral Status:  Substance Use Programming   Referrals for residential programming placed on 10/26/23 - assessment completed while in ED - referrals to:    [facility]  [address], [address] [place] [address]  Phone: [phone] Fax: [phone]  Email: [email or web address]  - 10/31/23- message sent to residential admissions team    [facility]   [address] [place] [address]  Phone: [phone] Fax: [phone] [email or web address]  -10/31/23 - spoke with intake with [facility] -- they will contact this writer after further review.  -11/1/23 - voicemail from [facility] - asked for call back to clarify further information / questions: - does patient have any history of criminal sexual charges - is patient under a commitment and - is there a secondary insurance (these are standard questions for their referral/assessment process  -11/2 - message from Clinical  Eliot requesting recent notes for ongoing review - notes sent.      [facility]  [address] [place] [address]  Admissions Phone: [phone]  [facility] waitlist: [phone]  [email or web address]  11/1/23 - voicemail left with admissions  11/2   11:00 am- spoke with staff from [email or web address] regarding referral - including clarifying that symptoms of psychosis have not been present for many days. Appears related to substance use as is consistent with resolution in a time frame indicating substance induced psychosis episode. Also clarified with provider that is ready for next level  of care. Was informed that nurse also reviews and they would be coming by unit to speak with with him soon on the unit - at that time I joined team rounds and discussed this with Edi.   12:00 pm - call back from program - spoke with staff again, stating nurse reviewed and given that last time he attended it was also reported he was psychiatrically stable he had been on station 20 for 7 days d then came to program and left . We discussed further - they state will need to have supervisor review who is not in until tomorrow. They will reach out again to this writer.     Legal Status:  voluntary    Contacts:  [facility] - Clinical Assessment Counselor  [email or web address]  Direct: [phone]  Fax: [phone]        Upcoming Meetings and Dates/Important Information and next steps:  CTC will continue to follow up with [facility] programs as noted in referral status section of this note.

## 2023-11-02 NOTE — PLAN OF CARE
"Adult Inpatient Safety Plan:     [facility]           [facility]                                Edi   Yas     SAFETY PLAN:  Step 1: Warning signs / cues (Thoughts, images, mood, situation, behavior) that a crisis may be developing:  Mood: Too elated, then depressed. Mood swings  Behaviors: Hyperactive, using drugs and alcohol    Step 2: Coping strategies - Things I can do to take my mind off of my problems without contacting another person (relaxation technique, physical activity):  Distress Tolerance Strategies: Talking to someone, NA meetings, music    Step 3: Remind myself of people and things that are important to me and worth living for:    Mom  Dad  Myself    Step 4: When I am in crisis, I can ask these people to help me use my safety plan:   Name: Mom    Name: Dad   Name: Older cousin and ex girlfriend     Step 5: Making the environment safe:   I will identify supportive people      Step 6: Professionals or agencies I can contact during a crisis:  [facility] Crisis (COPE) Response - Adult [phone]  Crisis text line: Text \"[place]\" to 618760. Free, confidential, 24/7.  Crisis Intervention: 762.376.9217 or 560-982-4510. Call anytime for help.   988      If unable to maintain safety despite working your plan, call 911 or go to my nearest emergency department.         Patient helped develop this safety plan and agreed to use it when needed.  Pt has been given a copy of this plan.          Today s date:  November 2, 2023  Completed by Clinician Name/ Credentials: SAMUEL Miner, SHANKRA        Adapted from Safety Plan Template 2008 Taisha Mac and [employer] is reprinted with the express permission of the authors.  No portion of the Safety Plan Template may be reproduced without the express, written permission.  You can contact the authors at [email or web address] or [email or web address].       "

## 2023-11-02 NOTE — PROGRESS NOTES
11/01/23 1900   Group Therapy Session   Group Attendance attended group session   Time Session Began 1715   Time Session Ended 1800   Total Time (minutes) 35   Total # Attendees 7   Group Type recreation   Group Topic Covered relaxation techniques   Group Session Detail stress reduction   Patient Response/Contribution cooperative with task   Patient Participation Detail Pt actively participated in a structured Therapeutic Recreation group with a focus on leisure participation, socializing, and exercise. Pt participated in the guided exercise, but left group for a little while in the middle of the group. Pt followed along, engaged in the guided chair exercise routine and added to the discussion prompts throughout the routine. Towards the beginning of the group, pt seemed to be laughing to himself incongruently to the group. Pt was encouraged to use positive imagery with the deep breathing and stretching to foster relaxation, improves focus, and reduce stress.

## 2023-11-02 NOTE — PLAN OF CARE
"Goal Outcome Evaluation:    Plan of Care Reviewed With: patient         Problem: Depressive Signs/Symptoms  Goal: Improved Mood Symptoms (Depressive Signs/Symptoms)  Outcome: Progressing     Nursing Assessment    Recent Vitals: B/P:134/91 T:98.3  P:83    General Shift Summary  Visible in milieu and social with staff and peers. Pt had no behavioral concerns and is very complimentary towards staff. Pt reports mood as being, \"really good\" tonight and states that they are feeling very grateful. Pt cites feeling this was because of all of the support they are getting from family and friends. Pt says that they are trying to stay up later tonight and to stay out of their bed more during the day since the provider mentioned to them that they are laying in bed a lot. Pt says they do that because there is not much to do here but eat and lay in bed, although they do appreciate the groups.     Patient is medication compliant and reported no side effects. Pt received PRN Seroquel with HS medication for sleep.     Hygiene and appetite are appropriate.     Pablo Ortiz RN     "

## 2023-11-03 PROCEDURE — 250N000013 HC RX MED GY IP 250 OP 250 PS 637

## 2023-11-03 PROCEDURE — 250N000013 HC RX MED GY IP 250 OP 250 PS 637: Performed by: STUDENT IN AN ORGANIZED HEALTH CARE EDUCATION/TRAINING PROGRAM

## 2023-11-03 PROCEDURE — 90853 GROUP PSYCHOTHERAPY: CPT

## 2023-11-03 PROCEDURE — 124N000002 HC R&B MH UMMC

## 2023-11-03 RX ADMIN — QUETIAPINE 150 MG: 100 TABLET ORAL at 20:19

## 2023-11-03 RX ADMIN — HYDROXYZINE HYDROCHLORIDE 25 MG: 25 TABLET, FILM COATED ORAL at 20:19

## 2023-11-03 RX ADMIN — RIVAROXABAN 20 MG: 10 TABLET, FILM COATED ORAL at 17:24

## 2023-11-03 RX ADMIN — BICTEGRAVIR SODIUM, EMTRICITABINE, AND TENOFOVIR ALAFENAMIDE FUMARATE 1 TABLET: 50; 200; 25 TABLET ORAL at 08:51

## 2023-11-03 RX ADMIN — RISPERIDONE 1 MG: 1 TABLET, ORALLY DISINTEGRATING ORAL at 20:19

## 2023-11-03 RX ADMIN — NICOTINE 1 PATCH: 21 PATCH, EXTENDED RELEASE TRANSDERMAL at 08:51

## 2023-11-03 RX ADMIN — SERTRALINE HYDROCHLORIDE 100 MG: 100 TABLET ORAL at 08:51

## 2023-11-03 RX ADMIN — RISPERIDONE 1 MG: 1 TABLET, ORALLY DISINTEGRATING ORAL at 08:51

## 2023-11-03 ASSESSMENT — ACTIVITIES OF DAILY LIVING (ADL)
LAUNDRY: WITH SUPERVISION
ADLS_ACUITY_SCORE: 28
HYGIENE/GROOMING: INDEPENDENT
DRESS: INDEPENDENT
ADLS_ACUITY_SCORE: 28
ORAL_HYGIENE: INDEPENDENT
ADLS_ACUITY_SCORE: 28

## 2023-11-03 NOTE — PLAN OF CARE
BEH IP Unit Acuity Rating Score (UARS)  Patient is given one point for every criteria they meet.    CRITERIA SCORING   On a 72 hour hold, court hold, committed, stay of commitment, or revocation 0    Patient LOS on BEH unit exceeds 20 days 0  LOS: 6   Patient under guardianship, 55+, otherwise medically complex, or under age 11 0   Suicide ideation without relief of precipitating factors 1   Current plan for suicide 0   Current plan for homicide 0   Imminent risk or actual attempt to seriously harm another without relief of factors precipitating the attempt 0   Severe dysfunction in daily living (ex: complete neglect for self care, extreme disruption in vegetative function, extreme deterioration in social interactions) 0   Recent (last 7 days) or current physical aggression in the ED or on unit 0   Restraints or seclusion episode in past 72 hours 0   Recent (last 7 days) or current verbal aggression, agitation, yelling, etc., while in the ED or unit 0   Active psychosis 0   Need for constant or near constant redirection (from leaving, from others, etc).  0   Intrusive or disruptive behaviors 0   TOTAL 1

## 2023-11-03 NOTE — PLAN OF CARE
Problem: Suicidal Behavior  Goal: Suicidal Behavior is Absent or Managed  Outcome: Progressing   Goal Outcome Evaluation:    Plan of Care Reviewed With: patient       Patient alert and oriented x 4. He was pleasant on approach. He appeared well-kempt. Flat affect noted. He reported that he was able to sleep well last night. He denied pain and all mental health symptoms. He was visible in the lounge, not social with peers. He's eating and drinking adequately. No behaviors noted this shift.

## 2023-11-03 NOTE — PROGRESS NOTES
Behavioral Health  Note   Behavioral Health  Spirituality Group Note     Unit 22    Name: Edi Sorenson    YOB: 1975   MRN: [id number]    Age: 48 year old     Patient attended -led group, which included discussion of spirituality, coping with illness and building resilience.   Patient attended group for [facility] - spirituality groups are not billed.   patient demonstrated an appreciation of topic's application for their personal circumstances.     Jorge Barillas  Staff    Page [phone]

## 2023-11-03 NOTE — PLAN OF CARE
"  11/02/23      Group Psychotherapy Session   Group Attendance Attended the group session   Time Session Began 17:15   Time Session Ended 18:10   Total Time (minutes) 55   Total # Attendees 3   Group Type Psychotherapy   Group Topic Covered Anxiety, depression, addiction   Group Session Detail During this session, each patient discussed a particular point of clinical concern that the patient considered to be bothering the patient. Each concern was assessed with the facilitator guiding other participants in providing positive/helpful feedback to the target person. Group members brainstormed on several positive strategies to address each issue that was brought for discussion.  Group also reviewed meaning making as a consideration for mental health and addiction. Specifically, patients were asked to brainstorm how the meanings they have concerning their problems might affect how they are affected by it. They also reviewed strategies to seek support from professionals in exploring the deeper meanings behind the problems or clinical challenges that they currently have. Facilitator assessed participants ' willingness to practice the feedback/skills learned. Group used trauma informed approach for feedback.   Patient Response/Contribution This patient engaged in the process. Reported that he has been struggling with addiction for a very long time. Reported that he struggles with keeping everything in moderation. Feedback was focused on this, which pt identified that he had not thought about addressing \"what addiction means to him\" with his psychotherapist in the past. Reported that he will start to seek ways to address this.   Patient Participation Detail Pt presented with congruent affect.       "

## 2023-11-03 NOTE — PLAN OF CARE
Team Note Due:  Monday    Assessment/Intervention/Current Symtoms and Care Coordination:  - chart review  - team meeting - discussed pt progress, symptomology, and response to treatment.  Discussed the discharge plan and any potential impediments to discharge.  - post team rounds team meeting / discussion    - see referral status regarding [facility] - [facility] has been [facility] - intake will reach out. patient updated on this.       Discharge Plan or Goal:  Pending stabilization & development of a safe discharge plan.  Considerations include: Co Occurring substance use program - residential        Barriers to Discharge:  Requires discharge to [facility] with Lodging or Residential [facility] program.     No longer endorses SI , however precipitating factors in the community leading to SI remain present if were to discharge back to same environment /situation without first attending recommended level of care.        Referral Status:  Substance Use Programming   Referrals for residential programming placed on 10/26/23 - assessment completed while in ED - referrals to:    [facility]  [address] [place] [address], [address] [place] [address]  Phone: [phone] Fax: [phone]  Email: [email or web address]  - 10/31/23- message sent to residential admissions team    [facility] [address] [place] [address]  Phone: [phone] Fax: [phone] [email or web address]  -10/31/23 - spoke with intake with [facility] -- they will contact this writer after further review.  -11/1/23 - voicemail from [facility] - asked for call back to clarify further information / questions: - does patient have any history of criminal sexual charges - is patient under a commitment and - is there a secondary insurance (these are standard questions for their referral/assessment process  -11/2 - message from Clinical   Eliot requesting recent notes for ongoing review - notes sent.  - 11/3 message from Eliot - patient has been approved by clinical team and an intake team member will reach out regarding admissions.     [facility]  [address], [place] [address]  Admissions Phone: [phone]  [facility] Plus waitlist: [phone]  [email or web address]  11/1/23 - voicemail left with admissions  11/2   11:00 am- [facility] - including clarifying that symptoms of psychosis have not been present for many days. Appears related to substance use as is consistent with resolution in a time frame indicating substance induced psychosis episode. Also clarified with provider that is ready for next level of care. Was informed that nurse also reviews and they would be coming by unit to speak with with him soon on the unit - at that time I joined team rounds and discussed this with Edi.   12:00 pm - call back from program - spoke with staff again, stating nurse reviewed and given that last time he attended it was also reported he was psychiatrically stable he had been on station 20 for 7 days d then came to program and left . We discussed further - they state will need to have supervisor review who is not in until tomorrow. They will reach out again to this writer.     Legal Status:  voluntary    Contacts:  [facility] - Clinical Assessment Counselor  [email or web address]  Direct: [phone]  Fax: [phone]        Upcoming Meetings and Dates/Important Information and next steps:  CTC will continue to follow up with [facility] programs as noted in referral status section of this note.   - follow up with [facility] - intake team is to reach out.

## 2023-11-03 NOTE — PLAN OF CARE
Patient appeared to have slept for 6.25 hours with breathing unlabored during the safety checks. He was up once for snacks and went back to sleep without difficulty. No behavioral concerns were noted or reported during this shift.     Problem: Suicidal Behavior  Goal: Suicidal Behavior is Absent or Managed  Outcome: Progressing     Problem: Sleep Disturbance  Goal: Adequate Sleep/Rest  Outcome: Progressing   Goal Outcome Evaluation:

## 2023-11-03 NOTE — PROGRESS NOTES
"  ----------------------------------------------------------------------------------------------------------  [facility]  Psychiatry Progress Note  Hospital Day #6     Interim History:     The patient's care was discussed with the treatment team and chart notes were reviewed.    Vitals: VSS  Sleep: 6.25 hours (11/03/23 0600)  Scheduled medications: Took all scheduled medications as prescribed  Psychiatric PRN medications: PRN hydroxyzine 25 mg and quetiapine 150 mg requested and given    Staff Report:   No acute events or safety concerns overnight. Please see staff notes for details.      Subjective:     Patient Interview:  Edi Sorenson was interviewed at the end of the hallway.  Patient reports that he is doing \"alright\". Pt reports AH of hissing again yesterday. He heard the noise during the night. Pt does not feel the AH bother him. Patient feels that he is still in a lot of emotional pain and does not feel \"solid\".     Patient reports having troubles falling asleep. Patient is agreeable to increasing his dose of Seroquel.     Pt judgement is improving as he believes that his ex fiances death is more likely to be due to overdose as opposed to the hot chip challenge. However is currently interested in getting information form the medical examiner.     Patient reports talking to [employer] about plans after discharge and is exciting about the potential opportunity.     ROS:  Patient has  lightheadedness , rib pain (unchanged)  Patient denies acute concerns     Objective:     Vitals:  /82 (BP Location: Left arm, Patient Position: Sitting, Cuff Size: Adult Regular)   Pulse 85   Temp 97.9  F (36.6  C) (Oral)   Resp 18   Ht 1.829 m (6')   Wt 85.4 kg (188 lb 4.8 oz)   SpO2 98%   BMI 25.54 kg/m      Allergies:  No Known Allergies    Current Medications:  Scheduled:  Current Facility-Administered Medications   Medication Dose Route Frequency    " "bictegravir-emtricitabine-tenofovir  1 tablet Oral Daily    nicotine  1 patch Transdermal Daily    nicotine   Transdermal Q8H    risperiDONE  1 mg Oral BID    rivaroxaban ANTICOAGULANT  20 mg Oral Daily with supper    sertraline  100 mg Oral Daily       PRN:  Current Facility-Administered Medications   Medication Dose Route Frequency    acetaminophen  650 mg Oral Q4H PRN    sore throat  1 lozenge Buccal Q1H PRN    famotidine  10 mg Oral BID PRN    gabapentin  200 mg Oral TID PRN    hydrOXYzine  25 mg Oral Q4H PRN    melatonin  3 mg Oral At Bedtime PRN    nicotine  4 mg Buccal Q1H PRN    OLANZapine  10 mg Oral TID PRN    Or    OLANZapine  10 mg Intramuscular TID PRN    ondansetron  4 mg Oral Q6H PRN    polyethylene glycol  17 g Oral Daily PRN    QUEtiapine  100 mg Oral At Bedtime PRN       Labs and Imaging:  New results:   No results found for this or any previous visit (from the past 24 hour(s)).      Data this admission:  - CBC unremarkable  - CMP unremarkable  - TSH normal  - UDS positive for amphetamines, fentanyl, cocaine, benzodiazepines, and cannabinoids  - Hgb A1c mildly elevated at 5.7  - Lipids unremarkable  - Vit B12 normal  - Folate normal  - Urinalysis unremarkable but with mucus present  - GC/CT negative     Mental Status Exam:     Oriented to:  Grossly Oriented  General:  Awake and Alert  Appearance:  appears stated age  Behavior/Attitude:  Cooperative, Engaged, and Open  Eye Contact: Appropriate  Psychomotor: Normal no catatonia present  Speech:  appropriate volume/tone  Language: Fluent in English with appropriate syntax and vocabulary.  Mood:  \"I'm doing better\"  Affect:  appropriate, congruent with mood, broad/full, and animated  Thought Process:  linear, coherent, goal directed, rambling, and tangential  Thought Content:   No HI, No VH, and auditory hallucinations of sounds from TV ; No apparent delusions  Associations:  intact  Insight:  good as he is able to verbalize that his wife passed from " an overdose rather than a hot chip   Judgment:  fair as he admits that he gets focused on being discharged to treatment before optimizing his mental health   Impulse control: limited  Attention Span:  grossly intact  Concentration:  grossly intact  Recent and Remote Memory:  not formally assessed  Fund of Knowledge: average  Muscle Strength and Tone: normal  Gait and Station: Normal     Psychiatric Assessment     Edi Sorenson is a 48 year old male with previous psychiatric diagnoses of MDD, polysubstance use disorder (amphetamine, cocaine, cannabis, opiates, benzo) and substance-induced psychosis, and past medical history of HIV and 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 Most recent psychiatric hospitalization was at [facility] in Aug 2023 for SI and AH which he left AMA. Significant symptoms on admission include SI, AH, and occasional VH. The MSE on admission was pertinent for SI, AH, and occasional VH. His UDS was positive for multiple substances including cocaine, amphetamine, fentanyl, benzo, cannabis. Biological contributions to mental health presentation include daily polysubstance use, past psychiatric diagnosis of MDD and substance-induced psychosis. Psychological contributions to mental health presentation include maladaptive coping skill. Social factors contributing to mental health presentation include recent loss of his fiance. Protective factors include motivation and voluntary engagement in  treatment.      In summary, the patient's reported symptoms of SI, AH, and VH in the context of substance use are consistent with previous diagnosis of substance-induced psychosis and substance-induced mood disorder worsened by recent psychosocial stressor vs MDD with psychosis. He will likely benefit from this admission.     Given that he currently has SI and psychosis, patient warrants inpatient psychiatric  hospitalization to maintain his safety.      Psychiatric Plan by Diagnosis      Today's changes:  - Nighttime seroquel PRN increased from 100mg to 150mg      # Substance-induced psychosis and Substance-induced mood disorder  1. Medications:  - Continue risperidone 1 mg bid  - Continue sertraline 100 mg daily  - Continue prn quetiapine 150mg at bedtime     - Patient will be treated in therapeutic milieu with appropriate individual and group therapies as described     Psychiatric Hospital Course:      Edi Sorenson was admitted to Station 22 as a voluntary patient.   Medications:  Risperidone 1mg BID, sertraline 100mg Qday, mirtazapine 15mg at bedtime from ED were continued.   New medications started at the time of admission include PRN Quetiapine 50mg at bedtime. Increased to 100 mg PRN at bedtime on 10/30. Further increased to 150mg on 11/3.   On 11/2, mirtazapine 15mg was discontinued for concern of zachary and him being on two antidepressants      The risks, benefits, alternatives, and side effects were discussed and understood by the patient.     Medical Assessment and Plan     Medical diagnoses to be addressed this admission:    # HIV  - had not taken ART for at least past year, most recent CD4 count 699  - Continue biktarvy daily  - Medicine consult 10/29 recommend follow up with PCP to establish routine care post-admission     # Hx of PE/DVT  - Continue rivaroxaban 15 mg daily  - notify medicine if patient c/o chest pain or SOB, or develops persistent tachycardia or has O2 sats <90%     # Abdominal discomfort during urination  - UA normal 10/28  - Med consult 10/29: not consistent with UTI, kidney stone, pyelonephritis; GC/CT testing negative 10/30    Medical course: Patient was physically examined by the ED prior to being transferred to the unit and was found to be medically stable and appropriate for admission.     Consults: medicine for HIV/possible UTI management     Checklist     Legal Status: Voluntary      Safety Assessment:   Behavioral Orders   Procedures    Code 1 - Restrict to Unit    Discontinue 1:1 attendant for suicide risk     Order Specific Question:   I have performed an in person assessment of the patient     Answer:   Based on this assessment the patient no longer requires a one on one attendant at this point in time.     Order Specific Question:   Rationale     Answer:   Patient States able to remain safe in hospital    Routine Programming     As clinically indicated    Status 15     Every 15 minutes.    Suicide precautions     Patients on Suicide Precautions should have a Combination Diet ordered that includes a Diet selection(s) AND a Behavioral Tray selection for Safe Tray - with utensils, or Safe Tray - NO utensils         Risk Assessment:  Risk for harm is moderate.  Risk factors: SI, maladaptive coping, substance use, and past behaviors  Protective factors: family and engaged in treatment     SIO: no    Disposition: TBD. Pending stabilization, medication optimization, & development of a safe discharge plan.     Attestations     Stuart Saldana DO   PGY2 Psychiatry     This patient has been seen and evaluated by me, Ryan Joseph.  I have discussed this patient with the psychiatry resident and I agree with the findings and plan in this note.    I have reviewed today's vital signs, medications, labs and imaging.     Ryan Elder MD on 11/6/2023 at 2:57 PM

## 2023-11-04 PROCEDURE — 250N000013 HC RX MED GY IP 250 OP 250 PS 637

## 2023-11-04 PROCEDURE — 124N000002 HC R&B MH UMMC

## 2023-11-04 PROCEDURE — 250N000013 HC RX MED GY IP 250 OP 250 PS 637: Performed by: STUDENT IN AN ORGANIZED HEALTH CARE EDUCATION/TRAINING PROGRAM

## 2023-11-04 RX ADMIN — SERTRALINE HYDROCHLORIDE 100 MG: 100 TABLET ORAL at 08:20

## 2023-11-04 RX ADMIN — RISPERIDONE 1 MG: 1 TABLET, ORALLY DISINTEGRATING ORAL at 08:19

## 2023-11-04 RX ADMIN — BICTEGRAVIR SODIUM, EMTRICITABINE, AND TENOFOVIR ALAFENAMIDE FUMARATE 1 TABLET: 50; 200; 25 TABLET ORAL at 08:20

## 2023-11-04 RX ADMIN — RISPERIDONE 1 MG: 1 TABLET, ORALLY DISINTEGRATING ORAL at 19:43

## 2023-11-04 RX ADMIN — RIVAROXABAN 20 MG: 10 TABLET, FILM COATED ORAL at 18:05

## 2023-11-04 RX ADMIN — QUETIAPINE 150 MG: 100 TABLET ORAL at 19:43

## 2023-11-04 RX ADMIN — NICOTINE 1 PATCH: 21 PATCH, EXTENDED RELEASE TRANSDERMAL at 08:20

## 2023-11-04 ASSESSMENT — ACTIVITIES OF DAILY LIVING (ADL)
ADLS_ACUITY_SCORE: 28
ADLS_ACUITY_SCORE: 28
DRESS: INDEPENDENT
ADLS_ACUITY_SCORE: 28
ADLS_ACUITY_SCORE: 28
ORAL_HYGIENE: INDEPENDENT
ADLS_ACUITY_SCORE: 28
ADLS_ACUITY_SCORE: 28
DRESS: INDEPENDENT
HYGIENE/GROOMING: INDEPENDENT;SHOWER
ADLS_ACUITY_SCORE: 28
ADLS_ACUITY_SCORE: 28
ORAL_HYGIENE: INDEPENDENT
LAUNDRY: WITH SUPERVISION
ADLS_ACUITY_SCORE: 28
HYGIENE/GROOMING: INDEPENDENT
ADLS_ACUITY_SCORE: 28

## 2023-11-04 NOTE — PLAN OF CARE
"  Problem: Depressive Signs/Symptoms  Goal: Improved Mood Symptoms (Depressive Signs/Symptoms)  Outcome: Progressing   Goal Outcome Evaluation:    Plan of Care Reviewed With: patient       Patient alert and oriented X 4 with stable vitals. Patient was calmed and cooperative. Spent  majority of the shift in the room sleeping on and off. Came out for breakfast and lunch ate 100%.    Affect flat and blunted  denied all psychosis symptoms and stated \"I am okay\".  Patient didn't attend group. Patient denied pain or any discomfort. No behavior issues or any safety concern observed on this shift.            "

## 2023-11-04 NOTE — PLAN OF CARE
Patient was sleeping when received and appeared to have slept for 6.25 hours without acute distress. He woke up around 0400 hours, requested and received cereal. No behavioral concerns noted or reported during this shift.     Problem: Suicidal Behavior  Goal: Suicidal Behavior is Absent or Managed  Outcome: Progressing     Problem: Sleep Disturbance  Goal: Adequate Sleep/Rest  Outcome: Progressing   Goal Outcome Evaluation:

## 2023-11-04 NOTE — PLAN OF CARE
11/03/23     Group Therapy Session   Group Attendance attended group session   Time Session Began 17:15   Time Session Ended 18:15   Total Time (minutes) 60      Total # Attendees 3   Group Type Psychotherapy   Group Topic Covered Psychotherapy on addictive behaviors and ways to help the brain heal through abstinence and harm reduction.   Group Session Detail This patient participated in a group that processed current stressors that each participant was facing, and which addressed strategies to deal effectively with anger, as well as ways to seek confirmation of reality, with the help of staff. Also reviewed ways to prevent drug/alcohol abuse.    Patient Response/Contribution In this group, patients discussed their journeys with addictive behaviors and use of substances. Focusing on ways to prevent drug use/abuse, patients reviewed with the facilitator, strategies and interventions that have been helpful for maintaining good, healthy behaviors. Reviewed the impacts of drugs on brain development, growth and stability.    Patient Participation Detail This patient attended this session. The patient reported that it is his goal to stop using drugs, but he has not found an effective way to do that. He reported that his most productive times are when he is not using. Pt reported that this group was helpful. Presented with a congruent affect.    Luis E Zelaya, Ph.D., [facility].

## 2023-11-04 NOTE — PLAN OF CARE
Problem: Suicidal Behavior  Goal: Suicidal Behavior is Absent or Managed  Outcome: Progressing   Goal Outcome Evaluation:    Plan of Care Reviewed With: patient        Pt was mostly withdrawn and isolative, avoiding social interaction. Was seen listening the music using headphones, pacing. Denies SI/HI. Endorsed anxiety rating at 5/10 and depressing rating at 5/10. Pt requested PRN Seroquel and hydroxyzine together with HS medication. When the writer inquired the trigger for depression and anxiety, pt stated that he has a lot to think about which pt did not wish to share. Pt denied pain. Attended evening group. Ate 100% of dinner served with adequate fluid in take.

## 2023-11-05 PROCEDURE — 250N000013 HC RX MED GY IP 250 OP 250 PS 637: Performed by: STUDENT IN AN ORGANIZED HEALTH CARE EDUCATION/TRAINING PROGRAM

## 2023-11-05 PROCEDURE — 250N000013 HC RX MED GY IP 250 OP 250 PS 637

## 2023-11-05 PROCEDURE — 124N000002 HC R&B MH UMMC

## 2023-11-05 RX ADMIN — BICTEGRAVIR SODIUM, EMTRICITABINE, AND TENOFOVIR ALAFENAMIDE FUMARATE 1 TABLET: 50; 200; 25 TABLET ORAL at 09:40

## 2023-11-05 RX ADMIN — HYDROXYZINE HYDROCHLORIDE 25 MG: 25 TABLET, FILM COATED ORAL at 19:31

## 2023-11-05 RX ADMIN — SERTRALINE HYDROCHLORIDE 100 MG: 100 TABLET ORAL at 09:40

## 2023-11-05 RX ADMIN — RISPERIDONE 1 MG: 1 TABLET, ORALLY DISINTEGRATING ORAL at 09:41

## 2023-11-05 RX ADMIN — NICOTINE 1 PATCH: 21 PATCH, EXTENDED RELEASE TRANSDERMAL at 09:46

## 2023-11-05 RX ADMIN — RIVAROXABAN 20 MG: 10 TABLET, FILM COATED ORAL at 18:29

## 2023-11-05 RX ADMIN — QUETIAPINE 150 MG: 100 TABLET ORAL at 19:31

## 2023-11-05 RX ADMIN — RISPERIDONE 1 MG: 1 TABLET, ORALLY DISINTEGRATING ORAL at 19:27

## 2023-11-05 ASSESSMENT — ACTIVITIES OF DAILY LIVING (ADL)
ORAL_HYGIENE: INDEPENDENT
ADLS_ACUITY_SCORE: 28
DRESS: SCRUBS (BEHAVIORAL HEALTH);INDEPENDENT
ADLS_ACUITY_SCORE: 28
LAUNDRY: WITH SUPERVISION
ADLS_ACUITY_SCORE: 28
HYGIENE/GROOMING: INDEPENDENT
ADLS_ACUITY_SCORE: 28

## 2023-11-05 NOTE — PLAN OF CARE
Patient was sleeping when received and appeared to have slept for 7.5 hours. He was up a couple of times and politely requested snacks and candies. No behavioral concerns were noted or reported during this shift.     Problem: Suicidal Behavior  Goal: Suicidal Behavior is Absent or Managed  Outcome: Progressing     Problem: Sleep Disturbance  Goal: Adequate Sleep/Rest  Outcome: Progressing   Goal Outcome Evaluation:

## 2023-11-05 NOTE — PLAN OF CARE
Goal Outcome Evaluation:    Plan of Care Reviewed With: patient      Patient has been spending part of the shift out in the milieu watching television with peers .  He did ask staff with assistance doing his laundry today.  Interactive with writer in wanting to find pictures of the treatment facility he maybe going to [facility] in [place], [place].  When he saw the pictures he appeared excited about the possibility of placement.  Edi's affect  brightened on approach and mood was calm and cooperative.  Thoughts are clear and logical.  Patient was medication compliant and no PRN medications requested of given.  Will continue plan of care.

## 2023-11-05 NOTE — PLAN OF CARE
Problem: Depressive Signs/Symptoms  Goal: Optimized Energy Level (Depressive Signs/Symptoms)  Outcome: Progressing  Note: Patient spending time out in the milieu. Watching tv with others. His affect is bright and his mood has been calm. He initiated showering this evening. Requesting candy from time to time. He is appropriate with his requests. Thoughts are clear and organized. He is med compliant. Requested and given Seroquel 150 mg at 1950 for anxiety. He denies SI and SIB. Ivon Preston RN     Goal Outcome Evaluation:    Plan of Care Reviewed With: (P) patient

## 2023-11-06 PROCEDURE — 250N000013 HC RX MED GY IP 250 OP 250 PS 637

## 2023-11-06 PROCEDURE — 250N000013 HC RX MED GY IP 250 OP 250 PS 637: Performed by: STUDENT IN AN ORGANIZED HEALTH CARE EDUCATION/TRAINING PROGRAM

## 2023-11-06 PROCEDURE — 99232 SBSQ HOSP IP/OBS MODERATE 35: CPT | Mod: GC | Performed by: PSYCHIATRY & NEUROLOGY

## 2023-11-06 PROCEDURE — 124N000002 HC R&B MH UMMC

## 2023-11-06 PROCEDURE — H2032 ACTIVITY THERAPY, PER 15 MIN: HCPCS

## 2023-11-06 RX ORDER — MIRTAZAPINE 7.5 MG/1
7.5 TABLET, FILM COATED ORAL AT BEDTIME
Status: DISCONTINUED | OUTPATIENT
Start: 2023-11-06 | End: 2023-11-08

## 2023-11-06 RX ORDER — MULTIPLE VITAMINS W/ MINERALS TAB 9MG-400MCG
1 TAB ORAL DAILY
Status: DISCONTINUED | OUTPATIENT
Start: 2023-11-06 | End: 2023-11-14 | Stop reason: HOSPADM

## 2023-11-06 RX ORDER — CLOTRIMAZOLE 1 %
CREAM (GRAM) TOPICAL 2 TIMES DAILY PRN
Status: DISCONTINUED | OUTPATIENT
Start: 2023-11-06 | End: 2023-11-14 | Stop reason: HOSPADM

## 2023-11-06 RX ADMIN — RISPERIDONE 1 MG: 1 TABLET, ORALLY DISINTEGRATING ORAL at 08:50

## 2023-11-06 RX ADMIN — QUETIAPINE 150 MG: 100 TABLET ORAL at 20:45

## 2023-11-06 RX ADMIN — NICOTINE 1 PATCH: 21 PATCH, EXTENDED RELEASE TRANSDERMAL at 08:50

## 2023-11-06 RX ADMIN — HYDROXYZINE HYDROCHLORIDE 25 MG: 25 TABLET, FILM COATED ORAL at 15:46

## 2023-11-06 RX ADMIN — RIVAROXABAN 20 MG: 10 TABLET, FILM COATED ORAL at 16:45

## 2023-11-06 RX ADMIN — SERTRALINE HYDROCHLORIDE 100 MG: 100 TABLET ORAL at 08:50

## 2023-11-06 RX ADMIN — MIRTAZAPINE 7.5 MG: 7.5 TABLET, FILM COATED ORAL at 20:45

## 2023-11-06 RX ADMIN — MULTIPLE VITAMINS W/ MINERALS TAB 1 TABLET: TAB at 15:46

## 2023-11-06 RX ADMIN — BICTEGRAVIR SODIUM, EMTRICITABINE, AND TENOFOVIR ALAFENAMIDE FUMARATE 1 TABLET: 50; 200; 25 TABLET ORAL at 08:51

## 2023-11-06 RX ADMIN — RISPERIDONE 1 MG: 1 TABLET, ORALLY DISINTEGRATING ORAL at 19:55

## 2023-11-06 ASSESSMENT — ACTIVITIES OF DAILY LIVING (ADL)
ADLS_ACUITY_SCORE: 28
HYGIENE/GROOMING: INDEPENDENT
DRESS: INDEPENDENT
ORAL_HYGIENE: INDEPENDENT
ADLS_ACUITY_SCORE: 28
LAUNDRY: WITH SUPERVISION
ADLS_ACUITY_SCORE: 28

## 2023-11-06 NOTE — PROVIDER NOTIFICATION
11/06/23 1143   Interprofessional Rounds   Summary Patient isolating to room at times, anxiety and depression at 5.  Awaiting programming.   Participants psychiatrist;CTC;nursing   Behavioral Team Discussion   Participants Ryan Joseph MD; Molly BANDA; Macarena Alarcon RN   Progress Patient improving   Anticipated length of stay 3-5 days   Continued Stay Criteria/Rationale Patient continues to experience symptoms of depression and anxiety and is awaiting a treatment bed.   Medical/Physical stable   Precautions see below   Plan Psychiatric assessment/Medication management. Therapeutic Milieu. Individual care planning and after care planning. Patient to participate in unit groups and activities. Individual and group support on unit.  patient had URSZULA assessment and reccomendations were made - CTC will assist in following up with referrals.   Rationale for change in precautions or plan per assessment   Anticipated Discharge Disposition substance use treatment     PRECAUTIONS AND SAFETY    Behavioral Orders   Procedures    Code 1 - Restrict to Unit    Discontinue 1:1 attendant for suicide risk     Order Specific Question:   I have performed an in person assessment of the patient     Answer:   Based on this assessment the patient no longer requires a one on one attendant at this point in time.     Order Specific Question:   Rationale     Answer:   Patient States able to remain safe in hospital    Routine Programming     As clinically indicated    Status 15     Every 15 minutes.    Suicide precautions     Patients on Suicide Precautions should have a Combination Diet ordered that includes a Diet selection(s) AND a Behavioral Tray selection for Safe Tray - with utensils, or Safe Tray - NO utensils         Safety  Safety WDL: WDL  Patient Location: patient room, own  Observed Behavior: calm  Observed Behavior (Comment): calm in bed  Safety Measures: safety rounds completed  Diversional Activity:  television  Suicidality: Status 15

## 2023-11-06 NOTE — PLAN OF CARE
Problem: Suicidal Behavior  Goal: Suicidal Behavior is Absent or Managed  Outcome: Progressing   Goal Outcome Evaluation:         Pt was present in the milieu. Was calm and cooperative. Alert and oriented x4. Denies SI/HI. Endorsed anxiety rating at 5/10 and depressing rating at 7/10. Pt requested PRN Seroquel and hydroxyzine together with HS medication. Pt denied pain. Ate 100% of dinner served with adequate fluid in take.

## 2023-11-06 NOTE — PLAN OF CARE
Problem: Suicidal Behavior  Goal: Suicidal Behavior is Absent or Managed  Outcome: Progressing   Goal Outcome Evaluation:    Plan of Care Reviewed With: patient          At the beginning of the shift, pt was out in the milieu. Was withdrawn and isolative. Pacing at some point. Presented with blunt and flat affect. At 1600, pt endorsed anxiety rating at 7/10. Requested and given PRN hydroxyzine for anxiety. Pt attended group. Was compliant with all evening medication. Ate 100% of dinner served with adequate fluid intake. VSS.  At 2050, patient requested HS medication early. Pt requested Seroquel with HS medication.

## 2023-11-06 NOTE — PROGRESS NOTES
"  ----------------------------------------------------------------------------------------------------------  [facility]  Psychiatry Progress Note  Hospital Day #9     Interim History:     The patient's care was discussed with the treatment team and chart notes were reviewed.    Vitals: VSS  Sleep: 6.5 hours (11/06/23 0600)  Scheduled medications: Took all scheduled medications as prescribed  Psychiatric PRN medications: PRN atarax and Seroquel     Staff Report:   No acute events or safety concerns overnight. Please see staff notes for details.      Subjective:     Patient Interview:  Edi Sorenson was interviewed in his room.    Says that he continues to have issues with falling asleep. It takes about 2 hours to fall asleep. Has been taking the nighttime dose of seroquel, doesn't think it is helping and hasn't noticed the dose increase. Has been trying not to sleep during the day to make sure he is tired at night. Had nightmares last night, doesn't usually have them. Has dreams about getting high, but shares that these remind him of what he is trying to get away from.    Says this weekend was \"a bummer\" and he felt antsy, like things were going so slow. Misses going outside. Feels his depression is worse today than it had been on Friday. Discussed we had discontinued one of his anti-depressants on Friday but if he thinks it was helpful we could re-start it. Patient would like to try it again. Adding mirtazapine 15 mg at bedtime.     Feeling like he is ready to go to treatment.     Requests jock itch cream. Has a rash on his groin that is irritated and itchy. This has also been keeping him awake. Interested in taking multivitamins for some increased energy. Reviewed drinking water.     ROS:  Patient has  lightheadedness , rib pain (unchanged)  Patient denies acute concerns     Objective:     Vitals:  /71 (Patient Position: Sitting, Cuff Size: Adult Regular)   " "Pulse 85   Temp 97.9  F (36.6  C) (Temporal)   Resp 15   Ht 1.829 m (6')   Wt 87.3 kg (192 lb 6.4 oz)   SpO2 99%   BMI 26.09 kg/m      Allergies:  No Known Allergies    Current Medications:  Scheduled:  Current Facility-Administered Medications   Medication Dose Route Frequency    bictegravir-emtricitabine-tenofovir  1 tablet Oral Daily    nicotine  1 patch Transdermal Daily    nicotine   Transdermal Q8H    risperiDONE  1 mg Oral BID    rivaroxaban ANTICOAGULANT  20 mg Oral Daily with supper    sertraline  100 mg Oral Daily       PRN:  Current Facility-Administered Medications   Medication Dose Route Frequency    acetaminophen  650 mg Oral Q4H PRN    sore throat  1 lozenge Buccal Q1H PRN    famotidine  10 mg Oral BID PRN    gabapentin  200 mg Oral TID PRN    hydrOXYzine  25 mg Oral Q4H PRN    melatonin  3 mg Oral At Bedtime PRN    nicotine  4 mg Buccal Q1H PRN    OLANZapine  10 mg Oral TID PRN    Or    OLANZapine  10 mg Intramuscular TID PRN    ondansetron  4 mg Oral Q6H PRN    polyethylene glycol  17 g Oral Daily PRN    QUEtiapine  150 mg Oral At Bedtime PRN       Labs and Imaging:  New results:   No results found for this or any previous visit (from the past 24 hour(s)).      Data this admission:  - CBC unremarkable  - CMP unremarkable  - TSH normal  - UDS positive for amphetamines, fentanyl, cocaine, benzodiazepines, and cannabinoids  - Hgb A1c mildly elevated at 5.7  - Lipids unremarkable  - Vit B12 normal  - Folate normal  - Urinalysis unremarkable but with mucus present  - GC/CT negative     Mental Status Exam:     Oriented to:  Grossly Oriented  General:  Awake and Alert  Appearance:  appears stated age  Behavior/Attitude:  Cooperative, Engaged, and Open  Eye Contact: Appropriate  Psychomotor: Normal no catatonia present  Speech:  appropriate volume/tone  Language: Fluent in English with appropriate syntax and vocabulary.  Mood:  \"I'm doing better\"  Affect:  appropriate, congruent with mood, broad/full, " and animated  Thought Process:  linear, coherent, goal directed, rambling, and tangential  Thought Content:   No HI, No VH, and auditory hallucinations of sounds from TV ; No apparent delusions  Associations:  intact  Insight:  good as he is able to verbalize the past weekend was difficult and he would like to restart Mirtazapine 15mg at bedtime  Judgment:  good as he believes this is the best place for him now and is looking for treatment centers upon discharge.   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 Most recent psychiatric hospitalization was at [facility] in Aug 2023 for SI and AH which he left AMA. Significant symptoms on admission include SI, AH, and occasional VH. The MSE on admission was pertinent for SI, AH, and occasional VH. His UDS was positive for multiple substances including cocaine, amphetamine, fentanyl, benzo, cannabis. Biological contributions to mental health presentation include daily polysubstance use, past psychiatric diagnosis of MDD and substance-induced psychosis. Psychological contributions to mental health presentation include maladaptive coping skill. Social factors contributing to mental health presentation include recent loss of his fiance. Protective factors include motivation and voluntary engagement in MH treatment.      In summary, the patient's reported symptoms of SI, AH, and VH in the context of substance  use are consistent with previous diagnosis of substance-induced psychosis and substance-induced mood disorder worsened by recent psychosocial stressor vs MDD with psychosis. He will likely benefit from this admission.     Given that he currently has SI and psychosis, patient warrants inpatient psychiatric hospitalization to maintain his safety.      Psychiatric Plan by Diagnosis      Today's changes:  - Add mirtazapine 15mg at bedtime  - Add topical clotrimazole topical 1% cream for Tinea cruris   - Add multivitamin      # Substance-induced psychosis and Substance-induced mood disorder  1. Medications:  - Continue risperidone 1 mg bid  - Continue sertraline 100 mg daily  - Continue prn quetiapine 150mg at bedtime  - Mirtazapine 15mg at bedtime    # Tinea cruris  - Add topical clotrimazole topical 1% cream for Tinea cruris        - Patient will be treated in therapeutic milieu with appropriate individual and group therapies as described     Psychiatric Hospital Course:      Edi Sorenson was admitted to [facility] as a voluntary patient.   Medications:  Risperidone 1mg BID, sertraline 100mg Qday, mirtazapine 15mg at bedtime from ED were continued.   New medications started at the time of admission include PRN Quetiapine 50mg at bedtime. Increased to 100 mg PRN at bedtime on 10/30. Further increased to 150mg on 11/3.   On 11/2, mirtazapine 15mg was discontinued for concern of zachary and him being on two antidepressants   On 11/6, mirtazapine 15mg was restarted as he reported a depressed mood after discontinuing this medication.    Add topical clotrimazole topical 1% cream for Tinea cruris      The risks, benefits, alternatives, and side effects were discussed and understood by the patient.     Medical Assessment and Plan     Medical diagnoses to be addressed this admission:    # HIV  - had not taken ART for at least past year, most recent CD4 count 699  - Continue biktarvy daily  - Medicine consult 10/29 recommend  follow up with PCP to establish routine care post-admission     # Hx of PE/DVT  - Continue rivaroxaban 15 mg daily  - notify medicine if patient c/o chest pain or SOB, or develops persistent tachycardia or has O2 sats <90%     # Abdominal discomfort during urination  - UA normal 10/28  - Med consult 10/29: not consistent with UTI, kidney stone, pyelonephritis; GC/CT testing negative 10/30    Medical course: Patient was physically examined by the ED prior to being transferred to the unit and was found to be medically stable and appropriate for admission.     Consults: medicine for HIV/possible UTI management     Checklist     Legal Status: Voluntary     Safety Assessment:   Behavioral Orders   Procedures    Code 1 - Restrict to Unit    Discontinue 1:1 attendant for suicide risk     Order Specific Question:   I have performed an in person assessment of the patient     Answer:   Based on this assessment the patient no longer requires a one on one attendant at this point in time.     Order Specific Question:   Rationale     Answer:   Patient States able to remain safe in hospital    Routine Programming     As clinically indicated    Status 15     Every 15 minutes.    Suicide precautions     Patients on Suicide Precautions should have a Combination Diet ordered that includes a Diet selection(s) AND a Behavioral Tray selection for Safe Tray - with utensils, or Safe Tray - NO utensils         Risk Assessment:  Risk for harm is moderate.  Risk factors: SI, maladaptive coping, substance use, and past behaviors  Protective factors: family and engaged in treatment     SIO: no    Disposition: TBD. Pending stabilization, medication optimization, & development of a safe discharge plan.     Attestations     Stuart Saldana DO   PGY2 Psychiatry     This patient has been seen and evaluated by me, Ryan Joseph.  I have discussed this patient with the psychiatry resident and I agree with the findings and plan in this note.    I have  reviewed today's vital signs, medications, labs and imaging.     Ryan Elder MD on 11/6/2023 at 2:57 PM

## 2023-11-06 NOTE — PLAN OF CARE
ddendum         Team Note Due:  Monday     Assessment/Intervention/Current Symtoms and Care Coordination:  - chart review  - team meeting - discussed pt progress, symptomology, and response to treatment.  Discussed the discharge plan and any potential impediments to discharge.  - post team rounds team meeting / discussion     - see referral status regarding [facility] - patient has been accepted for [facility] - intake will reach out. patient updated on this.      -patient has a bed at [facility] on Monday 11/14.  They will provide transportation and we are awaiting word on time for transport.  -patient requesting that we make a referral for a different program, [facility], that a friend has told him about.  [facility] has requested this of the CD evaluator.       Discharge Plan or Goal:  Pending stabilization & development of a safe discharge plan.  Considerations include: Co Occurring substance use program - residential         Barriers to Discharge:  Requires discharge to [facility] with Lodging or Residential [facility]A program.      No longer endorses SI , however precipitating factors in the community leading to SI remain present if were to discharge back to same environment /situation without first attending recommended level of care.         Referral Status:  Substance Use Programming   Referrals for residential programming placed on 10/26/23 - assessment completed while in ED - referrals to:     [facility]  [address] [place] [address], [address] [place] [address]  Phone: [phone] Fax: [phone]  Email: [email or web address]  - 10/31/23- message sent to residential admissions team     [facility]   [address] [place] [address]  Phone: [phone] Fax: [phone] [email or web address]  -10/31/23 - spoke with intake with [facility] -- they will contact this writer after further review.  -11/1/23 - voicemail from [facility] - asked for call back to clarify further information / questions: - does patient have any history of criminal sexual charges - is patient under a commitment and - is there a secondary insurance (these are standard questions for their referral/assessment process  -11/2 - message from Clinical  Eliot requesting recent notes for ongoing review - notes sent.  - 11/3 message from Eliot - patient has been approved by clinical team and an intake team member will reach out regarding admissions.      [facility]  [address] [place] [address]  Admissions Phone: [phone]  [facility] waitlist: [phone]  [email or web address]  11/1/23 - voicemail left with admissions  11/2   11:00 am- spoke with staff from [facility] regarding referral - including clarifying that symptoms of psychosis have not been present for many days. Appears related to substance use as is consistent with resolution in a time frame indicating substance induced psychosis episode. Also clarified with provider that is ready for next level of care. Was informed that nurse also reviews and they would be coming by unit to speak with with him soon on the unit - at that time I joined team rounds and discussed this with Edi.   12:00 pm - call back from program - spoke with staff again, stating nurse reviewed and given that last time he attended it was also reported he was psychiatrically stable he had been on station 20 for 7 days d then came to program and left . We discussed further - they state will need to have supervisor review who is not in until tomorrow. They will reach out again to this writer.   -[facility] requesting updated [place] Suicide scale in order to make final determination. RN to complete this today.       [facility]: Referral was made on 11/6 at request of patient.  They are reviewing  and want to know if it is possible for patient to be started on Suboxone. It appears that this would be preferred if going into their program.    Referral Fax: [phone]  [email or web address]     [facility] Location : [address] [place] [address]  Phone: [phone] Fax: [phone]     [facility] Location: [address] [place] [address]  Phone: [phone] Fax: [phone]  Legal Status:  voluntary     Contacts:  [facility] - Clinical Assessment Counselor  [email or web address]  Direct: [phone]  Fax: [phone]        Upcoming Meetings and Dates/Important Information and next steps:  CTC will continue to follow up with [facility]A programs as noted in referral status section of this note.   Contact Quintin or Bethel at [facility] [phone] to let them know if it is appropriate for patient to be put on Suboxone prior to being placed in their program.

## 2023-11-06 NOTE — PLAN OF CARE
Goal Outcome Evaluation:    Plan of Care Reviewed With: patient      Problem: Adult Behavioral Health Plan of Care  Goal: Adheres to Safety Considerations for Self and Others  Intervention: Develop and Maintain Individualized Safety Plan  Recent Flowsheet Documentation  Taken 11/6/2023 0900 by Macarena Alarcon RN  Safety Measures: safety rounds completed     Patient noted to be quiet and isolative to his room. Patient appeared reluctant to respond to RN. Presented with flat blunted affect. Patient endorsed anxiety and depression of 5 respectively. He denied SI/HI and hallucinations.he was compliant with taking his morning medications.  Patient did not attend group therapy. He was just in and out of his room most often to make calls. No behavior outburst.

## 2023-11-06 NOTE — PLAN OF CARE
Problem: Sleep Disturbance  Goal: Adequate Sleep/Rest  Outcome: Progressing     Pt asleep at the start of shift. Slept a total of 6.5 hours per q15 safety checks. No s/s pain or discomfort noted.Even respirations , non labored breathing. No behavorial concerns.

## 2023-11-06 NOTE — CARE PLAN
Occupational Therapy Group Note      11/06/23 1448   Group Therapy Session   Group Attendance attended group session   Group Type expressive therapy   Group Topic Covered coping skills/lifestyle management;leisure exploration/use of leisure time;emotions/expression   Group Session Detail OT: [facility] for creative expression, promoting autonomy, building sense of self-worth, coping with stress/symptoms and opportunity to exercise cognitive skills (i.e. initiation, planning, organization, sequencing, sustained attention, follow-through, termination of task and overall safety awareness).      Patient Response/Contribution cooperative with task;organized    Patient joined group for 30 minutes.  Pt opted to work on a familiar 1-step project despite writer offering support with starting a novel project.  Pt appeared content but presented with some underlying boredom. Affect was congruent.

## 2023-11-06 NOTE — PROGRESS NOTES
11/6/2023    Group Therapy Session   Group Attendance attended group session   Time Session Began 1415   Time Session Ended 1500   Total Time (minutes) 20   Total # Attendees 6   Group Type psychotherapeutic   Group Topic Covered Group Topic Covered: coping skills/lifestyle management, emotions/expression   Group Session Detail Life Balance assessment and worksheet for planning to improve areas of wellbeing including social, emotional, financial, intellectual physical and spiritual   Patient Response/Contribution organized, expressed readiness to alter behaviors   Patient Participation Detail Pt joined group late but was able to catch up on activity very quickly. Reports he is doing well in social realm, poorly in financial and physical particularly working out and eating.

## 2023-11-07 LAB
ALBUMIN UR-MCNC: NEGATIVE MG/DL
APPEARANCE UR: CLEAR
BILIRUB UR QL STRIP: NEGATIVE
COLOR UR AUTO: NORMAL
GLUCOSE UR STRIP-MCNC: NEGATIVE MG/DL
HGB UR QL STRIP: NEGATIVE
KETONES UR STRIP-MCNC: NEGATIVE MG/DL
LEUKOCYTE ESTERASE UR QL STRIP: NEGATIVE
NITRATE UR QL: NEGATIVE
PH UR STRIP: 6.5 [PH] (ref 5–7)
RBC URINE: <1 /HPF
SP GR UR STRIP: 1.02 (ref 1–1.03)
SQUAMOUS EPITHELIAL: <1 /HPF
UROBILINOGEN UR STRIP-MCNC: NORMAL MG/DL
WBC URINE: 1 /HPF

## 2023-11-07 PROCEDURE — 250N000013 HC RX MED GY IP 250 OP 250 PS 637

## 2023-11-07 PROCEDURE — 99232 SBSQ HOSP IP/OBS MODERATE 35: CPT | Mod: GC | Performed by: PSYCHIATRY & NEUROLOGY

## 2023-11-07 PROCEDURE — 250N000013 HC RX MED GY IP 250 OP 250 PS 637: Performed by: STUDENT IN AN ORGANIZED HEALTH CARE EDUCATION/TRAINING PROGRAM

## 2023-11-07 PROCEDURE — 90853 GROUP PSYCHOTHERAPY: CPT

## 2023-11-07 PROCEDURE — 81001 URINALYSIS AUTO W/SCOPE: CPT | Performed by: PSYCHIATRY & NEUROLOGY

## 2023-11-07 PROCEDURE — 124N000002 HC R&B MH UMMC

## 2023-11-07 RX ADMIN — RISPERIDONE 1 MG: 1 TABLET, ORALLY DISINTEGRATING ORAL at 08:28

## 2023-11-07 RX ADMIN — MULTIPLE VITAMINS W/ MINERALS TAB 1 TABLET: TAB at 16:16

## 2023-11-07 RX ADMIN — QUETIAPINE 150 MG: 100 TABLET ORAL at 21:00

## 2023-11-07 RX ADMIN — RIVAROXABAN 20 MG: 10 TABLET, FILM COATED ORAL at 18:30

## 2023-11-07 RX ADMIN — NICOTINE POLACRILEX 4 MG: 4 GUM, CHEWING BUCCAL at 21:10

## 2023-11-07 RX ADMIN — BICTEGRAVIR SODIUM, EMTRICITABINE, AND TENOFOVIR ALAFENAMIDE FUMARATE 1 TABLET: 50; 200; 25 TABLET ORAL at 08:28

## 2023-11-07 RX ADMIN — MIRTAZAPINE 7.5 MG: 7.5 TABLET, FILM COATED ORAL at 21:00

## 2023-11-07 RX ADMIN — CLOTRIMAZOLE: 1 CREAM TOPICAL at 09:42

## 2023-11-07 RX ADMIN — SERTRALINE HYDROCHLORIDE 100 MG: 100 TABLET ORAL at 08:28

## 2023-11-07 RX ADMIN — RISPERIDONE 1 MG: 1 TABLET, ORALLY DISINTEGRATING ORAL at 20:15

## 2023-11-07 RX ADMIN — NICOTINE 1 PATCH: 21 PATCH, EXTENDED RELEASE TRANSDERMAL at 08:31

## 2023-11-07 ASSESSMENT — ACTIVITIES OF DAILY LIVING (ADL)
ADLS_ACUITY_SCORE: 28
ADLS_ACUITY_SCORE: 28
LAUNDRY: WITH SUPERVISION
ADLS_ACUITY_SCORE: 28
DRESS: INDEPENDENT
LAUNDRY: WITH SUPERVISION
ADLS_ACUITY_SCORE: 28
HYGIENE/GROOMING: INDEPENDENT
ADLS_ACUITY_SCORE: 28
ADLS_ACUITY_SCORE: 28
ORAL_HYGIENE: INDEPENDENT
DRESS: INDEPENDENT
ADLS_ACUITY_SCORE: 28
HYGIENE/GROOMING: INDEPENDENT
ORAL_HYGIENE: INDEPENDENT

## 2023-11-07 NOTE — CARE PLAN
11/06/23 1900   Group Therapy Session   Group Attendance attended group session   Time Session Began 1715   Time Session Ended 1800   Total Time (minutes) 45   Total # Attendees 3   Group Type expressive therapy   Group Topic Covered emotions/expression   Patient Response/Contribution cooperative with task       Art Therapy directive was to create art in response to a chosen positive affirmation. Pt's were given a handout with various positive affirmations and the group also discussed the process of using positive affirmations to improve mental health.  Goals of directive: emotional expression, emotional regulation, media exploration  Pt was an engaged participant, focused on task for the full duration of group. Pt finished artwork and briefly verbally processed with author and group. Pt wrote down an affirmation referring to self love. Pt also wrote down his children's names and talked about the importance of family in his life. Pts mood was calm.

## 2023-11-07 NOTE — PLAN OF CARE
"  Problem: Adult Inpatient Plan of Care  Goal: Absence of Hospital-Acquired Illness or Injury  Outcome: Progressing     Problem: Suicidal Behavior  Goal: Suicidal Behavior is Absent or Managed  Outcome: Progressing     Problem: Sleep Disturbance  Goal: Adequate Sleep/Rest  Outcome: Progressing   Goal Outcome Evaluation:  Ongoing     Observed to have slept for approx 7 hrs. On all Q 15\" rounds overnight w/ regular non-labored respirations and no reported or observed distress.  Safe, therapeutic environment maintained.                         "

## 2023-11-07 NOTE — PROGRESS NOTES
"  ----------------------------------------------------------------------------------------------------------  [facility]  Psychiatry Progress Note  Hospital Day #10     Interim History:     The patient's care was discussed with the treatment team and chart notes were reviewed.    Vitals: VSS  Sleep: 7 hours (11/07/23 0700)  Scheduled medications: Took all scheduled medications as prescribed  Psychiatric PRN medications: PRN atarax and Seroquel     Staff Report:   No acute events or safety concerns overnight. Please see staff notes for details.      Subjective:     Patient Interview:  Edi Sorenson was interviewed in his room. Says he is doing \"okay\" today but had a bad night because he was very itchy. Was unable to use any of the clotrimazole cream until this morning. Feels like the itching is better after using the cream but frustrated he wasn't able to use it last night.     Reports he asked [facility] to request placement at [facility] post-discharge as he felt this would be a good fit for him. Didn't think that [facility] would have worked well for him because it is more group-based and locked down. Discussed that [facility] preferred that the patient would be on suboxone. Patient has not been on suboxone before and would not prefer to start because he does not want to be dependent on another medication. Did report willingness to talk with an addiction medicine provider about the topic. States his goal is to get himself to a better place so he can get back into his community.    Reports he has needed to get up and urinate 5-6 times every night. Noticed this start about a week ago. No pain with urination. Not noticing any increased urination during the day. Agreeable to getting a UA to rule out any infection.    ROS:  Patient has  nocturia , rib pain (stable)  Patient denies acute concerns     Objective:     Vitals:  /83 (BP Location: Left arm, Patient Position: Sitting, " Cuff Size: Adult Regular)   Pulse 76   Temp 97.9  F (36.6  C) (Oral)   Resp 15   Ht 1.829 m (6')   Wt 87.5 kg (193 lb)   SpO2 95%   BMI 26.18 kg/m      Allergies:  No Known Allergies    Current Medications:  Scheduled:  Current Facility-Administered Medications   Medication Dose Route Frequency    bictegravir-emtricitabine-tenofovir  1 tablet Oral Daily    mirtazapine  7.5 mg Oral At Bedtime    multivitamin w/minerals  1 tablet Oral Daily    nicotine  1 patch Transdermal Daily    nicotine   Transdermal Q8H    risperiDONE  1 mg Oral BID    rivaroxaban ANTICOAGULANT  20 mg Oral Daily with supper    sertraline  100 mg Oral Daily       PRN:  Current Facility-Administered Medications   Medication Dose Route Frequency    acetaminophen  650 mg Oral Q4H PRN    sore throat  1 lozenge Buccal Q1H PRN    clotrimazole   Topical BID PRN    famotidine  10 mg Oral BID PRN    gabapentin  200 mg Oral TID PRN    hydrOXYzine  25 mg Oral Q4H PRN    melatonin  3 mg Oral At Bedtime PRN    nicotine  4 mg Buccal Q1H PRN    OLANZapine  10 mg Oral TID PRN    Or    OLANZapine  10 mg Intramuscular TID PRN    ondansetron  4 mg Oral Q6H PRN    polyethylene glycol  17 g Oral Daily PRN    QUEtiapine  150 mg Oral At Bedtime PRN       Labs and Imaging:  New results:   No results found for this or any previous visit (from the past 24 hour(s)).      Data this admission:  - CBC unremarkable  - CMP unremarkable  - TSH normal  - UDS positive for amphetamines, fentanyl, cocaine, benzodiazepines, and cannabinoids  - Hgb A1c mildly elevated at 5.7  - Lipids unremarkable  - Vit B12 normal  - Folate normal  - Urinalysis unremarkable but with mucus present  - GC/CT negative     Mental Status Exam:     Oriented to:  Grossly Oriented  General:  Awake and Alert  Appearance:  appears stated age  Behavior/Attitude:  Calm, Cooperative, Engaged, and Open  Eye Contact: Appropriate  Psychomotor: Normal no catatonia present  Speech:  appropriate  "volume/tone  Language: Fluent in English with appropriate syntax and vocabulary.  Mood:  \"okay\"  Affect:  appropriate, congruent with mood, broad/full, and animated  Thought Process:  coherent, goal directed, and tangential  Thought Content:   No HI and No VH; No apparent delusions  Associations:  intact  Insight:  good as he is able to advocate for the level of care he feels he needs after leaving the hospital, continues to commit to sobriety and working on mental health  Judgment:  fair as he has had some increased irritation with staff but has consistently apologized after the fact  Impulse control: limited  Attention Span:  grossly intact  Concentration:  grossly intact  Recent and Remote Memory:  not formally assessed  Fund of Knowledge: average  Muscle Strength and Tone: normal  Gait and Station: Normal     Psychiatric Assessment     Edi Sorenson is a 48 year old male with previous psychiatric diagnoses of MDD, polysubstance use disorder (amphetamine, cocaine, cannabis, opiates, benzo) and substance-induced psychosis, and past medical history of HIV and 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 Most recent psychiatric hospitalization was at [facility] in Aug 2023 for SI and AH which he left AMA. Significant symptoms on admission include SI, AH, and occasional VH. The MSE on admission was pertinent for SI, AH, and occasional VH. His UDS was positive for multiple substances including cocaine, amphetamine, fentanyl, benzo, cannabis. Biological contributions to mental health presentation include daily polysubstance use, past psychiatric diagnosis of MDD and substance-induced psychosis. Psychological contributions to mental health presentation include maladaptive coping skill. Social factors contributing to mental health presentation include recent loss of his fiance. Protective factors include motivation and voluntary " engagement in  treatment.      In summary, the patient's reported symptoms of SI, AH, and VH in the context of substance use are consistent with previous diagnosis of substance-induced psychosis and substance-induced mood disorder worsened by recent psychosocial stressor vs MDD with psychosis. He will likely benefit from this admission.     Given that he currently has SI and psychosis, patient warrants inpatient psychiatric hospitalization to maintain his safety.      Psychiatric Plan by Diagnosis      Today's changes:  - obtain UA for c/o increased urination at night     # Substance-induced psychosis and Substance-induced mood disorder  1. Medications:  - Continue risperidone 1 mg bid  - Continue sertraline 100 mg daily  - Continue prn quetiapine 150mg at bedtime  - Continue mirtazapine 15mg at bedtime    # Tinea cruris  - Continue topical clotrimazole topical 1% cream for Tinea cruris        - Patient will be treated in therapeutic milieu with appropriate individual and group therapies as described     Psychiatric Hospital Course:      Edi Sorenson was admitted to [facility] as a voluntary patient.   Medications:  Risperidone 1mg BID, sertraline 100mg Qday, mirtazapine 15mg at bedtime from ED were continued.   New medications started at the time of admission include PRN Quetiapine 50mg at bedtime. Increased to 100 mg PRN at bedtime on 10/30. Further increased to 150mg on 11/3.   On 11/2, mirtazapine 15mg was discontinued for concern of zachary and him being on two antidepressants   On 11/6, mirtazapine 15mg was restarted as he reported a depressed mood after discontinuing this medication.    Add topical clotrimazole topical 1% cream for Tinea cruris      The risks, benefits, alternatives, and side effects were discussed and understood by the patient.     Medical Assessment and Plan     Medical diagnoses to be addressed this admission:    # HIV  - had not taken ART for at least past year, most recent CD4 count  699  - Continue biktarvy daily  - Medicine consult 10/29 recommend follow up with PCP to establish routine care post-admission     # Hx of PE/DVT  - Continue rivaroxaban 15 mg daily  - notify medicine if patient c/o chest pain or SOB, or develops persistent tachycardia or has O2 sats <90%     # Abdominal discomfort during urination  - UA normal 10/28  - Med consult 10/29: not consistent with UTI, kidney stone, pyelonephritis; GC/CT testing negative 10/30    Medical course: Patient was physically examined by the ED prior to being transferred to the unit and was found to be medically stable and appropriate for admission.     Consults: medicine for HIV/possible UTI management     Checklist     Legal Status: Voluntary     Safety Assessment:   Behavioral Orders   Procedures    Code 1 - Restrict to Unit    Discontinue 1:1 attendant for suicide risk     Order Specific Question:   I have performed an in person assessment of the patient     Answer:   Based on this assessment the patient no longer requires a one on one attendant at this point in time.     Order Specific Question:   Rationale     Answer:   Patient States able to remain safe in hospital    Routine Programming     As clinically indicated    Status 15     Every 15 minutes.    Suicide precautions     Patients on Suicide Precautions should have a Combination Diet ordered that includes a Diet selection(s) AND a Behavioral Tray selection for Safe Tray - with utensils, or Safe Tray - NO utensils         Risk Assessment:  Risk for harm is moderate.  Risk factors: SI, maladaptive coping, substance use, and past behaviors  Protective factors: family and engaged in treatment     SIO: no    Disposition: TBD. Pending stabilization, medication optimization, & development of a safe discharge plan.     Attestations     Ena Glaser, MS3  [employer]    Resident/Fellow Attestation   I, Stuart Saldana DO, was present with the medical student who  participated in the service and in the documentation of the note.  I have verified the history and personally performed the physical exam and medical decision making.  I agree with the assessment and plan of care as documented in the note.          Stuart Saldana, DO   PGY2 Psychiatry       This patient has been seen and evaluated by me, Ryan Joseph.  I have discussed this patient with the psychiatry resident and I agree with the findings and plan in this note.    I have reviewed today's vital signs, medications, labs and imaging.     Ryan Elder MD on 11/7/2023 at 9:13 PM

## 2023-11-07 NOTE — PLAN OF CARE
"Team Note Due:  Monday     Assessment/Intervention/Current Symtoms and Care Coordination:  - chart review  - team meeting - discussed pt progress, symptomology, and response to treatment.  Discussed the discharge plan and any potential impediments to discharge.  - post team rounds team meeting / discussion    -Writer reached out to [facility] to follow up on pt's referral. Explained that the hospital tx team will not be starting pt on Suboxone at this time and will defer this to their addiction medicine doctor. They stated that they need confirmation that pt will be willing to meet with their addiction medicine doctor to discuss starting Suboxone. Added that their soonest opening at either program would be next Tuesday in [place].    -Called [facility] admission. They are currently screening pt's referral and will call writer back with updates.    -Received call back from [facility]. They are declining patient from their program due to \"behaviors on the unit\". CTC asked for clarification; was informed that pt is declined due to being snappy, disrespectful to RN (noted this morning), and is displaying similar behaviors as when he was in their program last year, when he ultimately \"exploded\" at a staff member and had to leave the program.    -Met with patient to check in and discuss referral status. Patient reported he is agreeable to meeting with an addiction medicine doctor to discuss starting Suboxone. Pt was agreeable to waiting until next Tuesday, which is the next available opening at [facility].    -Called back [facility]. Scheduled admission for Tuesday, 11/14. They will provide transportation and will pick pt up at 1:00pm. Pt will need to be sent with 30 days of medications.     Discharge Plan or Goal:  [facility] with lodging in [facility]     Barriers to Discharge:  Ongoing symptom stabilization, placement at [facility] (11/14)     Referral Status:  Substance Use " Programming   Referrals for residential programming placed on 10/26/23 - assessment completed while in ED - referrals to:     [facility]  [address] [facility] [place] [address]  Phone: [phone] Fax: [phone]  Email: [email or web address]  - 10/31/23- message sent to residential admissions team     [facility]   [address] [place] [address]  Phone: [phone] Fax: [phone] [email or web address]  -10/31/23 - spoke with intake with [facility] -- they will contact this writer after further review.  -11/1/23 - voicemail from [facility] - asked for call back to clarify further information / questions: - does patient have any history of criminal sexual charges - is patient under a commitment and - is there a secondary insurance (these are standard questions for their referral/assessment process  -11/2 - message from Clinical  [facility] requesting recent notes for ongoing review - notes sent.  - 11/3 message from [facility] - patient has been approved by clinical team and an intake team member will reach out regarding admissions.      [facility]  [address] [place] [address]  Admissions Phone: [phone]  [facility] waitlist: [phone]  [email or web address]  11/1/23 - voicemail left with admissions  11/2   11:00 am- spoke with staff from [employer] [facility] regarding referral - including clarifying that symptoms of psychosis have not been present for many days. Appears related to substance use as is consistent with resolution in a time frame indicating substance induced psychosis episode. Also clarified with provider that is ready for next level of care. Was informed that nurse also reviews and they would be coming by unit to speak with with him soon on the unit - at that time I joined team  rounds and discussed this with Edi.   12:00 pm - call back from program - spoke with staff again, stating nurse reviewed and given that last time he attended it was also reported he was psychiatrically stable he had been on station 20 for 7 days d then came to program and left . We discussed further - they state will need to have supervisor review who is not in until tomorrow. They will reach out again to this writer.   -[facility] requesting updated [place] Suicide scale in order to make final determination. RN to complete this today.       [facility]: Referral was made on 11/6 at request of patient.  They are reviewing and want to know if it is possible for patient to be started on Suboxone. It appears that this would be preferred if going into their program.    Referral Fax: [phone]  [email or web address]     [facility] Location : [address] [place] [address]  Phone: [phone] Fax: [phone]     [facility] Location: [address] [place] [address]  Phone: [phone] Fax: [phone]  Legal Status:  voluntary     Contacts:  [facility] - Clinical Assessment Counselor  [email or web address]  Direct: [phone]  Fax: [phone]        Upcoming Meetings and Dates/Important Information and next steps:  CTC will continue to follow up with [facility] programs as noted in referral status section of this note.

## 2023-11-07 NOTE — PLAN OF CARE
"Goal Outcome Evaluation:    Plan of Care Reviewed With: patient      Problem: Depressive Signs/Symptoms  Goal: Optimized Energy Level (Depressive Signs/Symptoms)  Outcome: Progressing  Intervention: Optimize Energy Level  Recent Flowsheet Documentation  Taken 11/7/2023 0900 by Macarena Alarcon RN  Diversional Activity: television  Activity (Behavioral Health): up ad shawn     Patient presented with flat blunted affect. Noted to be highly irritable and snappy. Observed to be isolative in his room. Patient claimed that he did not sleep well last night even though he was reported to have slept 7 hours. He snaps at RN and then apologizes. Patient declined to respond to assessment questions stating, \"all I need is to be left alone.\" Patient was out for breakfast. He demonstrates good appetite. Patient was invited to medication window for his morning medications. Patient requested for Jock cream which was not available immediately. That made him more irritable. RN later received medication from pharmacy and administered it.   He was compliant with his medications. Patient progressively became brighter and spent the later half of the shift in the milieu. He even played the guitar and made jokes on the unit.                  "

## 2023-11-07 NOTE — PLAN OF CARE
Duration: Met with patient for a total of 15 minutes.    Time start: 1245  Time end 1300    Modality Used:Motivational Interviewing and Brief Therapy    Goals: Discharge to residential CD treatment    Pt progress: Pt shared he is motivated to go to CD treatment. Pt is happy that it is a smaller program.     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 Discussed coping with responsibility and mental load.    Treatment Objective(s) Addressed:   The focus of this session was on identifying an appropriate aftercare plan and building self-esteem     Progress Towards Goals:   Patient reports improving symptoms. Patient is making progress towards treatment goals as evidenced by improved mood and motivation for change.     Therapeutic Intervention(s):   Provided active listening, unconditional positive regard, and validation. Explored motivation for future goals, staying sober. Explored motivation for behavioral change.    Plan/next step: Writer will continue to check in with Pt, encouraged Pt to attend group sessions.       Kathryn Forrester [facility]  Psychotherapist

## 2023-11-08 PROCEDURE — G0177 OPPS/PHP; TRAIN & EDUC SERV: HCPCS

## 2023-11-08 PROCEDURE — 124N000002 HC R&B MH UMMC

## 2023-11-08 PROCEDURE — 250N000013 HC RX MED GY IP 250 OP 250 PS 637

## 2023-11-08 PROCEDURE — 99232 SBSQ HOSP IP/OBS MODERATE 35: CPT | Mod: GC | Performed by: PSYCHIATRY & NEUROLOGY

## 2023-11-08 PROCEDURE — 250N000013 HC RX MED GY IP 250 OP 250 PS 637: Performed by: STUDENT IN AN ORGANIZED HEALTH CARE EDUCATION/TRAINING PROGRAM

## 2023-11-08 PROCEDURE — H2032 ACTIVITY THERAPY, PER 15 MIN: HCPCS

## 2023-11-08 RX ORDER — MIRTAZAPINE 15 MG/1
15 TABLET, FILM COATED ORAL AT BEDTIME
Status: DISCONTINUED | OUTPATIENT
Start: 2023-11-08 | End: 2023-11-14 | Stop reason: HOSPADM

## 2023-11-08 RX ADMIN — RISPERIDONE 1 MG: 1 TABLET, ORALLY DISINTEGRATING ORAL at 08:32

## 2023-11-08 RX ADMIN — MULTIPLE VITAMINS W/ MINERALS TAB 1 TABLET: TAB at 16:42

## 2023-11-08 RX ADMIN — SERTRALINE HYDROCHLORIDE 100 MG: 100 TABLET ORAL at 08:32

## 2023-11-08 RX ADMIN — RISPERIDONE 1 MG: 1 TABLET, ORALLY DISINTEGRATING ORAL at 20:39

## 2023-11-08 RX ADMIN — RIVAROXABAN 20 MG: 10 TABLET, FILM COATED ORAL at 16:42

## 2023-11-08 RX ADMIN — MIRTAZAPINE 15 MG: 15 TABLET, FILM COATED ORAL at 21:15

## 2023-11-08 RX ADMIN — BICTEGRAVIR SODIUM, EMTRICITABINE, AND TENOFOVIR ALAFENAMIDE FUMARATE 1 TABLET: 50; 200; 25 TABLET ORAL at 08:33

## 2023-11-08 RX ADMIN — NICOTINE 1 PATCH: 21 PATCH, EXTENDED RELEASE TRANSDERMAL at 08:35

## 2023-11-08 RX ADMIN — QUETIAPINE 150 MG: 100 TABLET ORAL at 21:15

## 2023-11-08 ASSESSMENT — ACTIVITIES OF DAILY LIVING (ADL)
ADLS_ACUITY_SCORE: 28
ORAL_HYGIENE: INDEPENDENT
LAUNDRY: WITH SUPERVISION
ADLS_ACUITY_SCORE: 28
HYGIENE/GROOMING: INDEPENDENT
DRESS: INDEPENDENT

## 2023-11-08 NOTE — PLAN OF CARE
Problem: Suicidal Behavior  Goal: Suicidal Behavior is Absent or Managed  Outcome: Progressing   Goal Outcome Evaluation:    Plan of Care Reviewed With: patient          Pt was present in the milieu. Was calm and cooperative. Was withdrawn and isolative. Pacing at the hallway. Pt attended group. Alert and oriented x4. Denies SI/HI. Endorsed anxiety rating at 5/10 and depressing rating at 7/10. Pt requested PRN Seroquel together with HS medication. Pt denied pain. Ate 100% of dinner served with adequate fluid in take

## 2023-11-08 NOTE — PLAN OF CARE
"Problem: Adult Behavioral Health Plan of Care  Goal: Optimized Coping Skills in Response to Life Stressors  Outcome: Progressing     Pt is bright and states he feels \"good, it all comes in waves\". When asked to elaborate, he states that he is speaking about \"missing my Solange\". Pt denies all SI/SIB/HI/AH/VH. At one point he states that he \"had a moment of grief\" but did not have any SI/SIB thoughts during this. He spent most of the shift out in the milieu, interacting with select peers. Pt says he feels \"hopeful now that I am clean\". Pt says he feels encouraged to continue to stay clean once he is out of the hospital. Pt requested and received PRN Nicorette 4mg @ 2110. Pt requested & received PRN Seroquel 150 mg @ 2100 to help with sleep.   "

## 2023-11-08 NOTE — DISCHARGE INSTRUCTIONS
Behavioral Discharge Planning and Instructions    Summary: You were admitted on 10/23/2023  due to Substance Induced Psychosis and Suicidal Ideations.  You were treated by Dr. Ryan Joseph and discharged on 11/14/23 from [facility] to [facility]    Main Diagnosis: Substance-induced psychosis and Substance-induced mood disorder    Health Care Follow-up:     Tuesday November 14, 2023 -   [facility] Treatment:  [facility] Location : [address] [place] [address]. [address] [place] [address]  Phone: [phone] Fax: [phone]    Mental Health :   [facility]  [address] [place] [address] Phone: [phone] option 2 - for new patients Fax: [phone]  Call the Mental Health Center when you know your daily schedule at your program and they will assist in scheduling you for follow up mental health services.     Attend all scheduled appointments with your outpatient providers. Call at least 24 hours in advance if you need to reschedule an appointment to ensure continued access to your outpatient providers.     Major Treatments, Procedures and Findings:  You were provided with: a psychiatric assessment, assessed for medical stability, medication evaluation and/or management, group therapy, individual therapy, CD evaluation/assessment, and milieu management    Symptoms to Report: increased confusion, losing more sleep, mood getting worse, or thoughts of suicide    Early warning signs can include: increased depression or anxiety sleep disturbances increased thoughts or behaviors of suicide or self-harm     Safety and Wellness:  Take all medicines as directed.  Make no changes unless your doctor suggests them.      Follow treatment recommendations.  Refrain from alcohol and non-prescribed drugs.     Resources:   Crisis Intervention: [phone] or [phone] (TTY: 521.897.7362).  Call anytime for  "help.  National [place] on Mental Illness (www.[place].eliot.org): 147.489.7757 or 425-628-8499.  Alcoholics Anonymous (www.alcoholics-anonymous.org): Check your phone book for your local chapter.  Self- Management and Recovery Training., SMART-- Toll free: 242.469.1577  www.[employer]  [facility] Crisis (COPE) Response - Adult 584 698-7529  Text 4 Life: txt \"LIFE\" to 31624 for immediate support and crisis intervention  Crisis text line: Text \"[place]\" to 163446. Free, confidential, 24/7.    General Medication Instructions:   See your medication sheet(s) for instructions.   Take all medicines as directed.  Make no changes unless your doctor suggests them.   Go to all your doctor visits.  Be sure to have all your required lab tests. This way, your medicines can be refilled on time.  Do not use any drugs not prescribed by your doctor.  Avoid alcohol.    Advance Directives:   Scanned document on file with [employer]? No scanned doc  Is document scanned? Pt states no documents  Honoring Choices Your Rights Handout: Informed and given  Was more information offered? Pt declined    The Treatment team has appreciated the opportunity to work with you. If you have any questions or concerns about your recent admission, you can contact the unit which can receive your call 24 hours a day, 7 days a week. They will be able to get in touch with a Provider if needed. The unit number is [phone] .  "

## 2023-11-08 NOTE — PROGRESS NOTES
"  ----------------------------------------------------------------------------------------------------------  [facility]  Psychiatry Progress Note  Hospital Day #11     Interim History:     The patient's care was discussed with the treatment team and chart notes were reviewed.    Vitals: VSS  Sleep: 6 hours (11/08/23 0600)  Scheduled medications: Took all scheduled medications as prescribed  Psychiatric PRN medications: PRN Seroquel requested and given    Staff Report:   No acute events or safety concerns overnight. Please see staff notes for details.      Subjective:     Patient Interview:  Edi Sorenson was interviewed at the end of the hallway. Reports he is feeling \"okay\" today, but a \"little groggy.\" Slept well last night but stayed up later to watch a TV show so thinks that is why he's groggier today. Reported that he is excited about his upcoming discharge to [facility] and confirmed this will happen on the 14th. Reviewed that our goal is to have him remain on the unit until this time so he can have a [id number] transition. Discussed increasing mirtazapine dose from 7.5 mg to 15 mg and patient agrees. Mentioned this may further help with sleep but patient to report any symptoms of increased grogginess or hyperactivity. Reviewed that patient's UA from yesterday was normal and therefore his increased urination is unlikely to be due to any infection.    Requested to have outside food brought in, reviewed that it must be sealed but that we can put an order in. Would also like to have some clothes dropped off so he will have something to wear when he discharges next week.      ROS:  Patient has  nocturia , rib pain (stable)  Patient denies acute concerns     Objective:     Vitals:  /84   Pulse 76   Temp 97.7  F (36.5  C)   Resp 16   Ht 1.829 m (6')   Wt 87.5 kg (193 lb)   SpO2 99%   BMI 26.18 kg/m      Allergies:  No Known Allergies    Current " Medications:  Scheduled:  Current Facility-Administered Medications   Medication Dose Route Frequency    bictegravir-emtricitabine-tenofovir  1 tablet Oral Daily    mirtazapine  7.5 mg Oral At Bedtime    multivitamin w/minerals  1 tablet Oral Daily    nicotine  1 patch Transdermal Daily    nicotine   Transdermal Q8H    risperiDONE  1 mg Oral BID    rivaroxaban ANTICOAGULANT  20 mg Oral Daily with supper    sertraline  100 mg Oral Daily       PRN:  Current Facility-Administered Medications   Medication Dose Route Frequency    acetaminophen  650 mg Oral Q4H PRN    sore throat  1 lozenge Buccal Q1H PRN    clotrimazole   Topical BID PRN    famotidine  10 mg Oral BID PRN    gabapentin  200 mg Oral TID PRN    hydrOXYzine  25 mg Oral Q4H PRN    melatonin  3 mg Oral At Bedtime PRN    nicotine  4 mg Buccal Q1H PRN    OLANZapine  10 mg Oral TID PRN    Or    OLANZapine  10 mg Intramuscular TID PRN    ondansetron  4 mg Oral Q6H PRN    polyethylene glycol  17 g Oral Daily PRN    QUEtiapine  150 mg Oral At Bedtime PRN       Labs and Imaging:  New results:   Recent Results (from the past 24 hour(s))   UA with Microscopic reflex to Culture    Collection Time: 11/07/23  3:52 PM    Specimen: Urine, Clean Catch   Result Value Ref Range    Color Urine Light Yellow Colorless, Straw, Light Yellow, Yellow    Appearance Urine Clear Clear    Glucose Urine Negative Negative mg/dL    Bilirubin Urine Negative Negative    Ketones Urine Negative Negative mg/dL    Specific Gravity Urine 1.022 1.003 - 1.035    Blood Urine Negative Negative    pH Urine 6.5 5.0 - 7.0    Protein Albumin Urine Negative Negative mg/dL    Urobilinogen Urine Normal Normal, 2.0 mg/dL    Nitrite Urine Negative Negative    Leukocyte Esterase Urine Negative Negative    RBC Urine <1 <=2 /HPF    WBC Urine 1 <=5 /HPF    Squamous Epithelials Urine <1 <=1 /HPF         Data this admission:  - CBC unremarkable  - CMP unremarkable  - TSH normal  - UDS positive for amphetamines,  "fentanyl, cocaine, benzodiazepines, and cannabinoids  - Hgb A1c mildly elevated at 5.7  - Lipids unremarkable  - Vit B12 normal  - Folate normal  - Urinalysis unremarkable but with mucus present; normal on 11/7  - GC/CT negative     Mental Status Exam:     Oriented to:  Grossly Oriented  General:  Awake and Alert  Appearance:  appears stated age  Behavior/Attitude:  Calm, Cooperative, Engaged, Easy to redirect, and Open  Eye Contact: Appropriate  Psychomotor: Normal no catatonia present  Speech:  appropriate volume/tone and with good articulation  Language: Fluent in English with appropriate syntax and vocabulary.  Mood:  \"okay\" and \"a little groggy\"  Affect:  appropriate, congruent with mood, stable, and flat  Thought Process:  coherent, goal directed, and tangential  Thought Content:   No HI and No VH; No apparent delusions  Associations:  intact  Insight:  good as he realizes staying in the hospital until he transfers next week will support his continued recovery  Judgment:  good as he is cooperative with treatment decisions and staff  Impulse control: partial  Attention Span:  grossly intact  Concentration:  grossly intact  Recent and Remote Memory:  not formally assessed  Fund of Knowledge: average  Muscle Strength and Tone: normal  Gait and Station: Normal     Psychiatric Assessment     Edi Sorenson is a 48 year old male with previous psychiatric diagnoses of MDD, polysubstance use disorder (amphetamine, cocaine, cannabis, opiates, benzo) and substance-induced psychosis, and past medical history of HIV and 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 Most recent psychiatric hospitalization was at [facility] in Aug 2023 for SI and AH which he left AMA. Significant symptoms on admission include SI, AH, and occasional VH. The MSE on admission was pertinent for SI, AH, and occasional VH. His UDS was positive for multiple " substances including cocaine, amphetamine, fentanyl, benzo, cannabis. Biological contributions to mental health presentation include daily polysubstance use, past psychiatric diagnosis of MDD and substance-induced psychosis. Psychological contributions to mental health presentation include maladaptive coping skill. Social factors contributing to mental health presentation include recent loss of his fiance. Protective factors include motivation and voluntary engagement in  treatment.      In summary, the patient's reported symptoms of SI, AH, and VH in the context of substance use are consistent with previous diagnosis of substance-induced psychosis and substance-induced mood disorder worsened by recent psychosocial stressor vs MDD with psychosis. He will likely benefit from this admission.     Given that he currently has SI and psychosis, patient warrants inpatient psychiatric hospitalization to maintain his safety.      Psychiatric Plan by Diagnosis      Today's changes:  - Increase mirtazapine from 7.5 mg to 15 mg at bedtime  - PCOs to allow for sealed food from home and to allow for clothes to be dropped off to store in his locker for him to wear when discharging.     # Substance-induced psychosis and Substance-induced mood disorder  1. Medications:  - Continue risperidone 1 mg bid  - Continue sertraline 100 mg daily  - Continue prn quetiapine 150mg at bedtime  - Increase mirtazapine 15mg at bedtime    # Tinea cruris  - Continue topical clotrimazole topical 1% cream for Tinea cruris        - Patient will be treated in therapeutic milieu with appropriate individual and group therapies as described     Psychiatric Hospital Course:      Edi Sorenson was admitted to [facility] as a voluntary patient.   Medications:  Risperidone 1mg BID, sertraline 100mg Qday, mirtazapine 15mg at bedtime from ED were continued.   New medications started at the time of admission include PRN Quetiapine 50mg at bedtime. Increased  to 100 mg PRN at bedtime on 10/30. Further increased to 150mg on 11/3.   On 11/2, mirtazapine 15mg was discontinued for concern of zachary and him being on two antidepressants   On 11/6, mirtazapine 7.5 mg was restarted as he reported a depressed mood after discontinuing this medication. On 11/8 increased to 15 mg.   Add topical clotrimazole topical 1% cream for Tinea cruris      The risks, benefits, alternatives, and side effects were discussed and understood by the patient.     Medical Assessment and Plan     Medical diagnoses to be addressed this admission:    # HIV  - had not taken ART for at least past year, most recent CD4 count 699  - Continue biktarvy daily  - Medicine consult 10/29 recommend follow up with PCP to establish routine care post-admission     # Hx of PE/DVT  - Continue rivaroxaban 15 mg daily  - notify medicine if patient c/o chest pain or SOB, or develops persistent tachycardia or has O2 sats <90%     # Abdominal discomfort during urination, resolved  - UA normal 10/28  - Med consult 10/29: not consistent with UTI, kidney stone, pyelonephritis; GC/CT testing negative 10/30    # Nocturia  - UA normal 11/7    Medical course: Patient was physically examined by the ED prior to being transferred to the unit and was found to be medically stable and appropriate for admission.     Consults: medicine for HIV/possible UTI management     Checklist     Legal Status: Voluntary     Safety Assessment:   Behavioral Orders   Procedures    Code 1 - Restrict to Unit    Discontinue 1:1 attendant for suicide risk     Order Specific Question:   I have performed an in person assessment of the patient     Answer:   Based on this assessment the patient no longer requires a one on one attendant at this point in time.     Order Specific Question:   Rationale     Answer:   Patient States able to remain safe in hospital    Routine Programming     As clinically indicated    Status 15     Every 15 minutes.    Suicide  precautions     Patients on Suicide Precautions should have a Combination Diet ordered that includes a Diet selection(s) AND a Behavioral Tray selection for Safe Tray - with utensils, or Safe Tray - NO utensils         Risk Assessment:  Risk for harm is moderate.  Risk factors: SI, maladaptive coping, substance use, and past behaviors  Protective factors: family and engaged in treatment     SIO: no    Disposition: TBD. Pending stabilization, medication optimization, & development of a safe discharge plan.     Attestations     Ena Glaser, MS3  [employer]    Resident/Fellow Attestation   I, Stuart Saldana, was present with the medical/FRIEDA student who participated in the service and in the documentation of the note.  I have verified the history and personally performed the physical exam and medical decision making.  I agree with the assessment and plan of care as documented in the note.        Stuart Saldana, DO  PGY2 Psychiatry     Ryan Elder MD on 11/8/2023 at 9:52 PM

## 2023-11-08 NOTE — PLAN OF CARE
Problem: Depressive Signs/Symptoms  Goal: Optimized Energy Level (Depressive Signs/Symptoms)  Outcome: Progressing  Intervention: Optimize Energy Level  Recent Flowsheet Documentation  Taken 11/8/2023 1100 by Brennan Moya RN  Diversional Activity: television  Activity (Behavioral Health): up ad shawn     Problem: Thought Process Alteration  Goal: Optimal Thought Clarity  Outcome: Progressing   Goal Outcome Evaluation:    Plan of Care Reviewed With: patient        Patient isolative to his room most of the shift, dismissive with assessment question and noted to be snappy with staff. Said he just wanted to take  his medication and go back to bed. Patient intermittently out in the unit and requested snacks from his looker. No behavior outburst. Medication compliant. Intake good but need improvement with hygiene. Patient looks unkempt. Vital signs stable. Staff will continue to monitor and offer support.

## 2023-11-08 NOTE — CARE PLAN
11/07/23 1814   Group Therapy Session   Time Session Began 1710   Time Session Ended 1800   Total Time (minutes) 50   Total # Attendees 4   Group Type psychotherapeutic   Group Topic Covered coping skills/lifestyle management;emotions/expression   Group Session Detail CTC-Group members completed/discussed a guided worksheet on  10 minutes to tackle the impossible to include 3 things done to make the impossible thing happen, 1 thing that feels impossible now, 1 reason it probably isn t impossible, a small step you can take towards achieving the impossible again.   Patient Response/Contribution discussed personal experience with topic;cooperative with task;expressed understanding of topic;expressed readiness to alter behaviors   Patient Participation Detail Patient presented to group was pleasant, engaged, and checked in feeling great. Pt appeared comfortable sharing past experiences regarding his son and several friends murdered over the years and his drug use.

## 2023-11-08 NOTE — CARE PLAN
Occupational Therapy Group Note:     11/08/23 1500   Group Therapy Session   Group Attendance attended group session   Time Session Began 1415   Time Session Ended 1500   Total Time (minutes) 30  (no charge)   Total # Attendees 1-3   Group Type task skill;recreation   Group Topic Covered cognitive activities;problem-solving;leisure exploration/use of leisure time   Group Session Detail visual-spatial group game   Patient Response/Contribution cooperative with task   Patient Participation Detail Pt actively participated in the first half (no charge) of a structured occupational therapy group with a focus on visuospatial problem solving and social engagement via a group game. Pt demonstrated understanding of the familiar 3-step task after a brief reminder of task parameters. Receptive to strategic suggestions and subsequently utilized a more strategic task approach. Pt remained focused and engaged for the first half of group, then chose to leave early without explanation.

## 2023-11-08 NOTE — PLAN OF CARE
Team Note Due:  Monday     Assessment/Intervention/Current Symtoms and Care Coordination:  - chart review  - team meeting - discussed pt progress, symptomology, and response to treatment.  Discussed the discharge plan and any potential impediments to discharge.  - post team rounds team meeting / discussion         Discharge Plan or Goal:  [facility] with lodging in [facility]     Barriers to Discharge:  Ongoing symptom stabilization, placement at [facility] (11/14)     Referral Status:  Substance Use Programming   Referrals for residential programming placed on 10/26/23 - assessment completed while in ED - referrals to:    [facility]  - will need to cancel     [facility]:  [email or web address]     [facility] Location : [address] [place] [address]  Phone: [phone] Fax: [phone]     Accepted for admission - 11/14/      [facility] Location: [address] [place] [address]  Phone: [phone] Fax: [phone]  Legal Status:  voluntary     Contacts:  [facility] - Clinical Assessment Counselor  [email or web address]  Direct: [phone]  Fax: [phone]        Upcoming Meetings and Dates/Important Information and next steps:  CTC will continue to follow up with [facility] programs as noted in referral status section of this note.

## 2023-11-08 NOTE — CARE PLAN
"Occupational Therapy Group Note:     11/08/23 1100   Group Therapy Session   Group Attendance attended group session   Time Session Began 1125   Time Session Ended 1210   Total Time (minutes) 45   Total # Attendees 3-4   Group Type life skill   Group Topic Covered balanced lifestyle;coping skills/lifestyle management;relaxation techniques   Group Session Detail chair yoga   Patient Response/Contribution cooperative with task;listened actively   Patient Participation Detail Arrived to group room towards the end of the first group and requested a particular song that would help boost his mood, sharing that yesterday was 2 months from when his \"fiance was found dead.\" Expressed appreciation for the opportunity to listen to his requested song.    Pt actively participated in a structured occupational therapy group with a focus on coping through movement to facilitate relaxation and stress management via chair yoga. Endorsed past experience with yoga. Pt followed and engaged in about 75% of the yoga poses, and identified movements that were particularly fatiguing. Appeared calm and engaged throughout.       "

## 2023-11-09 PROCEDURE — G0177 OPPS/PHP; TRAIN & EDUC SERV: HCPCS

## 2023-11-09 PROCEDURE — 250N000013 HC RX MED GY IP 250 OP 250 PS 637

## 2023-11-09 PROCEDURE — 124N000002 HC R&B MH UMMC

## 2023-11-09 PROCEDURE — 250N000013 HC RX MED GY IP 250 OP 250 PS 637: Performed by: STUDENT IN AN ORGANIZED HEALTH CARE EDUCATION/TRAINING PROGRAM

## 2023-11-09 PROCEDURE — 99232 SBSQ HOSP IP/OBS MODERATE 35: CPT | Mod: GC | Performed by: PSYCHIATRY & NEUROLOGY

## 2023-11-09 PROCEDURE — 90837 PSYTX W PT 60 MINUTES: CPT

## 2023-11-09 RX ADMIN — RIVAROXABAN 20 MG: 10 TABLET, FILM COATED ORAL at 18:15

## 2023-11-09 RX ADMIN — NICOTINE 1 PATCH: 21 PATCH, EXTENDED RELEASE TRANSDERMAL at 10:53

## 2023-11-09 RX ADMIN — BICTEGRAVIR SODIUM, EMTRICITABINE, AND TENOFOVIR ALAFENAMIDE FUMARATE 1 TABLET: 50; 200; 25 TABLET ORAL at 10:52

## 2023-11-09 RX ADMIN — QUETIAPINE 150 MG: 100 TABLET ORAL at 21:15

## 2023-11-09 RX ADMIN — RISPERIDONE 1 MG: 1 TABLET, ORALLY DISINTEGRATING ORAL at 19:25

## 2023-11-09 RX ADMIN — SERTRALINE HYDROCHLORIDE 100 MG: 100 TABLET ORAL at 10:52

## 2023-11-09 RX ADMIN — MIRTAZAPINE 15 MG: 15 TABLET, FILM COATED ORAL at 21:12

## 2023-11-09 RX ADMIN — MULTIPLE VITAMINS W/ MINERALS TAB 1 TABLET: TAB at 18:16

## 2023-11-09 RX ADMIN — RISPERIDONE 1 MG: 1 TABLET, ORALLY DISINTEGRATING ORAL at 10:53

## 2023-11-09 ASSESSMENT — ACTIVITIES OF DAILY LIVING (ADL)
HYGIENE/GROOMING: INDEPENDENT
ADLS_ACUITY_SCORE: 28
ADLS_ACUITY_SCORE: 28
LAUNDRY: WITH SUPERVISION
ADLS_ACUITY_SCORE: 28
ORAL_HYGIENE: INDEPENDENT
ADLS_ACUITY_SCORE: 28
DRESS: INDEPENDENT

## 2023-11-09 NOTE — PROGRESS NOTES
"  ----------------------------------------------------------------------------------------------------------  [facility]  Psychiatry Progress Note  Hospital Day #12     Interim History:     The patient's care was discussed with the treatment team and chart notes were reviewed.    Vitals: VSS  Sleep: 7 hours (11/09/23 0600)  Scheduled medications: Took all scheduled medications as prescribed  Psychiatric PRN medications: PRN Seroquel requested and given    Staff Report:   No acute events or safety concerns overnight. Please see staff notes for details.      Subjective:     Patient Interview:  Edi Sorenson was interviewed in his room. He states he feels good and is \"excited to leave.\" Says he slept in today because he was up later than usual last evening, but that he slept well. Thinks the increase in mirtazapine is going well and denies any racing thoughts or increased activity. Shares he has been going to group and biding his time until he will discharge next week. Sad that his roommate is discharging today but excited because he was given the extra snacks the roommate's mother had brought that weren't eaten. Mentioned some increased soreness in his hands over the last few weeks but thinks that this could be arthritis or because he used to punch shankar.     ROS:  Patient has no bothersome physical symptoms  Patient denies acute concerns     Objective:     Vitals:  /76 (BP Location: Right arm, Patient Position: Sitting, Cuff Size: Adult Regular)   Pulse 74   Temp 98.9  F (37.2  C) (Tympanic)   Resp 16   Ht 1.829 m (6')   Wt 87.5 kg (193 lb)   SpO2 99%   BMI 26.18 kg/m      Allergies:  No Known Allergies    Current Medications:  Scheduled:  Current Facility-Administered Medications   Medication Dose Route Frequency    bictegravir-emtricitabine-tenofovir  1 tablet Oral Daily    mirtazapine  15 mg Oral At Bedtime    multivitamin w/minerals  1 tablet Oral " "Daily    nicotine  1 patch Transdermal Daily    nicotine   Transdermal Q8H    risperiDONE  1 mg Oral BID    rivaroxaban ANTICOAGULANT  20 mg Oral Daily with supper    sertraline  100 mg Oral Daily       PRN:  Current Facility-Administered Medications   Medication Dose Route Frequency    acetaminophen  650 mg Oral Q4H PRN    sore throat  1 lozenge Buccal Q1H PRN    clotrimazole   Topical BID PRN    famotidine  10 mg Oral BID PRN    gabapentin  200 mg Oral TID PRN    hydrOXYzine  25 mg Oral Q4H PRN    melatonin  3 mg Oral At Bedtime PRN    nicotine  4 mg Buccal Q1H PRN    OLANZapine  10 mg Oral TID PRN    Or    OLANZapine  10 mg Intramuscular TID PRN    ondansetron  4 mg Oral Q6H PRN    polyethylene glycol  17 g Oral Daily PRN    QUEtiapine  150 mg Oral At Bedtime PRN       Labs and Imaging:  New results:   No results found for this or any previous visit (from the past 24 hour(s)).      Data this admission:  - CBC unremarkable  - CMP unremarkable  - TSH normal  - UDS positive for amphetamines, fentanyl, cocaine, benzodiazepines, and cannabinoids  - Hgb A1c mildly elevated at 5.7  - Lipids unremarkable  - Vit B12 normal  - Folate normal  - Urinalysis unremarkable but with mucus present; normal on 11/7  - GC/CT negative     Mental Status Exam:     Oriented to:  Grossly Oriented  General:  Awake and Alert  Appearance:  appears stated age  Behavior/Attitude:  Calm, Cooperative, and Engaged  Eye Contact: Appropriate  Psychomotor: Normal no catatonia present  Speech:  appropriate volume/tone and with good articulation  Language: Fluent in English with appropriate syntax and vocabulary.  Mood:  \"good\" and \"excited to leave\"  Affect:  appropriate, congruent with mood, euthymic, and animated  Thought Process:  linear, coherent, and goal directed  Thought Content:   No HI and No VH; No apparent delusions  Associations:  intact  Insight:  good prepared and agreeable to stay in the hospital until leaving for [facility]  Judgment:  good " as he continues to be cooperative and attend groups  Impulse control: fair  Attention Span:  grossly intact  Concentration:  grossly intact  Recent and Remote Memory:  not formally assessed  Fund of Knowledge: average  Muscle Strength and Tone: normal  Gait and Station: Normal     Psychiatric Assessment     Edi Sorenson is a 48 year old male with previous psychiatric diagnoses of MDD, polysubstance use disorder (amphetamine, cocaine, cannabis, opiates, benzo) and substance-induced psychosis, and past medical history of HIV and 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 Most recent psychiatric hospitalization was at [facility] in Aug 2023 for SI and AH which he left AMA. Significant symptoms on admission include SI, AH, and occasional VH. The MSE on admission was pertinent for SI, AH, and occasional VH. His UDS was positive for multiple substances including cocaine, amphetamine, fentanyl, benzo, cannabis. Biological contributions to mental health presentation include daily polysubstance use, past psychiatric diagnosis of MDD and substance-induced psychosis. Psychological contributions to mental health presentation include maladaptive coping skill. Social factors contributing to mental health presentation include recent loss of his fiance. Protective factors include motivation and voluntary engagement in  treatment.      In summary, the patient's reported symptoms of SI, AH, and VH in the context of substance use are consistent with previous diagnosis of substance-induced psychosis and substance-induced mood disorder worsened by recent psychosocial stressor vs MDD with psychosis. He will likely benefit from this admission.     Given that he currently has SI and psychosis, patient warrants inpatient psychiatric hospitalization to maintain his safety.      Psychiatric Plan by Diagnosis      Today's changes:  - none     #  Substance-induced psychosis and Substance-induced mood disorder  1. Medications:  - Continue risperidone 1 mg bid  - Continue sertraline 100 mg daily  - Continue prn quetiapine 150mg at bedtime  - Continue mirtazapine 15mg at bedtime    - Patient will be treated in therapeutic milieu with appropriate individual and group therapies as described     Psychiatric Hospital Course:      Edi Sorenson was admitted to [facility] as a voluntary patient.   Medications:  Risperidone 1mg BID, sertraline 100mg Qday, mirtazapine 15mg at bedtime from ED were continued.   New medications started at the time of admission include PRN Quetiapine 50mg at bedtime. Increased to 100 mg PRN at bedtime on 10/30. Further increased to 150mg on 11/3.   On 11/2, mirtazapine 15mg was discontinued for concern of zachary and him being on two antidepressants   On 11/6, mirtazapine 7.5 mg was restarted as he reported a depressed mood after discontinuing this medication. On 11/8 increased to 15 mg.   Add topical clotrimazole topical 1% cream for Tinea cruris      The risks, benefits, alternatives, and side effects were discussed and understood by the patient.     Medical Assessment and Plan     Medical diagnoses to be addressed this admission:    # HIV  - had not taken ART for at least past year, most recent CD4 count 699  - Continue biktarvy daily  - Medicine consult 10/29 recommend follow up with PCP to establish routine care post-admission     # Hx of PE/DVT  - Continue rivaroxaban 15 mg daily  - notify medicine if patient c/o chest pain or SOB, or develops persistent tachycardia or has O2 sats <90%     # Abdominal discomfort during urination, resolved  - UA normal 10/28  - Med consult 10/29: not consistent with UTI, kidney stone, pyelonephritis; GC/CT testing negative 10/30    # Nocturia  - UA normal 11/7    # Tinea cruris  - Continue topical clotrimazole topical 1% cream for Tinea cruris     Medical course: Patient was physically examined by  the ED prior to being transferred to the unit and was found to be medically stable and appropriate for admission.     Consults: medicine for HIV/possible UTI management 10/29     Checklist     Legal Status: Voluntary     Safety Assessment:   Behavioral Orders   Procedures    Code 1 - Restrict to Unit    Discontinue 1:1 attendant for suicide risk     Order Specific Question:   I have performed an in person assessment of the patient     Answer:   Based on this assessment the patient no longer requires a one on one attendant at this point in time.     Order Specific Question:   Rationale     Answer:   Patient States able to remain safe in hospital    Routine Programming     As clinically indicated    Status 15     Every 15 minutes.    Suicide precautions     Patients on Suicide Precautions should have a Combination Diet ordered that includes a Diet selection(s) AND a Behavioral Tray selection for Safe Tray - with utensils, or Safe Tray - NO utensils         Risk Assessment:  Risk for harm is moderate.  Risk factors: SI, maladaptive coping, substance use, and past behaviors  Protective factors: family and engaged in treatment     SIO: no    Disposition: TBD. Pending stabilization, medication optimization, & development of a safe discharge plan.     Attestations     Ena Glaser, MS3  [employer]    Resident/Fellow Attestation   I, Stuart Saldana, was present with the medical student who participated in the service and in the documentation of the note.  I have verified the history and personally performed the physical exam and medical decision making.  I agree with the assessment and plan of care as documented in the note.        Stuart Saldana, DO  PGY2 Psychiatry     This patient has been seen and evaluated by me, Ryan Joseph.  I have discussed this patient with the psychiatry resident and I agree with the findings and plan in this note.    I have reviewed today's vital signs, medications,  labs and imaging.     Ryan Elder MD on 11/9/2023 at 11:09 PM

## 2023-11-09 NOTE — PROGRESS NOTES
11/08/23 1900   Group Therapy Session   Group Attendance attended group session   Time Session Began 1715   Time Session Ended 1805   Total Time (minutes) 35   Total # Attendees 4   Group Type recreation   Group Topic Covered leisure exploration/use of leisure time   Group Session Detail TR leisure group   Patient Response/Contribution cooperative with task   Patient Participation Detail Pt attended the structured Therapeutic Recreation group, participating in a group activity. Pt participated in group discussion, leisure participation, and social engagement to gain self-esteem, manage behaviors, improve social skills, decrease isolation, and reduce anxiety/depression.   Pt remained focused and engaged throughout group activity.  Pt mood was sociable and was appropriate with interactions, contributing to the clues and descriptions throughout the activity.

## 2023-11-09 NOTE — PLAN OF CARE
Team Note Due:  Monday     Assessment/Intervention/Current Symtoms and Care Coordination:  - chart review  - team meeting - discussed pt progress, symptomology, and response to treatment.  Discussed the discharge plan and any potential impediments to discharge.  - post team rounds team meeting / discussion    - message sent to [facility] intake canceling referral for [facility]      Discharge Plan or Goal:  [facility] with lodging in [facility]     Barriers to Discharge:  Ongoing symptom stabilization, placement at [facility] (11/14)     Referral Status:  Substance Use Programming   Referrals for residential programming placed on 10/26/23 - assessment completed while in ED - referrals to:    [facility]  - will need to cancel     [facility]:  [email or web address]     [facility] Location : [address] [place] [address]. [address] [place] [address]  Phone: [phone] Fax: [phone]     Accepted for admission - 11/14/      [facility] Location: [address] [place] [address]  Phone: [phone] Fax: [phone]  Legal Status:  voluntary     Contacts:           Upcoming Meetings and Dates/Important Information and next steps:    Discharge to treatment - 11/14 - they will pick patient up

## 2023-11-09 NOTE — PLAN OF CARE
Problem: Depressive Signs/Symptoms  Goal: Optimized Energy Level (Depressive Signs/Symptoms)  Outcome: Progressing  Intervention: Optimize Energy Level  Recent Flowsheet Documentation  Taken 11/9/2023 1400 by Brennan Moya RN  Diversional Activity: television  Activity (Behavioral Health): up ad shawn   Goal Outcome Evaluation:    Plan of Care Reviewed With: patient          Patient spent most of the shift in his room and was out for meals and spent some time in the lounge. No behavior outburst this shift. Medication compliant. Intake good but need improvement with hygiene. Patient looks unkempt. Vital signs stable.  Continues to deny all mental health symptoms. Said to be waiting for placement. No concern this shift. Staff will continue to monitor and offer support as needed.

## 2023-11-09 NOTE — PLAN OF CARE
"  Problem: Adult Behavioral Health Plan of Care  Goal: Adheres to Safety Considerations for Self and Others  Outcome: Progressing     Problem: Suicidal Behavior  Goal: Suicidal Behavior is Absent or Managed  Outcome: Progressing     Problem: Sleep Disturbance  Goal: Adequate Sleep/Rest  Outcome: Progressing   Goal Outcome Evaluation: Ongoing     Observed to have slept well for approx  7 hrs on all Q 15\" rounds overnight w/ regular non-labored respirations and no reported or observed distress. Up just before 0500 requesting and was provided a large snack.  Back to bed and easily back to sleep.  Safe, therapeutic environment maintained.                         "

## 2023-11-09 NOTE — PROGRESS NOTES
11/9/2023    Group Therapy Session   Group Attendance attended group session   Time Session Began 1415   Time Session Ended 1500   Total Time (minutes) 45   Total # Attendees 2   Group Type psychotherapeutic   Group Topic Covered Group Topic Covered: anger/conflict management, coping skills/lifestyle management   Group Session Detail Identify anger triggers, process accompanying emotions, and develop coping strategies for anger    Patient Response/Contribution organized, discussed personal experienced with topic, expressed readiness to alter behaviors   Patient Participation Detail Pt reports he is grateful to be in the hospital, nervous and happy about discharging next week. Pt reports in his past he used to be overly confident that he will be able to accomplish goals; feels more realistic now. Pt reports he sometimes suffers from self-entitlement.

## 2023-11-09 NOTE — CARE PLAN
Occupational Therapy Group Note      11/09/23 1425   Group Therapy Session   Group Attendance attended group session   Group Type expressive therapy;psychotherapeutic;recreation   Group Topic Covered coping skills/lifestyle management;leisure exploration/use of leisure time;emotions/expression;relaxation techniques   Group Session Detail OT: [facility] (90 minutes; 10:20-11:50; 7 participants) for creative expression, promoting autonomy, building sense of self-worth, coping with stress/symptoms and opportunity to exercise cognitive skills (i.e. initiation, planning, organization, sequencing, sustained attention, follow-through, termination of task and overall safety awareness).     Wellness group (13:20-14:00; 4 participants) focused on autonomic nervous system education/discussion and exploration of mind-body coping skills (diaphragmatic breathing, PMR, acupressure, self-massage, stretching and essential oils) for parasympathetic nervous system activation, coping with stress/sxs and building sense of ownership over self-management.   Patient Response/Contribution cooperative with task;organized;discussed personal experience with topic    Patient joined group for 45 minutes. Pt was goal directed and was open to trying a new project.  Pt worked in a goal directed manner and socialized with staff/peers.  Pt demonstrated insight into his addiction and recovery when talking about his discharge plans, what supports his sobriety, and his protective factors. Pt talked about the importance of his work in the community.  Overall pt was future oriented and appeared hopeful.  Pt's affect was bright and appeared improved compared to early this week.   Patient joined group for 40 minutes.  Pt shared that watching sports on TV and listening to music are a couple of his coping skills.  Pt was actively engaged in the wellness activities.  Pt demonstrated a positive response and reported a decreased in stress.  Pt specifically  enjoyed acupressure and stretching on a yoga mat.  Pt expressed gratitude for the group.  Writer provided pt with a yoga mat to use in his room.

## 2023-11-10 PROCEDURE — 250N000013 HC RX MED GY IP 250 OP 250 PS 637: Performed by: STUDENT IN AN ORGANIZED HEALTH CARE EDUCATION/TRAINING PROGRAM

## 2023-11-10 PROCEDURE — 250N000013 HC RX MED GY IP 250 OP 250 PS 637

## 2023-11-10 PROCEDURE — 124N000002 HC R&B MH UMMC

## 2023-11-10 PROCEDURE — 99232 SBSQ HOSP IP/OBS MODERATE 35: CPT | Mod: GC | Performed by: PSYCHIATRY & NEUROLOGY

## 2023-11-10 RX ADMIN — BICTEGRAVIR SODIUM, EMTRICITABINE, AND TENOFOVIR ALAFENAMIDE FUMARATE 1 TABLET: 50; 200; 25 TABLET ORAL at 08:51

## 2023-11-10 RX ADMIN — RISPERIDONE 1 MG: 1 TABLET, ORALLY DISINTEGRATING ORAL at 20:43

## 2023-11-10 RX ADMIN — Medication 3 MG: at 20:43

## 2023-11-10 RX ADMIN — RIVAROXABAN 20 MG: 10 TABLET, FILM COATED ORAL at 17:41

## 2023-11-10 RX ADMIN — SERTRALINE HYDROCHLORIDE 100 MG: 100 TABLET ORAL at 08:50

## 2023-11-10 RX ADMIN — MULTIPLE VITAMINS W/ MINERALS TAB 1 TABLET: TAB at 17:41

## 2023-11-10 RX ADMIN — RISPERIDONE 1 MG: 1 TABLET, ORALLY DISINTEGRATING ORAL at 08:50

## 2023-11-10 RX ADMIN — NICOTINE 1 PATCH: 21 PATCH, EXTENDED RELEASE TRANSDERMAL at 08:58

## 2023-11-10 RX ADMIN — MIRTAZAPINE 15 MG: 15 TABLET, FILM COATED ORAL at 21:07

## 2023-11-10 RX ADMIN — QUETIAPINE 150 MG: 100 TABLET ORAL at 21:06

## 2023-11-10 ASSESSMENT — ACTIVITIES OF DAILY LIVING (ADL)
ADLS_ACUITY_SCORE: 28
ORAL_HYGIENE: INDEPENDENT
HYGIENE/GROOMING: INDEPENDENT
ADLS_ACUITY_SCORE: 28
DRESS: INDEPENDENT
ADLS_ACUITY_SCORE: 28
ADLS_ACUITY_SCORE: 28
LAUNDRY: WITH SUPERVISION
ADLS_ACUITY_SCORE: 28
ORAL_HYGIENE: INDEPENDENT
ADLS_ACUITY_SCORE: 28
ADLS_ACUITY_SCORE: 28
DRESS: INDEPENDENT
HYGIENE/GROOMING: INDEPENDENT
LAUNDRY: WITH SUPERVISION
ADLS_ACUITY_SCORE: 28

## 2023-11-10 NOTE — PLAN OF CARE
Problem: Depressive Signs/Symptoms  Goal: Optimized Energy Level (Depressive Signs/Symptoms)  Outcome: Progressing  Intervention: Optimize Energy Level  Recent Flowsheet Documentation  Taken 11/10/2023 1321 by Brennan Moya RN  Patient Performed Hygiene: dressed  Diversional Activity: television  Activity (Behavioral Health): up ad shawn     Problem: Thought Process Alteration  Goal: Optimal Thought Clarity  Outcome: Progressing   Goal Outcome Evaluation:    Plan of Care Reviewed With: patient               Patient spent most of the shift in his room and was out for meals and medication. No behavior outburst this shift. Medication compliant. Intake good but need improvement with hygiene. Patient looks unkempt. Vital signs stable.  Patient continues to deny all mental health symptoms. No concern this shift. Staff will continue to monitor and offer support as needed.

## 2023-11-10 NOTE — PLAN OF CARE
Problem: Sleep Disturbance  Goal: Adequate Sleep/Rest  Outcome: Progressing  Intervention: Promote Sleep/Rest  Flowsheets (Taken 11/10/2023 0112)  Sleep/Rest Enhancement:   awakenings minimized   regular sleep/rest pattern promoted     Pt asleep at the start of shift. Pt up for snacks in the lounge area. Retired to bed shortly;y after eating. Slept a total of 6.25 hours per q15 safety checks. No s/s pain or discomfort noted.Even respirations , non labored breathing. No behavorial concerns.

## 2023-11-10 NOTE — PROGRESS NOTES
"  ----------------------------------------------------------------------------------------------------------  [facility]  Psychiatry Progress Note  Hospital Day #13     Interim History:     The patient's care was discussed with the treatment team and chart notes were reviewed.    Vitals: VSS  Sleep: 6.25 hours (11/10/23 0556)  Scheduled medications: Took all scheduled medications as prescribed  Psychiatric PRN medications: PRN Seroquel requested and given    Staff Report:   No acute events or safety concerns overnight. Please see staff notes for details.      Subjective:     Patient Interview:  Edi Sorenson was interviewed in his room. Says he feels \"okay\" today and about the same as yesterday. Feels like he woke up too early this morning but went back to bed, hopes he will be able to fall asleep normally tonight after sleeping in. Feels his medications are in a good place and feels better since re-starting Remeron. Felt like he \"went down a spiral\" when it was stopped and like something was missing.    Reports he was able to eat some outside food and it was \"the bomb.\" Excited to get into treatment and be in a program that will allow him to go outside, smoke cigarettes, and cook his own food. Says he's just waiting until next week and trying to kill time until then.    ROS:  Patient has no bothersome physical symptoms  Patient denies acute concerns     Objective:     Vitals:  BP (!) 141/97 (BP Location: Left arm)   Pulse 87   Temp 97.8  F (36.6  C) (Temporal)   Resp 16   Ht 1.829 m (6')   Wt 87.5 kg (193 lb)   SpO2 100%   BMI 26.18 kg/m      Allergies:  No Known Allergies    Current Medications:  Scheduled:  Current Facility-Administered Medications   Medication Dose Route Frequency    bictegravir-emtricitabine-tenofovir  1 tablet Oral Daily    mirtazapine  15 mg Oral At Bedtime    multivitamin w/minerals  1 tablet Oral Daily    nicotine  1 patch Transdermal " "Daily    nicotine   Transdermal Q8H    risperiDONE  1 mg Oral BID    rivaroxaban ANTICOAGULANT  20 mg Oral Daily with supper    sertraline  100 mg Oral Daily       PRN:  Current Facility-Administered Medications   Medication Dose Route Frequency    acetaminophen  650 mg Oral Q4H PRN    sore throat  1 lozenge Buccal Q1H PRN    clotrimazole   Topical BID PRN    famotidine  10 mg Oral BID PRN    gabapentin  200 mg Oral TID PRN    hydrOXYzine  25 mg Oral Q4H PRN    melatonin  3 mg Oral At Bedtime PRN    nicotine  4 mg Buccal Q1H PRN    OLANZapine  10 mg Oral TID PRN    Or    OLANZapine  10 mg Intramuscular TID PRN    ondansetron  4 mg Oral Q6H PRN    polyethylene glycol  17 g Oral Daily PRN    QUEtiapine  150 mg Oral At Bedtime PRN       Labs and Imaging:  New results:   No results found for this or any previous visit (from the past 24 hour(s)).      Data this admission:  - CBC unremarkable  - CMP unremarkable  - TSH normal  - UDS positive for amphetamines, fentanyl, cocaine, benzodiazepines, and cannabinoids  - Hgb A1c mildly elevated at 5.7  - Lipids unremarkable  - Vit B12 normal  - Folate normal  - Urinalysis unremarkable but with mucus present; normal on 11/7  - GC/CT negative     Mental Status Exam:     Oriented to:  Grossly Oriented  General:  Awake and Alert  Appearance:  appears stated age  Behavior/Attitude:  Calm, Cooperative, Engaged, Easy to redirect, and Open  Eye Contact: Appropriate  Psychomotor: Normal no catatonia present  Speech:  appropriate volume/tone and with good articulation  Language: Fluent in English with appropriate syntax and vocabulary.  Mood:  \"okay\"  Affect:  appropriate, congruent with mood, and flat  Thought Process:  coherent and goal directed  Thought Content:   No HI and No VH; No apparent delusions  Associations:  intact  Insight:  good continues to discuss plans for his life once he is discharged, excited for discharge to  treatment  Judgment:  good due to continued cooperation " with team and medication  Impulse control: fair  Attention Span:  grossly intact  Concentration:  grossly intact  Recent and Remote Memory:  not formally assessed  Fund of Knowledge: average  Muscle Strength and Tone: normal  Gait and Station: Normal     Psychiatric Assessment     Edi Sorenson is a 48 year old male with previous psychiatric diagnoses of MDD, polysubstance use disorder (amphetamine, cocaine, cannabis, opiates, benzo) and substance-induced psychosis, and past medical history of HIV and 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 Most recent psychiatric hospitalization was at [facility] in Aug 2023 for SI and AH which he left AMA. Significant symptoms on admission include SI, AH, and occasional VH. The MSE on admission was pertinent for SI, AH, and occasional VH. His UDS was positive for multiple substances including cocaine, amphetamine, fentanyl, benzo, cannabis. Biological contributions to mental health presentation include daily polysubstance use, past psychiatric diagnosis of MDD and substance-induced psychosis. Psychological contributions to mental health presentation include maladaptive coping skill. Social factors contributing to mental health presentation include recent loss of his fiance. Protective factors include motivation and voluntary engagement in  treatment.      In summary, the patient's reported symptoms of SI, AH, and VH in the context of substance use are consistent with previous diagnosis of substance-induced psychosis and substance-induced mood disorder worsened by recent psychosocial stressor vs MDD with psychosis. He will likely benefit from this admission.     Given that he currently has SI and psychosis, patient warrants inpatient psychiatric hospitalization to maintain his safety.      Psychiatric Plan by Diagnosis      Today's changes:  - none     # Substance-induced psychosis  and Substance-induced mood disorder  1. Medications:  - Continue risperidone 1 mg bid  - Continue sertraline 100 mg daily  - Continue prn quetiapine 150mg at bedtime  - Continue mirtazapine 15mg at bedtime    - Patient will be treated in therapeutic milieu with appropriate individual and group therapies as described     Psychiatric Hospital Course:      Edi Sorenson was admitted to [facility] as a voluntary patient.   Medications:  Risperidone 1mg BID, sertraline 100mg Qday, mirtazapine 15mg at bedtime from ED were continued.   New medications started at the time of admission include PRN Quetiapine 50mg at bedtime. Increased to 100 mg PRN at bedtime on 10/30. Further increased to 150mg on 11/3.   On 11/2, mirtazapine 15mg was discontinued for concern of zachary and him being on two antidepressants   On 11/6, mirtazapine 7.5 mg was restarted as he reported a depressed mood after discontinuing this medication. On 11/8 increased to 15 mg.     The risks, benefits, alternatives, and side effects were discussed and understood by the patient.     Medical Assessment and Plan     Medical diagnoses to be addressed this admission:    # HIV  - had not taken ART for at least past year, most recent CD4 count 699  - Continue biktarvy daily  - Medicine consult 10/29 recommend follow up with PCP to establish routine care post-admission     # Hx of PE/DVT  - Continue rivaroxaban 15 mg daily  - notify medicine if patient c/o chest pain or SOB, or develops persistent tachycardia or has O2 sats <90%     # Abdominal discomfort during urination, resolved  - UA normal 10/28  - Med consult 10/29: not consistent with UTI, kidney stone, pyelonephritis; GC/CT testing negative 10/30    # Nocturia  - UA normal 11/7    # Tinea cruris  - Continue topical clotrimazole topical 1% cream for Tinea cruris     Medical course: Patient was physically examined by the ED prior to being transferred to the unit and was found to be medically stable and  appropriate for admission.     Consults: medicine for HIV/possible UTI management 10/29     Checklist     Legal Status: Voluntary     Safety Assessment:   Behavioral Orders   Procedures    Code 1 - Restrict to Unit    Discontinue 1:1 attendant for suicide risk     Order Specific Question:   I have performed an in person assessment of the patient     Answer:   Based on this assessment the patient no longer requires a one on one attendant at this point in time.     Order Specific Question:   Rationale     Answer:   Patient States able to remain safe in hospital    Routine Programming     As clinically indicated    Status 15     Every 15 minutes.    Suicide precautions     Patients on Suicide Precautions should have a Combination Diet ordered that includes a Diet selection(s) AND a Behavioral Tray selection for Safe Tray - with utensils, or Safe Tray - NO utensils         Risk Assessment:  Risk for harm is moderate.  Risk factors: SI, maladaptive coping, substance use, and past behaviors  Protective factors: family and engaged in treatment     SIO: no    Disposition: Plan to discharge to [facility] on 11/14. Pending stabilization, medication optimization, & development of a safe discharge plan.     Attestations     Ena Glaser, MS3  [employer]    Physician Attestation   I, Ryan Elder MD, was present with the medical student who participated in the service and in the documentation of the note.  I have verified the history and personally performed the physical exam and medical decision making.  I agree with the assessment and plan of care as documented in the note.          Ryan Elder MD  Date of Service (when I saw the patient): 11/10/2023

## 2023-11-10 NOTE — PLAN OF CARE
Team Note Due:  Monday     Assessment/Intervention/Current Symtoms and Care Coordination:  - chart review  - team meeting - discussed pt progress, symptomology, and response to treatment.  Discussed the discharge plan and any potential impediments to discharge.  - post team rounds team meeting / discussion    - spoke with [facility] intake canceling referral for [facility]      Discharge Plan or Goal:    Tuesday 11/14/23 - 1:00 pm -  - for admission to:  [facility] with lodging in [facility]       Barriers to Discharge:  Ongoing symptom stabilization, placement at [facility] (11/14)     Referral Status:    11/10/23 - Referral Tracking Form completed       Substance Use Programming   Referrals for residential programming placed on 10/26/23 - assessment completed while in ED - referrals to:      [facility]:  [email or web address]     [facility] Location : [address] [place] [address]  Phone: [phone] Fax: [phone]     Accepted for admission - 11/14/    [facility] Location: [facility] [address]  Phone: [phone] Fax: [phone]    11/10 - This writer confirmed with [facility] to cancel previously scheduled intake      Legal Status:  voluntary     Contacts:           Upcoming Meetings and Dates/Important Information and next steps:    Discharge to treatment - 11/14 - they will pick patient up

## 2023-11-10 NOTE — PLAN OF CARE
Problem: Suicidal Behavior  Goal: Suicidal Behavior is Absent or Managed  Outcome: Progressing   Goal Outcome Evaluation:       Pt was present in the milieu. Was calm and cooperative. Spent most of his time in the lounge watching with peers. Pacing at the hallway few times.  Alert and oriented x4. Denies SI/HI. Endorsed anxiety rating at 6/10 and depressing rating at 6/10. Pt requested and given PRN Seroquel together with HS medication. Pt denied pain. Ate 100% of dinner served with adequate fluid in take

## 2023-11-11 PROCEDURE — 124N000002 HC R&B MH UMMC

## 2023-11-11 PROCEDURE — 250N000013 HC RX MED GY IP 250 OP 250 PS 637

## 2023-11-11 PROCEDURE — 250N000013 HC RX MED GY IP 250 OP 250 PS 637: Performed by: STUDENT IN AN ORGANIZED HEALTH CARE EDUCATION/TRAINING PROGRAM

## 2023-11-11 RX ADMIN — RISPERIDONE 1 MG: 1 TABLET, ORALLY DISINTEGRATING ORAL at 20:41

## 2023-11-11 RX ADMIN — Medication 3 MG: at 20:41

## 2023-11-11 RX ADMIN — NICOTINE 1 PATCH: 21 PATCH, EXTENDED RELEASE TRANSDERMAL at 08:32

## 2023-11-11 RX ADMIN — SERTRALINE HYDROCHLORIDE 100 MG: 100 TABLET ORAL at 08:30

## 2023-11-11 RX ADMIN — QUETIAPINE 150 MG: 100 TABLET ORAL at 21:23

## 2023-11-11 RX ADMIN — RIVAROXABAN 20 MG: 10 TABLET, FILM COATED ORAL at 18:25

## 2023-11-11 RX ADMIN — BICTEGRAVIR SODIUM, EMTRICITABINE, AND TENOFOVIR ALAFENAMIDE FUMARATE 1 TABLET: 50; 200; 25 TABLET ORAL at 08:30

## 2023-11-11 RX ADMIN — MULTIPLE VITAMINS W/ MINERALS TAB 1 TABLET: TAB at 18:25

## 2023-11-11 RX ADMIN — RISPERIDONE 1 MG: 1 TABLET, ORALLY DISINTEGRATING ORAL at 08:30

## 2023-11-11 RX ADMIN — MIRTAZAPINE 15 MG: 15 TABLET, FILM COATED ORAL at 21:23

## 2023-11-11 ASSESSMENT — ACTIVITIES OF DAILY LIVING (ADL)
ADLS_ACUITY_SCORE: 28
ORAL_HYGIENE: INDEPENDENT
ADLS_ACUITY_SCORE: 28
LAUNDRY: WITH SUPERVISION
LAUNDRY: WITH SUPERVISION
ADLS_ACUITY_SCORE: 28
DRESS: INDEPENDENT
HYGIENE/GROOMING: INDEPENDENT
ORAL_HYGIENE: INDEPENDENT
ADLS_ACUITY_SCORE: 28
DRESS: INDEPENDENT
HYGIENE/GROOMING: INDEPENDENT
ADLS_ACUITY_SCORE: 28

## 2023-11-11 NOTE — PLAN OF CARE
Problem: Depressive Signs/Symptoms  Goal: Improved Mood Symptoms (Depressive Signs/Symptoms)  Outcome: Progressing  Intervention: Promote Mood Improvement  Recent Flowsheet Documentation  Taken 11/10/2023 1816 by Pablo Ortiz RN  Diversional Activity: television   Goal Outcome Evaluation:    Plan of Care Reviewed With: patient        Nursing Assessment    General Shift Summary  Visible in milieu and social with peers. No behavioral concerns. Pt discusses future plans for maintaining sobriety and improving mental health. Pt says that they have all of their stuff set up at the same place, which has been helpful for them in the past. Pt reports their mood being good. 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     Patient is medication compliant and reported no side effects. Pt received PRN Seroquel and melatonin for sleep.     Hygiene and appetite are appropriate.     Pablo Ortiz, RN

## 2023-11-11 NOTE — PLAN OF CARE
"  Problem: Adult Inpatient Plan of Care  Goal: Optimal Comfort and Wellbeing  Outcome: Progressing     Problem: Suicidal Behavior  Goal: Suicidal Behavior is Absent or Managed  Outcome: Progressing     Problem: Sleep Disturbance  Goal: Adequate Sleep/Rest  Outcome: Progressing   Goal Outcome Evaluation:  Ongoing    Observed to have slept well for approx 6.5  hrs on all Q 15\" rounds overnight w/ regular non-labored respirations and no reported or observed distress.  Safe, therapeutic environment maintained.                        "

## 2023-11-11 NOTE — PLAN OF CARE
"  Problem: Adult Inpatient Plan of Care  Goal: Optimal Comfort and Wellbeing  11/11/2023 0403 by Daily Carlson, RN  Outcome: Progressing  11/11/2023 0329 by Daily Carlson, RN  Outcome: Progressing     Problem: Adult Behavioral Health Plan of Care  Goal: Adheres to Safety Considerations for Self and Others  Outcome: Progressing     Problem: Suicidal Behavior  Goal: Suicidal Behavior is Absent or Managed  11/11/2023 0403 by Daily Carlson, RN  Outcome: Progressing  11/11/2023 0329 by Daily Carlson, RN  Outcome: Progressing     Problem: Sleep Disturbance  Goal: Adequate Sleep/Rest  11/11/2023 0403 by Daily Carlson, RN  Outcome: Progressing  11/11/2023 0329 by Daily Carlson, RN  Outcome: Progressing   Goal Outcome Evaluation: Ongoing  Observed to have slept well for approx 6.5 hrs on all Q 15\" rounds overnight w/ regular non-labored respirations and no reported or observed distress.  Up at approx 0345 requesting and was provided a large snack. Pleasantly engaged w/ staff briefly and then readily ret'd  to sleep.  Safe, therapeutic environment maintained.                         "

## 2023-11-11 NOTE — PLAN OF CARE
Problem: Depressive Signs/Symptoms  Goal: Optimized Energy Level (Depressive Signs/Symptoms)  Outcome: Progressing  Intervention: Optimize Energy Level  Recent Flowsheet Documentation  Taken 11/11/2023 1000 by Brennan Moya RN  Patient Performed Hygiene: dressed  Diversional Activity: television  Activity (Behavioral Health): up ad shawn     Problem: Thought Process Alteration  Goal: Optimal Thought Clarity  Outcome: Progressing   Goal Outcome Evaluation:    Plan of Care Reviewed With: patient    Patient isolative to his room most of the shift. Out for meals. Continues to deny all mental health symptoms and contracted for safety. Medication compliant. Intake good, to complain of any concern. Staff will continue to monitor and offer support as needed.

## 2023-11-12 PROCEDURE — 250N000013 HC RX MED GY IP 250 OP 250 PS 637

## 2023-11-12 PROCEDURE — 124N000002 HC R&B MH UMMC

## 2023-11-12 PROCEDURE — 250N000013 HC RX MED GY IP 250 OP 250 PS 637: Performed by: STUDENT IN AN ORGANIZED HEALTH CARE EDUCATION/TRAINING PROGRAM

## 2023-11-12 RX ADMIN — RISPERIDONE 1 MG: 1 TABLET, ORALLY DISINTEGRATING ORAL at 20:12

## 2023-11-12 RX ADMIN — MULTIPLE VITAMINS W/ MINERALS TAB 1 TABLET: TAB at 17:59

## 2023-11-12 RX ADMIN — RIVAROXABAN 20 MG: 10 TABLET, FILM COATED ORAL at 17:58

## 2023-11-12 RX ADMIN — BICTEGRAVIR SODIUM, EMTRICITABINE, AND TENOFOVIR ALAFENAMIDE FUMARATE 1 TABLET: 50; 200; 25 TABLET ORAL at 08:41

## 2023-11-12 RX ADMIN — Medication 3 MG: at 20:12

## 2023-11-12 RX ADMIN — NICOTINE 1 PATCH: 21 PATCH, EXTENDED RELEASE TRANSDERMAL at 08:42

## 2023-11-12 RX ADMIN — RISPERIDONE 1 MG: 1 TABLET, ORALLY DISINTEGRATING ORAL at 08:40

## 2023-11-12 RX ADMIN — MIRTAZAPINE 15 MG: 15 TABLET, FILM COATED ORAL at 21:20

## 2023-11-12 RX ADMIN — SERTRALINE HYDROCHLORIDE 100 MG: 100 TABLET ORAL at 08:40

## 2023-11-12 RX ADMIN — QUETIAPINE 150 MG: 100 TABLET ORAL at 21:20

## 2023-11-12 ASSESSMENT — ACTIVITIES OF DAILY LIVING (ADL)
ADLS_ACUITY_SCORE: 28
HYGIENE/GROOMING: INDEPENDENT
ADLS_ACUITY_SCORE: 28
LAUNDRY: WITH SUPERVISION
DRESS: INDEPENDENT
ADLS_ACUITY_SCORE: 28
LAUNDRY: WITH SUPERVISION
ORAL_HYGIENE: INDEPENDENT
ADLS_ACUITY_SCORE: 28
HYGIENE/GROOMING: INDEPENDENT
ADLS_ACUITY_SCORE: 28
DRESS: INDEPENDENT
ORAL_HYGIENE: INDEPENDENT

## 2023-11-12 NOTE — PLAN OF CARE
Problem: Adult Inpatient Plan of Care  Goal: Readiness for Transition of Care  Outcome: Progressing   Goal Outcome Evaluation:    Plan of Care Reviewed With: patient       Nursing Assessment    Recent Vitals: B/P:119/83  T:98.5  P:78    General Shift Summary  Visible in milieu and social with peers. No behavioral concerns. Denies MH concerns. Pt states that they are little nervous about starting treatment but that they are looking forward to it.     Patient is medication compliant and reported no side effects. Received PRN melatonin and seroquel for sleep.     Hygiene and appetite are appropriate.     Pablo Ortiz RN

## 2023-11-12 NOTE — PLAN OF CARE
Problem: Depressive Signs/Symptoms  Goal: Optimized Energy Level (Depressive Signs/Symptoms)  Outcome: Progressing  Intervention: Optimize Energy Level  Recent Flowsheet Documentation  Taken 11/12/2023 1200 by Brennan Moya RN  Patient Performed Hygiene: dressed  Diversional Activity: television  Activity (Behavioral Health): up ad shawn     Problem: Thought Process Alteration  Goal: Optimal Thought Clarity  Outcome: Progressing   Goal Outcome Evaluation:    Plan of Care Reviewed With: patient        Isolative to his room, denies all mental health symptoms. Out for meals and to request snacks from their locker. Patient is medication compliant. Though are clear and organized. Denies any pain nor discomfort. No concern this shift. Staff will continue to monitor and offer support as needed.

## 2023-11-12 NOTE — PROGRESS NOTES
11/11/23 1946   Group Therapy Session   Group Attendance attended group session   Time Session Began 1715   Time Session Ended 1800   Total Time (minutes) 30   Total # Attendees 5   Group Type recreation   Group Session Detail Education and group discussion provided on the importance of healthy leisure participation and the challenge of maintaining healthy leisure activities when struggling with mental illness and/or chemical dependency.  Hands on [employer] category activity for concentration, cognitive processing, flexible thinking, creativity, tracking, mood stabilization, reality-based activity, healthy leisure, frustration tolerance, follow through, socialization, and an opportunity to experience success.   Patient Participation Detail Pt joined group, then left for about 15 min, but later returned for the remainder of the group.  Pt was an active participant while present.  Pt often gave responses that were already said aloud by peers due to limited attention to detail.  No charge.

## 2023-11-12 NOTE — PLAN OF CARE
"  Problem: Adult Inpatient Plan of Care  Goal: Optimal Comfort and Wellbeing  Outcome: Progressing     Problem: Suicidal Behavior  Goal: Suicidal Behavior is Absent or Managed  Outcome: Progressing     Problem: Sleep Disturbance  Goal: Adequate Sleep/Rest  Outcome: Progressing   Goal Outcome Evaluation: Ongoing     Observed to have slept well for approx 5.5  hrs on all Q 15\" rounds overnight.   Briefly wakeful and up x2's  during the night for snacks.  Pleasantly joking w/ staff, denying any discomforts/concerns.  Safe, therapeutic nvironment maintained.                         "

## 2023-11-13 PROCEDURE — 250N000013 HC RX MED GY IP 250 OP 250 PS 637

## 2023-11-13 PROCEDURE — 99232 SBSQ HOSP IP/OBS MODERATE 35: CPT | Mod: GC | Performed by: PSYCHIATRY & NEUROLOGY

## 2023-11-13 PROCEDURE — G0177 OPPS/PHP; TRAIN & EDUC SERV: HCPCS

## 2023-11-13 PROCEDURE — 124N000002 HC R&B MH UMMC

## 2023-11-13 PROCEDURE — 250N000013 HC RX MED GY IP 250 OP 250 PS 637: Performed by: STUDENT IN AN ORGANIZED HEALTH CARE EDUCATION/TRAINING PROGRAM

## 2023-11-13 RX ORDER — SERTRALINE HYDROCHLORIDE 100 MG/1
100 TABLET, FILM COATED ORAL DAILY
Qty: 30 TABLET | Refills: 0 | Status: SHIPPED | OUTPATIENT
Start: 2023-11-14 | End: 2024-01-08

## 2023-11-13 RX ORDER — NICOTINE 21 MG/24HR
1 PATCH, TRANSDERMAL 24 HOURS TRANSDERMAL DAILY
Qty: 30 PATCH | Refills: 0 | Status: SHIPPED | OUTPATIENT
Start: 2023-11-14 | End: 2024-01-08

## 2023-11-13 RX ORDER — MULTIPLE VITAMINS W/ MINERALS TAB 9MG-400MCG
1 TAB ORAL DAILY
Qty: 30 TABLET | Refills: 0 | Status: SHIPPED | OUTPATIENT
Start: 2023-11-13 | End: 2024-01-08

## 2023-11-13 RX ORDER — QUETIAPINE FUMARATE 150 MG/1
150 TABLET, FILM COATED ORAL
Qty: 30 TABLET | Refills: 0 | Status: SHIPPED | OUTPATIENT
Start: 2023-11-13 | End: 2024-01-08

## 2023-11-13 RX ORDER — MIRTAZAPINE 15 MG/1
15 TABLET, FILM COATED ORAL AT BEDTIME
Qty: 30 TABLET | Refills: 0 | Status: SHIPPED | OUTPATIENT
Start: 2023-11-13 | End: 2024-01-08

## 2023-11-13 RX ORDER — RISPERIDONE 1 MG/1
1 TABLET, ORALLY DISINTEGRATING ORAL 2 TIMES DAILY
Qty: 60 TABLET | Refills: 0 | Status: SHIPPED | OUTPATIENT
Start: 2023-11-13 | End: 2024-01-08

## 2023-11-13 RX ORDER — LANOLIN ALCOHOL/MO/W.PET/CERES
3 CREAM (GRAM) TOPICAL
Qty: 30 TABLET | Refills: 0 | Status: SHIPPED | OUTPATIENT
Start: 2023-11-13 | End: 2024-01-08

## 2023-11-13 RX ADMIN — Medication 3 MG: at 20:15

## 2023-11-13 RX ADMIN — RIVAROXABAN 20 MG: 10 TABLET, FILM COATED ORAL at 18:16

## 2023-11-13 RX ADMIN — MIRTAZAPINE 15 MG: 15 TABLET, FILM COATED ORAL at 20:46

## 2023-11-13 RX ADMIN — SERTRALINE HYDROCHLORIDE 100 MG: 100 TABLET ORAL at 08:25

## 2023-11-13 RX ADMIN — QUETIAPINE 150 MG: 100 TABLET ORAL at 20:48

## 2023-11-13 RX ADMIN — RISPERIDONE 1 MG: 1 TABLET, ORALLY DISINTEGRATING ORAL at 08:25

## 2023-11-13 RX ADMIN — BICTEGRAVIR SODIUM, EMTRICITABINE, AND TENOFOVIR ALAFENAMIDE FUMARATE 1 TABLET: 50; 200; 25 TABLET ORAL at 08:25

## 2023-11-13 RX ADMIN — MULTIPLE VITAMINS W/ MINERALS TAB 1 TABLET: TAB at 18:16

## 2023-11-13 RX ADMIN — RISPERIDONE 1 MG: 1 TABLET, ORALLY DISINTEGRATING ORAL at 20:15

## 2023-11-13 RX ADMIN — NICOTINE 1 PATCH: 21 PATCH, EXTENDED RELEASE TRANSDERMAL at 08:25

## 2023-11-13 ASSESSMENT — ACTIVITIES OF DAILY LIVING (ADL)
DRESS: INDEPENDENT
ADLS_ACUITY_SCORE: 28
HYGIENE/GROOMING: INDEPENDENT
ADLS_ACUITY_SCORE: 28
ADLS_ACUITY_SCORE: 28
DRESS: INDEPENDENT
ADLS_ACUITY_SCORE: 28
ADLS_ACUITY_SCORE: 28
LAUNDRY: WITH SUPERVISION
ORAL_HYGIENE: INDEPENDENT
ADLS_ACUITY_SCORE: 28
LAUNDRY: WITH SUPERVISION
ADLS_ACUITY_SCORE: 28
ORAL_HYGIENE: INDEPENDENT
HYGIENE/GROOMING: INDEPENDENT
ADLS_ACUITY_SCORE: 28

## 2023-11-13 NOTE — CARE PLAN
Occupational Therapy Group Note      11/13/23 1517   Group Therapy Session   Group Attendance attended group session   Group Type expressive therapy;recreation   Group Topic Covered coping skills/lifestyle management;emotions/expression;leisure exploration/use of leisure time   Group Session Detail OT: [facility] for creative expression, promoting autonomy, building sense of self-worth, coping with stress/symptoms and opportunity to exercise cognitive skills (i.e. initiation, planning, organization, sequencing, sustained attention, follow-through, termination of task and overall safety awareness).   (13:15-15:00; 8 participants)   Patient Response/Contribution cooperative with task;organized;discussed personal experience with topic    Patient joined group for 90 minutes.  Pt finished a few projects that he had started in preparation for discharge.  Pt engaged with others in a positive manner.  Pt requested to watch a motivational speech, which the group did.  Pt offered reflections about having a new beginning and big goals for his life.  Pt expressed hope and gratitude.

## 2023-11-13 NOTE — PROVIDER NOTIFICATION
11/13/23 7067   Interprofessional Rounds   Participants psychiatrist;CTC;nursing   Behavioral Team Discussion   Progress improving   Anticipated length of stay 24 hrs   Continued Stay Criteria/Rationale direct discharge to residential [facility] is reccomended -   Medical/Physical no issues   Precautions see below   Plan discharge directly to residential (IOP with lodging) [facility] program tommorrow 11/14   Rationale for change in precautions or plan per assessment   Anticipated Discharge Disposition substance use treatment             PRECAUTIONS AND SAFETY    Behavioral Orders   Procedures    Code 1 - Restrict to Unit    Discontinue 1:1 attendant for suicide risk     Order Specific Question:   I have performed an in person assessment of the patient     Answer:   Based on this assessment the patient no longer requires a one on one attendant at this point in time.     Order Specific Question:   Rationale     Answer:   Patient States able to remain safe in hospital    Routine Programming     As clinically indicated    Status 15     Every 15 minutes.    Suicide precautions     Patients on Suicide Precautions should have a Combination Diet ordered that includes a Diet selection(s) AND a Behavioral Tray selection for Safe Tray - with utensils, or Safe Tray - NO utensils         Safety  Safety WDL: WDL  Patient Location: dining room  Observed Behavior: calm, sitting  Observed Behavior (Comment): calm  Safety Measures: safety rounds completed  Diversional Activity: television  Suicidality: Status 15

## 2023-11-13 NOTE — PLAN OF CARE
Goal Outcome Evaluation:    Plan of Care Reviewed With: patient      Problem: Depressive Signs/Symptoms  Goal: Optimized Energy Level (Depressive Signs/Symptoms)  Outcome: Progressing  Intervention: Optimize Energy Level  Recent Flowsheet Documentation  Taken 11/13/2023 0900 by Macarena Alarcon RN  Activity (Behavioral Health): up ad shawn     Patient observed to be isolative in his room. Flat blunted affect noted. Patient came out for breakfast and ate everything. He even requested extra and patient was given a box of cereal and a carton of milk. Patient was compliant with his medications and cooperative with vital signs check. Patient retired to his room immediately after breakfast. He denied all mental health symptoms.   Patient ate lunch. He came out out to use the phones a couple of times this shift. No behavior change.

## 2023-11-13 NOTE — PLAN OF CARE
Problem: Adult Inpatient Plan of Care  Goal: Readiness for Transition of Care  Outcome: Progressing   Goal Outcome Evaluation:    Plan of Care Reviewed With: patient        Nursing Assessment    Recent Vitals: B/P:147/84  T:98.2  P:79    General Shift Summary  Visible in milieu and social with peers. No behavioral concerns. Denies MH concerns. Pt declines having any concerns to talk about this evening.      Patient is medication compliant and reported no side effects. Received PRN melatonin and seroquel for sleep.      Hygiene and appetite are appropriate.    Pablo Ortiz RN

## 2023-11-13 NOTE — PLAN OF CARE
BEH IP Unit Acuity Rating Score (UARS)  Patient is given one point for every criteria they meet.     CRITERIA SCORING   On a 72 hour hold, court hold, committed, stay of commitment, or revocation 0    Patient LOS on BEH unit exceeds 20 days 0  LOS: 16   Patient under guardianship, 55+, otherwise medically complex, or under age 11 0   Suicide ideation without relief of precipitating factors 1   Current plan for suicide 0   Current plan for homicide 0   Imminent risk or actual attempt to seriously harm another without relief of factors precipitating the attempt 0   Severe dysfunction in daily living (ex: complete neglect for self care, extreme disruption in vegetative function, extreme deterioration in social interactions) 0   Recent (last 7 days) or current physical aggression in the ED or on unit 0   Restraints or seclusion episode in past 72 hours 0   Recent (last 7 days) or current verbal aggression, agitation, yelling, etc., while in the ED or unit 0   Active psychosis 0   Need for constant or near constant redirection (from leaving, from others, etc).  0   Intrusive or disruptive behaviors 0   TOTAL 1

## 2023-11-13 NOTE — PROGRESS NOTES
11/13/2023    Group Therapy Session   Group Attendance attended group session   Time Session Began 1015   Time Session Ended 1100   Total Time (minutes) 30   Total # Attendees 6   Group Type psychotherapeutic   Group Topic Covered Group Topic Covered: relationship, emotions/expression   Group Session Detail Self-exploration activity: group discussion and worksheet to identify the values, beliefs, aspirations we have that inform our choices, make us who we are, and lead to greater self-awareness and personal fulfillment.      Patient Response/Contribution organized, discussed personal experienced with topic, expressed readiness to alter behaviors   Patient Participation Detail Pt reports he feels good and is happy to be alive. Pt reports he is looking forward to discharging tomorrow. Pt reports he would like to learn more about where mental illness comes from.

## 2023-11-13 NOTE — PLAN OF CARE
"  Problem: Adult Inpatient Plan of Care  Goal: Optimal Comfort and Wellbeing  Outcome: Progressing     Problem: Adult Behavioral Health Plan of Care  Goal: Adheres to Safety Considerations for Self and Others  Outcome: Progressing     Problem: Suicidal Behavior  Goal: Suicidal Behavior is Absent or Managed  Outcome: Progressing     Problem: Sleep Disturbance  Goal: Adequate Sleep/Rest  Outcome: Progressing   Goal Outcome Evaluation: Ongoing     Observed to have slept well for approx 6.25  hrs on all Q 15\" rounds overnight w/ regular non-labored respirations and no reported or observed distress.  Wakeful, up for a snack at approx 0245, then readily back to bed.  Safe, therapeutic environment maintained.                         "

## 2023-11-13 NOTE — CARE PLAN
Occupational Therapy Group Note      11/13/23 1312   Group Therapy Session   Group Attendance attended group session   Group Type psychotherapeutic   Group Topic Covered coping skills/lifestyle management;emotions/expression;leisure exploration/use of leisure time   Group Session Detail OT: Organizational Worksheets and Who I Am magazine collaging for identification of values, fostering hope, future-orientation, focusing on recovery, exercising cognitive skills and coping with stress/sxs (11:15-12:00; 7 participants)   Patient Response/Contribution cooperative with task;organized;discussed personal experience with topic    Patient joined group for 30 minutes.  Pt shared that the weekend went well and he is looking forward to his discharge this week.  Pt was a positive participant with a bright affect.  PT was future oriented and he expressed gratitude towards the hospital staff.

## 2023-11-13 NOTE — PROGRESS NOTES
"  ----------------------------------------------------------------------------------------------------------  [facility]  Psychiatry Progress Note  Hospital Day #16     Interim History:     The patient's care was discussed with the treatment team and chart notes were reviewed.    Vitals: VSS  Sleep: 5.5 hours (11/12/23 0551)  Scheduled medications: Took all scheduled medications as prescribed  Psychiatric PRN medications: PRN Seroquel and melatonin requested and given    Staff Report:   No acute events or safety concerns overnight. Please see staff notes for details.      Subjective:     Patient Interview:  Edi Sorenson was interviewed in his room. Says he is \"excited\" today to be discharging tomorrow. Discussed that the plan is for him to be picked up at  then head to [facility]. We reviewed that he would be sent with a 30 day supply of his medications. Wondering if he could get all of his outpatient cares to occur under the same system, preferring [facility]. He says this is because he was reminded by his ex that the period of time he stayed sober the longest and was the healthiest was when all of his appointments and providers were in the same place. Encouraged patient to talk with social work team about this idea prior to discharge.    ROS:  Patient has no bothersome physical symptoms  Patient denies acute concerns     Objective:     Vitals:  BP (!) 147/84 (BP Location: Left arm, Patient Position: Left side, Cuff Size: Adult Regular)   Pulse 79   Temp 98.2  F (36.8  C) (Temporal)   Resp 16   Ht 1.829 m (6')   Wt 89.6 kg (197 lb 8 oz)   SpO2 100%   BMI 26.79 kg/m      Allergies:  No Known Allergies    Current Medications:  Scheduled:  Current Facility-Administered Medications   Medication Dose Route Frequency    bictegravir-emtricitabine-tenofovir  1 tablet Oral Daily    mirtazapine  15 mg Oral At Bedtime    multivitamin w/minerals  1 tablet Oral Daily    nicotine  1 " "patch Transdermal Daily    nicotine   Transdermal Q8H    risperiDONE  1 mg Oral BID    rivaroxaban ANTICOAGULANT  20 mg Oral Daily with supper    sertraline  100 mg Oral Daily       PRN:  Current Facility-Administered Medications   Medication Dose Route Frequency    acetaminophen  650 mg Oral Q4H PRN    sore throat  1 lozenge Buccal Q1H PRN    clotrimazole   Topical BID PRN    famotidine  10 mg Oral BID PRN    gabapentin  200 mg Oral TID PRN    hydrOXYzine  25 mg Oral Q4H PRN    melatonin  3 mg Oral At Bedtime PRN    nicotine  4 mg Buccal Q1H PRN    OLANZapine  10 mg Oral TID PRN    Or    OLANZapine  10 mg Intramuscular TID PRN    ondansetron  4 mg Oral Q6H PRN    polyethylene glycol  17 g Oral Daily PRN    QUEtiapine  150 mg Oral At Bedtime PRN       Labs and Imaging:  New results:   No results found for this or any previous visit (from the past 24 hour(s)).      Data this admission:  - CBC unremarkable  - CMP unremarkable  - TSH normal  - UDS positive for amphetamines, fentanyl, cocaine, benzodiazepines, and cannabinoids  - Hgb A1c mildly elevated at 5.7  - Lipids unremarkable  - Vit B12 normal  - Folate normal  - Urinalysis unremarkable but with mucus present; normal on 11/7  - GC/CT negative     Mental Status Exam:     Oriented to:  Grossly Oriented  General:  Awake and Alert  Appearance:  appears stated age  Behavior/Attitude:  Calm, Cooperative, and Engaged  Eye Contact: Appropriate  Psychomotor: Normal no catatonia present  Speech:  appropriate volume/tone and with good articulation  Language: Fluent in English with appropriate syntax and vocabulary.  Mood:  \"excited\"  Affect:  appropriate, congruent with mood, broad/full, and euthymic  Thought Process:  coherent and goal directed  Thought Content:   No SI/HI/AH/VH; No apparent delusions  Associations:  intact  Insight:  good excited to enter CD treatment; thinking about what has been most successful for him in the past for his sobriety  Judgment:  good due " to cooperation with treatment  Impulse control: fair  Attention Span:  grossly intact  Concentration:  grossly intact  Recent and Remote Memory:  not formally assessed  Fund of Knowledge: average  Muscle Strength and Tone: normal  Gait and Station: Normal     Psychiatric Assessment     Edi Sorenson is a 48 year old male with previous psychiatric diagnoses of MDD, polysubstance use disorder (amphetamine, cocaine, cannabis, opiates, benzo) and substance-induced psychosis, and past medical history of HIV and 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 Most recent psychiatric hospitalization was at [facility] in Aug 2023 for SI and AH which he left AMA. Significant symptoms on admission include SI, AH, and occasional VH. The MSE on admission was pertinent for SI, AH, and occasional VH. His UDS was positive for multiple substances including cocaine, amphetamine, fentanyl, benzo, cannabis. Biological contributions to mental health presentation include daily polysubstance use, past psychiatric diagnosis of MDD and substance-induced psychosis. Psychological contributions to mental health presentation include maladaptive coping skill. Social factors contributing to mental health presentation include recent loss of his fiance. Protective factors include motivation and voluntary engagement in  treatment.      In summary, the patient's reported symptoms of SI, AH, and VH in the context of substance use are consistent with previous diagnosis of substance-induced psychosis and substance-induced mood disorder worsened by recent psychosocial stressor vs MDD with psychosis. He will likely benefit from this admission.     Given that he currently has SI and psychosis, patient warrants inpatient psychiatric hospitalization to maintain his safety.      Psychiatric Plan by Diagnosis      Today's changes:  - none; patient to be discharged  tomorrow for [facility]     # Substance-induced psychosis and Substance-induced mood disorder  1. Medications:  - Continue risperidone 1 mg bid  - Continue sertraline 100 mg daily  - Continue prn quetiapine 150mg at bedtime  - Continue mirtazapine 15mg at bedtime    - Patient will be treated in therapeutic milieu with appropriate individual and group therapies as described     Psychiatric Hospital Course:      Edi Sorenson was admitted to [facility] as a voluntary patient.   Medications:  Risperidone 1mg BID, sertraline 100mg Qday, mirtazapine 15mg at bedtime from ED were continued.   New medications started at the time of admission include PRN Quetiapine 50mg at bedtime. Increased to 100 mg PRN at bedtime on 10/30. Further increased to 150mg on 11/3.   On 11/2, mirtazapine 15mg was discontinued for concern of zachary and him being on two antidepressants   On 11/6, mirtazapine 7.5 mg was restarted as he reported a depressed mood after discontinuing this medication. On 11/8 increased to 15 mg.     The risks, benefits, alternatives, and side effects were discussed and understood by the patient.     Medical Assessment and Plan     Medical diagnoses to be addressed this admission:    # HIV  - had not taken ART for at least past year, most recent CD4 count 699  - Continue biktarvy daily  - Medicine consult 10/29 recommend follow up with PCP to establish routine care post-admission     # Hx of PE/DVT  - Continue rivaroxaban 15 mg daily  - notify medicine if patient c/o chest pain or SOB, or develops persistent tachycardia or has O2 sats <90%     # Abdominal discomfort during urination, resolved  - UA normal 10/28  - Med consult 10/29: not consistent with UTI, kidney stone, pyelonephritis; GC/CT testing negative 10/30    # Nocturia  - UA normal 11/7    # Tinea cruris  - Continue topical clotrimazole topical 1% cream for Tinea cruris     Medical course: Patient was physically examined by the ED prior to being transferred to the  unit and was found to be medically stable and appropriate for admission.     Consults: medicine for HIV/possible UTI management 10/29     Checklist     Legal Status: Voluntary     Safety Assessment:   Behavioral Orders   Procedures    Code 1 - Restrict to Unit    Discontinue 1:1 attendant for suicide risk     Order Specific Question:   I have performed an in person assessment of the patient     Answer:   Based on this assessment the patient no longer requires a one on one attendant at this point in time.     Order Specific Question:   Rationale     Answer:   Patient States able to remain safe in hospital    Routine Programming     As clinically indicated    Status 15     Every 15 minutes.    Suicide precautions     Patients on Suicide Precautions should have a Combination Diet ordered that includes a Diet selection(s) AND a Behavioral Tray selection for Safe Tray - with utensils, or Safe Tray - NO utensils         Risk Assessment:  Risk for harm is moderate.  Risk factors: SI, maladaptive coping, substance use, and past behaviors  Protective factors: family and engaged in treatment     SIO: no    Disposition: Plan to discharge to [facility] on 11/14. Pending stabilization, medication optimization, & development of a safe discharge plan.     Attestations     Ena Glaser, MS3  [employer]    Resident/Fellow Attestation   I, Stuart Saldana, DO was present with the medical student who participated in the service and in the documentation of the note.  I have verified the history and personally performed the physical exam and medical decision making.  I agree with the assessment and plan of care as documented in the note.          Stuart Saldana, PGY2 Psychiatry     This patient has been seen and evaluated by me, Ryan Joseph.  I have discussed this patient with the psychiatry resident and I agree with the findings and plan in this note.    I have reviewed today's vital signs, medications, labs and  imaging.     Ryan Elder MD on 11/13/2023 at 4:12 PM

## 2023-11-13 NOTE — PLAN OF CARE
Team Note Due:  Monday     Assessment/Intervention/Current Symtoms and Care Coordination:  - chart review  - team meeting - discussed pt progress, symptomology, and response to treatment.  Discussed the discharge plan and any potential impediments to discharge.  - patient asked about getting set up for Psychiatry through [facility] - discussed with him options including having [facility] staff assist in this. [facility] will contact their out patient in the am - inquire about wait / referral .     - Behavioral Team Discussion Note completed for weekly plan of care update.      Discharge Plan or Goal:    Tuesday 11/14/23 - 1:00 pm -  - for admission to:  [facility] with lodging in [facility]       Barriers to Discharge:  Ongoing symptom stabilization, placement at [facility] (11/14)     Referral Status:    11/10/23 - Referral Tracking Form completed       Substance Use Programming   Referrals for residential programming placed on 10/26/23 - assessment completed while in ED - referrals to:      [facility]:  [email or web address]     [facility] Location : [address] [place] [address]. [address] [place] [address]  Phone: [phone] Fax: [phone]     Accepted for admission - 11/14/    [facility] Location: [address] [place] [address]  Phone: [phone] Fax: [phone]    11/10 - This writer confirmed with [facility] to cancel previously scheduled intake      Legal Status:  voluntary     Contacts:           Upcoming Meetings and Dates/Important Information and next steps:    Discharge to treatment - 11/14 - they will pick patient up at 1:00pm - will come to [facility] entrance and call unit

## 2023-11-14 VITALS
SYSTOLIC BLOOD PRESSURE: 123 MMHG | RESPIRATION RATE: 14 BRPM | WEIGHT: 199.5 LBS | DIASTOLIC BLOOD PRESSURE: 89 MMHG | TEMPERATURE: 97.8 F | OXYGEN SATURATION: 100 % | BODY MASS INDEX: 27.02 KG/M2 | HEIGHT: 72 IN | HEART RATE: 69 BPM

## 2023-11-14 PROCEDURE — G0177 OPPS/PHP; TRAIN & EDUC SERV: HCPCS

## 2023-11-14 PROCEDURE — 250N000013 HC RX MED GY IP 250 OP 250 PS 637

## 2023-11-14 PROCEDURE — 99239 HOSP IP/OBS DSCHRG MGMT >30: CPT | Mod: GC | Performed by: PSYCHIATRY & NEUROLOGY

## 2023-11-14 RX ADMIN — BICTEGRAVIR SODIUM, EMTRICITABINE, AND TENOFOVIR ALAFENAMIDE FUMARATE 1 TABLET: 50; 200; 25 TABLET ORAL at 08:36

## 2023-11-14 RX ADMIN — NICOTINE 1 PATCH: 21 PATCH, EXTENDED RELEASE TRANSDERMAL at 08:36

## 2023-11-14 RX ADMIN — SERTRALINE HYDROCHLORIDE 100 MG: 100 TABLET ORAL at 08:36

## 2023-11-14 RX ADMIN — RISPERIDONE 1 MG: 1 TABLET, ORALLY DISINTEGRATING ORAL at 08:36

## 2023-11-14 ASSESSMENT — ACTIVITIES OF DAILY LIVING (ADL)
ADLS_ACUITY_SCORE: 28
ORAL_HYGIENE: INDEPENDENT
ADLS_ACUITY_SCORE: 28
HYGIENE/GROOMING: INDEPENDENT
LAUNDRY: WITH SUPERVISION
ADLS_ACUITY_SCORE: 28
DRESS: INDEPENDENT
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
## 2023-11-14 NOTE — PLAN OF CARE
Problem: Adult Inpatient Plan of Care  Goal: Readiness for Transition of Care  Outcome: Progressing   Goal Outcome Evaluation:    Plan of Care Reviewed With: patient           Nursing Assessment     Recent Vitals: BP:115/78 T:98.6  P:71     General Shift Summary  Visible in milieu and social with peers. No behavioral concerns. Denies MH concerns. Pt declines having any concerns to talk about this evening. States they have no concerns at this time about discharge.      Patient is medication compliant and reported no side effects. Received PRN melatonin and seroquel for sleep.      Hygiene and appetite are appropriate.     Pablo Ortiz RN

## 2023-11-14 NOTE — PLAN OF CARE
Team Note Due:  Monday     Assessment/Intervention/Current Symtoms and Care Coordination:  - chart review  - team meeting - discussed pt progress, symptomology, and response to treatment.  Discussed the discharge plan and any potential impediments to discharge.  - called and spoke with scheduling staff and [facility] re scheduling patient -they asked questions re demographics/CFR etc-   and if patient were to call and schedule once at treatment and knows his schedule they will be able to get him an appointment as it sounds like he is eligible. This writer asked specifically if thought there would be any difference in ability to schedule if this writer scheduled today versus patient calling from treatment. - they report that they have had a good amount of intake appointments lately , and there is no difference time/wait wise.     Spoke with patient about options re scheduling - he says he also thinks it's best he call once at the program - verbalizes understanding of follow up.      Discharge Plan or Goal:  Discharge today - See discharge instructions/AVS  Tuesday 11/14/23 - 1:00 pm -  - for admission to:  [facility] with lodging in [facility]       Barriers to Discharge:  Ongoing symptom stabilization, placement at [facility] (11/14)     Referral Status:  See discharge instructions/AVS    Legal Status:  voluntary     Contacts:           Upcoming Meetings and Dates/Important Information and next steps:  Discharge today - See discharge instructions/AVS

## 2023-11-14 NOTE — PLAN OF CARE
Goal Outcome Evaluation:    Plan of Care Reviewed With: patient      Problem: Adult Inpatient Plan of Care  Goal: Readiness for Transition of Care  Outcome: Adequate for Care Transition     Patient aware of his discharge plan. Patient appeared calm and pleasant. Ate 100% of his breakfast. He was compliant with his medications. Patient denies all mental health symptoms. Rested in his room for an hour and a half before coming to take shower. Patient is discharging to treatment facility and will be picked up at 1300. No behavior concerns.   Patient went over patient's discharge summary with him. Patient verbalized understanding of his discharge instructions. Patient was walked out of the unit to catch his ride at 1305. Patient left with all his belongings.

## 2023-11-14 NOTE — CARE PLAN
Occupational Therapy Group Note      11/14/23 [id number]   Group Therapy Session   Group Attendance attended group session   Group Type psychotherapeutic   Group Topic Covered coping skills/lifestyle management   Group Session Detail OT: Gentle chair yoga and wellness discussion for parsympathetic nervous system activation, sensory modulation, building mind-body awareness, building insight into total body wellness and coping with stress/sxs. (10:10-11:00; 5 participants)   Patient Response/Contribution cooperative with task;organized

## 2024-01-07 ENCOUNTER — HOSPITAL ENCOUNTER (OUTPATIENT)
Facility: CLINIC | Age: 49
Setting detail: OBSERVATION
Discharge: HOME | End: 2024-01-08
Attending: EMERGENCY MEDICINE | Admitting: EMERGENCY MEDICINE
Payer: COMMERCIAL

## 2024-01-07 ENCOUNTER — HOSPITAL ENCOUNTER (EMERGENCY)
Facility: CLINIC | Age: 49
Discharge: HOME | End: 2024-01-07
Attending: FAMILY MEDICINE | Admitting: FAMILY MEDICINE
Payer: COMMERCIAL

## 2024-01-07 VITALS
OXYGEN SATURATION: 98 % | BODY MASS INDEX: 25.98 KG/M2 | HEART RATE: 75 BPM | WEIGHT: 191.8 LBS | SYSTOLIC BLOOD PRESSURE: 132 MMHG | TEMPERATURE: 97.9 F | RESPIRATION RATE: 16 BRPM | DIASTOLIC BLOOD PRESSURE: 94 MMHG | HEIGHT: 72 IN

## 2024-01-07 DIAGNOSIS — F32.1 CURRENT MODERATE EPISODE OF MAJOR DEPRESSIVE DISORDER, UNSPECIFIED WHETHER RECURRENT (H): ICD-10-CM

## 2024-01-07 DIAGNOSIS — F15.959 STIMULANT-INDUCED PSYCHOTIC DISORDER (H): ICD-10-CM

## 2024-01-07 DIAGNOSIS — F19.20 CHEMICAL DEPENDENCY (H): ICD-10-CM

## 2024-01-07 DIAGNOSIS — F32.A DEPRESSION, UNSPECIFIED DEPRESSION TYPE: ICD-10-CM

## 2024-01-07 DIAGNOSIS — R45.851 SUICIDAL IDEATION: ICD-10-CM

## 2024-01-07 DIAGNOSIS — Z76.5 MALINGERING: ICD-10-CM

## 2024-01-07 DIAGNOSIS — B20 HUMAN IMMUNODEFICIENCY VIRUS (HIV) DISEASE (H): ICD-10-CM

## 2024-01-07 DIAGNOSIS — F15.90 STIMULANT USE DISORDER: ICD-10-CM

## 2024-01-07 DIAGNOSIS — F32.1 CURRENT MODERATE EPISODE OF MAJOR DEPRESSIVE DISORDER WITHOUT PRIOR EPISODE (H): ICD-10-CM

## 2024-01-07 DIAGNOSIS — F14.159: ICD-10-CM

## 2024-01-07 DIAGNOSIS — F43.10 POSTTRAUMATIC STRESS DISORDER: ICD-10-CM

## 2024-01-07 PROBLEM — F19.99 SUBSTANCE-INDUCED DISORDER (H): Status: ACTIVE | Noted: 2024-01-07

## 2024-01-07 PROBLEM — F33.9 RECURRENT MAJOR DEPRESSION (H): Status: RESOLVED | Noted: 2024-01-07 | Resolved: 2024-01-07

## 2024-01-07 PROBLEM — F32.9 MAJOR DEPRESSIVE DISORDER, SINGLE EPISODE, UNSPECIFIED: Status: ACTIVE | Noted: 2024-01-07

## 2024-01-07 PROBLEM — F33.9 RECURRENT MAJOR DEPRESSION (H): Status: ACTIVE | Noted: 2024-01-07

## 2024-01-07 PROBLEM — F14.10 COCAINE ABUSE (H): Status: RESOLVED | Noted: 2024-01-07 | Resolved: 2024-01-07

## 2024-01-07 PROBLEM — F14.10 COCAINE ABUSE (H): Status: ACTIVE | Noted: 2024-01-07

## 2024-01-07 LAB
AMPHETAMINES UR QL SCN: ABNORMAL
BARBITURATES UR QL SCN: ABNORMAL
BENZODIAZ UR QL SCN: ABNORMAL
BZE UR QL SCN: ABNORMAL
CANNABINOIDS UR QL SCN: ABNORMAL
FENTANYL UR QL: ABNORMAL
OPIATES UR QL SCN: ABNORMAL
PCP QUAL URINE (ROCHE): ABNORMAL

## 2024-01-07 PROCEDURE — 99285 EMERGENCY DEPT VISIT HI MDM: CPT

## 2024-01-07 PROCEDURE — 99283 EMERGENCY DEPT VISIT LOW MDM: CPT | Performed by: FAMILY MEDICINE

## 2024-01-07 PROCEDURE — 99284 EMERGENCY DEPT VISIT MOD MDM: CPT | Performed by: FAMILY MEDICINE

## 2024-01-07 PROCEDURE — 80307 DRUG TEST PRSMV CHEM ANLYZR: CPT | Performed by: FAMILY MEDICINE

## 2024-01-07 ASSESSMENT — ACTIVITIES OF DAILY LIVING (ADL)
ADLS_ACUITY_SCORE: 35
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
## 2024-01-07 NOTE — DISCHARGE INSTRUCTIONS
You may self refer for most Crisis Residence services. This is a short-term service, typically 3-10 days, for stabilization when experiencing a mental health crisis. They provide housing and treatment services that integrate mental health, medical, and substance use care.    [facility] - People Workboard  Main phone: [phone]   Direct: [phone]   [address] [place] [address]     [facility]   [phone]   [address] [facility] [address]     [facility]  Main phone: [phone]   Direct: [phone]   [address] [place] [address]     [facility]  [phone]   [address] [place] [address]     [facility] **requires referral from a medical facility  [phone] [address] [place] [address]     Wherever you attend for a crisis residence, if possible bring any medications you may have in their prescribed bottles.      Substance Use Disorder Direct Access Resources    It is recommended that you abstain from all mood altering chemicals. Please contact the sober support hotline ([phone]) as needed; phones are answered 24 hours a day, 7 days a week.    To access substance use treatment you must have a comprehensive assessment completed to begin any treatment program.     If uninsured, please contact your county of residence for eligibility screen to substance use disorder evaluation and treatment:    [place] - [phone]   [facility] [phone]   State mental health facility [phone]   [facility] [phone]   [place] - [phone]   [facility] [phone]   [facility] [phone]   [place] - [phone]     If you have private insurance, call the customer service number on the back of your insurance card to find an in-network substance abuse use disorder assessment. The ideal provider will be a treatment facility, licensed in the [facility].      Community URSZULA Evaluations: Clients may call their county for a full list of providers - Availability and services listed belo are subject to change, please call the provider to confirm    [facility]  [phone]  Walk in Assessments: Mon-Friday 7a-1:45p  [address] [facility], [address]    [facility]  Central Access [phone]  [address] [place], [place], [address]  *by appointment only    [facility]  [phone] (phone consultation available )  Locations in: [place], [place], [place], [place], and [place], [place]  Urdu virtual IOP [phone] or visit [email or web address]   Also offers LGBTQ programming     [facility]  [phone]  [address]  [place], [place], [address]  *Currently only offered via telehealth - call to set up an appointment    [facility]  [address] [place] [address], [address]  [facility]  For more information to to make a referral call:  [phone]  Walk-in on   9-11 a.m.    [facility]  [phone]  [address] [place], [place], [address]  *available by appointments only    [facility] [place] [facility]  [phone]  [address] [place], [place], [address]  *available by appointment only    [facility]  [phone]  [address] [place], [place], [address]  *walk in assessments available M-F starting at 7 am.    [facility]  [phone]  Locations: [facility], [facility], and [place]  *Walk in assessments availble M-F starting at 8 am -virtual only    [facility]  [phone]  [address] [place] [address]    [facility]  Virtual + Locations: [place], [place], [place], [place], [facility], [place], [place], [place]   [phone]  *available by appointment only    [facility]  [phone]  [address]  [place], [place], [address]    [facility]  [phone]  [address]  [place], [address]  *available by appointment    [facility]  [phone]  [address]  [place], [place], [address]  *walk ins available M-TH from 9-3    [facility]  MAT program: [phone]  [address], [place], [address]    [place]  [phone]  Same day substance use disorder assessments are available Monday - Friday, via walk-in or by appointment at the [place] location.  [address], [address], [place] [address]     [facility] - adolescent and adult SUDs services  [phone]  Offer services Monday through Friday, as well as evening hours Monday through Thursday. Normally, a first appointment will be scheduled within one week  [email or web address]  Locations all over [place]    If you are intoxicated, you may be required to detox at a detox facility before starting treatment. The following are detox facilities that you can self present to. All detox facilities are able to help you complete an assessment prior to discharge if you choose:    [facility]: [address] [place], [place], [address].         [phone]    [facility]: [address] [place], [place], [address]  [phone]     [facility] ([facility]): [address] [place], [place], [address] [phone]     [facility]: [address] [place], [address], [phone]    Ways to help cope with sobriety:    -- Take prescribed medicines as scheduled  -- Keep follow-up appointments  -- Talk to others about your concerns  -- Get regular exercise  -- Practice deep breathing skills  -- Eat a healthy diet  -- Use community resources, including hotline numbers, [facility] crisis and support meetings  -- Stay sober and avoid places/people/things associated with substance use  --Maintain a daily  schedule/routine  --Get at least 7-8 hours of sleep per night  --Create a list 10--20 healthy activities that you can do that are enjoyable and do not involve substance use  --Create daily goals (approx. 1-4 goals) per day and work to achieve them throughout the day.       Free Resources:    [facility] ([facility])  [facility] connects people seeking recovery to resources that help foster and sustain long-term recovery. Whether you are seeking resources for treatment, transportation, housing, job training, education, health care or other pathways to recovery, [facility] is a great place to start.  Phone: 850.746.3332. www.[place]Pixeon (Great listing of all types of recovery and non-recovery related resources)    Alcoholics Anonymous  Phone: 6-358-ALCOHOL  Website: HTTP://WWW.AA.ORG/  AA [place] (563-037-5995 or http://aaAlethia BioTherapeutics.org)  AA [place] (321-838-4932 or www.aastpauYapta.org)     Narcotics Anonymous  Phone: 191.795.2314  Website: www.[employer].esolidar.    [employer] [facility] [address], [place], [place],  Phone: [phone]  Drop-in Hours: Monday-Friday 9-11:30 am. By appointment at other times.  Provides: [facility] is a drop-in center on the east side [facility] that provides a safe space for individuals who are homeless and have a history of chemical use. Sobriety is not a requirement but drugs and alcohol are not allowed on the property.  Services: Non-clients can access drop-in services such as Recovery and Harm Reduction Groups, referrals to case management, community activities, shower facilities, and a pool table. Individuals who are homeless and have chemical health needs may be eligible for enrollment into [facility]'s case management program. Clients and  work together to access benefits, treatment, health care, shelter, and external housing resources.

## 2024-01-07 NOTE — ED PROVIDER NOTES
"ED Provider Note  [facility]      History     Chief Complaint   Patient presents with    Suicidal     Cooperative with care.  Flirts with female staff.     HPI  Edi Sorenson is a 48 year old male with history of substance use disorder who presents to the Emergency Department with suicidal ideation.     Epic records reviewed. He had presented to [facility] emergency department earlier this morning at 7:39 AM for seeking assistance with chemical dependency treatment. Initially it was felt that he was there for a behavioral crisis, admitted doing drugs and stopping taking his psych medications, endorsed suicidal ideation. He requested help getting into treatment. He underwent psychiatric evaluation, it was felt that inpatient mental health admission was not recommended and he was discharged with instructions to use community resources such as crisis residence or detox/ substance use disorder treatment options. Upon hearing this patient moved to leave AMA, requesting to leave when  attempted to discussed less restrictive alternatives/treatment. It was felt that his recent substance use may have been exacerbating his presentation. While giving patient his paperwork, he threw them in the corner of the room and yelled \"you're no fucking help!\" Patient demanded I call an ambulance for him to a different hospital. This RN stated she was unable to do that, but he could certainly call one after he's discharged. Security called to assist with discharge.    Past Medical History  Past Medical History:   Diagnosis Date    Asymptomatic human immunodeficiency virus (HIV) infection status (H)     Cocaine abuse (H)     Depression     DVT (deep venous thrombosis) (H)     Last in 6/2018. No hx of PE. On Coumadin.    Genital HSV      History reviewed. No pertinent surgical history.  bictegravir-emtricitabine-tenofovir (BIKTARVY) -25 MG per tablet  gabapentin (NEURONTIN) 100 MG " "capsule  melatonin 3 MG tablet  mirtazapine (REMERON) 15 MG tablet  multivitamin w/minerals (THERA-VIT-M) tablet  nicotine (NICODERM CQ) 21 MG/24HR 24 hr patch  QUEtiapine 150 MG TABS  risperiDONE (RISPERDAL M-TABS) 1 MG ODT  rivaroxaban ANTICOAGULANT (XARELTO) 20 MG TABS tablet  sertraline (ZOLOFT) 100 MG tablet      No Known Allergies  Family History  No family history on file.  Social History   Social History     Tobacco Use    Smoking status: Former    Smokeless tobacco: Never   Substance Use Topics    Alcohol use: Yes     Comment: social    Drug use: Yes     Types: Cocaine, \"Crack\" cocaine         A medically appropriate review of systems was performed with pertinent positives and negatives noted in the HPI, and all other systems negative.    Physical Exam   BP: (!) 132/94  Pulse: 75  Temp: 97.9  F (36.6  C)  Resp: 16  Height: 182.9 cm (6')  Weight: 87 kg (191 lb 12.8 oz)  SpO2: 98 %  Physical Exam  Constitutional:       General: He is not in acute distress.     Appearance: Normal appearance. He is not toxic-appearing.   HENT:      Head: Atraumatic.   Eyes:      General: No scleral icterus.     Conjunctiva/sclera: Conjunctivae normal.   Cardiovascular:      Rate and Rhythm: Normal rate.      Heart sounds: Normal heart sounds.   Pulmonary:      Effort: Pulmonary effort is normal. No respiratory distress.      Breath sounds: Normal breath sounds.   Abdominal:      Palpations: Abdomen is soft.      Tenderness: There is no abdominal tenderness.   Musculoskeletal:         General: No deformity.      Cervical back: Neck supple.   Skin:     General: Skin is warm.   Neurological:      General: No focal deficit present.      Mental Status: He is alert and oriented to person, place, and time.      Sensory: No sensory deficit.      Motor: No weakness.      Coordination: Coordination normal.   Psychiatric:         Mood and Affect: Affect is labile.         Speech: Speech normal.         Behavior: Behavior is agitated.    "      Thought Content: Thought content includes suicidal ideation. Thought content does not include homicidal ideation.           ED Course, Procedures, & Data      Procedures         Medications - No data to display  Labs Ordered and Resulted from Time of ED Arrival to Time of ED Departure   URINE DRUG SCREEN PANEL - Abnormal       Result Value    Amphetamines Urine Screen Negative      Barbituates Urine Screen Negative      Benzodiazepine Urine Screen Negative      Cannabinoids Urine Screen Negative      Cocaine Urine Screen Positive (*)     Fentanyl Qual Urine Screen Positive (*)     Opiates Urine Screen Negative      PCP Urine Screen Negative       No orders to display          Critical care was not performed.     Medical Decision Making  The patient's presentation was of moderate complexity (a chronic illness mild to moderate exacerbation, progression, or side effect of treatment).    The patient's evaluation involved:  ordering and/or review of 1 test(s) in this encounter (see separate area of note for details)  discussion of management or test interpretation with another health professional (independent provider able to do full DEC assessment.)    The patient's management necessitated moderate risk (limitations due to social determinants of health (patient is homeless abusing chemicals at this time crisis residence and shelter were offered and patient is refusing)).    Assessment & Plan        I have reviewed the nursing notes. I have reviewed the findings, diagnosis, plan and need for follow up with the patient.        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

## 2024-01-07 NOTE — ED NOTES
Attempted to complete DEC assessment at approximately 2:30 pm, pt too somnolent, would not wake up. MD naylor. Will attempt to complete DEC once pt wakes up.

## 2024-01-07 NOTE — ED NOTES
"Pt given discharge paperwork and belongings. Pt asking for phone ot use, writer told him he can use the phone in the lobby to call for a crisis bed. Pt then stating, \"I need to be cabbed and I need to use the phone first\". Writer and another nurse reminded the pt he can use the phone in the lobby. Pt became upset, grabbed his belongings and said \"yeah, you're a bitch\" to one of the nurses. Security followed pt out.   "

## 2024-01-07 NOTE — ED NOTES
Pt is agreeable to allowing DEC team to work on locating a crisis bed for him if possible. We will plan to attempt to arrange this before discharge.

## 2024-01-08 VITALS
SYSTOLIC BLOOD PRESSURE: 148 MMHG | OXYGEN SATURATION: 100 % | RESPIRATION RATE: 18 BRPM | WEIGHT: 194 LBS | HEIGHT: 72 IN | HEART RATE: 55 BPM | BODY MASS INDEX: 26.28 KG/M2 | DIASTOLIC BLOOD PRESSURE: 100 MMHG | TEMPERATURE: 98.1 F

## 2024-01-08 PROBLEM — R45.851 SUICIDAL IDEATION: Status: ACTIVE | Noted: 2022-02-08

## 2024-01-08 PROBLEM — F15.959 STIMULANT-INDUCED PSYCHOTIC DISORDER (H): Status: ACTIVE | Noted: 2024-01-07

## 2024-01-08 PROBLEM — F15.90 STIMULANT USE DISORDER: Status: ACTIVE | Noted: 2023-08-28

## 2024-01-08 PROCEDURE — G0378 HOSPITAL OBSERVATION PER HR: HCPCS

## 2024-01-08 PROCEDURE — 250N000013 HC RX MED GY IP 250 OP 250 PS 637: Performed by: PSYCHIATRY & NEUROLOGY

## 2024-01-08 PROCEDURE — 99223 1ST HOSP IP/OBS HIGH 75: CPT | Mod: AI | Performed by: PSYCHIATRY & NEUROLOGY

## 2024-01-08 RX ORDER — HYDROXYZINE HYDROCHLORIDE 50 MG/1
50 TABLET, FILM COATED ORAL 3 TIMES DAILY PRN
Status: DISCONTINUED | OUTPATIENT
Start: 2024-01-08 | End: 2024-01-08 | Stop reason: HOSPADM

## 2024-01-08 RX ORDER — GABAPENTIN 100 MG/1
200 CAPSULE ORAL 3 TIMES DAILY PRN
Status: DISCONTINUED | OUTPATIENT
Start: 2024-01-08 | End: 2024-01-08 | Stop reason: HOSPADM

## 2024-01-08 RX ORDER — SERTRALINE HYDROCHLORIDE 100 MG/1
100 TABLET, FILM COATED ORAL DAILY
Status: DISCONTINUED | OUTPATIENT
Start: 2024-01-08 | End: 2024-01-08 | Stop reason: HOSPADM

## 2024-01-08 RX ORDER — OLANZAPINE 10 MG/2ML
10 INJECTION, POWDER, FOR SOLUTION INTRAMUSCULAR DAILY PRN
Status: DISCONTINUED | OUTPATIENT
Start: 2024-01-08 | End: 2024-01-08 | Stop reason: HOSPADM

## 2024-01-08 RX ORDER — RISPERIDONE 0.5 MG/1
0.5 TABLET, ORALLY DISINTEGRATING ORAL ONCE
Status: COMPLETED | OUTPATIENT
Start: 2024-01-08 | End: 2024-01-08

## 2024-01-08 RX ORDER — MIRTAZAPINE 15 MG/1
15 TABLET, FILM COATED ORAL AT BEDTIME
Status: DISCONTINUED | OUTPATIENT
Start: 2024-01-08 | End: 2024-01-08 | Stop reason: HOSPADM

## 2024-01-08 RX ORDER — OLANZAPINE 5 MG/1
5 TABLET, ORALLY DISINTEGRATING ORAL EVERY 6 HOURS PRN
Status: DISCONTINUED | OUTPATIENT
Start: 2024-01-08 | End: 2024-01-08 | Stop reason: HOSPADM

## 2024-01-08 RX ORDER — SERTRALINE HYDROCHLORIDE 100 MG/1
100 TABLET, FILM COATED ORAL DAILY
Qty: 15 TABLET | Refills: 1 | Status: SHIPPED | OUTPATIENT
Start: 2024-01-08

## 2024-01-08 RX ORDER — MIRTAZAPINE 15 MG/1
15 TABLET, FILM COATED ORAL AT BEDTIME
Qty: 15 TABLET | Refills: 1 | Status: SHIPPED | OUTPATIENT
Start: 2024-01-08

## 2024-01-08 RX ORDER — ACETAMINOPHEN 325 MG/1
650 TABLET ORAL EVERY 4 HOURS PRN
Status: DISCONTINUED | OUTPATIENT
Start: 2024-01-08 | End: 2024-01-08 | Stop reason: HOSPADM

## 2024-01-08 RX ORDER — RISPERIDONE 0.5 MG/1
0.5 TABLET ORAL 2 TIMES DAILY PRN
Status: DISCONTINUED | OUTPATIENT
Start: 2024-01-08 | End: 2024-01-08 | Stop reason: HOSPADM

## 2024-01-08 RX ORDER — ACETAMINOPHEN 650 MG/1
650 SUPPOSITORY RECTAL EVERY 4 HOURS PRN
Status: DISCONTINUED | OUTPATIENT
Start: 2024-01-08 | End: 2024-01-08 | Stop reason: HOSPADM

## 2024-01-08 RX ORDER — TRAZODONE HYDROCHLORIDE 50 MG/1
50 TABLET, FILM COATED ORAL
Status: DISCONTINUED | OUTPATIENT
Start: 2024-01-08 | End: 2024-01-08 | Stop reason: HOSPADM

## 2024-01-08 RX ORDER — OLANZAPINE 10 MG/1
10 TABLET, ORALLY DISINTEGRATING ORAL EVERY 6 HOURS PRN
Status: DISCONTINUED | OUTPATIENT
Start: 2024-01-08 | End: 2024-01-08 | Stop reason: HOSPADM

## 2024-01-08 RX ADMIN — RISPERIDONE 0.5 MG: 0.5 TABLET, ORALLY DISINTEGRATING ORAL at 11:21

## 2024-01-08 RX ADMIN — SERTRALINE HYDROCHLORIDE 100 MG: 100 TABLET ORAL at 11:21

## 2024-01-08 RX ADMIN — BICTEGRAVIR SODIUM, EMTRICITABINE, AND TENOFOVIR ALAFENAMIDE FUMARATE 1 TABLET: 50; 200; 25 TABLET ORAL at 11:21

## 2024-01-08 ASSESSMENT — COLUMBIA-SUICIDE SEVERITY RATING SCALE - C-SSRS
5. HAVE YOU STARTED TO WORK OUT OR WORKED OUT THE DETAILS OF HOW TO KILL YOURSELF? DO YOU INTEND TO CARRY OUT THIS PLAN?: NO
REASONS FOR IDEATION SINCE LAST CONTACT: EQUALLY TO GET ATTENTION, REVENGE, OR A REACTION FROM OTHERS AND TO END/STOP THE PAIN
TOTAL  NUMBER OF ABORTED OR SELF INTERRUPTED ATTEMPTS SINCE LAST CONTACT: NO
6. HAVE YOU EVER DONE ANYTHING, STARTED TO DO ANYTHING, OR PREPARED TO DO ANYTHING TO END YOUR LIFE?: NO
1. SINCE LAST CONTACT, HAVE YOU WISHED YOU WERE DEAD OR WISHED YOU COULD GO TO SLEEP AND NOT WAKE UP?: YES
SUICIDE, SINCE LAST CONTACT: NO
TOTAL  NUMBER OF INTERRUPTED ATTEMPTS SINCE LAST CONTACT: NO
2. HAVE YOU ACTUALLY HAD ANY THOUGHTS OF KILLING YOURSELF?: YES
ATTEMPT SINCE LAST CONTACT: NO

## 2024-01-08 ASSESSMENT — ACTIVITIES OF DAILY LIVING (ADL)
ADLS_ACUITY_SCORE: 35

## 2024-01-08 NOTE — CONSULTS
[facility]  [address] [place] [address]      Assessment and Placement Summary Update     Patient name:   Edi Sorenson   Patient phone: [phone] (home)    Last 4 #:   [id number]   : 1975      PMI #:  [id number]    Patient address:   [address] [place] [address]     Date of Original Assessment / Last Update: 10/26/23 Update Assessment Date: 2024   Updated by:   Kavin Luna [facility]    E-mail: [email or web address]   Referred by:   Self and Kristen [facility] [phone] Agency / phone number: [phone]   [facility] to:   [facility]   NPI: Kristen NPI #: [id number]   Summary:  Per hospital H+P:   Patient presents to the [facility] ED today with concerns related to depression, suicidal ideations, and psychosis further complicated by recent substance use. Pt tested positive for cocaine and fentanyl earlier this morning at [facility] ED. Pt was also at [facility] ED prior to [facility] today. Patient endorsed command auditory hallucinations that tell him to harm himself. He reported feeling suicidal ideation, wanting his misery to end. He has intent to act on his thoughts if he does not get help, but does not have a specific plan. He had visited several EDs today seeking inpatient psych admission. He stated that he lives in his own home in [place], but does not feel safe there due to depression and loneliness. He stated that he has been off medications for 2-4 weeks, including psych and HIV meds. He would like to restart his medications or have them adjusted. He reported difficulty following up with discharge recommendations from the two previous ED visits due to his current state of despair. Substance use is likely contributing to difficulty obtaining medications ordered to a pharmacy after he discharged from [facility]. He was discharged with crisis bed and shelter resources from both EDs, but he reported needing more help  facilitating this.      States he has his own apartment. 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  States he has had 3-4 suicide attempts in the past 3 months.     Reason for today's update: Patient requesting a referral to [facility] for IOP treatment.        Substance Use History Update:           X = Primary Drug Used   Age of First Use Most Recent Pattern of Use and Duration   Need enough information to show pattern (both frequency and amounts) and to show tolerance for each chemical that has a diagnosis   Date of last use and time, if needed   Withdrawal Potential? Requiring special care Method of use  (oral, smoked, snort, IV, etc)      Alcohol     No use            Marijuana/  Hashish   No use         X Cocaine/Crack     31 Crack- Daily, 8 ball per day 26  Smoke      Meth/  Amphetamines   No use       X   Heroin     37 Fentanyl, 3-4 days a week, about a match head.  26  Smoke/snort/IV      Other Opiates/  Synthetics   No use          Inhalants     No use          Benzodiazepines     No use          Hallucinogens     No use          Barbiturates/  Sedatives/  Hypnotics No use          Over-the-Counter Drugs   No use          Other     No use          Nicotine     20 1/2 PPD [place] No Smoke     Dimension: Severity Rating/ Reason for Changes from Previous Assessment:  Dimension I: Acute intoxication/Withdrawal potential     Previous ratin Current ratin   Comments:   No Change     Dimension II: Biomedical Conditions and Complications     Previous ratin Current ratin   Comments:   No Change     Dimension III: Emotional/Behavioral/Cognitive     Previous ratin Current rating:   3   Comments:   Patient reports he has had 3-4 suicide attempts in the past 3 months.      Dimension IV: Readiness for Change     Previous ratin Current rating:   3   Comments:   Patient didn't appear to be an accurate .  Patient appeared easily irritable during the assessment and declined  any referrals other than [facility].      Dimension V: Relapse/Continued Use/Continued problem potential     Previous ratin Current ratin   Comments:   No change     Dimension VI: Recovery environment    Previous ratin Current ratin   Comments:   No change       Summary of Assessment Update and Recommendations:   What was the outcome of last referral?  Residential Treatment     Reason for changes in the Risk Description since last assessment? Patient didn't appear to be an accurate .  Patient appeared easily irritable during the assessment and declined any referrals other than [facility]. Patient reports he has had 3-4 suicide attempts in the past 3 months.      Recommendation and rationale for current request and significant issues that need to be addressed:    Recommendations: Residential MICD Treatment, however patient states he is only interested in IOP treatment at [facility] and declined to make any referrals to any other programs.  May benefit from an IRTS.     Clinical Substantiation:  Patient states he has attempted suicide 3-4x in the past 3 months.  Has stopped taking his mental health medications 2-4 weeks ago.  States he has been taking crack and fentanyl daily.      Referrals/ Alternatives:    [facility] in [address] [id number] [phone]  [phone]    URSZULA consult completed by:   Kavin Luna [facility]  Substance Use Disorder Evaluation Counselor  E-mail Address: [email or web address] Consult & Liaison Department  [facility], [address] [place] [address]     *Due to regulation of Title 42 of the Code of Federal Regulations (CFR) Part 2: Confidentiality laws apply to this note and the information wherein.  Thus, this note cannot be copy and pasted into any other health  "care staff's note nor can it be included in general medical records sent to ANY outside agency without the patient's written consent.                      Type Of Assessment: Inpatient Substance Use Comprehensive Assessment     Referral Source:  [facility]  MRN:   [id number]     DATE OF SERVICE:  October 26, 2023  Date of previous URSZULA Assessment: 2022  Patient confirmed identity through two factor verification: Full Legal Name and SSN     PATIENT'S NAME:    Edi Sorenson  Age: 48 year old  Last 4 SSN: [id number]  Sex: male   Gender Identity: male  Sexual Orientation: Heterosexual  Cultural Background: \"Black\"  YOB: 1975  Current Address:   [address] [place] [address]  Patient Phone Number:  [phone]   Patient's E-Mail Contact:  No e-mail address on record  Funding: [employer]  PMI: [id number]   Emergency Contact: Extended Emergency Contact Information  Primary Emergency Contact: Marsha Verma  Home Phone: [phone]  Mobile Phone: [phone]  Relation: Friend     JAVIER information was provided to patient and patient does not want a copy.      Telemedicine Visit: The patient's condition can be safely assessed and treated via synchronous audio and visual telemedicine encounter.    Reason for Telemedicine Visit: Services only offered telehealth  Originating Site (Patient Location): [address] [address]  Distant Site (Provider Location): Provider Remote Setting- Home Office  Consent:  The patient/guardian has verbally consented to: the potential risks and benefits of telemedicine (video visit) versus in person care; bill my insurance or make self-payment for services provided; and responsibility for payment of non-covered services.   Mode of Communication:  telephone     START TIME: 12:14pm  END TIME: 12:41pm     As the provider I attest to compliance with applicable laws " "and regulations related to telemedicine.   Edi Sorenson was seen for a substance use disorder consult on 10/26/2023 by JANA Wilson.     Reason for Substance Use Disorder Consult:  Pt is interested in URSZULA treatment at this time for \"drugs and alcohol.\"     Are you currently having severe withdrawal symptoms that are putting yourself or others in danger? No  Are you currently having severe medical problems that require immediate attention? No  Are you currently having severe emotional or behavioral problems that are putting yourself or others at risk of harm? No     Have you participated in prior substance use disorder evaluations? Yes. When, Where, and What circumstances: 2022   Have you ever been to detox, inpatient or outpatient treatment for substance related use? List previous treatment: Yes. When, Where, and What circumstances: about 5x and the last one was 1-2 years ago.   Have you ever had a gambling problem or had treatment for compulsive gambling? No  Have you ever felt the need to bet more and more money? No  Have you ever had to lie to people important to you about how much you gambled? No     Patient does not appear to be in severe withdrawal, an imminent safety risk to self or others, or requiring immediate medical attention and may proceed with the assessment interview.            Comprehensive Substance Use History   X X = Primary Drug Used Age of First Use    Pattern of Substance Use   (heaviest use in life and a use history within the past year if applicable) (DSM-5: Sx #3) Date /  Quantity of last use if within the past 30 days Withdrawal Potential?    Method of use  (Oral, smoked, snorted, IV, etc)     Alcohol   20/19 HU: age [age]     Past year: he drinks 3-4 days per week. He will drink 1 pint-1/5th of vodka per day.    10/23/23  1 pint       x Marijuana/Hashish   23 HU: 20s     Past year: he eats gummies almost every day. He will take 3-4 gummies per day.    10/23/23 no " "oral   x Cocaine/Crack 31 HU: daily use since age 31.     Past year: daily use of 1/2oz per day.    10/23/23 no smoke     Meth/Amphetamines   31 Meth:  First use: 31  Last use: 10/23/23  HU: none  Used over 100 times in his life.    10/23/23 no smoke   xx Heroin   37 HU: early 2023. He was using every day all day. He was using about 1-2 grams per day.     Past month: using off and on, about every other day.    10/23/23 no Smoke, snort, IV     Other Opiates/Synthetics   No use             Inhalants  No use             Benzodiazepines   No use             Hallucinogens   No use             Barbiturates/Sedatives/Hypnotics   No use             Over-the-Counter Drugs   No use             Other   No use             Nicotine   20 Cigarettes: 1/2 ppd 10/23/23 no smoke      Withdrawal symptoms: Have you had any of the following withdrawal symptoms?  stomach hurting, shaking, tired, muscle aches, vomiting, agitated.      Have you experienced any cravings?  Yes     Have you had periods of abstinence?  Yes   What was your longest period? 18 months  How: attending NA meetings.  Any circumstances that lead to relapse? \"I don't know. I put my recovery second and my girlfriends first.\"     What activities have you engaged in when using alcohol/other drugs that could be hazardous to you or others?  driving, getting into fights, IVDU.     A description of any risk-taking behavior, including behavior that puts the client at risk of exposure to blood-borne or sexually transmitted diseases: IVDU     Arrests and legal interventions related to substance use: No pending charges or CSC.    A description of how the patient's use affected their ability to function appropriately in a work setting:  \"stopped me from being who I am in the community.\"     A description of how the patient's use affected their ability to function appropriately in an educational setting: \"put a wedge in it.\"     Leisure time activities that are associated with " "substance use: none reported.     Do you think your substance use has become a problem for you? He agrees he has a substance abuse problem.     MEDICAL HISTORY  Physical or medical concerns or diagnoses: \"I need my teeth fixed.\"      Patient Active Problem List   Diagnosis    Depression    Altered mental status    Suicidal ideation    Acute low back pain    Constipation    Chemical dependency (H)    Kimberli (H)    Anxiety    Other stimulant use, unspecified with unspecified stimulant-induced disorder (H)    Suicidal ideations      Do you have any current medical treatment needs not being addressed by inpatient treatment?  no     Do you need a referral for a medical provider? no     Current medications:       Current Facility-Administered Medications   Medication    bictegravir-emtricitabine-tenofovir (BIKTARVY) -25 MG per tablet 1 tablet    gabapentin (NEURONTIN) capsule 200 mg    mirtazapine (REMERON) tablet 15 mg    nicotine (NICODERM CQ) 21 MG/24HR 24 hr patch 1 patch    nicotine Patch in Place    nicotine polacrilex (NICORETTE) gum 4 mg    ondansetron (ZOFRAN ODT) ODT tab 4 mg    risperiDONE (risperDAL M-TABS) ODT tab 1 mg    rivaroxaban ANTICOAGULANT (XARELTO) tablet 20 mg    [START ON 10/27/2023] sertraline (ZOLOFT) tablet 100 mg          Current Outpatient Medications   Medication    Bictegravir-Emtricitab-Tenofov (BIKTARVY) -15 MG TABS    gabapentin (NEURONTIN) 100 MG capsule    methocarbamol (ROBAXIN) 500 MG tablet    mirtazapine (REMERON) 15 MG tablet    risperiDONE (RISPERDAL) 0.5 MG tablet    rivaroxaban ANTICOAGULANT (XARELTO) 20 MG TABS tablet    traZODone (DESYREL) 50 MG tablet          Facility-Administered Medications Ordered in Other Encounters   Medication    Self Administer Medications: Behavioral Services       Are you pregnant? NA, Male     Do you have any specific physical needs/accommodations? No     MENTAL HEALTH HISTORY:  Have you ever had  hospitalizations or treatment for " "mental health illness: Yes. When, Where, and What circumstances: He was hospitalized for the first time a couple of months ago.      Mental health history, including diagnosis and symptoms, and the effect on the client's ability to function: \"depression. Schizophrenia.\"     Current mental health treatment including psychotropic medication needed to maintain stability: (Note: The assessment must utilize screening tools approved by the commissioner pursuant to section 245.4863 to identify whether the client screens positive for co-occurring disorders): none reported.     GAIN-SS Tool:       10/26/2023    12:00 PM   When was the last time that you had significant problems...   with feeling very trapped, lonely, sad, blue, depressed or hopeless about the future? Past month   with sleep trouble, such as bad dreams, sleeping restlessly, or falling asleep during the day? Past Month   with feeling very anxious, nervous, tense, scared, panicked or like something bad was going to happen? Past month   with becoming very distressed & upset when something reminded you of the past? Past month   with thinking about ending your life or committing suicide? Past month           10/26/2023    12:00 PM   When was the last time that you did the following things 2 or more times?   Lied or conned to get things you wanted or to avoid having to do something? Never   Had a hard time paying attention at school, work or home? Never   Had a hard time listening to instructions at school, work or home? Never   Were a bully or threatened other people? Never   Started physical fights with other people? Never      Have you ever been verbally, emotionally, physically or sexually abused?   No     Family history of substance use and misuse: none reported.     The patient's desire for family involvement in the treatment program: no  Level of family support: \"they supportive.\"     Social network in relation to expected support for recovery: he has a " "history of attending NA meetings.     Are you currently in a significant relationship? No     Do you have any children (include living arrangements/custody/contact)?:  no     What is your current living situation? \"I stay at [facility] people with [facility].\"     Are you employed/attending school? no     SUMMARY:  Ability to understand written treatment materials: Yes  Ability to understand patient rules and patient rights: Yes  Does the patient recognize needs related to substance use and is willing to follow treatment recommendations: Yes  Does the patient have an opioid use disorder:  does not have a history of opiate use.     ASAM Dimension Scale Ratings:     Dimension 1 -  Acute Intoxication/Withdrawal: 0 - No Problem  Dimension 2 - Biomedical: 0 - No Problem  Dimension 3 - Emotional/Behavioral/Cognitive Conditions: 2 - Moderate Problem  Dimension 4 - Readiness to Change:  1 - Minor Problem  Dimension 5 - Relapse/Continued Use/ Continued Problem Potential: 4 - Extreme Problem  Dimension 6 - Recovery Environment:  4 - Extreme Problem     Category of Substance Severity (ICD-10 Code / DSM 5 Code)      Alcohol Use Disorder Severe  (10.20) (303.90)   Cannabis Use Disorder Moderate  (F12.20) (304.30)   Hallucinogen Use Disorder The patient does not meet the criteria for a Hallucinogen use disorder.   Inhalant Use Disorder The patient does not meet the criteria for an Inhalant use disorder.   Opioid Use Disorder Severe   (F11.20) (304.00)   Sedative, Hypnotic, or Anxiolytic Use Disorder The patient does not meet the criteria for a Sedative/Hypnotic use disorder.   Stimulant Related Disorder Severe   (F14.20) (304.20) Cocaine   Tobacco Use Disorder Moderate   (F17.200) (305.1)   Other (or unknown) Substance Use Disorder The patient does not meet the criteria for a Other (or unknown) Substance use disorder.      A problematic pattern of alcohol/drug use leading to clinically significant impairment or distress, as " manifested by at least two of the following, occurring within a 12-month period:     1.) Alcohol/drug is often taken in larger amounts or over a longer period than was intended.  2.) There is a persistent desire or unsuccessful efforts to cut down or control alcohol/drug use  3.) A great deal of time is spent in activities necessary to obtain alcohol, use alcohol, or recover from its effects.  4.) Craving, or a strong desire or urge to use alcohol/drug  5.) Recurrent alcohol/drug use resulting in a failure to fulfill major role obligations at work, school or home.  6.) Continued alcohol use despite having persistent or recurrent social or interpersonal problems caused or exacerbated by the effects of alcohol/drug.  7.) Important social, occupational, or recreational activities are given up or reduced because of alcohol/drug use.  8.) Recurrent alcohol/drug use in situations in which it is physically hazardous.  9.) Alcohol/drug use is continued despite knowledge of having a persistent or recurrent physical or psychological problem that is likely to have been caused or exacerbated by alcohol.  10.) Tolerance, as defined by either of the following: A need for markedly increased amounts of alcohol/drug to achieve intoxication or desired effect.  11.) Withdrawal, as manifested by either of the following: The characteristic withdrawal syndrome for alcohol/drug (refer to Criteria A and B of the criteria set for alcohol/drug withdrawal).     Specify if: In early remission:  After full criteria for alcohol/drug use disorder were previously met, none of the criteria for alcohol/drug use disorder have been met for at least 3 months but for less than 12 months (with the exception that Criterion A4,  Craving or a strong desire or urge to use alcohol/drug  may be met).      In sustained remission:   After full criteria for alcohol use disorder were previously met, non of the criteria for alcohol/drug use disorder have been met  at any time during a period of 12 months or longer (with the exception that Criterion A4,  Craving or strong desire or urge to use alcohol/drug  may be met).      Specify if:   This additional specifier is used if the individual is in an environment where access to alcohol is restricted.     Mild: Presence of 2-3 symptoms  Moderate: Presence of 4-5 symptoms  Severe: Presence of 6 or more symptoms     Collateral information:   The patient's medical record at [facility] was reviewed and the information contained in the medical record supported the patient's account of his chemical use history and chemical use consequences.     Recommendations:   1)  Complete a residential based or similar treatment program.  2)  Abstain from all mood-altering chemicals unless prescribed by a licensed provider.   3)  Attend, at minimum, 2 weekly support group meetings, such as 12 step based (AA/NA), SMART Recovery, Health Realizations, and/or Refuge Recovery meetings.     4)  Actively work with a male mentor/sponsor on a weekly basis.   5)  Follow all the recommendations of your treatment/medical providers.     Clinical Substantiation:    Pt has a long history of substance use. He is willing to attend an IP URSZULA tx program to help him get back to his sobriety. His use increased over the past month after the dead of his girlfriend.     Referrals/ Alternatives:  [facility]  [address] [place] [address], [address]  [place] [address]  Phone: [phone]  Fax: [phone]  Email: [email or web address]   [address] [place] [address]  Phone: [phone]  Fax: [phone]  [email or web address]     [facility]  [facility] [place] [address]  Admissions Phone: [phone]  Lodging Plus waitlist: [phone]  [email or web address]     URSZULA consult completed by: JANA Wilson.  [facility] Evaluation Department  [address] [place] [address]     *Due to regulation of Title 42 of the Code of Federal Regulations (CFR) Part 2: Confidentiality laws apply to this note and the information wherein.  Thus, this note cannot be copy and pasted into any other health care staff's note nor can it be included in general medical records sent to ANY outside agency without the patient's written consent.

## 2024-01-08 NOTE — ED PROVIDER NOTES
History     Chief Complaint:  Psychiatric Evaluation       HPI   Edi Sorenson is a 48 year old male with a history of schizophrenia presents emergency department with a complaint of suicidal ideation.  Patient reports that he does not have a specific plan but would do anything to take him out of this misery.  Patient reports that he stopped his medications 1 month ago.  Patient denies any other complaints.      Independent Historian:   None - Patient Only    Review of External Notes:   Reviewed patient's chart, and he has been to 2 other facilities today.  His chief complaint was suicidal ideation.  Patient was discharged with the diagnoses of malingering, cocaine use.      Medications:    bictegravir-emtricitabine-tenofovir (BIKTARVY) -25 MG per tablet  gabapentin (NEURONTIN) 100 MG capsule  melatonin 3 MG tablet  mirtazapine (REMERON) 15 MG tablet  multivitamin w/minerals (THERA-VIT-M) tablet  nicotine (NICODERM CQ) 21 MG/24HR 24 hr patch  QUEtiapine 150 MG TABS  risperiDONE (RISPERDAL M-TABS) 1 MG ODT  rivaroxaban ANTICOAGULANT (XARELTO) 20 MG TABS tablet  sertraline (ZOLOFT) 100 MG tablet        Past Medical History:    Past Medical History:   Diagnosis Date    Asymptomatic human immunodeficiency virus (HIV) infection status (H)     Cocaine abuse (H)     Depression     DVT (deep venous thrombosis) (H)     Genital HSV        Past Surgical History:    No past surgical history on file.     Physical Exam   Patient Vitals for the past 24 hrs:   BP Temp Temp src Pulse Resp SpO2 Height Weight   01/07/24 1942 -- -- -- -- -- 99 % -- --   01/07/24 1941 (!) 129/93 -- -- 71 18 -- -- --   01/07/24 1820 (!) 170/106 97.9  F (36.6  C) Temporal 68 16 98 % 1.829 m (6') 88 kg (194 lb)        Physical Exam  General: No distress  Abdomen: Soft, non-tender  Respiratory: No tachypnea  Cardiovascular: Equal pulses, brisk cap refill  Extremities: Moving all extremities      Emergency Department Course        Imaging:  No  orders to display          Laboratory:  Labs Ordered and Resulted from Time of ED Arrival to Time of ED Departure - No data to display     Procedures       Emergency Department Course & Assessments:    PSS-3      Date and Time Over the past 2 weeks have you felt down, depressed, or hopeless? Over the past 2 weeks have you had thoughts of killing yourself? Have you ever attempted to kill yourself? When did this last happen? User   24 1827 yes yes yes -- MA                Suicide assessment completed by mental health (D.E.C., LCSW, etc.)    Interventions:  Medications - No data to display     Assessments:      Independent Interpretation (X-rays, CTs, rhythm strip):  None    Consultations/Discussion of Management or Tests:  Spoke with Corwin with DEC.  She [facility] as long as the patient stays calm and cooperative.  No need for a hold.       Social Determinants of Health affecting care:   None    Disposition:  The patient was transferred to [facility].     Impression & Plan    CMS Diagnoses: None      Medical Decision Makin-year-old male with a history of schizophrenia presents emergency department with a complaint of suicidal ideation.  No specific plan.  Patient reports that he has not taken his meds in 1 month.  Patient denies any hallucinations or homicidal ideation.  On evaluation, patient is calm and cooperative.  He [facility]  On review of his chart it shows that he has been to 2 other facilities today.  He was diagnosed with cocaine use and malingering.  At this point the patient will needs a mental health evaluation.  Patient is voluntary, I do not think that he needs a psychiatric hold.  Spoke with DEC .  Patient was calm and cooperative with her.  Patient does have 2 friends at bedside.  She states after he speaks with his friends then he is able to [facility].  [facility].    Diagnosis:    ICD-10-CM    1. Suicidal ideation   R45.851            Discharge Medications:  New Prescriptions    No medications on file          Gardenia Augustine MD  1/7/2024   Gardenia Augustine MD Richardson, Elizabeth, MD  01/07/24 2222

## 2024-01-08 NOTE — ED NOTES
"Pt states he stopped his medication for a month because he felt better, and that right now he has had suicidal thoughts and hearing voices for the past 1-2 days. He says he has no plan, but \"wants it to be nice and peaceful to end my misery.\" Pt reports depression. Pt asks for something to eat and drink. Denies drug or alcohol use. Calm and cooperative. Room made safe, pt searched by security, and belongings including cell phone locked up in room. Pt allowed to wear his own shirt and pants.  "
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
## 2024-01-08 NOTE — PROGRESS NOTES
"Patient has been declined at [facility] in [facility].  Attempted to discuss with patient and make more referrals patient only agreed to [facility] for Outpatient with lodging.    JANA Lopez on 1/8/2024 at 1:10 PM           Triage & Transition Services, Extended Care       Patient: Edi goes by \"Edi,\" uses he/him pronouns  Date of Service: January 8, 2024  Site of Service: [facility] EMERGENCY DEPT                             [id number]  Patient was seen   In person  Mode of Assessment:      Patient is followed related to: boarding status  Notable observations from today's encounter include: Very irritable, short answers, difficulty tracking.    Recommendations: Residential MICD Treatment, however patient states he is only interested in IOP treatment at [facility] in [facility] and declined to make any referrals to any other programs.  May benefit from an IRTS.     Case Management included: Case Management Included: completing or following up on referrals  Details on completing or following up on referrals: [facility] in [address] [address]  [phone]  [phone]    JANA Lopez on 1/8/2024 at 10:58 AM    "

## 2024-01-08 NOTE — ED NOTES
"48 year old male with history of depression and chemical dependency received from ED due to suicidal ideation. Pt was making comments on the way to the unit \"This place is too quiet, there is no action here. I like the action\". Pt was calm and cooperative and drowsy. Pt was able to answer some questions and was falling asleep on the others. He rated his anxiety and depression 10/10, endorsed hearing voices and having suicidal thoughts but no intent or plan. Denied HI. Pt reported to be off prescribed medications for over a month. Pt was observed to be playful and flirty with female staff, but may need reinforcement of boundaries.     Nursing and risk assessments completed. Admission information reviewed with patient. Patient given a tour of [facility] and instructions on using the facility. Questions regarding [facility] addressed. Pt safety search completed.    "

## 2024-01-08 NOTE — ED NOTES
[facility]  ED to EMPATH Checklist:      Goal for EMPATH: Suicidality    Current Behavior: Calm and Cooperative    Safety Concerns: Suicidal, no plan    Legal Hold Status: Voluntary    Medically Cleared by ED provider: Yes    Patient Therapeutically Searched: Therapeutic search by ED staff (strings, belts, shoes, pockets, electronics, etc.)    Belongings: In room locker    Independent Ambulation at Baseline: Yes/No: Yes    Participates in Care/Conversation: Yes/No: Yes    Patient Informed about EMPATH: Yes/No: Yes    DEC: Ordered and completed    Patient Ready to be Transferred to EMPATH? Yes/No: Yes

## 2024-01-08 NOTE — PLAN OF CARE
Edi Yas  January 7, 2024  Plan of Care Hand-off Note     Patient Care Path: observation    Plan for Care:   Patient presents to the [facility] ED today with concerns related to depression, suicidal ideations, and psychosis further complicated by recent substance use. Pt tested positive for cocaine and fentanyl earlier this morning at [facility] ED. Pt was also at [facility] ED prior to [facility] today. Patient endorsed command auditory hallucinations that tell him to harm himself. He reported feeling suicidal ideation, wanting his misery to end. He has intent to act on his thoughts if he does not get help, but does not have a specific plan. He had visited several EDs today seeking inpatient psych admission. He stated that he lives in his own home in [place], but does not feel safe there due to depression and loneliness. He stated that he has been off medications for 2-4 weeks, including psych and HIV meds. He would like to restart his medications or have them adjusted. He reported difficulty following up with discharge recommendations from the two previous ED visits due to his current state of despair. Substance use is likely contributing to difficulty obtaining medications ordered to a pharmacy after he discharged from [facility]. He was discharged with crisis bed and shelter resources from both EDs, but he reported needing more help facilitating this.       Identified Goals and Safety Issues:      He does not have a working cell phone, making OP treatment unfeasible at this time. Writer explained the process of [facility] and he will be voluntarily here for observation, symptom stabilization, URSZULA assessment, and establish OP support.               Legal Status: Legal Status at Admission: Voluntary/Patient has signed consent for treatment    Psychiatry Consult: pending       Updated MD and RN  regarding plan of care.           SHANKAR Hills

## 2024-01-08 NOTE — ED NOTES
"Discharge instructions reviewed with patient including follow-up care plan. Educated on medication regime and advised not to stop prescribed medication without consulting their physician. While attempting to educate patient he used cell phone to turn music on but claimed he was still listening to writer. Reviewed safety plan and outpatient resources/appointments.Replied by information by stating \"Just give me the medication and paperwork\" All belongings which where brought into the hospital have been returned to patient. Escorted off the unit @ [id number].Discharged to self care.  "
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
## 2024-01-08 NOTE — ED TRIAGE NOTES
"Pt states that he is out of his medications and has not taken them over the last month. States that they have to \" tweaked.\"  States that he is hearing voices but his meds usually help.        "

## 2024-01-08 NOTE — PROGRESS NOTES
"Triage and Transition Services Extended Care Reassessment     Patient: Edi goes by \"Edi,\" uses he/him pronouns  Date of Service: January 8, 2024  Site of Service: [facility] EMERGENCY DEPT                             [id number]  Patient was seen yes  Mode of Assessment: In person     Reason for Reassessment: suicidal ideation, other (see comment), substance use (auditory hallucinations)    History of Patient's Original Emergency Room Encounter: Patient presented to the [facility] ED yesterday (1/7/24) with auditory hallucinations and suicidal ideation further complicated by recent substance use. Pt tested positive for cocaine and fentanyl yesterday morning at [facility] ED. Pt was also at the [facility] ED prior to going to the [facility] ED yesterday. He denied a specific plan for suicide upon assessment at [facility] yesterday.    Current Patient Presentation: Pt is observed lying in his recliner chair with his eyes closed. He is not sleeping. He declines to move to a consult room for further conversation with this writer, stating that this writer is interrupting his sleep. Pt is unable to tell this writer why he declined referral options offered to him by the [facility] during his URSZULA assessment. He denies a plan or intent for suicide at present; however, tells this writer that he is still feeling suicidal. He is unable to tell this writer the content of his suicidal ideation. When asked, pt tells this writer that he is still experiencing auditory hallucinations that sound like \"shhh, shhh\". This writer tells pt that she spoke to the attending provider and that discharge is recommended for pt at this time. Pt then begins to become verbally agitated with this writer. He reports that he is \"not ready to go\" as he is still \"f*cked up\" and that it is \"racist\" for discharge to be considered.    Presentation Summary: Pt is observed lying in his recliner chair with his eyes closed. He is not sleeping. He declines to move to a " "consult room for further conversation with this writer, stating that this writer is interrupting his sleep. Pt is unable to tell this writer why he declined referral options offered to him by the [facility] during his URSZULA assessment. He denies a plan or intent for suicide at present; however, tells this writer that he is still feeling suicidal. He is unable to tell this writer the content of his suicidal ideation. When asked, pt tells this writer that he is still experiencing auditory hallucinations that sound like \"shhh, shhh\". This writer tells pt that she spoke to the attending provider and that discharge is recommended for pt at this time. Pt then begins to become verbally agitated with this writer. He reports that he is \"not ready to go\" as he is still \"f*cked up\" and that it is \"racist\" for discharge to be considered.    Changes Observed Since Initial Assessment: decrease in presenting symptoms    Therapeutic Interventions Provided: Engaged in guided discovery, explored patient's perspectives and helped expand them through socratic dialogue.    Current Symptoms:   thoughts of death/suicide (passive SI only)   auditory hallucinations      Mental Status Exam   Affect: Appropriate  Appearance: Appropriate  Attention Span/Concentration: Inattentive  Eye Contact: Avoidant    Fund of Knowledge: Appropriate   Language /Speech Content: Fluent  Language /Speech Volume: Normal  Language /Speech Rate/Productions: Normal  Recent Memory: Intact  Remote Memory: Intact  Mood: Irritable  Orientation to Person: Yes   Orientation to Place: Yes  Orientation to Time of Day: Yes  Orientation to Date: Yes     Situation (Do they understand why they are here?): Yes  Psychomotor Behavior: Normal  Thought Content: Hallucinations, Suicidal (passive SI only)  Thought Form: Goal Directed    Treatment Objective(s) Addressed: assessing safety    Patient Response to Interventions: unacceptance expressed, needs reinforcement    Progress Towards " Goals:  Patient Reports Symptoms Are: improving  Patient Progress Toward Goals: is making progress  Comment: Pt denies a plan or intent for suicide at present. He denies command auditory hallucinations.    Case Management: Case Management Included: completing or following up on referrals  Details on completing or following up on referrals: This writer spoke to [facility]. [facility] recommended residential treatment for pt, which he declined. [facility] is referring pt to [facility] for outpatient care.  Summary of Interaction: This writer spoke to [facility]. [facility] recommended residential treatment for pt, which he declined. [facility] is referring pt to [facility] for outpatient care.    C-SSRS Since Last Contact:   1. Wish to be Dead (Since Last Contact): Yes  2. Non-Specific Active Suicidal Thoughts (Since Last Contact): Yes  3. Active Suicidal Ideation with any Methods (Not Plan) Without Intent to Act (Since Last Contact): No  4. Active Suicidal Ideation with Some Intent to Act, Without Specific Plan (Since Last Contact): No  5. Active Suicidal Ideation with Specific Plan and Intent (Since Last Contact): No  Most Severe Ideation Rating (Since Last Contact): 2  Frequency (Since Last Contact): Daily or almost daily  Duration (Since Last Contact): 1-4 hours/a lot of time  Controllability (Since Last Contact): Can control thoughts with some difficulty  Deterrents (Since Last Contact): Deterrents probably stopped you  Reasons for Ideation (Since Last Contact): Equally to get attention, revenge, or a reaction from others and to end/stop the pain  Actual Attempt (Since Last Contact): No  Has subject engaged in non-suicidal self-injurious behavior? (Since Last Contact): No  Interrupted Attempts (Since Last Contact): No  Aborted or Self-Interrupted Attempt (Since Last Contact): No  Preparatory Acts or Behavior (Since Last Contact): No  Suicide (Since Last Contact): No     Calculated C-SSRS Risk Score (Since  Last Contact): Low Risk    Plan: Final Disposition / Recommended Care Path: discharge  Plan for Care reviewed with assigned Medical Provider: yes  Plan for Care Team Review: provider, RN  Comments: Dr. Green, in agreement  Patient and/or validated legal guardian concurs: yes  Clinical Substantiation: It is the recommendation of this writer that pt discharge to follow-up with outpatient supports. He is being referred to [facility] in [facility] and [facility] for URSZULA treatment. He denies a plan or intent for suicide at this point in time. He also denies command auditory hallucinations. Pt has been declining referral options offered to pt, including residential URSZULA treatment. He has been recently seen at [facility] and [facility] with similar presentatiosn and discharged from the ED at both of those hospitals.    Legal Status: Legal Status at Admission: Voluntary/Patient has signed consent for treatment    Session Status: Time session started: 1330  Time session ended: 1335  Session Duration (minutes): 5 minutes  Session Number: 1  Anticipated number of sessions or this episode of care: 1    Session Start Time: 1330  Session Stop Time: 1335  CPT codes: Non-Billable  Time Spent: 5 minutes      CPT code(s) utilized: Non-Billable    Diagnosis:   Patient Active Problem List   Diagnosis Code    Depression F32.A    Altered mental status R41.82    Suicidal ideation R45.851    Acute low back pain M54.50    Constipation K59.00    Chemical dependency (H) F19.20    Kimberli (H) F30.9    Anxiety F41.9    Stimulant use disorder F15.90    Suicidal ideations R45.851    Drug intoxication with complication (H) F19.929    Depression, unspecified F32.A    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

## 2024-01-08 NOTE — ED NOTES
Pt slept through the night uneventfully. No signs of distress noted. Respirations were even and unlabored.

## 2024-01-08 NOTE — CONSULTS
"Triage & Transition Services, Extended Care         Patient: Edi goes by \"Edi,\" uses he/him pronouns  Date of Service: January 8, 2024  Site of Service: [facility] EMERGENCY DEPT                             EMP09  Patient was seen   In person  Mode of Assessment:       Patient is followed related to: boarding status  Notable observations from today's encounter include: Very irritable, short answers, difficulty tracking.     Recommendations: Residential MICD Treatment, however patient states he is only interested in IOP treatment at [facility] and declined to make any referrals to any other programs.  May benefit from an IRTS.      Case Management included: Case Management Included: completing or following up on referrals  Details on completing or following up on referrals: [facility] [address] [address]  [phone]  [phone]    Patient gave Verbal SYLVAIN for [facility], referral is being made at this time.      JANA Lopez on 1/8/2024 at 10:58 AM  "

## 2024-01-08 NOTE — ED PROVIDER NOTES
EmPATH Unit - Initial Psychiatric Observation Note  [facility] Emergency Department  Observation Initiation Date: Jan 7, 2024    Edi Sorenson MRN: [id number]   Age: 48 year old YOB: 1975     History     Chief Complaint   Patient presents with    Psychiatric Evaluation     HPI  Edi Sorenson is a 48 year old male with a past history notable for major depressive disorder, PTSD, further complicated by substance use disorders which often result in mood instability and psychosis.  Frequented substances involve predominantly stimulants, in the form of cocaine and amphetamine, with documentation also highlighting abuse of cannabis, opiates, and benzodiazepines.  Records indicate that the patient presented to the emergency department reporting depressed mood and suicidal ideation.  Chart review indicates multiple recent visits to other emergency departments with a similar presentation.  It appears that he was discharged the same day as his arrival, leading to attempts to seek help elsewhere.  In our emergency department, he was determined to be medically stable then transferred to the EmPATH unit for psychiatric assessment.  He is now approaching 16 hours in the emergency department.  On examination today, the patient was resting comfortably in his recliner.  He preferred to proceed with the interview as he laid in his recliner.  He highlighted worsening depressive symptoms over the past few weeks while nonadherent with his medications for approximately 1 month.  He confirms illicit substance usage, reporting fentanyl and cocaine this past week, however not able to easily quantify an amount or frequency.  He alludes to ongoing usage for some time however did not characterize dependence or withdrawal or daily opiate use.  He did not report any recent benzodiazepine use.  He did not report recent alcohol use.  He characterized symptom severity related to his depressed mood as 8 out of 10, gaining some  "improvement through the therapeutic milieu of the unit.  He is requesting to restart his medications while noting that they were previously helpful at improving his mood.  He is interested in pursuing substance use disorder treatments.  Suicidal thoughts are beginning to diminish as he feels hopeful for formulating an effective treatment plan.  Today, he denied psychotic symptoms although notes a history of hallucinations when using illicit substances.  There is no indication of homicidal thoughts.  He expressed appreciation for the care he has received thus far.    Past Medical History  Past Medical History:   Diagnosis Date    Asymptomatic human immunodeficiency virus (HIV) infection status (H)     Cocaine abuse (H)     Depression     DVT (deep venous thrombosis) (H)     Last in 6/2018. No hx of PE. On Coumadin.    Genital HSV      No past surgical history on file.  bictegravir-emtricitabine-tenofovir (BIKTARVY) -25 MG per tablet  gabapentin (NEURONTIN) 100 MG capsule  melatonin 3 MG tablet  mirtazapine (REMERON) 15 MG tablet  multivitamin w/minerals (THERA-VIT-M) tablet  nicotine (NICODERM CQ) 21 MG/24HR 24 hr patch  QUEtiapine 150 MG TABS  risperiDONE (RISPERDAL M-TABS) 1 MG ODT  rivaroxaban ANTICOAGULANT (XARELTO) 20 MG TABS tablet  sertraline (ZOLOFT) 100 MG tablet      No Known Allergies  Family History  No family history on file.  Social History   Social History     Tobacco Use    Smoking status: Former    Smokeless tobacco: Never   Substance Use Topics    Alcohol use: Yes     Comment: social    Drug use: Yes     Types: Cocaine, \"Crack\" cocaine          Review of Systems  A medically appropriate review of systems was performed with pertinent positives and negatives noted in the HPI, and all other systems negative.    Physical Examination   BP: (!) 170/106  Pulse: 68  Temp: 97.9  F (36.6  C)  Resp: 16  Height: 182.9 cm (6')  Weight: 88 kg (194 lb)  SpO2: 98 %    Physical Exam  General: Appears stated " age.   Neuro: Alert and fully oriented. Extremities appear to demonstrate normal strength on visual inspection.   Integumentary/Skin: no rash visualized, normal color    Psychiatric Examination   Appearance: awake, alert  Attitude:  cooperative  Eye Contact:  fair  Mood:  depressed  Affect:  intensity is blunted  Speech:  decreased prosody  Psychomotor Behavior:  fidgeting  Thought Process:  linear  Associations:  no loose associations  Thought Content:  no evidence of suicidal ideation or homicidal ideation and no evidence of psychotic thought  Insight:  fair  Judgement:  fair  Oriented to:  time, person, and place  Attention Span and Concentration:  limited  Recent and Remote Memory:  fair  Language: able to name/identify objects without impairment  Fund of Knowledge: intact with awareness of current and past events    ED Course        Labs Ordered and Resulted from Time of ED Arrival to Time of ED Departure - No data to display    Assessments & Plan (with Medical Decision Making)   Patient presenting with worsening depressed mood and heightened anxiety in the context of stimulant and opiate use.  Records indicate a history of psychosis in the context of illicit substance usage.  Chart review highlights a diagnosis of major depressive disorder further complicated by substance use disorders.  Over the past few years, he has gained benefit from a combination of Zoloft and Remeron although has been nonadherent for about 1 month.  His treatment plan is focused on restarting previously effective antidepressant medications while helping him formulate a treatment plan aimed at sobriety and mental health stability. Nursing notes reviewed noting no acute issues.     I have reviewed the assessment completed by the [facility].     During the observation period, the patient did not require medications for agitation, and did not require restraints/seclusion for patient and/or provider safety.     The patient was found to have a  psychiatric condition that would benefit from an observation stay in the emergency department for further psychiatric stabilization and/or coordination of a safe disposition. The observation plan includes serial assessments of psychiatric condition, potential administration of medications if indicated, further disposition pending the patient's psychiatric course during the monitoring period.     Preliminary diagnosis:    ICD-10-CM    1. Suicidal ideation  R45.851       2. Stimulant use disorder  F15.90       3. 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 Current moderate episode of major depressive disorder without prior episode (H)  F32.1       5. Posttraumatic stress disorder  F43.10            Treatment Plan:  -Restart Zoloft 100 mg daily for antidepressant treatment; if GI side effects or headaches become problematic, consider reducing the dose and utilizing a titration schedule to resume his prior effective dose.  For now, we will monitor his response and tolerability while restarting his previous effective dose.  -Restart Remeron 15 mg nightly for augmentation of Zoloft with additional benefits at treating insomnia.  -1 time dose of Risperdal 0.5 mg today to ensure remission of psychosis given his recent usage of stimulants and history of stimulant induced psychosis.  He has tolerated this medication well in the past and gained improvement.  Additional doses will be available as needed if psychotic symptoms were to recur.  -Antiviral therapy has been restarted given a history of HIV  -Xarelto has been restarted for DVT prophylactic treatment  -A chemical health assessment has been scheduled for today as we aim to explore residential MICD treatments  -Continue observation status as we formulate a treatment plan for him.  Once this has been achieved, we will likely discharge back to the community versus pursuing placement at a crisis facility to aid with transitioning to a residential treatment  facility.    --  Scott Green MD   [facility] EMERGENCY DEPT  EmPATH Unit       Scott Green MD  01/08/24 1046

## 2024-01-08 NOTE — DISCHARGE INSTRUCTIONS
Aftercare Plan    Follow up with established providers and supports as scheduled. Continue taking medications as prescribed. Abstain from drugs and alcohol. Utilize your [facility] mental health crisis team as needed. They are available 24/7. Contact information is listed below.     If I am feeling unsafe or I am in a crisis, I will:   Contact my established care providers   Call the National Suicide Prevention Lifeline: 986  Go to the nearest emergency room   Call 911     Warning signs that I or other people might notice when a crisis is developing for me: changes to sleep, appetite or mood, increased anger, agitation or irritability, feeling depressed or hopeless, spending more time alone or talking less, increased crying, decreased productivity, seeing or hearing things that aren't there, thoughts of not wanting to live anymore or of actually killing myself, thoughts of hurting others    Things I am able to do on my own to cope or help me feel better: watching a favorite tv show or movie, listening to music I enjoy, going outside and breathing fresh air, going for a walk or exercising, taking a shower or bath, a cold or hot beverage, a healthy snack, drawing/coloring/painting, journaling, singing or dancing, deep breathing     I can try practicing square breathing when I begin to feel anxious - inhale through the nose for the count of 4 and the first line on the square. Exhale through the mouth for the count of 4 for the second line of the square. Repeat to complete the square. Repeat the square as many times as needed.    I can also use my five senses to practice mindfulness and grounding. What are five things I can see, four things I can hear, three things I can feel, two things I can smell, and one thing I can taste.     Things that I am able to do with others to cope or help me feel better: sometimes just talking or spending time with someone else, sharing a meal or having coffee, watching a movie or playing a  game, going for a walk or exercising    I can also use community resources including mental health hotlines, [facility] crisis teams, or apps.     Things I can use or do for distraction: movies/tv, music, reading, games, drawing/coloring/painting or other art, essential oils, exercise, cleaning/organizing, puzzles, crossword puzzles, word search, Sudoku       I can also download a meditation or relaxation jose j, like Calm, Headspace, or Insight Timer (all three offer a free version)    Changes I can make to support my mental health and wellness: Attend scheduled mental health therapy and psychiatric appointments. Take my medications as prescribed. Maintain a daily schedule/routine. Abstain from all mood altering substances, including drugs, alcohol, or medications not currently prescribed to me. Implement a self-care routine.      People in my life that I can ask for help: friends or family, trusted teachers/staff/colleagues, trusted members of my community or place of [employer], mental health crisis lines, or 911    Your [facility] has a mental health crisis team you can call 24/7: [facility], [phone]    Other things that are important when I m in crisis: to remember that the feelings I am having right now are temporary, and it won't feel like this forever, and that it is okay and important to ask for help    Substance Use Disorder Direct Access Resources    It is recommended that you abstain from all mood altering chemicals. Please contact the sober support hotline (995-683-6525) as needed; phones are answered 24 hours a day, 7 days a week.    To access substance use treatment you must have a comprehensive assessment completed to begin any treatment program.     If uninsured, please contact your [facility] of residence for eligibility screen to substance use disorder evaluation and treatment:    [place] - [phone]   [facility] [phone]   [facility] [phone]   [place] - [phone]   [facility] [phone]    [place] - [phone]   [place] - [phone]   [place] - [phone]     If you have private insurance, call the customer service number on the back of your insurance card to find an in-network substance abuse use disorder assessment. The ideal provider will be a treatment facility, licensed in the state [facility].     Community URSZULA Evaluations: Clients may call their county for a full list of providers - Availability and services listed belo are subject to change, please call the provider to confirm    [facility]  [phone]  [address] [place] [address], [address]  *Please call the above number to schedule a comprehensive assessment for determination of level of care needs. In person and virtual appointments available Mon-Fri.    [facility], [address] [place] [address], First Floor, [address], [place] [address] (next to the outpatient lab)    Phone: [phone]   Provides bridging services to people with Opiate Use Disorders (OUD) seeking care. This is a front door to Medication Assisted Treatments (MAT), ages 16+  Walk In hours: Monday-Friday 9:00am-3:00pm    [facility]  [phone]  Walk in Assessments: Mon-Friday 7a-1:45p  [address], [place], [address]    [facility]  Central Access [phone]  [address] [place] [address], [address]  *by appointment only    [facility]  [phone] (phone consultation available )  Locations in: [place], [facility], and [place], [place]  [facility] virtual IOP [phone] or visit [email or web address]   Also offers LGBTQ programming     [facility]  [phone] [address]  [place], [place], [address]  *Currently only offered via telehealth - call to set up an appointment    [facility]  [address] [place], [place], [address]  [facility]  For more information to to make a  referral call:  [phone]  Walk-in on Fridays  9-11 a.m.    [facility]  [phone]  [address] [place], [place], [address]  *available by appointments only    [facility]  [phone] [address] [place], [place], [address]  *available by appointment only    [facility]  [phone]  [address] [place], [place], [address]  *walk in assessments available M-F starting at 7 am.    [facility]  [phone]  Locations: [facility], [facility], and [place]  *Walk in assessments availble M-F starting at 8 am -virtual only    [facility]  [phone]  [address] [place] [address]    [facility]  Virtual + Locations: [facility], [place], [place], [facility], [facility], [place], [place]   [phone]  *available by appointment only    [facility]  [phone]  [address]  [place], [address]    [facility]  [phone]  [address] [place], [place], [address]  *available by appointment    [facility]  [phone]  [address] [place], [place], [address]  *walk ins available M-TH from 9-3    [facility] program: [phone]  [address] [place], [place], [address]    [place]  [phone]  Same day substance use disorder assessments are available Monday - Friday, via walk-in or by appointment at the [place] location.  [address], [place] [address]     [facility] - adolescent and adult SUDs services  [phone]  Offer services Monday through Friday, as well as evening hours Monday through Thursday. Normally, a first appointment will be scheduled within one week  [email or web address]  Locations all over [place]    If you are intoxicated, you may be required to detox at a detox facility before starting treatment. The following  are detox facilities that you can self present to. All detox facilities are able to help you complete an assessment prior to discharge if you choose:    [facility]: Arrive at a [facility] for immediate medical evaluation    [facility]: [address] [place], [place], [address].         [phone]    [facility]: [address] [place], [place], [address]  [phone]     [facility] ([facility]): [address] [place], [place], [address] [phone]     [facility]: [address] [place], [place], [address], [phone]         Ways to help cope with sobriety:    -- Take prescribed medicines as scheduled  -- Keep follow-up appointments  -- Talk to others about your concerns  -- Get regular exercise  -- Practice deep breathing skills  -- Eat a healthy diet  -- Use community resources, including hotline numbers, [facility] crisis and support meetings  -- Stay sober and avoid places/people/things associated with substance use  --Maintain a daily schedule/routine  --Get at least 7-8 hours of sleep per night  --Create a list 10--20 healthy activities that you can do that are enjoyable and do not involve substance use  --Create daily goals (approx. 1-4 goals) per day and work to achieve them throughout the day.       Free Resources:    [place] Recovery Connection ([facility])  [facility] connects people seeking recovery to resources that help foster and sustain long-term recovery. Whether you are seeking resources for treatment, transportation, housing, job training, education, health care or other pathways to recovery, [facility] is a great place to start.  Phone: 348.983.2233. www.[place]9Lenses.AlphaLab (Great listing of all types of recovery and non-recovery related resources)    Alcoholics Anonymous  Phone: 8-416-ALCOHOL  Website: HTTP://WWW.AA.ORG/  AA [place] (509-567-5974 or http://a[place].org)  AA [place] (876-883-6071 or www.aastpaul.org)     Narcotics  "Anonymous  Phone: 927.333.9774  Website: www.Delta Systems.[place].    [facility] [address], [place], [place],  Phone: [phone]  Drop-in Hours: Monday-Friday 9-11:30 am. By appointment at other times.  Provides: [facility] is a drop-in center on the [place] side of [place] that provides a safe space for individuals who are homeless and have a history of chemical use. Sobriety is not a requirement but drugs and alcohol are not allowed on the property.  Services: Non-clients can access drop-in services such as Recovery and Harm Reduction Groups, referrals to case management, community activities, shower facilities, and a pool table. Individuals who are homeless and have chemical health needs may be eligible for enrollment into [facility]'s case management program. Clients and  work together to access benefits, treatment, health care, shelter, and external housing resources.    Crisis Lines  Crisis Text Line  Text 332248  You will be connected with a trained live crisis counselor to provide support.    Por espanol, texto  TANJA a 359330 o texto a 442-AYUDAME en WhatsApp    National Hope Line  1.800.SUICIDE [7261475]      Community Resources  Fast Tracker  Linking people to mental health and substance use disorder resources  fasttrackShockwave Medicaln.org     [place] Mental Health Warm Line  Peer to peer support  Monday thru Saturday, 12 pm to 10 pm  245.380.8004 or 0.955.370.3482  Text \"Support\" to 46369    National [place] on Mental Illness ([place])  595.239.9290 or 1.888.SJ.HELPS      Mental Health Apps  My3  https://my3app.org/    VirtualHopeBox  https://DigiFit.org/apps/virtual-hope-box/      Additional Information  Today you were seen by a licensed mental health professional through Triage and Transition services, [facility] ([facility])  for a crisis assessment in the Emergency Department at [facility].  It is recommended that you " follow up with your established providers (psychiatrist, mental health therapist, and/or primary care doctor - as relevant) as soon as possible. Coordinators from [facility] will be calling you in the next 24-48 hours to ensure that you have the resources you need.  You can also contact [facility] coordinators directly at [phone]. You may have been scheduled for or offered an appointment with a mental health provider. [facility] maintains an extensive network of licensed behavioral health providers to connect patients with the services they need.  We do not charge providers a fee to participate in our referral network.  We match patients with providers based on a patient's specific needs, insurance coverage, and location.  Our first effort will be to refer you to a provider within your care system, and will utilize providers outside your care system as needed.

## 2024-01-08 NOTE — ED NOTES
"Pt. has been sleeping on and off most of the morning. \"Very tired\". Restarted on medication. Ate well for meals. Completed visit with [facility] and provider. Completed SUDS assessment-minimally cooperative. Will be having a re-visit with [facility] to determine disposition.   "

## 2024-01-09 ENCOUNTER — PATIENT OUTREACH (OUTPATIENT)
Dept: CARE COORDINATION | Facility: CLINIC | Age: 49
End: 2024-01-09
Payer: COMMERCIAL

## 2024-01-10 NOTE — PROGRESS NOTES
Connected Care Resource Center:   Connected Care Resource Center Contact  [facility]/Voicemail     Clinical Data: Post-Discharge Outreach     Outreach attempted x 2.  Unable to leave a voicemail, patients phone disconnects without voicemail.      Plan:  [facility] will do no further outreaches at this time.       KEMAL Alexander  [facility] [place], [facility]    *Connected Care Resource Team does NOT follow patient ongoing. Referrals are identified based on internal discharge reports and the outreach is to ensure patient has an understanding of their discharge instructions.